# Patient Record
Sex: FEMALE | Race: ASIAN | Employment: OTHER | URBAN - METROPOLITAN AREA
[De-identification: names, ages, dates, MRNs, and addresses within clinical notes are randomized per-mention and may not be internally consistent; named-entity substitution may affect disease eponyms.]

---

## 2017-03-23 ENCOUNTER — OFFICE VISIT (OUTPATIENT)
Dept: INTERNAL MEDICINE CLINIC | Age: 62
End: 2017-03-23

## 2017-03-23 ENCOUNTER — CARE COORDINATION (OUTPATIENT)
Dept: INTERNAL MEDICINE CLINIC | Age: 62
End: 2017-03-23

## 2017-03-23 VITALS
HEIGHT: 60 IN | BODY MASS INDEX: 25.91 KG/M2 | DIASTOLIC BLOOD PRESSURE: 90 MMHG | SYSTOLIC BLOOD PRESSURE: 160 MMHG | WEIGHT: 132 LBS | OXYGEN SATURATION: 99 % | HEART RATE: 94 BPM | TEMPERATURE: 98.7 F

## 2017-03-23 DIAGNOSIS — E87.6 HYPOKALEMIA: Primary | ICD-10-CM

## 2017-03-23 DIAGNOSIS — E87.6 HYPOKALEMIA: ICD-10-CM

## 2017-03-23 DIAGNOSIS — I10 ESSENTIAL HYPERTENSION: ICD-10-CM

## 2017-03-23 LAB
ANION GAP SERPL CALCULATED.3IONS-SCNC: 17 MMOL/L (ref 3–16)
BUN BLDV-MCNC: 11 MG/DL (ref 7–20)
CALCIUM SERPL-MCNC: 8.8 MG/DL (ref 8.3–10.6)
CHLORIDE BLD-SCNC: 96 MMOL/L (ref 99–110)
CO2: 26 MMOL/L (ref 21–32)
CREAT SERPL-MCNC: 0.7 MG/DL (ref 0.6–1.2)
GFR AFRICAN AMERICAN: >60
GFR NON-AFRICAN AMERICAN: >60
GLUCOSE BLD-MCNC: 298 MG/DL (ref 70–99)
POTASSIUM SERPL-SCNC: 3.6 MMOL/L (ref 3.5–5.1)
SODIUM BLD-SCNC: 139 MMOL/L (ref 136–145)

## 2017-03-23 PROCEDURE — G8427 DOCREV CUR MEDS BY ELIG CLIN: HCPCS | Performed by: NURSE PRACTITIONER

## 2017-03-23 PROCEDURE — 3017F COLORECTAL CA SCREEN DOC REV: CPT | Performed by: NURSE PRACTITIONER

## 2017-03-23 PROCEDURE — 3014F SCREEN MAMMO DOC REV: CPT | Performed by: NURSE PRACTITIONER

## 2017-03-23 PROCEDURE — G8419 CALC BMI OUT NRM PARAM NOF/U: HCPCS | Performed by: NURSE PRACTITIONER

## 2017-03-23 PROCEDURE — 99214 OFFICE O/P EST MOD 30 MIN: CPT | Performed by: NURSE PRACTITIONER

## 2017-03-23 PROCEDURE — G8484 FLU IMMUNIZE NO ADMIN: HCPCS | Performed by: NURSE PRACTITIONER

## 2017-03-23 PROCEDURE — 1036F TOBACCO NON-USER: CPT | Performed by: NURSE PRACTITIONER

## 2017-04-06 ENCOUNTER — OFFICE VISIT (OUTPATIENT)
Dept: INTERNAL MEDICINE CLINIC | Age: 62
End: 2017-04-06

## 2017-04-06 VITALS
WEIGHT: 135 LBS | SYSTOLIC BLOOD PRESSURE: 140 MMHG | OXYGEN SATURATION: 98 % | HEART RATE: 62 BPM | BODY MASS INDEX: 26.5 KG/M2 | DIASTOLIC BLOOD PRESSURE: 80 MMHG | HEIGHT: 60 IN

## 2017-04-06 DIAGNOSIS — Z79.4 TYPE 2 DIABETES MELLITUS WITHOUT COMPLICATION, WITH LONG-TERM CURRENT USE OF INSULIN (HCC): Primary | Chronic | ICD-10-CM

## 2017-04-06 DIAGNOSIS — E11.9 TYPE 2 DIABETES MELLITUS WITHOUT COMPLICATION, WITH LONG-TERM CURRENT USE OF INSULIN (HCC): Primary | Chronic | ICD-10-CM

## 2017-04-06 DIAGNOSIS — I10 MALIGNANT HYPERTENSION: ICD-10-CM

## 2017-04-06 DIAGNOSIS — M54.42 CHRONIC LEFT-SIDED LOW BACK PAIN WITH LEFT-SIDED SCIATICA: ICD-10-CM

## 2017-04-06 DIAGNOSIS — F51.01 PRIMARY INSOMNIA: ICD-10-CM

## 2017-04-06 DIAGNOSIS — G89.29 CHRONIC LEFT-SIDED LOW BACK PAIN WITH LEFT-SIDED SCIATICA: ICD-10-CM

## 2017-04-06 DIAGNOSIS — E78.00 HYPERCHOLESTEREMIA: Chronic | ICD-10-CM

## 2017-04-06 LAB
A/G RATIO: 1.3 (ref 1.1–2.2)
ALBUMIN SERPL-MCNC: 4.3 G/DL (ref 3.4–5)
ALP BLD-CCNC: 59 U/L (ref 40–129)
ALT SERPL-CCNC: 16 U/L (ref 10–40)
ANION GAP SERPL CALCULATED.3IONS-SCNC: 14 MMOL/L (ref 3–16)
AST SERPL-CCNC: 16 U/L (ref 15–37)
BILIRUB SERPL-MCNC: 0.3 MG/DL (ref 0–1)
BUN BLDV-MCNC: 9 MG/DL (ref 7–20)
CALCIUM SERPL-MCNC: 9.3 MG/DL (ref 8.3–10.6)
CHLORIDE BLD-SCNC: 94 MMOL/L (ref 99–110)
CO2: 29 MMOL/L (ref 21–32)
CREAT SERPL-MCNC: 0.6 MG/DL (ref 0.6–1.2)
GFR AFRICAN AMERICAN: >60
GFR NON-AFRICAN AMERICAN: >60
GLOBULIN: 3.3 G/DL
GLUCOSE BLD-MCNC: 281 MG/DL (ref 70–99)
POTASSIUM SERPL-SCNC: 4 MMOL/L (ref 3.5–5.1)
SODIUM BLD-SCNC: 137 MMOL/L (ref 136–145)
TOTAL PROTEIN: 7.6 G/DL (ref 6.4–8.2)

## 2017-04-06 PROCEDURE — 99214 OFFICE O/P EST MOD 30 MIN: CPT | Performed by: INTERNAL MEDICINE

## 2017-04-06 ASSESSMENT — ENCOUNTER SYMPTOMS
BLURRED VISION: 0
STRIDOR: 0
BACK PAIN: 0
SHORTNESS OF BREATH: 0
WHEEZING: 1
COUGH: 1
HEMOPTYSIS: 0
GASTROINTESTINAL NEGATIVE: 1
SORE THROAT: 0
SPUTUM PRODUCTION: 1

## 2017-04-07 LAB
ESTIMATED AVERAGE GLUCOSE: 274.7 MG/DL
HBA1C MFR BLD: 11.2 %

## 2017-04-11 ENCOUNTER — TELEPHONE (OUTPATIENT)
Dept: INTERNAL MEDICINE CLINIC | Age: 62
End: 2017-04-11

## 2017-07-06 ENCOUNTER — OFFICE VISIT (OUTPATIENT)
Dept: INTERNAL MEDICINE CLINIC | Age: 62
End: 2017-07-06

## 2017-07-06 VITALS
OXYGEN SATURATION: 96 % | BODY MASS INDEX: 26.62 KG/M2 | DIASTOLIC BLOOD PRESSURE: 60 MMHG | WEIGHT: 135.6 LBS | HEIGHT: 60 IN | SYSTOLIC BLOOD PRESSURE: 98 MMHG | HEART RATE: 62 BPM

## 2017-07-06 DIAGNOSIS — M79.602 LEFT ARM PAIN: ICD-10-CM

## 2017-07-06 DIAGNOSIS — M54.42 CHRONIC LEFT-SIDED LOW BACK PAIN WITH LEFT-SIDED SCIATICA: Primary | ICD-10-CM

## 2017-07-06 DIAGNOSIS — E11.9 TYPE 2 DIABETES MELLITUS WITHOUT COMPLICATION, WITH LONG-TERM CURRENT USE OF INSULIN (HCC): Chronic | ICD-10-CM

## 2017-07-06 DIAGNOSIS — Z91.14 POOR COMPLIANCE WITH MEDICATION: ICD-10-CM

## 2017-07-06 DIAGNOSIS — E78.00 HYPERCHOLESTEREMIA: Chronic | ICD-10-CM

## 2017-07-06 DIAGNOSIS — I10 MALIGNANT HYPERTENSION: ICD-10-CM

## 2017-07-06 DIAGNOSIS — G89.29 CHRONIC LEFT-SIDED LOW BACK PAIN WITH LEFT-SIDED SCIATICA: Primary | ICD-10-CM

## 2017-07-06 DIAGNOSIS — Z79.4 TYPE 2 DIABETES MELLITUS WITHOUT COMPLICATION, WITH LONG-TERM CURRENT USE OF INSULIN (HCC): Chronic | ICD-10-CM

## 2017-07-06 PROCEDURE — 99214 OFFICE O/P EST MOD 30 MIN: CPT | Performed by: INTERNAL MEDICINE

## 2017-07-06 PROCEDURE — 93000 ELECTROCARDIOGRAM COMPLETE: CPT | Performed by: INTERNAL MEDICINE

## 2017-07-06 ASSESSMENT — ENCOUNTER SYMPTOMS
BLURRED VISION: 0
SORE THROAT: 0
HEMOPTYSIS: 0
COUGH: 0
SPUTUM PRODUCTION: 0
BACK PAIN: 1
GASTROINTESTINAL NEGATIVE: 1
WHEEZING: 0
STRIDOR: 0
SHORTNESS OF BREATH: 0

## 2017-07-08 ENCOUNTER — TELEPHONE (OUTPATIENT)
Dept: INTERNAL MEDICINE CLINIC | Age: 62
End: 2017-07-08

## 2017-07-10 ENCOUNTER — TELEPHONE (OUTPATIENT)
Dept: INTERNAL MEDICINE CLINIC | Age: 62
End: 2017-07-10

## 2017-07-10 DIAGNOSIS — Z79.4 TYPE 2 DIABETES MELLITUS WITHOUT COMPLICATION, WITH LONG-TERM CURRENT USE OF INSULIN (HCC): Chronic | ICD-10-CM

## 2017-07-10 DIAGNOSIS — E11.41 TYPE 2 DIABETES MELLITUS WITH DIABETIC MONONEUROPATHY, WITH LONG-TERM CURRENT USE OF INSULIN (HCC): ICD-10-CM

## 2017-07-10 DIAGNOSIS — E11.9 TYPE 2 DIABETES MELLITUS WITHOUT COMPLICATION, WITH LONG-TERM CURRENT USE OF INSULIN (HCC): Chronic | ICD-10-CM

## 2017-07-10 DIAGNOSIS — F51.01 PRIMARY INSOMNIA: ICD-10-CM

## 2017-07-10 DIAGNOSIS — Z79.4 TYPE 2 DIABETES MELLITUS WITH DIABETIC MONONEUROPATHY, WITH LONG-TERM CURRENT USE OF INSULIN (HCC): ICD-10-CM

## 2017-07-10 RX ORDER — TRAZODONE HYDROCHLORIDE 100 MG/1
TABLET ORAL
Qty: 90 TABLET | Refills: 3 | Status: SHIPPED | OUTPATIENT
Start: 2017-07-10 | End: 2017-08-02 | Stop reason: SDUPTHER

## 2017-07-12 ENCOUNTER — HOSPITAL ENCOUNTER (OUTPATIENT)
Dept: NON INVASIVE DIAGNOSTICS | Age: 62
Discharge: HOME | End: 2017-07-12

## 2017-07-12 DIAGNOSIS — M79.602 PAIN OF LEFT ARM: ICD-10-CM

## 2017-07-12 LAB
LV EF: 69 %
LVEF MODALITY: NORMAL

## 2017-07-13 ENCOUNTER — OFFICE VISIT (OUTPATIENT)
Dept: INTERNAL MEDICINE CLINIC | Age: 62
End: 2017-07-13

## 2017-07-13 VITALS
HEIGHT: 60 IN | HEART RATE: 70 BPM | WEIGHT: 140.6 LBS | SYSTOLIC BLOOD PRESSURE: 132 MMHG | TEMPERATURE: 98.8 F | DIASTOLIC BLOOD PRESSURE: 62 MMHG | BODY MASS INDEX: 27.61 KG/M2 | OXYGEN SATURATION: 97 %

## 2017-07-13 DIAGNOSIS — J21.9 ACUTE BRONCHIOLITIS DUE TO UNSPECIFIED ORGANISM: ICD-10-CM

## 2017-07-13 DIAGNOSIS — J20.9 ACUTE BRONCHITIS, UNSPECIFIED ORGANISM: ICD-10-CM

## 2017-07-13 PROCEDURE — 99213 OFFICE O/P EST LOW 20 MIN: CPT | Performed by: INTERNAL MEDICINE

## 2017-07-13 RX ORDER — GUAIFENESIN DEXTROMETHORPHAN HYDROBROMIDE ORAL SOLUTION 10; 100 MG/5ML; MG/5ML
10 SOLUTION ORAL EVERY 4 HOURS PRN
Qty: 120 ML | Refills: 0 | Status: SHIPPED | OUTPATIENT
Start: 2017-07-13 | End: 2017-10-10 | Stop reason: ALTCHOICE

## 2017-07-13 RX ORDER — DOXYCYCLINE HYCLATE 100 MG
100 TABLET ORAL 2 TIMES DAILY
Qty: 20 TABLET | Refills: 0 | Status: SHIPPED | OUTPATIENT
Start: 2017-07-13 | End: 2017-07-23

## 2017-07-13 RX ORDER — CEFUROXIME AXETIL 500 MG/1
500 TABLET ORAL 2 TIMES DAILY
Qty: 20 TABLET | Refills: 0 | Status: SHIPPED | OUTPATIENT
Start: 2017-07-13 | End: 2017-07-23

## 2017-07-13 ASSESSMENT — ENCOUNTER SYMPTOMS
BLURRED VISION: 0
STRIDOR: 0
BACK PAIN: 0
SHORTNESS OF BREATH: 0
HEMOPTYSIS: 0
GASTROINTESTINAL NEGATIVE: 1
SPUTUM PRODUCTION: 1
WHEEZING: 1
COUGH: 1
SORE THROAT: 1

## 2017-07-18 ENCOUNTER — TELEPHONE (OUTPATIENT)
Dept: INTERNAL MEDICINE CLINIC | Age: 62
End: 2017-07-18

## 2017-08-02 DIAGNOSIS — E11.41 TYPE 2 DIABETES MELLITUS WITH DIABETIC MONONEUROPATHY, WITH LONG-TERM CURRENT USE OF INSULIN (HCC): ICD-10-CM

## 2017-08-02 DIAGNOSIS — E11.9 TYPE 2 DIABETES MELLITUS WITHOUT COMPLICATION, WITH LONG-TERM CURRENT USE OF INSULIN (HCC): Chronic | ICD-10-CM

## 2017-08-02 DIAGNOSIS — E78.00 HYPERCHOLESTEREMIA: Chronic | ICD-10-CM

## 2017-08-02 DIAGNOSIS — Z79.4 TYPE 2 DIABETES MELLITUS WITH DIABETIC MONONEUROPATHY, WITH LONG-TERM CURRENT USE OF INSULIN (HCC): ICD-10-CM

## 2017-08-02 DIAGNOSIS — M48.02 CERVICAL SPINAL STENOSIS: ICD-10-CM

## 2017-08-02 DIAGNOSIS — I10 MALIGNANT HYPERTENSION: ICD-10-CM

## 2017-08-02 DIAGNOSIS — Z79.4 TYPE 2 DIABETES MELLITUS WITHOUT COMPLICATION, WITH LONG-TERM CURRENT USE OF INSULIN (HCC): Chronic | ICD-10-CM

## 2017-08-02 DIAGNOSIS — F51.01 PRIMARY INSOMNIA: ICD-10-CM

## 2017-08-02 DIAGNOSIS — R41.3 MEMORY LOSS: ICD-10-CM

## 2017-08-02 RX ORDER — TRAZODONE HYDROCHLORIDE 100 MG/1
TABLET ORAL
Qty: 90 TABLET | Refills: 3 | Status: SHIPPED | OUTPATIENT
Start: 2017-08-02 | End: 2017-08-02 | Stop reason: SDUPTHER

## 2017-08-02 RX ORDER — GABAPENTIN 300 MG/1
300 CAPSULE ORAL 3 TIMES DAILY
Qty: 270 CAPSULE | Refills: 3 | Status: SHIPPED | OUTPATIENT
Start: 2017-08-02 | End: 2017-10-30 | Stop reason: SDUPTHER

## 2017-08-02 RX ORDER — ATORVASTATIN CALCIUM 40 MG/1
40 TABLET, FILM COATED ORAL DAILY
Qty: 90 TABLET | Refills: 3 | Status: SHIPPED | OUTPATIENT
Start: 2017-08-02 | End: 2018-08-14 | Stop reason: SDUPTHER

## 2017-08-02 RX ORDER — LOSARTAN POTASSIUM AND HYDROCHLOROTHIAZIDE 25; 100 MG/1; MG/1
1 TABLET ORAL DAILY
Qty: 90 TABLET | Refills: 3 | Status: SHIPPED | OUTPATIENT
Start: 2017-08-02 | End: 2017-11-02 | Stop reason: SDUPTHER

## 2017-08-02 RX ORDER — METOPROLOL SUCCINATE 50 MG/1
50 TABLET, EXTENDED RELEASE ORAL DAILY
Qty: 90 TABLET | Refills: 3 | Status: SHIPPED | OUTPATIENT
Start: 2017-08-02 | End: 2017-08-02 | Stop reason: SDUPTHER

## 2017-08-02 RX ORDER — DONEPEZIL HYDROCHLORIDE 5 MG/1
5 TABLET, FILM COATED ORAL NIGHTLY
Qty: 90 TABLET | Refills: 3 | Status: SHIPPED | OUTPATIENT
Start: 2017-08-02 | End: 2017-08-02 | Stop reason: SDUPTHER

## 2017-08-02 RX ORDER — DONEPEZIL HYDROCHLORIDE 5 MG/1
5 TABLET, FILM COATED ORAL NIGHTLY
Qty: 90 TABLET | Refills: 3 | Status: SHIPPED | OUTPATIENT
Start: 2017-08-02 | End: 2017-11-02 | Stop reason: SDUPTHER

## 2017-08-02 RX ORDER — TRAZODONE HYDROCHLORIDE 100 MG/1
TABLET ORAL
Qty: 90 TABLET | Refills: 3 | Status: SHIPPED | OUTPATIENT
Start: 2017-08-02 | End: 2017-10-30 | Stop reason: SDUPTHER

## 2017-08-02 RX ORDER — METOPROLOL SUCCINATE 50 MG/1
50 TABLET, EXTENDED RELEASE ORAL DAILY
Qty: 90 TABLET | Refills: 3 | Status: SHIPPED | OUTPATIENT
Start: 2017-08-02 | End: 2017-10-23 | Stop reason: SDUPTHER

## 2017-08-02 RX ORDER — GABAPENTIN 300 MG/1
300 CAPSULE ORAL 3 TIMES DAILY
Qty: 270 CAPSULE | Refills: 3 | Status: SHIPPED | OUTPATIENT
Start: 2017-08-02 | End: 2017-08-02 | Stop reason: SDUPTHER

## 2017-08-02 RX ORDER — ATORVASTATIN CALCIUM 40 MG/1
40 TABLET, FILM COATED ORAL DAILY
Qty: 90 TABLET | Refills: 3 | Status: SHIPPED | OUTPATIENT
Start: 2017-08-02 | End: 2017-08-02 | Stop reason: SDUPTHER

## 2017-08-02 RX ORDER — LOSARTAN POTASSIUM AND HYDROCHLOROTHIAZIDE 25; 100 MG/1; MG/1
1 TABLET ORAL DAILY
Qty: 90 TABLET | Refills: 3 | Status: SHIPPED | OUTPATIENT
Start: 2017-08-02 | End: 2017-08-02 | Stop reason: SDUPTHER

## 2017-08-05 ENCOUNTER — TELEPHONE (OUTPATIENT)
Dept: INTERNAL MEDICINE CLINIC | Age: 62
End: 2017-08-05

## 2017-08-05 DIAGNOSIS — F51.01 PRIMARY INSOMNIA: ICD-10-CM

## 2017-08-05 DIAGNOSIS — M48.02 CERVICAL SPINAL STENOSIS: ICD-10-CM

## 2017-08-05 DIAGNOSIS — Z79.4 TYPE 2 DIABETES MELLITUS WITHOUT COMPLICATION, WITH LONG-TERM CURRENT USE OF INSULIN (HCC): Primary | Chronic | ICD-10-CM

## 2017-08-05 DIAGNOSIS — I10 ESSENTIAL HYPERTENSION: ICD-10-CM

## 2017-08-05 DIAGNOSIS — M54.42 CHRONIC LEFT-SIDED LOW BACK PAIN WITH LEFT-SIDED SCIATICA: ICD-10-CM

## 2017-08-05 DIAGNOSIS — E11.9 TYPE 2 DIABETES MELLITUS WITHOUT COMPLICATION, WITH LONG-TERM CURRENT USE OF INSULIN (HCC): Primary | Chronic | ICD-10-CM

## 2017-08-05 DIAGNOSIS — G89.29 CHRONIC LEFT-SIDED LOW BACK PAIN WITH LEFT-SIDED SCIATICA: ICD-10-CM

## 2017-08-05 DIAGNOSIS — G31.84 MCI (MILD COGNITIVE IMPAIRMENT): ICD-10-CM

## 2017-08-07 ENCOUNTER — TELEPHONE (OUTPATIENT)
Dept: INTERNAL MEDICINE CLINIC | Age: 62
End: 2017-08-07

## 2017-08-07 PROBLEM — G31.84 MCI (MILD COGNITIVE IMPAIRMENT): Status: ACTIVE | Noted: 2017-08-07

## 2017-08-08 ENCOUNTER — CARE COORDINATION (OUTPATIENT)
Dept: INTERNAL MEDICINE CLINIC | Age: 62
End: 2017-08-08

## 2017-08-08 DIAGNOSIS — G31.84 MCI (MILD COGNITIVE IMPAIRMENT): Primary | ICD-10-CM

## 2017-08-08 DIAGNOSIS — Z79.4 TYPE 2 DIABETES MELLITUS WITHOUT COMPLICATION, WITH LONG-TERM CURRENT USE OF INSULIN (HCC): Chronic | ICD-10-CM

## 2017-08-08 DIAGNOSIS — I10 ESSENTIAL HYPERTENSION: ICD-10-CM

## 2017-08-08 DIAGNOSIS — E11.9 TYPE 2 DIABETES MELLITUS WITHOUT COMPLICATION, WITH LONG-TERM CURRENT USE OF INSULIN (HCC): Chronic | ICD-10-CM

## 2017-08-16 ENCOUNTER — CARE COORDINATION (OUTPATIENT)
Dept: INTERNAL MEDICINE CLINIC | Age: 62
End: 2017-08-16

## 2017-08-17 ENCOUNTER — CARE COORDINATION (OUTPATIENT)
Dept: INTERNAL MEDICINE CLINIC | Age: 62
End: 2017-08-17

## 2017-08-21 ENCOUNTER — CARE COORDINATION (OUTPATIENT)
Dept: INTERNAL MEDICINE CLINIC | Age: 62
End: 2017-08-21

## 2017-08-22 ENCOUNTER — CARE COORDINATION (OUTPATIENT)
Dept: INTERNAL MEDICINE CLINIC | Age: 62
End: 2017-08-22

## 2017-08-24 ENCOUNTER — CARE COORDINATION (OUTPATIENT)
Dept: INTERNAL MEDICINE CLINIC | Age: 62
End: 2017-08-24

## 2017-08-29 ENCOUNTER — CARE COORDINATION (OUTPATIENT)
Dept: INTERNAL MEDICINE CLINIC | Age: 62
End: 2017-08-29

## 2017-09-06 ENCOUNTER — CARE COORDINATION (OUTPATIENT)
Dept: INTERNAL MEDICINE CLINIC | Age: 62
End: 2017-09-06

## 2017-09-18 ENCOUNTER — CARE COORDINATOR VISIT (OUTPATIENT)
Dept: INTERNAL MEDICINE CLINIC | Age: 62
End: 2017-09-18

## 2017-09-18 DIAGNOSIS — E11.41 TYPE 2 DIABETES MELLITUS WITH DIABETIC MONONEUROPATHY, UNSPECIFIED LONG TERM INSULIN USE STATUS: ICD-10-CM

## 2017-09-18 DIAGNOSIS — I10 MALIGNANT HYPERTENSION: ICD-10-CM

## 2017-09-18 RX ORDER — CLONIDINE HYDROCHLORIDE 0.2 MG/1
0.2 TABLET ORAL DAILY
Qty: 180 TABLET | Refills: 3 | Status: SHIPPED | OUTPATIENT
Start: 2017-09-18 | End: 2017-09-18 | Stop reason: SDUPTHER

## 2017-09-18 RX ORDER — CLONIDINE HYDROCHLORIDE 0.2 MG/1
0.2 TABLET ORAL DAILY
Qty: 180 TABLET | Refills: 3 | Status: SHIPPED | OUTPATIENT
Start: 2017-09-18 | End: 2018-09-27 | Stop reason: SDUPTHER

## 2017-10-02 ENCOUNTER — TELEPHONE (OUTPATIENT)
Dept: INTERNAL MEDICINE CLINIC | Age: 62
End: 2017-10-02

## 2017-10-02 PROBLEM — R07.2 PRECORDIAL PAIN: Status: ACTIVE | Noted: 2017-10-02

## 2017-10-02 PROBLEM — R07.9 CHEST PAIN: Status: ACTIVE | Noted: 2017-10-02

## 2017-10-02 NOTE — TELEPHONE ENCOUNTER
Tonia RN with [facility] is calling to speak with you regarding patient care please call her at [phone]

## 2017-10-04 ENCOUNTER — CARE COORDINATION (OUTPATIENT)
Dept: INTERNAL MEDICINE CLINIC | Age: 62
End: 2017-10-04

## 2017-10-04 ENCOUNTER — OFFICE VISIT (OUTPATIENT)
Dept: INTERNAL MEDICINE CLINIC | Age: 62
End: 2017-10-04

## 2017-10-04 VITALS
BODY MASS INDEX: 26.55 KG/M2 | OXYGEN SATURATION: 98 % | HEART RATE: 53 BPM | WEIGHT: 135.2 LBS | HEIGHT: 60 IN | SYSTOLIC BLOOD PRESSURE: 130 MMHG | DIASTOLIC BLOOD PRESSURE: 72 MMHG

## 2017-10-04 DIAGNOSIS — E78.00 HYPERCHOLESTEREMIA: Chronic | ICD-10-CM

## 2017-10-04 DIAGNOSIS — I10 ESSENTIAL HYPERTENSION: ICD-10-CM

## 2017-10-04 DIAGNOSIS — Z79.4 TYPE 2 DIABETES MELLITUS WITHOUT COMPLICATION, WITH LONG-TERM CURRENT USE OF INSULIN (HCC): Primary | Chronic | ICD-10-CM

## 2017-10-04 DIAGNOSIS — Z12.31 ENCOUNTER FOR SCREENING MAMMOGRAM FOR MALIGNANT NEOPLASM OF BREAST: ICD-10-CM

## 2017-10-04 DIAGNOSIS — R07.2 PRECORDIAL PAIN: ICD-10-CM

## 2017-10-04 DIAGNOSIS — E11.9 TYPE 2 DIABETES MELLITUS WITHOUT COMPLICATION, WITH LONG-TERM CURRENT USE OF INSULIN (HCC): Chronic | ICD-10-CM

## 2017-10-04 DIAGNOSIS — Z79.4 TYPE 2 DIABETES MELLITUS WITHOUT COMPLICATION, WITH LONG-TERM CURRENT USE OF INSULIN (HCC): Chronic | ICD-10-CM

## 2017-10-04 DIAGNOSIS — G31.84 MCI (MILD COGNITIVE IMPAIRMENT): ICD-10-CM

## 2017-10-04 DIAGNOSIS — E11.9 TYPE 2 DIABETES MELLITUS WITHOUT COMPLICATION, WITH LONG-TERM CURRENT USE OF INSULIN (HCC): Primary | Chronic | ICD-10-CM

## 2017-10-04 DIAGNOSIS — M54.42 CHRONIC LEFT-SIDED LOW BACK PAIN WITH LEFT-SIDED SCIATICA: ICD-10-CM

## 2017-10-04 DIAGNOSIS — G89.29 CHRONIC LEFT-SIDED LOW BACK PAIN WITH LEFT-SIDED SCIATICA: ICD-10-CM

## 2017-10-04 LAB
A/G RATIO: 1.2 (ref 1.1–2.2)
ALBUMIN SERPL-MCNC: 4.2 G/DL (ref 3.4–5)
ALP BLD-CCNC: 46 U/L (ref 40–129)
ALT SERPL-CCNC: 15 U/L (ref 10–40)
ANION GAP SERPL CALCULATED.3IONS-SCNC: 13 MMOL/L (ref 3–16)
AST SERPL-CCNC: 18 U/L (ref 15–37)
BASOPHILS ABSOLUTE: 0.1 K/UL (ref 0–0.2)
BASOPHILS RELATIVE PERCENT: 0.7 %
BILIRUB SERPL-MCNC: 0.5 MG/DL (ref 0–1)
BUN BLDV-MCNC: 15 MG/DL (ref 7–20)
CALCIUM SERPL-MCNC: 10 MG/DL (ref 8.3–10.6)
CHLORIDE BLD-SCNC: 97 MMOL/L (ref 99–110)
CHOLESTEROL, TOTAL: 165 MG/DL (ref 0–199)
CO2: 31 MMOL/L (ref 21–32)
CREAT SERPL-MCNC: 0.7 MG/DL (ref 0.6–1.2)
EOSINOPHILS ABSOLUTE: 0.2 K/UL (ref 0–0.6)
EOSINOPHILS RELATIVE PERCENT: 2.6 %
GFR AFRICAN AMERICAN: >60
GFR NON-AFRICAN AMERICAN: >60
GLOBULIN: 3.6 G/DL
GLUCOSE BLD-MCNC: 241 MG/DL (ref 70–99)
HCT VFR BLD CALC: 39.7 % (ref 36–48)
HDLC SERPL-MCNC: 58 MG/DL (ref 40–60)
HEMOGLOBIN: 13.4 G/DL (ref 12–16)
LDL CHOLESTEROL CALCULATED: 67 MG/DL
LYMPHOCYTES ABSOLUTE: 2.3 K/UL (ref 1–5.1)
LYMPHOCYTES RELATIVE PERCENT: 31 %
MCH RBC QN AUTO: 30.7 PG (ref 26–34)
MCHC RBC AUTO-ENTMCNC: 33.7 G/DL (ref 31–36)
MCV RBC AUTO: 91 FL (ref 80–100)
MONOCYTES ABSOLUTE: 0.6 K/UL (ref 0–1.3)
MONOCYTES RELATIVE PERCENT: 8.6 %
NEUTROPHILS ABSOLUTE: 4.3 K/UL (ref 1.7–7.7)
NEUTROPHILS RELATIVE PERCENT: 57.1 %
PDW BLD-RTO: 13.4 % (ref 12.4–15.4)
PLATELET # BLD: 248 K/UL (ref 135–450)
PMV BLD AUTO: 9.2 FL (ref 5–10.5)
POTASSIUM SERPL-SCNC: 4.2 MMOL/L (ref 3.5–5.1)
RBC # BLD: 4.36 M/UL (ref 4–5.2)
SODIUM BLD-SCNC: 141 MMOL/L (ref 136–145)
TOTAL PROTEIN: 7.8 G/DL (ref 6.4–8.2)
TRIGL SERPL-MCNC: 198 MG/DL (ref 0–150)
TSH SERPL DL<=0.05 MIU/L-ACNC: 1.69 UIU/ML (ref 0.27–4.2)
VLDLC SERPL CALC-MCNC: 40 MG/DL
WBC # BLD: 7.5 K/UL (ref 4–11)

## 2017-10-04 PROCEDURE — 99214 OFFICE O/P EST MOD 30 MIN: CPT | Performed by: INTERNAL MEDICINE

## 2017-10-04 ASSESSMENT — ENCOUNTER SYMPTOMS
COUGH: 0
SHORTNESS OF BREATH: 0
STRIDOR: 0
SORE THROAT: 0
HEMOPTYSIS: 0
BLURRED VISION: 0
GASTROINTESTINAL NEGATIVE: 1
BACK PAIN: 0
WHEEZING: 0

## 2017-10-04 NOTE — CARE COORDINATION
Ambulatory Care Coordination Note  10/4/2017  CM Risk Score: 2  Yamilet Mortality Risk Score:      ACC: Karthikeyan Gomez, RN    Summary Note: Met with pt and boyfriend Baldo. Reviewed medication list with both. Pt has been taking Lantus 40U QD instead of BID. Discussed with Dr Go Loera and confirmed that pt should be taking it BID. Reviewed Clonidine order as well. Diabetes Assessment    Medic Alert ID:  No   Meal Planning:  Avoidance of concentrated sweets   How often do you test your blood sugar?:  Daily   Do you have barriers with adherence to non-pharmacologic self-management interventions? (Nutrition/Exercise/Self-Monitoring):  Yes   Have you ever had to go to the ED for symptoms of low blood sugar?:  No      No patient-reported symptoms   Do you have hyperglycemia symptoms?:  No   Do you have hypoglycemia symptoms?:  No   Last Blood Sugar Value:  130   Blood Sugar Monitoring Regimen:  Morning Fasting   Blood Sugar Trends:  Fluctuating (Comment: FBS's are 130-170's. Before dinner 140-200's.)                   Care Coordination Interventions    Referral from Primary Care Provider:  No   Suggested Interventions and Community Resources   Diabetes Education: In Process (Comment: Pt with fluctuating BS's)   Other Services or Interventions:  VA benefits             Goals Addressed     None          Prior to Admission medications    Medication Sig Start Date End Date Taking?  Authorizing Provider   aspirin 325 MG tablet Take 1 tablet by mouth daily 10/3/17   Adelaida Elaine MD   amLODIPine (NORVASC) 5 MG tablet Take 1 tablet by mouth daily 10/3/17   Adelaida Elaine MD   cloNIDine (CATAPRES) 0.2 MG tablet Take 1 tablet by mouth daily And as needed Take an extra tablet for SBP >160 9/18/17   Adelaida Elaine MD   traZODone (DESYREL) 100 MG tablet TAKE ONE TABLET BY MOUTH DAILY AS NEEDED FOR SLEEP 8/2/17   Adelaida Elaine MD   empagliflozin (JARDIANCE) 25 MG tablet Take 25 mg by mouth daily 8/2/17 Warden Cesar MD   SITagliptin (JANUVIA) 100 MG tablet Take 1 tablet by mouth daily For diabetes 8/2/17   Warden Cesar MD   metoprolol succinate (TOPROL XL) 50 MG extended release tablet Take 1 tablet by mouth daily For heart 8/2/17   Warden Cesar MD   metFORMIN (GLUCOPHAGE) 1000 MG tablet Take 1 tablet by mouth 2 times daily (with meals) For diabetes 8/2/17   Warden Cesar MD   losartan-hydrochlorothiazide (HYZAAR) 100-25 MG per tablet Take 1 tablet by mouth daily 8/2/17   Warden Cesar MD   insulin glargine (LANTUS SOLOSTAR) 100 UNIT/ML injection pen Inject 40 Units into the skin 2 times daily 8/2/17   Warden Cesar MD   gabapentin (NEURONTIN) 300 MG capsule Take 1 capsule by mouth 3 times daily For nerve pain related to diabetes 8/2/17   Warden Cesar MD   donepezil (ARICEPT) 5 MG tablet Take 1 tablet by mouth nightly 8/2/17   Warden Cesar MD   atorvastatin (LIPITOR) 40 MG tablet Take 1 tablet by mouth daily For cholesterol and heart 8/2/17   Warden Cesar MD   dextromethorphan-guaiFENesin (ROBITUSSIN-DM)  MG/5ML syrup Take 10 mLs by mouth every 4 hours as needed for Cough 7/13/17   Warden Cesar MD   Acetaminophen 650 MG TABS Take 650 mg by mouth every 4 hours as needed for Pain 5/12/16   Warden Cesar MD   Insulin Pen Needle 31G X 5 MM MISC 1 each by Does not apply route daily 5/12/16   Warden Cesar MD   glucose blood VI test strips (FREESTYLE LITE) strip 1 each by In Vitro route 2 times daily (before meals) 5/12/16   Warden Cesar MD   FREESTYLE LANCETS MISC 1 each by Does not apply route 2 times daily (before meals) 5/12/16   Warden Cesar MD   Blood Glucose Monitoring Suppl (FREESTYLE LITE) SHANNEN 1 Device by Does not apply route 2 times daily (before meals) 5/12/16   Warden Cesar MD       Future Appointments  Date Time Provider Nael Millie   10/10/2017 9:30 AM Gadiel Carballo MD [facility]

## 2017-10-04 NOTE — PROGRESS NOTES
OUTPATIENT PROGRESS NOTE    Date of Service:  10/4/2017  Address: [address] [place] [address] INTERNAL MEDICINE  [address] [place] [address]  Dept: [phone]    Subjective:      Patient ID: [id number]  Edwar Dukes is a 57 y.o. female with:  Chief Complaint   Patient presents with    Hyperlipidemia     HPI: 56 y.o. female immigrant from the [facility] () w/ PMHx DM, HTN, HLD, and insomnia who p/w chest pain, brought to the ED by her boyfriend. Was admitted found in HTN emergency, dropped her BP with addition of amlodipine [facility]     Has been compliant with meds now with very vigilant home health. BP much better on amlodipine      Blood sugar testin time per day; 130 - 170 recent avg 168  Meter: Freestyle Lite meter  Hypoglycemia: several per week, mostly in afternoon after exercise  Symptoms: rarely shaking/ sweaty, more often non-specific sensation Threshold: 40's       Last dilated eye exam: Will give referral today to Dr. Marlon Cameron  Last dental exam: q6mos  Last podiatry exam: n/a  Associated symptoms: Now endorses polyuria, claudication, and neuropathy symptoms      Pt has sx's related to elevated blood pressure including HA. No CP, SOB, vision changes, orthopnea, and edema. Has been feeling dizzy lately.  Jordan Sterling for [facility]. Normal pap last year. Review of Systems   Constitutional: Negative for chills, diaphoresis, fever, malaise/fatigue and weight loss. HENT: Negative for congestion, ear discharge, ear pain, hearing loss, nosebleeds, sore throat and tinnitus. Eyes: Negative for blurred vision. Respiratory: Negative for cough, hemoptysis, shortness of breath, wheezing and stridor. Cardiovascular: Negative for chest pain. Gastrointestinal: Negative. Genitourinary: Negative. Musculoskeletal: Positive for myalgias. Negative for back pain. Skin: Negative. Neurological: Negative for dizziness, weakness and headaches. Endo/Heme/Allergies: Negative. Psychiatric/Behavioral: Positive for memory loss. All other systems reviewed and are negative. Objective: Wt Readings from Last 3 Encounters:   10/04/17 135 lb 3.2 oz (61.3 kg)   10/03/17 138 lb 10.7 oz (62.9 kg)   07/13/17 140 lb 9.6 oz (63.8 kg)     BP Readings from Last 3 Encounters:   10/04/17 130/72   10/03/17 105/64   07/13/17 132/62      Vitals:    10/04/17 1001   BP: 130/72   Site: Right Arm   Pulse: 53   SpO2: 98%   Weight: 135 lb 3.2 oz (61.3 kg)   Height: 5' (1.524 m)     Body mass index is 26.4 kg/(m^2). Physical Exam   Constitutional: She is oriented to person, place, and time and well-developed, well-nourished, and in no distress. Vital signs are normal. She appears not lethargic, to not be writhing in pain, not malnourished, not dehydrated and not jaundiced. She appears healthy. She appears not cachectic. Non-toxic appearance. She does not have a sickly appearance. No distress. HENT:   Head: Normocephalic and atraumatic. Right Ear: Hearing, tympanic membrane, external ear and ear canal normal.   Left Ear: Hearing, tympanic membrane, external ear and ear canal normal.   Nose: Nose normal.   Mouth/Throat: Oropharynx is clear and moist. No oropharyngeal exudate. Eyes: Conjunctivae and EOM are normal. Pupils are equal, round, and reactive to light. Right eye exhibits no discharge. Left eye exhibits no discharge. No scleral icterus. Neck: Normal range of motion. Neck supple. No JVD present. No tracheal deviation present. No thyromegaly present. Cardiovascular: Normal rate, regular rhythm, normal heart sounds and intact distal pulses. Exam reveals no gallop and no friction rub. No murmur heard. Pulmonary/Chest: Effort normal and breath sounds normal. No stridor. No respiratory distress. She has no wheezes. She has no rales. She exhibits no tenderness. Abdominal: Soft. Bowel sounds are normal. She exhibits no distension and no mass.  There is no tenderness. There is no rebound and no guarding. Musculoskeletal: She exhibits no edema. Left shoulder: She exhibits decreased range of motion, tenderness and bony tenderness. Lumbar back: She exhibits decreased range of motion. Left upper leg: She exhibits tenderness. Lymphadenopathy:     She has no cervical adenopathy. Neurological: She is oriented to person, place, and time. She has normal reflexes. She appears not lethargic. No cranial nerve deficit. She exhibits normal muscle tone. Gait normal. Coordination normal.   Skin: Skin is warm and dry. No rash noted. She is not diaphoretic. No erythema. No pallor. Psychiatric: Mood, memory, affect and judgment normal.   Nursing note and vitals reviewed. Assessment/Plan:      Encounter Diagnoses   Name Primary?  Type 2 diabetes mellitus without complication, with long-term current use of insulin (HCC) Yes    Precordial pain     MCI (mild cognitive impairment)     Chronic left-sided low back pain with left-sided sciatica     Essential hypertension     Hypercholesteremia     Encounter for screening mammogram for malignant neoplasm of breast      1. Type 2 diabetes mellitus without complication, with long-term current use of insulin (HCC)  Blood glucose control better with home tracking, she should get more extended home health to help with compliance as Heron Bush also has memory issues, this complicates the care of both of them. - CBC Auto Differential; Future  - Comprehensive Metabolic Panel; Future  - Hemoglobin A1C; Future  - TSH without Reflex; Future  - Lipid Panel; Future    2. Precordial pain  Resolved, needs to consider cardiology f/u in the future    3. MCI (mild cognitive impairment)  Stable progression on aricept, should add memantine    4. Chronic left-sided low back pain with left-sided sciatica  Stable, actually improved with better weight control    5. Essential hypertension  Now on amlodipine as well.  ASA for elevated ASCVD scoring    6. Hypercholesteremia  On high intensity statin, no myalgias, goal LDL < 70. Not at goal      Additional Orders:      Orders Placed This Encounter   Procedures    MARJORIE DIGITAL SCREEN W CAD BILATERAL     Standing Status:   Future     Standing Expiration Date:   12/5/2018     Order Specific Question:   Reason for exam:     Answer:   Screening mammogram    CBC Auto Differential     Standing Status:   Future     Standing Expiration Date:   10/4/2018    Comprehensive Metabolic Panel     Standing Status:   Future     Standing Expiration Date:   10/4/2018    Hemoglobin A1C     Standing Status:   Future     Standing Expiration Date:   10/4/2018    TSH without Reflex     Standing Status:   Future     Standing Expiration Date:   10/4/2018    Lipid Panel     Standing Status:   Future     Standing Expiration Date:   10/4/2018     Order Specific Question:   Is Patient Fasting?/# of Hours     Answer:   14     No orders of the defined types were placed in this encounter. DISPOSITION:      Return in about 3 months (around 1/4/2018).   1. Greater than 25 minutes spent with patient and significant other and >20 minutes on medication dosing, use and lifestyle modifications, home safety    Oscar Fowler MD

## 2017-10-04 NOTE — MR AVS SNAPSHOT
After Visit Summary             Bethel Luu   10/4/2017 10:00 AM   Office Visit    Description:  Female : 1955   Provider:  Henry Sparks MD   Department:  [facility] Internal Medicine              Your Follow-Up and Future Appointments         Below is a list of your follow-up and future appointments. This may not be a complete list as you may have made appointments directly with providers that we are not aware of or your providers may have made some for you. Please call your providers to confirm appointments. It is important to keep your appointments. Please bring your current insurance card, photo ID, co-pay, and all medication bottles to your appointment. If self-pay, payment is expected at the time of service. Your To-Do List     Future Appointments Provider Department Dept Phone    10/10/2017 9:30 AM Dane Sutton MD [facility] [phone]    Please arrive 15 minutes prior to scheduled appointment time to complete paper work, bring photo ID and insurance cards. Future Orders Complete By Expires    CBC Auto Differential [POA4799 Custom]  10/4/2017 10/4/2018    Comprehensive Metabolic Panel [PTE26 Custom]  10/4/2017 10/4/2018    Hemoglobin A1C [LAB90 Custom]  10/4/2017 10/4/2018    Lipid Panel [LAB18 Custom]  10/4/2017 10/4/2018    MARJORIE DIGITAL SCREEN W CAD BILATERAL [ Custom]  10/4/2017 2018    TSH without Reflex [NMX814 Custom]  10/4/2017 10/4/2018    Follow-Up    Return in about 3 months (around 2018).          Information from Your Visit        Department     Name Address Phone Fax    [facility] Internal Medicine MADELE[address]ina [address] [place] [address] [phone]      You Were Seen for:         Comments    Type 2 diabetes mellitus without complication, with long-term current use of insulin ([facility]   [[id number]]         Vital Signs     Blood Pressure Pulse Height Weight Oxygen Saturation Body Mass Index · Write your numbers in your log book, along with the date and time. What else should you know about the test?  Results for adults ages 25 and older (mm Hg):  · Normal (ideal): Systolic 341 or below. Diastolic 79 or below. · Prehypertension: Systolic 420 to 483. Diastolic 80 to 89. · Hypertension: Systolic 891 or above. Diastolic 90 or above. Follow-up care is a key part of your treatment and safety. Be sure to make and go to all appointments, and call your doctor if you are having problems. It's also a good idea to keep a list of the medicines you take. Where can you learn more? Go to [email or web address] and sign in to your CaseTrek account. Enter C427 in the Spanning Cloud Apps box to learn more about \"Home Blood Pressure Test: About This Test.\"     If you do not have an account, please click on the \"Sign Up Now\" link. Current as of: November 3, 2016  Content Version: 11.3  © 7951-5977 [employer]. Care instructions adapted under license by [facility] ([facility]). If you have questions about a medical condition or this instruction, always ask your healthcare professional. [place] [address] any warranty or liability for your use of this information.               Medications and Orders      Your Current Medications Are              aspirin 325 MG tablet Take 1 tablet by mouth daily    amLODIPine (NORVASC) 5 MG tablet Take 1 tablet by mouth daily    cloNIDine (CATAPRES) 0.2 MG tablet Take 1 tablet by mouth daily And as needed Take an extra tablet for SBP >160    traZODone (DESYREL) 100 MG tablet TAKE ONE TABLET BY MOUTH DAILY AS NEEDED FOR SLEEP    empagliflozin (JARDIANCE) 25 MG tablet Take 25 mg by mouth daily    SITagliptin (JANUVIA) 100 MG tablet Take 1 tablet by mouth daily For diabetes    metoprolol succinate (TOPROL XL) 50 MG extended release tablet Take 1 tablet by mouth daily For heart    metFORMIN (GLUCOPHAGE) 1000 MG tablet Take 1 tablet by mouth 2 times 1. In your Internet browser, go to [email or web address]  2. Click on the Sign Up Now link in the Sign In box. You will see the New Member Sign Up page. 3. Enter your Numerate Access Code exactly as it appears below. You will not need to use this code after youve completed the sign-up process. If you do not sign up before the expiration date, you must request a new code. Numerate Access Code: [email or web address]  Expires: 12/2/2017  9:45 AM    4. Enter your Social Security Number ([id number]) and Date of Birth (mm/dd/yyyy) as indicated and click Submit. You will be taken to the next sign-up page. 5. Create a Adisnt ID. This will be your Numerate login ID and cannot be changed, so think of one that is secure and easy to remember. 6. Create a Numerate password. You can change your password at any time. 7. Enter your Password Reset Question and Answer. This can be used at a later time if you forget your password. 8. Enter your e-mail address. You will receive e-mail notification when new information is available in [address] 9. Click Sign Up. You can now view your medical record. Additional Information  If you have questions, please contact the physician practice where you receive care. Remember, Numerate is NOT to be used for urgent needs. For medical emergencies, dial 911. For questions regarding your Numerate account call [phone]. If you have a clinical question, please call your doctor's office.

## 2017-10-04 NOTE — CARE COORDINATION
Call placed to pt's [address] [facility] Cora Wlaters to discuss pt's OV and medications.     Kashif ALAS

## 2017-10-04 NOTE — PATIENT INSTRUCTIONS
Home Blood Pressure Test: About This Test  What is it? A home blood pressure test allows you to keep track of your blood pressure at home. Blood pressure is a measure of the force of blood against the walls of your arteries. Blood pressure readings include two numbers, such as 130/80 (say \"130 over 80\"). The first number is the systolic pressure. The second number is the diastolic pressure. Why is this test done? You may do this test at home to:  · Find out if you have high blood pressure. · Track your blood pressure if you have high blood pressure. · Track how well medicine is working to reduce high blood pressure. · Check how lifestyle changes, such as weight loss and exercise, are affecting blood pressure. How can you prepare for the test?  · Do not use caffeine, tobacco, or medicines known to raise blood pressure (such as nasal decongestant sprays) for at least 30 minutes before taking your blood pressure. · Do not exercise for at least 30 minutes before taking your blood pressure. What happens before the test?  Take your blood pressure while you feel comfortable and relaxed. Sit quietly with both feet on the floor for at least 5 minutes before the test.  What happens during the test?  · Sit with your arm slightly bent and resting on a table so that your upper arm is at the same level as your heart. · Roll up your sleeve or take off your shirt to expose your upper arm. · Wrap the blood pressure cuff around your upper arm so that the lower edge of the cuff is about 1 inch above the bend of your elbow. Proceed with the following steps depending on if you are using an automatic or manual pressure monitor. Automatic blood pressure monitors  · Press the on/off button on the automatic monitor and wait until the ready-to-measure \"heart\" symbol appears next to zero in the display window. · Press the start button. The cuff will inflate and deflate by itself.   · Your blood pressure numbers will appear on the screen. · Write your numbers in your log book, along with the date and time. Manual blood pressure monitors  · Place the earpieces of a stethoscope in your ears, and place the bell of the stethoscope over the artery, just below the cuff. · Close the valve on the rubber inflating bulb. · Squeeze the bulb rapidly with your opposite hand to inflate the cuff until the dial or column of mercury reads about 30 mm Hg higher than your usual systolic pressure. If you do not know your usual pressure, inflate the cuff to 210 mm Hg or until the pulse at your wrist disappears. · Open the pressure valve just slightly by twisting or pressing the valve on the bulb. · As you watch the pressure slowly fall, note the level on the dial at which you first start to hear a pulsing or tapping sound through the stethoscope. This is your systolic blood pressure. · Continue letting the air out slowly. The sounds will become muffled and will finally disappear. Note the pressure when the sounds completely disappear. This is your diastolic blood pressure. Let out all the remaining air. · Write your numbers in your log book, along with the date and time. What else should you know about the test?  Results for adults ages 25 and older (mm Hg):  · Normal (ideal): Systolic 325 or below. Diastolic 79 or below. · Prehypertension: Systolic 332 to 701. Diastolic 80 to 89. · Hypertension: Systolic 319 or above. Diastolic 90 or above. Follow-up care is a key part of your treatment and safety. Be sure to make and go to all appointments, and call your doctor if you are having problems. It's also a good idea to keep a list of the medicines you take. Where can you learn more? Go to [email or web address] and sign in to your [email or web address] account.  Enter C427 in the eTax Credit Exchange box to learn more about \"Home Blood Pressure Test: About This Test.\"     If you do not have an account, please click on the \"Sign Up Now\" link.  Current as of: November 3, 2016  Content Version: 11.3  © 0147-5871 [employer], [employer]. Care instructions adapted under license by [employer]. If you have questions about a medical condition or this instruction, always ask your healthcare professional. [address] any warranty or liability for your use of this information.

## 2017-10-05 LAB
ESTIMATED AVERAGE GLUCOSE: 162.8 MG/DL
HBA1C MFR BLD: 7.3 %

## 2017-10-09 ENCOUNTER — HOSPITAL ENCOUNTER (OUTPATIENT)
Dept: WOMENS IMAGING | Age: 62
Discharge: HOME | End: 2017-10-09
Attending: INTERNAL MEDICINE | Admitting: INTERNAL MEDICINE

## 2017-10-09 DIAGNOSIS — Z12.31 ENCOUNTER FOR SCREENING MAMMOGRAM FOR MALIGNANT NEOPLASM OF BREAST: ICD-10-CM

## 2017-10-10 ENCOUNTER — OFFICE VISIT (OUTPATIENT)
Dept: CARDIOLOGY CLINIC | Age: 62
End: 2017-10-10

## 2017-10-10 VITALS
DIASTOLIC BLOOD PRESSURE: 62 MMHG | OXYGEN SATURATION: 97 % | SYSTOLIC BLOOD PRESSURE: 110 MMHG | BODY MASS INDEX: 26.7 KG/M2 | HEIGHT: 60 IN | HEART RATE: 68 BPM | WEIGHT: 136 LBS

## 2017-10-10 DIAGNOSIS — Z79.4 TYPE 2 DIABETES MELLITUS WITHOUT COMPLICATION, WITH LONG-TERM CURRENT USE OF INSULIN (HCC): Chronic | ICD-10-CM

## 2017-10-10 DIAGNOSIS — I10 ESSENTIAL HYPERTENSION: ICD-10-CM

## 2017-10-10 DIAGNOSIS — E11.9 TYPE 2 DIABETES MELLITUS WITHOUT COMPLICATION, WITH LONG-TERM CURRENT USE OF INSULIN (HCC): Chronic | ICD-10-CM

## 2017-10-10 DIAGNOSIS — R07.2 PRECORDIAL PAIN: Primary | ICD-10-CM

## 2017-10-10 PROCEDURE — 99214 OFFICE O/P EST MOD 30 MIN: CPT | Performed by: INTERNAL MEDICINE

## 2017-10-10 PROCEDURE — 1036F TOBACCO NON-USER: CPT | Performed by: INTERNAL MEDICINE

## 2017-10-10 PROCEDURE — G8482 FLU IMMUNIZE ORDER/ADMIN: HCPCS | Performed by: INTERNAL MEDICINE

## 2017-10-10 PROCEDURE — 3017F COLORECTAL CA SCREEN DOC REV: CPT | Performed by: INTERNAL MEDICINE

## 2017-10-10 PROCEDURE — 1111F DSCHRG MED/CURRENT MED MERGE: CPT | Performed by: INTERNAL MEDICINE

## 2017-10-10 PROCEDURE — 3014F SCREEN MAMMO DOC REV: CPT | Performed by: INTERNAL MEDICINE

## 2017-10-10 PROCEDURE — G8417 CALC BMI ABV UP PARAM F/U: HCPCS | Performed by: INTERNAL MEDICINE

## 2017-10-10 PROCEDURE — G8427 DOCREV CUR MEDS BY ELIG CLIN: HCPCS | Performed by: INTERNAL MEDICINE

## 2017-10-10 PROCEDURE — 3046F HEMOGLOBIN A1C LEVEL >9.0%: CPT | Performed by: INTERNAL MEDICINE

## 2017-10-10 NOTE — PROGRESS NOTES
[facility], [place]  Cardiology Consult Note      Bard Alexander  1955, 57 y.o.    CC: Chest Pain/Left arm pain                 Yoselin Cook MD:      HPI:   This is a 57 y.o. female with a PMH significant for HTN, Type 2 DM and Hypercholesteremia who presented to ED 10/2/17 for evaluation of chest pain. Pt was [id number]/2/17-10/3/17 for evaluation. She was seen by my partner Dr. Elias Fernandez. Pt had Lexiscan 7/2017 showing no reversible ischemia. Patient had unremarkable work-up and was discharged home. She was told that a LHC would be considered if she has recurrence of chest pain and that she would possibly benefit from an event monitor. Patient is here today for hospital f/u. She says she continues to have chest pain that radiates to her left shoulder pain. This pain is non exertional. She denies any nausea, vomiting, diaphoresis, palpitations or dizziness. In general, she has been doing well since hospital discharge. Continues to take medication as prescribed.        Past Medical History:   Diagnosis Date    Diabetes mellitus ([address]     NIDDM    Headache     Herniated disc, cervical     Hypercholesteremia     Hypertension     Memory loss     short term    Psychiatric problem       Past Surgical History:   Procedure Laterality Date    SHOULDER SURGERY      TUBAL LIGATION        Family History   Problem Relation Age of Onset    Diabetes Mother     High Blood Pressure Mother     Diabetes Father     High Blood Pressure Father       Social History   Substance Use Topics    Smoking status: Never Smoker    Smokeless tobacco: Not on file    Alcohol use No     No Known Allergies      Review of Systems -   Constitutional: Negative for weight gain/loss; malaise, fever  Respiratory: Negative for Asthma;  cough and hemoptysis  Cardiovascular: Negative for palpitations,dizziness   Gastrointestinal: Negative for abd.pain; constipation/diarrhea;    Genitourinary: Negative for stones; hematuria; frequency hesitancy  Integumentt: Negative for rash or pruritis  Hematologic/lymphatic: Negative for blood dyscrasia; leukemia/lymphoma  Musculoskeletal: Negative for Connective tissue disease  Neurological:  Negative for Seizure   Behavioral/Psych:Negative for Bipolar disorder, Schizophrenia; Dementia  Endocrine: negative for thyroid, parathyroid disease    Physical Examination:    /62   Pulse 68   Ht 5' (1.524 m)   Wt 136 lb (61.7 kg)   SpO2 97%   BMI 26.56 kg/m²    HEENT:  Face: Atraumatic, Conjunctiva: Pink; non icteric,  Mucous Memb:  Moist, No thyromegaly or Lymphadenopathy  Respiratory:  Resp Assessment: normal, Resp Auscultation: clear  Cardiovascular: Auscultation: nl S1 & S2, Palpation:  Nl PMI;  No heaves or thrills, JVP:  normal  Abdomen: Soft, non-tender, Normal bowel sounds,  No organomegaly  Extremities: No Cyanosis or Clubbing  Neurological: Oriented to time, place, and person, Non-anxious  Psychiatric: Normal mood and affect  Skin: Warm and dry,  No rash seen     Outpatient Prescriptions Marked as Taking for the 10/10/17 encounter (Office Visit) with Néstor Washington MD   Medication Sig Dispense Refill    aspirin 325 MG tablet Take 1 tablet by mouth daily 30 tablet 3    amLODIPine (NORVASC) 5 MG tablet Take 1 tablet by mouth daily 30 tablet 3    cloNIDine (CATAPRES) 0.2 MG tablet Take 1 tablet by mouth daily And as needed Take an extra tablet for SBP >160 180 tablet 3    traZODone (DESYREL) 100 MG tablet TAKE ONE TABLET BY MOUTH DAILY AS NEEDED FOR SLEEP 90 tablet 3    SITagliptin (JANUVIA) 100 MG tablet Take 1 tablet by mouth daily For diabetes 90 tablet 3    metoprolol succinate (TOPROL XL) 50 MG extended release tablet Take 1 tablet by mouth daily For heart 90 tablet 3    metFORMIN (GLUCOPHAGE) 1000 MG tablet Take 1 tablet by mouth 2 times daily (with meals) For diabetes 180 tablet 3    insulin glargine (LANTUS SOLOSTAR) 100 UNIT/ML injection pen Inject 40 Units into the skin 2 times daily 81 mL 3    atorvastatin (LIPITOR) 40 MG tablet Take 1 tablet by mouth daily For cholesterol and heart 90 tablet 3    Acetaminophen 650 MG TABS Take 650 mg by mouth every 4 hours as needed for Pain 120 tablet 1    Insulin Pen Needle 31G X 5 MM MISC 1 each by Does not apply route daily 100 each 1    glucose blood VI test strips (FREESTYLE LITE) strip 1 each by In Vitro route 2 times daily (before meals) 100 each 3    FREESTYLE LANCETS MISC 1 each by Does not apply route 2 times daily (before meals) 100 each 3    Blood Glucose Monitoring Suppl (FREESTYLE LITE) SHANNEN 1 Device by Does not apply route 2 times daily (before meals) 1 Device 0       Labs:   Lab Results   Component Value Date    HDL 58 10/04/2017    HDL 53 11/22/2011    LDLDIRECT 150 04/30/2013    LDLCALC 67 10/04/2017    TRIG 198 10/04/2017         EKG: reviewed EKG from 10/1/17    ECHO:5/31/2016  Left ventricle size is normal. Mild concentric left ventricular hypertrophy is present. Ejection fraction is visually estimated to be 55-60 %. There is reversal of E/A inflow velocities across the mitral valve suggesting impaired left ventricular relaxation. Normal right ventricular size and function. Stress Nuclear: 7/12/2017  Pharmacological Stress/MPI Results:   1. Technically a satisfactory study.   2. Normal pharmacological stress portion of the study.   3. No evidence of Ischemia by Myocardial Perfusion Imaging.   4. Gated Study shows normal LV size and Systolic function; EF is 69 %.     ASSESSMENT AND PLAN:      Chest pain   Atypical. Noncardiac. Most likely involving left shoulder pathology  Recent stress from 7/2017 test was negative, showing no reversible ischemia. Currently takes full dose ASA; she can switch to 81 mg ASA daily (pt  would like to check with PCP). No further cardiac work up needed at this time.      Essential Hypertension   Uncontrolled during hospital stay. Started on BB and ACEI.   BP is controlled

## 2017-10-10 NOTE — PATIENT INSTRUCTIONS
Patient Education        Chest Pain: Care Instructions  Your Care Instructions  There are many things that can cause chest pain. Some are not serious and will get better on their own in a few days. But some kinds of chest pain need more testing and treatment. Your doctor may have recommended a follow-up visit in the next 8 to 12 hours. If you are not getting better, you may need more tests or treatment. Even though your doctor has released you, you still need to watch for any problems. The doctor carefully checked you, but sometimes problems can develop later. If you have new symptoms or if your symptoms do not get better, get medical care right away. If you have worse or different chest pain or pressure that lasts more than 5 minutes or you passed out (lost consciousness), call 911 or seek other emergency help right away. A medical visit is only one step in your treatment. Even if you feel better, you still need to do what your doctor recommends, such as going to all suggested follow-up appointments and taking medicines exactly as directed. This will help you recover and help prevent future problems. How can you care for yourself at home? · Rest until you feel better. · Take your medicine exactly as prescribed. Call your doctor if you think you are having a problem with your medicine. · Do not drive after taking a prescription pain medicine. When should you call for help? Call 911 if:  · You passed out (lost consciousness). · You have severe difficulty breathing. · You have symptoms of a heart attack. These may include:  ¨ Chest pain or pressure, or a strange feeling in your chest.  ¨ Sweating. ¨ Shortness of breath. ¨ Nausea or vomiting. ¨ Pain, pressure, or a strange feeling in your back, neck, jaw, or upper belly or in one or both shoulders or arms. ¨ Lightheadedness or sudden weakness. ¨ A fast or irregular heartbeat.   After you call 911, the  may tell you to chew 1 adult-strength or 2 to 4 low-dose aspirin. Wait for an ambulance. Do not try to drive yourself. Call your doctor today if:  · You have any trouble breathing. · Your chest pain gets worse. · You are dizzy or lightheaded, or you feel like you may faint. · You are not getting better as expected. · You are having new or different chest pain. Where can you learn more? Go to [email or web address].[email or web address]-[email or web address] org and sign in to your [employer] account. Enter A120 in the "[employer]" box to learn more about \"Chest Pain: Care Instructions. \"     If you do not have an account, please click on the \"Sign Up Now\" link. Current as of: March 20, 2017  Content Version: 11.3  © 3763-1351 [employer]. Care instructions adapted under license by [facility] ([facility]). If you have questions about a medical condition or this instruction, always ask your healthcare professional. [place] [address] any warranty or liability for your use of this information.

## 2017-10-20 ENCOUNTER — CARE COORDINATION (OUTPATIENT)
Dept: INTERNAL MEDICINE CLINIC | Age: 62
End: 2017-10-20

## 2017-10-20 NOTE — CARE COORDINATION
Ambulatory Care Coordination Note  10/20/2017  CM Risk Score: 2  Yamilet Mortality Risk Score:      ACC: Amber Leon, RN    Summary Note: Called pt's [facility] nurse Aaron Pedraza to f/u. She states pt's BP has been below 140/90 with daily Clonidine. Pt and SO are moving to another apt complex November 1st.  SO Isra Bowers is stressed about this. Aaron Pedraza sent SW to the home today to offer assistance with the move. Diabetes Assessment    Medic Alert ID:  No  Meal Planning:  Avoidance of concentrated sweets   How often do you test your blood sugar?:  Daily   Do you have barriers with adherence to non-pharmacologic self-management interventions? (Nutrition/Exercise/Self-Monitoring):  Yes   Have you ever had to go to the ED for symptoms of low blood sugar?:  No       No patient-reported symptoms   Do you have hyperglycemia symptoms?:  No   Do you have hypoglycemia symptoms?:  No   Last Blood Sugar Value:  123   Blood Sugar Monitoring Regimen:  Morning Fasting   Blood Sugar Trends:  No Change (Comment: BS's are below 130.)                Care Coordination Interventions    Program Enrollment:  Rising Risk  Referral from Primary Care Provider:  Yes  Suggested Interventions and Community Resources  Diabetes Education: In Process (Comment: Pt with fluctuating BS's. VN working with pt ans s/o)  [address]: In Process (Comment: [facility] for Skilled nurse)  Social Work:  In Process (Comment: Brittany [facility])  Other Services or Interventions:  VA benefits         Goals Addressed             Most Recent     Self Monitoring   On track (10/20/2017)             Self-Monitored Blood Glucose - I will notify my provider of any trends of increasing or decreasing blood sugars over a 1 month period. I will notify my provider if I have any blood sugar readings less than 70 more than 2 times a month.         Barriers: diet noncompliance  Plan for overcoming my barriers: Continued CC support  Confidence: 5/10  Anticipated Goal Completion FREESTYLE LANCETS MISC 1 each by Does not apply route 2 times daily (before meals) 5/12/16   Lenore Vasquez MD   Blood Glucose Monitoring Suppl (FREESTYLE LITE) SHANNEN 1 Device by Does not apply route 2 times daily (before meals) 5/12/16   Lenore Vasquez MD       No future appointments.

## 2017-10-23 DIAGNOSIS — Z79.4 TYPE 2 DIABETES MELLITUS WITH DIABETIC MONONEUROPATHY, WITH LONG-TERM CURRENT USE OF INSULIN (HCC): ICD-10-CM

## 2017-10-23 DIAGNOSIS — I10 MALIGNANT HYPERTENSION: ICD-10-CM

## 2017-10-23 DIAGNOSIS — E11.41 TYPE 2 DIABETES MELLITUS WITH DIABETIC MONONEUROPATHY, WITH LONG-TERM CURRENT USE OF INSULIN (HCC): ICD-10-CM

## 2017-10-23 RX ORDER — METOPROLOL SUCCINATE 50 MG/1
50 TABLET, EXTENDED RELEASE ORAL DAILY
Qty: 90 TABLET | Refills: 3 | Status: SHIPPED | OUTPATIENT
Start: 2017-10-23 | End: 2017-10-30 | Stop reason: SDUPTHER

## 2017-10-23 NOTE — TELEPHONE ENCOUNTER
Austin Landrum with [facility] calling on behalf of pt who want her Metoprolol called to [address] at [phone].     metoprolol succinate (TOPROL XL) 50 MG extended release tablet Take 1 tablet by mouth daily For

## 2017-10-30 DIAGNOSIS — E11.41 TYPE 2 DIABETES MELLITUS WITH DIABETIC MONONEUROPATHY, WITH LONG-TERM CURRENT USE OF INSULIN (HCC): ICD-10-CM

## 2017-10-30 DIAGNOSIS — Z79.4 TYPE 2 DIABETES MELLITUS WITH DIABETIC MONONEUROPATHY, WITH LONG-TERM CURRENT USE OF INSULIN (HCC): ICD-10-CM

## 2017-10-30 DIAGNOSIS — F51.01 PRIMARY INSOMNIA: ICD-10-CM

## 2017-10-30 DIAGNOSIS — I10 MALIGNANT HYPERTENSION: ICD-10-CM

## 2017-10-30 RX ORDER — METOPROLOL SUCCINATE 50 MG/1
50 TABLET, EXTENDED RELEASE ORAL DAILY
Qty: 90 TABLET | Refills: 0 | Status: SHIPPED | OUTPATIENT
Start: 2017-10-30 | End: 2018-04-26 | Stop reason: SDUPTHER

## 2017-10-30 RX ORDER — GABAPENTIN 300 MG/1
300 CAPSULE ORAL 3 TIMES DAILY
Qty: 270 CAPSULE | Refills: 0 | Status: SHIPPED | OUTPATIENT
Start: 2017-10-30 | End: 2018-04-26 | Stop reason: SDUPTHER

## 2017-10-30 RX ORDER — TRAZODONE HYDROCHLORIDE 100 MG/1
TABLET ORAL
Qty: 90 TABLET | Refills: 0 | Status: SHIPPED | OUTPATIENT
Start: 2017-10-30 | End: 2018-04-26 | Stop reason: SDUPTHER

## 2017-10-30 NOTE — TELEPHONE ENCOUNTER
metoprolol succinate (TOPROL XL) 50 MG extended release tablet Take 1 tablet by mouth daily For heart   aspirin 325 MG tablet Take 1 tablet by mouth daily     amLODIPine (NORVASC) 5 MG tablet Take 1 tablet by mouth daily     traZODone (DESYREL) 100 MG tablet TAKE ONE TABLET BY MOUTH DAILY AS NEEDED FOR SLEEP   SITagliptin (JANUVIA) 100 MG tablet Take 1 tablet by mouth daily For diabetes     metFORMIN (GLUCOPHAGE) 1000 MG tablet Take 1 tablet by mouth 2 times daily (with meals) For diabetes   losartan-hydrochlorothiazide (HYZAAR) 100-25 MG per tablet Take 1 tablet by mouth daily     gabapentin (NEURONTIN) 300 MG capsule Take 1 capsule by mouth 3 times daily For nerve pain related to diabetes   donepezil (ARICEPT) 5 MG tablet Take 1 tablet by mouth nightly     atorvastatin (LIPITOR) 40 MG tablet Take 1 tablet by mouth daily For cholesterol and heart     Acetaminophen 650 MG TABS Take 650 mg by mouth every 4 hours as needed for Pain       [facility] [address], [place] - [phone] [place] [phone] - F [phone]

## 2017-11-02 ENCOUNTER — TELEPHONE (OUTPATIENT)
Dept: INTERNAL MEDICINE CLINIC | Age: 62
End: 2017-11-02

## 2017-11-02 DIAGNOSIS — E11.9 TYPE 2 DIABETES MELLITUS WITHOUT COMPLICATION, WITH LONG-TERM CURRENT USE OF INSULIN (HCC): Chronic | ICD-10-CM

## 2017-11-02 DIAGNOSIS — R41.3 MEMORY LOSS: ICD-10-CM

## 2017-11-02 DIAGNOSIS — Z79.4 TYPE 2 DIABETES MELLITUS WITH DIABETIC MONONEUROPATHY, WITH LONG-TERM CURRENT USE OF INSULIN (HCC): ICD-10-CM

## 2017-11-02 DIAGNOSIS — E11.41 TYPE 2 DIABETES MELLITUS WITH DIABETIC MONONEUROPATHY, WITH LONG-TERM CURRENT USE OF INSULIN (HCC): ICD-10-CM

## 2017-11-02 DIAGNOSIS — Z79.4 TYPE 2 DIABETES MELLITUS WITHOUT COMPLICATION, WITH LONG-TERM CURRENT USE OF INSULIN (HCC): Chronic | ICD-10-CM

## 2017-11-02 DIAGNOSIS — I10 MALIGNANT HYPERTENSION: ICD-10-CM

## 2017-11-02 RX ORDER — DONEPEZIL HYDROCHLORIDE 5 MG/1
5 TABLET, FILM COATED ORAL NIGHTLY
Qty: 90 TABLET | Refills: 3 | Status: SHIPPED | OUTPATIENT
Start: 2017-11-02 | End: 2018-09-27 | Stop reason: SDUPTHER

## 2017-11-02 RX ORDER — LOSARTAN POTASSIUM AND HYDROCHLOROTHIAZIDE 25; 100 MG/1; MG/1
1 TABLET ORAL DAILY
Qty: 90 TABLET | Refills: 3 | Status: SHIPPED | OUTPATIENT
Start: 2017-11-02 | End: 2018-09-27 | Stop reason: SDUPTHER

## 2017-11-02 NOTE — TELEPHONE ENCOUNTER
Merlinda Corrente asking for meds below    losartan-hydrochlorothiazide (HYZAAR) 100-25 MG per tablet    donepezil (ARICEPT) 5 MG tablet Take 1 tablet by mouth nightly       SITagliptin (JANUVIA) 100 MG tablet Take 1 tablet by mouth daily For diabetes       insulin glargine (LANTUS SOLOSTAR) 100 UNIT/ML injection pen 81 mL 3 8/2/2017     Sig - Route: Inject 40 Units into the skin 2 times daily - Subcutaneous      empagliflozin (JARDIANCE) 25 MG tablet (Discontinued) 90 tablet 3 8/2/2017 8/2/2017    Sig - Route:  Take 25 mg by mouth daily - Oral          [facility], [place] - On license of [facility] [address] [facility] [address]. - F [phone]

## 2018-01-08 ENCOUNTER — CARE COORDINATION (OUTPATIENT)
Dept: CARE COORDINATION | Age: 63
End: 2018-01-08

## 2018-01-10 ENCOUNTER — OFFICE VISIT (OUTPATIENT)
Dept: INTERNAL MEDICINE CLINIC | Age: 63
End: 2018-01-10

## 2018-01-10 VITALS
DIASTOLIC BLOOD PRESSURE: 80 MMHG | OXYGEN SATURATION: 98 % | BODY MASS INDEX: 27.09 KG/M2 | HEIGHT: 60 IN | WEIGHT: 138 LBS | RESPIRATION RATE: 16 BRPM | TEMPERATURE: 97.8 F | HEART RATE: 54 BPM | SYSTOLIC BLOOD PRESSURE: 120 MMHG

## 2018-01-10 DIAGNOSIS — E53.8 LOW VITAMIN B12 LEVEL: Primary | ICD-10-CM

## 2018-01-10 DIAGNOSIS — E78.00 HYPERCHOLESTEREMIA: ICD-10-CM

## 2018-01-10 DIAGNOSIS — Z79.4 TYPE 2 DIABETES MELLITUS WITHOUT COMPLICATION, WITH LONG-TERM CURRENT USE OF INSULIN (HCC): ICD-10-CM

## 2018-01-10 DIAGNOSIS — F51.01 PRIMARY INSOMNIA: ICD-10-CM

## 2018-01-10 DIAGNOSIS — E11.9 TYPE 2 DIABETES MELLITUS WITHOUT COMPLICATION, WITH LONG-TERM CURRENT USE OF INSULIN (HCC): ICD-10-CM

## 2018-01-10 DIAGNOSIS — G31.84 MCI (MILD COGNITIVE IMPAIRMENT): ICD-10-CM

## 2018-01-10 DIAGNOSIS — I10 ESSENTIAL HYPERTENSION: ICD-10-CM

## 2018-01-10 DIAGNOSIS — I10 ESSENTIAL HYPERTENSION: Primary | ICD-10-CM

## 2018-01-10 LAB
A/G RATIO: 1.3 (ref 1.1–2.2)
ALBUMIN SERPL-MCNC: 4.2 G/DL (ref 3.4–5)
ALP BLD-CCNC: 56 U/L (ref 40–129)
ALT SERPL-CCNC: 21 U/L (ref 10–40)
ANION GAP SERPL CALCULATED.3IONS-SCNC: 15 MMOL/L (ref 3–16)
AST SERPL-CCNC: 24 U/L (ref 15–37)
BASOPHILS ABSOLUTE: 0 K/UL (ref 0–0.2)
BASOPHILS RELATIVE PERCENT: 0.6 %
BILIRUB SERPL-MCNC: 0.4 MG/DL (ref 0–1)
BUN BLDV-MCNC: 9 MG/DL (ref 7–20)
CALCIUM SERPL-MCNC: 9.4 MG/DL (ref 8.3–10.6)
CHLORIDE BLD-SCNC: 100 MMOL/L (ref 99–110)
CO2: 26 MMOL/L (ref 21–32)
CREAT SERPL-MCNC: 0.8 MG/DL (ref 0.6–1.2)
CREATININE URINE: 90.3 MG/DL (ref 28–259)
EOSINOPHILS ABSOLUTE: 0.1 K/UL (ref 0–0.6)
EOSINOPHILS RELATIVE PERCENT: 2 %
FOLATE: 15.34 NG/ML (ref 4.78–24.2)
GFR AFRICAN AMERICAN: >60
GFR NON-AFRICAN AMERICAN: >60
GLOBULIN: 3.3 G/DL
GLUCOSE BLD-MCNC: 113 MG/DL (ref 70–99)
HCT VFR BLD CALC: 39.5 % (ref 36–48)
HEMOGLOBIN: 12.9 G/DL (ref 12–16)
LYMPHOCYTES ABSOLUTE: 2.4 K/UL (ref 1–5.1)
LYMPHOCYTES RELATIVE PERCENT: 31.8 %
MCH RBC QN AUTO: 29.9 PG (ref 26–34)
MCHC RBC AUTO-ENTMCNC: 32.7 G/DL (ref 31–36)
MCV RBC AUTO: 91.4 FL (ref 80–100)
MICROALBUMIN UR-MCNC: <1.2 MG/DL
MICROALBUMIN/CREAT UR-RTO: NORMAL MG/G (ref 0–30)
MONOCYTES ABSOLUTE: 0.5 K/UL (ref 0–1.3)
MONOCYTES RELATIVE PERCENT: 7 %
NEUTROPHILS ABSOLUTE: 4.5 K/UL (ref 1.7–7.7)
NEUTROPHILS RELATIVE PERCENT: 58.6 %
PDW BLD-RTO: 13.7 % (ref 12.4–15.4)
PLATELET # BLD: 258 K/UL (ref 135–450)
PMV BLD AUTO: 8.7 FL (ref 5–10.5)
POTASSIUM SERPL-SCNC: 4.1 MMOL/L (ref 3.5–5.1)
RBC # BLD: 4.32 M/UL (ref 4–5.2)
SODIUM BLD-SCNC: 141 MMOL/L (ref 136–145)
TOTAL PROTEIN: 7.5 G/DL (ref 6.4–8.2)
VITAMIN B-12: 310 PG/ML (ref 211–911)
WBC # BLD: 7.7 K/UL (ref 4–11)

## 2018-01-10 PROCEDURE — 99214 OFFICE O/P EST MOD 30 MIN: CPT | Performed by: INTERNAL MEDICINE

## 2018-01-10 ASSESSMENT — ENCOUNTER SYMPTOMS
STRIDOR: 0
GASTROINTESTINAL NEGATIVE: 1
HEMOPTYSIS: 0
SORE THROAT: 0
BLURRED VISION: 0
WHEEZING: 0
COUGH: 0
BACK PAIN: 0
SHORTNESS OF BREATH: 0

## 2018-01-10 ASSESSMENT — PATIENT HEALTH QUESTIONNAIRE - PHQ9
1. LITTLE INTEREST OR PLEASURE IN DOING THINGS: 0
SUM OF ALL RESPONSES TO PHQ QUESTIONS 1-9: 0
2. FEELING DOWN, DEPRESSED OR HOPELESS: 0
SUM OF ALL RESPONSES TO PHQ9 QUESTIONS 1 & 2: 0

## 2018-01-10 NOTE — PATIENT INSTRUCTIONS
Patient Education        Learning About the Mediterranean Diet  What is the [address]? The Mediterranean diet is a style of eating rather than a diet plan. It features foods eaten in [place], [place], [place] and [place], and other countries along the [facility]. It emphasizes eating foods like fish, fruits, vegetables, beans, high-fiber breads and whole grains, nuts, and olive oil. This style of eating includes limited red meat, cheese, and sweets. Why choose the Mediterranean diet? A Mediterranean-style diet may improve heart health. It contains more fat than other heart-healthy diets. But the fats are mainly from nuts, unsaturated oils (such as fish oils and olive oil), and certain nut or seed oils (such as canola, soybean, or flaxseed oil). These fats may help protect the heart and blood vessels. How can you get started on the Mediterranean diet? Here are some things you can do to switch to a more Mediterranean way of eating. What to eat  · Eat a variety of fruits and vegetables each day, such as grapes, blueberries, tomatoes, broccoli, peppers, figs, olives, spinach, eggplant, beans, lentils, and chickpeas. · Eat a variety of whole-grain foods each day, such as oats, brown rice, and whole wheat bread, pasta, and couscous. · Eat fish at least 2 times a week. Try tuna, salmon, mackerel, lake trout, herring, or sardines. · Eat moderate amounts of low-fat dairy products, such as milk, cheese, or yogurt. · Eat moderate amounts of poultry and eggs. · Choose healthy (unsaturated) fats, such as nuts, olive oil, and certain nut or seed oils like canola, soybean, and flaxseed. · Limit unhealthy (saturated) fats, such as butter, palm oil, and coconut oil. And limit fats found in animal products, such as meat and dairy products made with whole milk. Try to eat red meat only a few times a month in very small amounts. · Limit sweets and desserts to only a few times a week.  This includes

## 2018-01-10 NOTE — CARE COORDINATION
Received call from Almita Birch. She saw pt on Monday and states pt's BS's and BP's look good. She states pt's caregiver Xavier Garcia is somwhat stresssed out with the care of pt. He doesn't like reminding her to take medications--however, pt does take them. Nadeen Falcon states the couple moved to a new apt a few month's ago and are now settled. The move was very stressful for Baldo. Pt has a dtr with 12 children and a son. Neither help out much. Xavier Garcia does not have children. Informed Nadeen Falcon that pt has an OV with Dr Loren Gamez today to discuss her increasing confusion--told Nadeen Falcon I will update her with the outcome.     Kashif ALAS

## 2018-01-11 LAB
ESTIMATED AVERAGE GLUCOSE: 168.6 MG/DL
HBA1C MFR BLD: 7.5 %

## 2018-01-16 ENCOUNTER — TELEPHONE (OUTPATIENT)
Dept: INTERNAL MEDICINE CLINIC | Age: 63
End: 2018-01-16

## 2018-01-29 ENCOUNTER — CARE COORDINATION (OUTPATIENT)
Dept: CARE COORDINATION | Age: 63
End: 2018-01-29

## 2018-02-05 ENCOUNTER — CARE COORDINATION (OUTPATIENT)
Dept: CARE COORDINATION | Age: 63
End: 2018-02-05

## 2018-02-05 ENCOUNTER — TELEPHONE (OUTPATIENT)
Dept: INTERNAL MEDICINE CLINIC | Age: 63
End: 2018-02-05

## 2018-02-05 DIAGNOSIS — I10 MALIGNANT HYPERTENSION: Primary | ICD-10-CM

## 2018-02-05 RX ORDER — BENZONATATE 100 MG/1
100 CAPSULE ORAL 3 TIMES DAILY PRN
Qty: 20 CAPSULE | Refills: 0 | Status: SHIPPED | OUTPATIENT
Start: 2018-02-05 | End: 2018-02-12

## 2018-02-05 RX ORDER — AMLODIPINE BESYLATE 5 MG/1
5 TABLET ORAL DAILY
Qty: 90 TABLET | Refills: 3 | Status: SHIPPED | OUTPATIENT
Start: 2018-02-05 | End: 2018-04-26 | Stop reason: SDUPTHER

## 2018-02-05 NOTE — CARE COORDINATION
Received call from pt's [facility]. She states pt has a persistent cough with dark yellow sputum. VSS and no fever. Lungs are clear. Gonzalez Hawkins I will discuss with Dr Dandre Jackson and get back to her.        Kashif ALAS

## 2018-02-05 NOTE — TELEPHONE ENCOUNTER
Pt is requesting a refill for med listed below, pt uses [facility] on [address] at [phone].     amLODIPine (NORVASC) 5 MG tablet Take 1 tablet by mouth daily

## 2018-02-05 NOTE — CARE COORDINATION
Discussed pt's cough with Dr Dandre Jackson. He will order Maylon Dancer and if this doesn't help, pt should schedule an OV.     Kashif ALAS

## 2018-02-19 ENCOUNTER — TELEPHONE (OUTPATIENT)
Dept: INTERNAL MEDICINE CLINIC | Age: 63
End: 2018-02-19

## 2018-02-19 ENCOUNTER — CARE COORDINATION (OUTPATIENT)
Dept: CARE COORDINATION | Age: 63
End: 2018-02-19

## 2018-02-22 ENCOUNTER — CARE COORDINATION (OUTPATIENT)
Dept: CARE COORDINATION | Age: 63
End: 2018-02-22

## 2018-02-22 NOTE — CARE COORDINATION
MD Anuel   azithromycin (ZITHROMAX) 250 MG tablet 2 po day 1, then 1 po days 2-5 1/27/18   ANDRY Plunkett   ondansetron [facility]) 4 MG tablet Take 1 tablet by mouth every 8 hours as needed for Nausea 1/27/18   ANDRY Plunkett   Cyanocobalamin 1000 MCG SUBL Place 1,000 mcg under the tongue daily 1/15/18   Ida Gray MD   donepezil (ARICEPT) 5 MG tablet Take 1 tablet by mouth nightly 11/2/17   Ida Gray MD   losartan-hydrochlorothiazide [facility]) 100-25 MG per tablet Take 1 tablet by mouth daily 11/2/17   Ida Gray MD   SITagliptin (JANUVIA) 100 MG tablet Take 1 tablet by mouth daily For diabetes 11/2/17   Ida Gray MD   insulin glargine (LANTUS SOLOSTAR) 100 UNIT/ML injection pen Inject 40 Units into the skin 2 times daily 11/2/17   Ida Gray MD   empagliflozin (JARDIANCE) 25 MG tablet Take 25 mg by mouth daily 11/2/17   Ida Gray MD   metoprolol succinate (TOPROL XL) 50 MG extended release tablet Take 1 tablet by mouth daily For heart 10/30/17   Ling Westbrook MD   traZODone (DESYREL) 100 MG tablet TAKE ONE TABLET BY MOUTH DAILY AS NEEDED FOR SLEEP 10/30/17   Ling Westbrook MD   gabapentin (NEURONTIN) 300 MG capsule Take 1 capsule by mouth 3 times daily For nerve pain related to diabetes 10/30/17   Ling Westbrook MD   aspirin 325 MG tablet Take 1 tablet by mouth daily 10/3/17   Ida Gray MD   cloNIDine (CATAPRES) 0.2 MG tablet Take 1 tablet by mouth daily And as needed Take an extra tablet for SBP >160 9/18/17   Ida Gray MD   metFORMIN (GLUCOPHAGE) 1000 MG tablet Take 1 tablet by mouth 2 times daily (with meals) For diabetes 8/2/17   Ida Gray MD   atorvastatin (LIPITOR) 40 MG tablet Take 1 tablet by mouth daily For cholesterol and heart 8/2/17   Ida Gray MD   Acetaminophen 650 MG TABS Take 650 mg by mouth every 4 hours as needed for Pain 5/12/16   Ida Gray MD   Insulin Pen Needle

## 2018-03-09 ENCOUNTER — OFFICE VISIT (OUTPATIENT)
Dept: INTERNAL MEDICINE CLINIC | Age: 63
End: 2018-03-09

## 2018-03-09 VITALS
BODY MASS INDEX: 27.29 KG/M2 | DIASTOLIC BLOOD PRESSURE: 82 MMHG | HEART RATE: 64 BPM | RESPIRATION RATE: 16 BRPM | TEMPERATURE: 98.3 F | OXYGEN SATURATION: 98 % | HEIGHT: 60 IN | SYSTOLIC BLOOD PRESSURE: 138 MMHG | WEIGHT: 139 LBS

## 2018-03-09 DIAGNOSIS — I10 ESSENTIAL HYPERTENSION: ICD-10-CM

## 2018-03-09 DIAGNOSIS — G31.84 MCI (MILD COGNITIVE IMPAIRMENT): ICD-10-CM

## 2018-03-09 DIAGNOSIS — G89.29 CHRONIC LEFT-SIDED LOW BACK PAIN WITH LEFT-SIDED SCIATICA: Primary | ICD-10-CM

## 2018-03-09 DIAGNOSIS — Z79.4 TYPE 2 DIABETES MELLITUS WITHOUT COMPLICATION, WITH LONG-TERM CURRENT USE OF INSULIN (HCC): Chronic | ICD-10-CM

## 2018-03-09 DIAGNOSIS — Z23 NEED FOR VACCINATION FOR ZOSTER: ICD-10-CM

## 2018-03-09 DIAGNOSIS — F51.01 PRIMARY INSOMNIA: ICD-10-CM

## 2018-03-09 DIAGNOSIS — M54.42 CHRONIC LEFT-SIDED LOW BACK PAIN WITH LEFT-SIDED SCIATICA: ICD-10-CM

## 2018-03-09 DIAGNOSIS — G89.29 CHRONIC LEFT-SIDED LOW BACK PAIN WITH LEFT-SIDED SCIATICA: ICD-10-CM

## 2018-03-09 DIAGNOSIS — E11.9 TYPE 2 DIABETES MELLITUS WITHOUT COMPLICATION, WITH LONG-TERM CURRENT USE OF INSULIN (HCC): ICD-10-CM

## 2018-03-09 DIAGNOSIS — Z79.4 TYPE 2 DIABETES MELLITUS WITHOUT COMPLICATION, WITH LONG-TERM CURRENT USE OF INSULIN (HCC): ICD-10-CM

## 2018-03-09 DIAGNOSIS — M54.42 CHRONIC LEFT-SIDED LOW BACK PAIN WITH LEFT-SIDED SCIATICA: Primary | ICD-10-CM

## 2018-03-09 DIAGNOSIS — E78.00 HYPERCHOLESTEREMIA: Chronic | ICD-10-CM

## 2018-03-09 DIAGNOSIS — E78.00 HYPERCHOLESTEREMIA: ICD-10-CM

## 2018-03-09 DIAGNOSIS — E11.9 TYPE 2 DIABETES MELLITUS WITHOUT COMPLICATION, WITH LONG-TERM CURRENT USE OF INSULIN (HCC): Chronic | ICD-10-CM

## 2018-03-09 LAB
A/G RATIO: 1.2 (ref 1.1–2.2)
ALBUMIN SERPL-MCNC: 4.3 G/DL (ref 3.4–5)
ALP BLD-CCNC: 54 U/L (ref 40–129)
ALT SERPL-CCNC: 18 U/L (ref 10–40)
ANION GAP SERPL CALCULATED.3IONS-SCNC: 17 MMOL/L (ref 3–16)
AST SERPL-CCNC: 21 U/L (ref 15–37)
BASOPHILS ABSOLUTE: 0.1 K/UL (ref 0–0.2)
BASOPHILS RELATIVE PERCENT: 0.7 %
BILIRUB SERPL-MCNC: 0.4 MG/DL (ref 0–1)
BUN BLDV-MCNC: 10 MG/DL (ref 7–20)
CALCIUM SERPL-MCNC: 9.7 MG/DL (ref 8.3–10.6)
CHLORIDE BLD-SCNC: 97 MMOL/L (ref 99–110)
CO2: 27 MMOL/L (ref 21–32)
CREAT SERPL-MCNC: 0.6 MG/DL (ref 0.6–1.2)
EOSINOPHILS ABSOLUTE: 0.1 K/UL (ref 0–0.6)
EOSINOPHILS RELATIVE PERCENT: 1.7 %
FOLATE: 11.64 NG/ML (ref 4.78–24.2)
GFR AFRICAN AMERICAN: >60
GFR NON-AFRICAN AMERICAN: >60
GLOBULIN: 3.5 G/DL
GLUCOSE BLD-MCNC: 163 MG/DL (ref 70–99)
HCT VFR BLD CALC: 40.1 % (ref 36–48)
HEMOGLOBIN: 13.4 G/DL (ref 12–16)
LYMPHOCYTES ABSOLUTE: 1.8 K/UL (ref 1–5.1)
LYMPHOCYTES RELATIVE PERCENT: 22.1 %
MCH RBC QN AUTO: 29.6 PG (ref 26–34)
MCHC RBC AUTO-ENTMCNC: 33.3 G/DL (ref 31–36)
MCV RBC AUTO: 88.8 FL (ref 80–100)
MONOCYTES ABSOLUTE: 0.7 K/UL (ref 0–1.3)
MONOCYTES RELATIVE PERCENT: 8.3 %
NEUTROPHILS ABSOLUTE: 5.4 K/UL (ref 1.7–7.7)
NEUTROPHILS RELATIVE PERCENT: 67.2 %
PDW BLD-RTO: 13.5 % (ref 12.4–15.4)
PLATELET # BLD: 293 K/UL (ref 135–450)
PMV BLD AUTO: 9.2 FL (ref 5–10.5)
POTASSIUM SERPL-SCNC: 4 MMOL/L (ref 3.5–5.1)
RBC # BLD: 4.51 M/UL (ref 4–5.2)
SODIUM BLD-SCNC: 141 MMOL/L (ref 136–145)
TOTAL PROTEIN: 7.8 G/DL (ref 6.4–8.2)
VITAMIN B-12: 368 PG/ML (ref 211–911)
WBC # BLD: 8.1 K/UL (ref 4–11)

## 2018-03-09 PROCEDURE — 99214 OFFICE O/P EST MOD 30 MIN: CPT | Performed by: INTERNAL MEDICINE

## 2018-03-09 ASSESSMENT — ENCOUNTER SYMPTOMS
BACK PAIN: 0
BLURRED VISION: 0
SORE THROAT: 0
GASTROINTESTINAL NEGATIVE: 1
HEMOPTYSIS: 0
WHEEZING: 0
SHORTNESS OF BREATH: 0
COUGH: 0
STRIDOR: 0

## 2018-03-10 LAB
ESTIMATED AVERAGE GLUCOSE: 180 MG/DL
HBA1C MFR BLD: 7.9 %

## 2018-04-04 ENCOUNTER — CARE COORDINATION (OUTPATIENT)
Dept: CARE COORDINATION | Age: 63
End: 2018-04-04

## 2018-04-26 ENCOUNTER — OFFICE VISIT (OUTPATIENT)
Dept: INTERNAL MEDICINE CLINIC | Age: 63
End: 2018-04-26

## 2018-04-26 ENCOUNTER — CARE COORDINATION (OUTPATIENT)
Dept: CARE COORDINATION | Age: 63
End: 2018-04-26

## 2018-04-26 VITALS
OXYGEN SATURATION: 98 % | HEART RATE: 45 BPM | TEMPERATURE: 97.6 F | WEIGHT: 137 LBS | DIASTOLIC BLOOD PRESSURE: 86 MMHG | RESPIRATION RATE: 16 BRPM | HEIGHT: 60 IN | BODY MASS INDEX: 26.9 KG/M2 | SYSTOLIC BLOOD PRESSURE: 132 MMHG

## 2018-04-26 DIAGNOSIS — Z79.4 TYPE 2 DIABETES MELLITUS WITH DIABETIC MONONEUROPATHY, WITH LONG-TERM CURRENT USE OF INSULIN (HCC): Primary | ICD-10-CM

## 2018-04-26 DIAGNOSIS — F51.01 PRIMARY INSOMNIA: ICD-10-CM

## 2018-04-26 DIAGNOSIS — G31.84 MCI (MILD COGNITIVE IMPAIRMENT): ICD-10-CM

## 2018-04-26 DIAGNOSIS — M54.42 CHRONIC LEFT-SIDED LOW BACK PAIN WITH LEFT-SIDED SCIATICA: ICD-10-CM

## 2018-04-26 DIAGNOSIS — I10 MALIGNANT HYPERTENSION: ICD-10-CM

## 2018-04-26 DIAGNOSIS — E11.41 TYPE 2 DIABETES MELLITUS WITH DIABETIC MONONEUROPATHY, WITH LONG-TERM CURRENT USE OF INSULIN (HCC): Primary | ICD-10-CM

## 2018-04-26 DIAGNOSIS — G89.29 CHRONIC LEFT-SIDED LOW BACK PAIN WITH LEFT-SIDED SCIATICA: ICD-10-CM

## 2018-04-26 PROCEDURE — 99214 OFFICE O/P EST MOD 30 MIN: CPT | Performed by: INTERNAL MEDICINE

## 2018-04-26 RX ORDER — GABAPENTIN 300 MG/1
300 CAPSULE ORAL 4 TIMES DAILY
Qty: 360 CAPSULE | Refills: 3 | Status: SHIPPED | OUTPATIENT
Start: 2018-04-26 | End: 2018-09-27 | Stop reason: SDUPTHER

## 2018-04-26 RX ORDER — METOPROLOL SUCCINATE 50 MG/1
50 TABLET, EXTENDED RELEASE ORAL DAILY
Qty: 90 TABLET | Refills: 3 | Status: SHIPPED | OUTPATIENT
Start: 2018-04-26 | End: 2018-08-14 | Stop reason: SDUPTHER

## 2018-04-26 RX ORDER — TRAZODONE HYDROCHLORIDE 100 MG/1
TABLET ORAL
Qty: 90 TABLET | Refills: 3 | Status: SHIPPED | OUTPATIENT
Start: 2018-04-26 | End: 2018-08-14 | Stop reason: SDUPTHER

## 2018-04-26 RX ORDER — AMLODIPINE BESYLATE 5 MG/1
5 TABLET ORAL DAILY
Qty: 90 TABLET | Refills: 3 | Status: SHIPPED | OUTPATIENT
Start: 2018-04-26 | End: 2018-09-27 | Stop reason: SDUPTHER

## 2018-04-26 ASSESSMENT — ENCOUNTER SYMPTOMS
HEMOPTYSIS: 0
SORE THROAT: 0
COUGH: 0
BACK PAIN: 0
BLURRED VISION: 0
WHEEZING: 0
GASTROINTESTINAL NEGATIVE: 1
SHORTNESS OF BREATH: 0
STRIDOR: 0

## 2018-05-11 ENCOUNTER — CARE COORDINATION (OUTPATIENT)
Dept: INTERNAL MEDICINE CLINIC | Age: 63
End: 2018-05-11

## 2018-05-11 DIAGNOSIS — G31.84 MCI (MILD COGNITIVE IMPAIRMENT): Primary | ICD-10-CM

## 2018-06-06 ENCOUNTER — OFFICE VISIT (OUTPATIENT)
Dept: INTERNAL MEDICINE CLINIC | Age: 63
End: 2018-06-06

## 2018-06-06 VITALS
SYSTOLIC BLOOD PRESSURE: 84 MMHG | OXYGEN SATURATION: 99 % | HEIGHT: 60 IN | BODY MASS INDEX: 26.9 KG/M2 | DIASTOLIC BLOOD PRESSURE: 54 MMHG | HEART RATE: 59 BPM | WEIGHT: 137 LBS

## 2018-06-06 DIAGNOSIS — E78.00 HYPERCHOLESTEREMIA: ICD-10-CM

## 2018-06-06 DIAGNOSIS — I10 MALIGNANT HYPERTENSION: ICD-10-CM

## 2018-06-06 DIAGNOSIS — E11.41 TYPE 2 DIABETES MELLITUS WITH DIABETIC MONONEUROPATHY, WITH LONG-TERM CURRENT USE OF INSULIN (HCC): Primary | ICD-10-CM

## 2018-06-06 DIAGNOSIS — Z91.89 STREPTOCOCCUS PNEUMONIAE VACCINATION INDICATED: ICD-10-CM

## 2018-06-06 DIAGNOSIS — G31.84 MCI (MILD COGNITIVE IMPAIRMENT): ICD-10-CM

## 2018-06-06 DIAGNOSIS — F51.01 PRIMARY INSOMNIA: ICD-10-CM

## 2018-06-06 DIAGNOSIS — E11.41 TYPE 2 DIABETES MELLITUS WITH DIABETIC MONONEUROPATHY, WITH LONG-TERM CURRENT USE OF INSULIN (HCC): ICD-10-CM

## 2018-06-06 DIAGNOSIS — Z23 NEED FOR 23-POLYVALENT PNEUMOCOCCAL POLYSACCHARIDE VACCINE: ICD-10-CM

## 2018-06-06 DIAGNOSIS — Z79.4 TYPE 2 DIABETES MELLITUS WITH DIABETIC MONONEUROPATHY, WITH LONG-TERM CURRENT USE OF INSULIN (HCC): Primary | ICD-10-CM

## 2018-06-06 DIAGNOSIS — H44.812 EYE HEMORRHAGE, LEFT: ICD-10-CM

## 2018-06-06 DIAGNOSIS — Z79.4 TYPE 2 DIABETES MELLITUS WITH DIABETIC MONONEUROPATHY, WITH LONG-TERM CURRENT USE OF INSULIN (HCC): ICD-10-CM

## 2018-06-06 LAB
A/G RATIO: 1.3 (ref 1.1–2.2)
ALBUMIN SERPL-MCNC: 4.3 G/DL (ref 3.4–5)
ALP BLD-CCNC: 51 U/L (ref 40–129)
ALT SERPL-CCNC: 17 U/L (ref 10–40)
ANION GAP SERPL CALCULATED.3IONS-SCNC: 16 MMOL/L (ref 3–16)
AST SERPL-CCNC: 21 U/L (ref 15–37)
BASOPHILS ABSOLUTE: 0.1 K/UL (ref 0–0.2)
BASOPHILS RELATIVE PERCENT: 0.8 %
BILIRUB SERPL-MCNC: 0.5 MG/DL (ref 0–1)
BUN BLDV-MCNC: 9 MG/DL (ref 7–20)
CALCIUM SERPL-MCNC: 9.6 MG/DL (ref 8.3–10.6)
CHLORIDE BLD-SCNC: 97 MMOL/L (ref 99–110)
CO2: 28 MMOL/L (ref 21–32)
CREAT SERPL-MCNC: 0.6 MG/DL (ref 0.6–1.2)
EOSINOPHILS ABSOLUTE: 0.2 K/UL (ref 0–0.6)
EOSINOPHILS RELATIVE PERCENT: 1.9 %
GFR AFRICAN AMERICAN: >60
GFR NON-AFRICAN AMERICAN: >60
GLOBULIN: 3.3 G/DL
GLUCOSE BLD-MCNC: 124 MG/DL (ref 70–99)
HCT VFR BLD CALC: 39.6 % (ref 36–48)
HEMOGLOBIN: 13.2 G/DL (ref 12–16)
LYMPHOCYTES ABSOLUTE: 2.7 K/UL (ref 1–5.1)
LYMPHOCYTES RELATIVE PERCENT: 29 %
MCH RBC QN AUTO: 29.6 PG (ref 26–34)
MCHC RBC AUTO-ENTMCNC: 33.3 G/DL (ref 31–36)
MCV RBC AUTO: 88.9 FL (ref 80–100)
MONOCYTES ABSOLUTE: 0.6 K/UL (ref 0–1.3)
MONOCYTES RELATIVE PERCENT: 6.4 %
NEUTROPHILS ABSOLUTE: 5.6 K/UL (ref 1.7–7.7)
NEUTROPHILS RELATIVE PERCENT: 61.9 %
PDW BLD-RTO: 13.4 % (ref 12.4–15.4)
PLATELET # BLD: 240 K/UL (ref 135–450)
PMV BLD AUTO: 9.2 FL (ref 5–10.5)
POTASSIUM SERPL-SCNC: 4.2 MMOL/L (ref 3.5–5.1)
RBC # BLD: 4.45 M/UL (ref 4–5.2)
SODIUM BLD-SCNC: 141 MMOL/L (ref 136–145)
TOTAL PROTEIN: 7.6 G/DL (ref 6.4–8.2)
WBC # BLD: 9.1 K/UL (ref 4–11)

## 2018-06-06 PROCEDURE — G0009 ADMIN PNEUMOCOCCAL VACCINE: HCPCS | Performed by: INTERNAL MEDICINE

## 2018-06-06 PROCEDURE — 99214 OFFICE O/P EST MOD 30 MIN: CPT | Performed by: INTERNAL MEDICINE

## 2018-06-06 PROCEDURE — 90732 PPSV23 VACC 2 YRS+ SUBQ/IM: CPT | Performed by: INTERNAL MEDICINE

## 2018-06-06 ASSESSMENT — ENCOUNTER SYMPTOMS
SHORTNESS OF BREATH: 0
BLURRED VISION: 0
COUGH: 0
STRIDOR: 0
SORE THROAT: 0
HEMOPTYSIS: 0
GASTROINTESTINAL NEGATIVE: 1
BACK PAIN: 0
WHEEZING: 0

## 2018-06-07 LAB
ESTIMATED AVERAGE GLUCOSE: 174.3 MG/DL
HBA1C MFR BLD: 7.7 %

## 2018-07-06 ENCOUNTER — CARE COORDINATION (OUTPATIENT)
Dept: INTERNAL MEDICINE CLINIC | Age: 63
End: 2018-07-06

## 2018-07-06 NOTE — CARE COORDINATION
Call placed to pt's [phone] [facility] Sergey De La Cruz to f/u. Message left on VM to call me.      Kashif ALAS

## 2018-07-10 ENCOUNTER — CARE COORDINATION (OUTPATIENT)
Dept: INTERNAL MEDICINE CLINIC | Age: 63
End: 2018-07-10

## 2018-07-10 NOTE — CARE COORDINATION
Received VM message from pt's Anuradha Rodriguez--states pt and care giver Jasvir Kendall are doing good. Will call Colby Portillo as I need more information about the pt.     Kashif ALAS

## 2018-08-14 DIAGNOSIS — Z79.4 TYPE 2 DIABETES MELLITUS WITH DIABETIC MONONEUROPATHY, WITH LONG-TERM CURRENT USE OF INSULIN (HCC): ICD-10-CM

## 2018-08-14 DIAGNOSIS — I10 MALIGNANT HYPERTENSION: ICD-10-CM

## 2018-08-14 DIAGNOSIS — Z79.4 TYPE 2 DIABETES MELLITUS WITHOUT COMPLICATION, WITH LONG-TERM CURRENT USE OF INSULIN (HCC): Chronic | ICD-10-CM

## 2018-08-14 DIAGNOSIS — E11.9 TYPE 2 DIABETES MELLITUS WITHOUT COMPLICATION, WITH LONG-TERM CURRENT USE OF INSULIN (HCC): Chronic | ICD-10-CM

## 2018-08-14 DIAGNOSIS — E11.41 TYPE 2 DIABETES MELLITUS WITH DIABETIC MONONEUROPATHY, WITH LONG-TERM CURRENT USE OF INSULIN (HCC): ICD-10-CM

## 2018-08-14 DIAGNOSIS — E78.00 HYPERCHOLESTEREMIA: Chronic | ICD-10-CM

## 2018-08-14 DIAGNOSIS — F51.01 PRIMARY INSOMNIA: ICD-10-CM

## 2018-08-14 RX ORDER — METOPROLOL SUCCINATE 50 MG/1
50 TABLET, EXTENDED RELEASE ORAL DAILY
Qty: 90 TABLET | Refills: 3 | Status: SHIPPED | OUTPATIENT
Start: 2018-08-14 | End: 2018-09-27 | Stop reason: SDUPTHER

## 2018-08-14 RX ORDER — TRAZODONE HYDROCHLORIDE 100 MG/1
TABLET ORAL
Qty: 90 TABLET | Refills: 3 | Status: SHIPPED | OUTPATIENT
Start: 2018-08-14 | End: 2018-09-27 | Stop reason: SDUPTHER

## 2018-08-14 RX ORDER — ATORVASTATIN CALCIUM 40 MG/1
40 TABLET, FILM COATED ORAL DAILY
Qty: 90 TABLET | Refills: 3 | Status: SHIPPED | OUTPATIENT
Start: 2018-08-14 | End: 2018-09-27 | Stop reason: SDUPTHER

## 2018-09-05 ENCOUNTER — CARE COORDINATION (OUTPATIENT)
Dept: INTERNAL MEDICINE CLINIC | Age: 63
End: 2018-09-05

## 2018-09-06 NOTE — CARE COORDINATION
Called pt's [phone] [facility] Wojciech Leal for an update on pt's status. Wojciech Leal states FBS's are below 200. Pt has poor diet control--pt and  eat out most every day. Pt's BP has been in the 130's/80's. She rarely uses prn Clonidine. Wojciech Leal states she will be signing off case next week. Pt has an appt with Dr Jina Aase on 9/27/18--told Wojciech Leal I will meet with pt after BRUNO Rowland RN

## 2019-02-14 DIAGNOSIS — Z11.4 ENCOUNTER FOR SCREENING FOR HIV: ICD-10-CM

## 2019-02-14 DIAGNOSIS — E11.9 TYPE 2 DIABETES MELLITUS WITHOUT COMPLICATION, WITH LONG-TERM CURRENT USE OF INSULIN (HCC): ICD-10-CM

## 2019-02-14 DIAGNOSIS — E78.2 MIXED HYPERLIPIDEMIA: ICD-10-CM

## 2019-02-14 DIAGNOSIS — Z11.59 ENCOUNTER FOR HEPATITIS C SCREENING TEST FOR LOW RISK PATIENT: ICD-10-CM

## 2019-02-14 DIAGNOSIS — Z79.4 TYPE 2 DIABETES MELLITUS WITHOUT COMPLICATION, WITH LONG-TERM CURRENT USE OF INSULIN (HCC): ICD-10-CM

## 2019-02-14 LAB
CHOLESTEROL, TOTAL: 186 MG/DL (ref 0–199)
HDLC SERPL-MCNC: 72 MG/DL (ref 40–60)
HEPATITIS C ANTIBODY INTERPRETATION: NORMAL
LDL CHOLESTEROL CALCULATED: 87 MG/DL
TRIGL SERPL-MCNC: 134 MG/DL (ref 0–150)
VLDLC SERPL CALC-MCNC: 27 MG/DL

## 2019-02-15 LAB
ESTIMATED AVERAGE GLUCOSE: 234.6 MG/DL
HBA1C MFR BLD: 9.8 %
HIV AG/AB: NORMAL
HIV ANTIGEN: NORMAL
HIV-1 ANTIBODY: NORMAL
HIV-2 AB: NORMAL

## 2019-02-23 ENCOUNTER — APPOINTMENT (OUTPATIENT)
Dept: GENERAL RADIOLOGY | Age: 64
End: 2019-02-23
Payer: MEDICARE

## 2019-02-23 ENCOUNTER — APPOINTMENT (OUTPATIENT)
Dept: CT IMAGING | Age: 64
End: 2019-02-23
Payer: MEDICARE

## 2019-02-23 ENCOUNTER — HOSPITAL ENCOUNTER (EMERGENCY)
Age: 64
Discharge: HOME | End: 2019-02-23
Attending: EMERGENCY MEDICINE
Payer: MEDICARE

## 2019-02-23 VITALS
HEART RATE: 72 BPM | OXYGEN SATURATION: 98 % | DIASTOLIC BLOOD PRESSURE: 78 MMHG | TEMPERATURE: 98.3 F | SYSTOLIC BLOOD PRESSURE: 161 MMHG | RESPIRATION RATE: 16 BRPM
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
## 2019-02-23 DIAGNOSIS — S09.90XA CLOSED HEAD INJURY, INITIAL ENCOUNTER: ICD-10-CM

## 2019-02-23 PROCEDURE — 73030 X-RAY EXAM OF SHOULDER: CPT

## 2019-02-23 PROCEDURE — 70450 CT HEAD/BRAIN W/O DYE: CPT

## 2019-02-23 PROCEDURE — 71250 CT THORAX DX C-: CPT

## 2019-02-23 PROCEDURE — 72125 CT NECK SPINE W/O DYE: CPT

## 2019-02-23 PROCEDURE — 99284 EMERGENCY DEPT VISIT MOD MDM: CPT

## 2019-02-23 ASSESSMENT — PAIN DESCRIPTION - PAIN TYPE
TYPE: ACUTE PAIN
TYPE: ACUTE PAIN

## 2019-02-23 ASSESSMENT — PAIN SCALES - GENERAL
PAINLEVEL_OUTOF10: 8
PAINLEVEL_OUTOF10: 8

## 2019-02-23 ASSESSMENT — PAIN DESCRIPTION - LOCATION: LOCATION: HEAD

## 2019-02-25 ENCOUNTER — CARE COORDINATION (OUTPATIENT)
Dept: CARE COORDINATION | Age: 64
End: 2019-02-25

## 2019-02-27 ENCOUNTER — APPOINTMENT (OUTPATIENT)
Dept: GENERAL RADIOLOGY | Age: 64
End: 2019-02-27
Payer: MEDICARE

## 2019-02-27 ENCOUNTER — HOSPITAL ENCOUNTER (EMERGENCY)
Age: 64
Discharge: HOME | End: 2019-02-27
Attending: EMERGENCY MEDICINE
Payer: MEDICARE

## 2019-02-27 ENCOUNTER — CARE COORDINATION (OUTPATIENT)
Dept: CARE COORDINATION | Age: 64
End: 2019-02-27

## 2019-02-27 ENCOUNTER — APPOINTMENT (OUTPATIENT)
Dept: CT IMAGING | Age: 64
End: 2019-02-27
Payer: MEDICARE

## 2019-02-27 VITALS
DIASTOLIC BLOOD PRESSURE: 85 MMHG | HEART RATE: 72 BPM | RESPIRATION RATE: 16 BRPM | BODY MASS INDEX: 26.53 KG/M2 | OXYGEN SATURATION: 98 % | WEIGHT: 135.14 LBS | HEIGHT: 60 IN | TEMPERATURE: 98.9 F | SYSTOLIC BLOOD PRESSURE: 165 MMHG

## 2019-02-27 DIAGNOSIS — R11.2 NON-INTRACTABLE VOMITING WITH NAUSEA, UNSPECIFIED VOMITING TYPE: ICD-10-CM

## 2019-02-27 DIAGNOSIS — F07.81 POST CONCUSSION SYNDROME: Primary | ICD-10-CM

## 2019-02-27 LAB
A/G RATIO: 0.9 (ref 1.1–2.2)
ALBUMIN SERPL-MCNC: 4.2 G/DL (ref 3.4–5)
ALP BLD-CCNC: 63 U/L (ref 40–129)
ALT SERPL-CCNC: 21 U/L (ref 10–40)
ANION GAP SERPL CALCULATED.3IONS-SCNC: 16 MMOL/L (ref 3–16)
AST SERPL-CCNC: 24 U/L (ref 15–37)
BASOPHILS ABSOLUTE: 0.1 K/UL (ref 0–0.2)
BASOPHILS RELATIVE PERCENT: 0.9 %
BILIRUB SERPL-MCNC: 0.7 MG/DL (ref 0–1)
BILIRUBIN URINE: NEGATIVE
BLOOD, URINE: NEGATIVE
BUN BLDV-MCNC: 10 MG/DL (ref 7–20)
CALCIUM SERPL-MCNC: 9.8 MG/DL (ref 8.3–10.6)
CHLORIDE BLD-SCNC: 98 MMOL/L (ref 99–110)
CLARITY: CLEAR
CO2: 27 MMOL/L (ref 21–32)
COLOR: YELLOW
CREAT SERPL-MCNC: 0.6 MG/DL (ref 0.6–1.2)
EKG ATRIAL RATE: 68 BPM
EKG DIAGNOSIS: NORMAL
EKG P-R INTERVAL: 168 MS
EKG Q-T INTERVAL: 432 MS
EKG QRS DURATION: 72 MS
EKG QTC CALCULATION (BAZETT): 459 MS
EKG R AXIS: -21 DEGREES
EKG T AXIS: -17 DEGREES
EKG VENTRICULAR RATE: 68 BPM
EOSINOPHILS ABSOLUTE: 0.1 K/UL (ref 0–0.6)
EOSINOPHILS RELATIVE PERCENT: 0.7 %
EPITHELIAL CELLS, UA: 0 /HPF (ref 0–5)
GFR AFRICAN AMERICAN: >60
GFR NON-AFRICAN AMERICAN: >60
GLOBULIN: 4.6 G/DL
GLUCOSE BLD-MCNC: 208 MG/DL (ref 70–99)
GLUCOSE URINE: >=1000 MG/DL
HCT VFR BLD CALC: 45.4 % (ref 36–48)
HEMOGLOBIN: 15.2 G/DL (ref 12–16)
HYALINE CASTS: 0 /LPF (ref 0–8)
KETONES, URINE: NEGATIVE MG/DL
LEUKOCYTE ESTERASE, URINE: NEGATIVE
LIPASE: 57 U/L (ref 13–60)
LYMPHOCYTES ABSOLUTE: 1.2 K/UL (ref 1–5.1)
LYMPHOCYTES RELATIVE PERCENT: 12.2 %
MCH RBC QN AUTO: 29.6 PG (ref 26–34)
MCHC RBC AUTO-ENTMCNC: 33.4 G/DL (ref 31–36)
MCV RBC AUTO: 88.8 FL (ref 80–100)
MICROSCOPIC EXAMINATION: YES
MONOCYTES ABSOLUTE: 0.3 K/UL (ref 0–1.3)
MONOCYTES RELATIVE PERCENT: 3.2 %
NEUTROPHILS ABSOLUTE: 7.8 K/UL (ref 1.7–7.7)
NEUTROPHILS RELATIVE PERCENT: 83 %
NITRITE, URINE: NEGATIVE
PDW BLD-RTO: 13.6 % (ref 12.4–15.4)
PH UA: 7
PLATELET # BLD: 276 K/UL (ref 135–450)
PMV BLD AUTO: 8.6 FL (ref 5–10.5)
POTASSIUM REFLEX MAGNESIUM: 3.8 MMOL/L (ref 3.5–5.1)
PROTEIN UA: 30 MG/DL
RAPID INFLUENZA  B AGN: NEGATIVE
RAPID INFLUENZA A AGN: NEGATIVE
RBC # BLD: 5.11 M/UL (ref 4–5.2)
RBC UA: 0 /HPF (ref 0–4)
SODIUM BLD-SCNC: 141 MMOL/L (ref 136–145)
SPECIFIC GRAVITY UA: 1.02
TOTAL PROTEIN: 8.8 G/DL (ref 6.4–8.2)
TROPONIN: <0.01 NG/ML
URINE REFLEX TO CULTURE: ABNORMAL
URINE TYPE: ABNORMAL
UROBILINOGEN, URINE: 0.2 E.U./DL
WBC # BLD: 9.4 K/UL (ref 4–11)
WBC UA: 0 /HPF (ref 0–5)

## 2019-02-27 PROCEDURE — 83690 ASSAY OF LIPASE: CPT

## 2019-02-27 PROCEDURE — 99284 EMERGENCY DEPT VISIT MOD MDM: CPT

## 2019-02-27 PROCEDURE — 93005 ELECTROCARDIOGRAM TRACING: CPT | Performed by: EMERGENCY MEDICINE

## 2019-02-27 PROCEDURE — 84484 ASSAY OF TROPONIN QUANT: CPT

## 2019-02-27 PROCEDURE — 96374 THER/PROPH/DIAG INJ IV PUSH: CPT

## 2019-02-27 PROCEDURE — 81001 URINALYSIS AUTO W/SCOPE: CPT

## 2019-02-27 PROCEDURE — 93010 ELECTROCARDIOGRAM REPORT: CPT | Performed by: INTERNAL MEDICINE

## 2019-02-27 PROCEDURE — 71045 X-RAY EXAM CHEST 1 VIEW: CPT

## 2019-02-27 PROCEDURE — 2580000003 HC RX 258: Performed by: EMERGENCY MEDICINE

## 2019-02-27 PROCEDURE — 80053 COMPREHEN METABOLIC PANEL: CPT

## 2019-02-27 PROCEDURE — 70450 CT HEAD/BRAIN W/O DYE: CPT

## 2019-02-27 PROCEDURE — 6360000002 HC RX W HCPCS: Performed by: EMERGENCY MEDICINE

## 2019-02-27 PROCEDURE — 87804 INFLUENZA ASSAY W/OPTIC: CPT

## 2019-02-27 PROCEDURE — 85025 COMPLETE CBC W/AUTO DIFF WBC: CPT

## 2019-02-27 PROCEDURE — 70486 CT MAXILLOFACIAL W/O DYE: CPT

## 2019-02-27 RX ORDER — ONDANSETRON 4 MG/1
4 TABLET, ORALLY DISINTEGRATING ORAL EVERY 8 HOURS PRN
Qty: 20 TABLET | Refills: 0 | Status: SHIPPED | OUTPATIENT
Start: 2019-02-27 | End: 2020-10-27

## 2019-02-27 RX ORDER — 0.9 % SODIUM CHLORIDE 0.9 %
500 INTRAVENOUS SOLUTION INTRAVENOUS ONCE
Status: COMPLETED | OUTPATIENT
Start: 2019-02-27 | End: 2019-02-27

## 2019-02-27 RX ORDER — ONDANSETRON 2 MG/ML
4 INJECTION INTRAMUSCULAR; INTRAVENOUS ONCE
Status: COMPLETED | OUTPATIENT
Start: 2019-02-27 | End: 2019-02-27

## 2019-02-27 RX ADMIN — ONDANSETRON 4 MG: 2 INJECTION INTRAMUSCULAR; INTRAVENOUS at 12:32

## 2019-02-27 RX ADMIN — SODIUM CHLORIDE 500 ML: 9 INJECTION, SOLUTION INTRAVENOUS at 12:32

## 2019-02-27 ASSESSMENT — PAIN SCALES - GENERAL: PAINLEVEL_OUTOF10: 8

## 2019-02-27 ASSESSMENT — PAIN DESCRIPTION - LOCATION: LOCATION: HEAD;BACK;ABDOMEN

## 2019-02-27 ASSESSMENT — PAIN DESCRIPTION - PAIN TYPE: TYPE: ACUTE PAIN

## 2019-02-28 ENCOUNTER — CARE COORDINATION (OUTPATIENT)
Dept: CARE COORDINATION | Age: 64
End: 2019-02-28

## 2019-03-05 ENCOUNTER — CARE COORDINATION (OUTPATIENT)
Dept: CARE COORDINATION | Age: 64
End: 2019-03-05

## 2019-04-01 ENCOUNTER — CARE COORDINATION (OUTPATIENT)
Dept: CARE COORDINATION | Age: 64
End: 2019-04-01

## 2019-04-03 NOTE — CARE COORDINATION
Ambulatory Care Coordination Note  4/1/19  CM Risk Score: 6  Yamilet Mortality Risk Score: 3    ACC: Julia Laughlin, RN    Summary Note: Called pt to f/u. Spoke with caregiver Johnny Sy. He states pt continued to buy pop and drinks it. He has a difficult time controlling what she puts in her mouth. States she no longer has headaches from recent fall. Told Baldo to call me in a week with BS's. Diabetes Assessment    Medic Alert ID:  No  Meal Planning:  Avoidance of concentrated sweets   How often do you test your blood sugar?:  Daily   Do you have barriers with adherence to non-pharmacologic self-management interventions? (Nutrition/Exercise/Self-Monitoring):  Yes   Have you ever had to go to the ED for symptoms of low blood sugar?:  No       Increase or Decrease trend in Blood Sugars   Do you have hyperglycemia symptoms?:  No   Do you have hypoglycemia symptoms?:  No   Last Blood Sugar Value:  151   Blood Sugar Monitoring Regimen:  Morning Fasting   Blood Sugar Trends:  Steady Increase (Comment: Fbs's have been in the upper 100's. Had a couple in the 300's. Told caregiver Baldo to increase Lantus from 40U to 42U QD)                Care Coordination Interventions    Program Enrollment:  Rising Risk  Referral from Primary Care Provider:  Yes  Suggested Interventions and Community Resources  Diabetes Education: In Process (Comment: Pt with fluctuating BS's. VN working with pt ans s/o)  [address]: In Process (Comment: [facility] for Skilled nurse)  Social Work:  Completed (Comment: [facility] [address])  Other Services or Interventions:  VA benefits         Goals Addressed                 This Visit's Progress     Medication Management (pt-stated)   On track     I will take my medication as directed.     Barriers: impairment:  cognitive and overwhelmed by complexity of regimen  Plan for overcoming my barriers: Continued CC support  Confidence: 2/10  Anticipated Goal Completion Date: 11/31/19      [facility] Self Monitoring   No change     Self-Monitored Blood Glucose - I will notify my provider of any trends of increasing or decreasing blood sugars over a 1 month period. I will notify my provider if I have any blood sugar readings less than 70 more than 2 times a month. Barriers: diet noncompliance  Plan for overcoming my barriers: Continued CC support  Confidence: 5/10  Anticipated Goal Completion Date: 11/30/19              Prior to Admission medications    Medication Sig Start Date End Date Taking? Authorizing Provider   ondansetron (ZOFRAN ODT) 4 MG disintegrating tablet Take 1 tablet by mouth every 8 hours as needed for Nausea 2/27/19   Yasmeen Cutting, DO   traZODone (DESYREL) 100 MG tablet TAKE ONE TABLET BY MOUTH DAILY AS NEEDED FOR SLEEP 2/14/19   Beckie Gusman MD   SITagliptin (JANUVIA) 100 MG tablet Take 1 tablet by mouth daily For diabetes 12/19/18   Beckie Gusman MD   losartan-hydrochlorothiazide [facility]) 100-25 MG per tablet Take 1 tablet by mouth daily 12/19/18   Beckie Gusman MD   empagliflozin (JARDIANCE) 25 MG tablet Take 25 mg by mouth daily 12/19/18   Beckie Gusman MD   donepezil (ARICEPT) 5 MG tablet Take 1 tablet by mouth nightly 12/19/18   Beckie Gusman MD   metoprolol succinate (TOPROL XL) 50 MG extended release tablet Take 1 tablet by mouth daily For heart 12/19/18   Beckie Gusman MD   amLODIPine (NORVASC) 5 MG tablet Take 1 tablet by mouth daily 12/19/18   Beckie Gusman MD   atorvastatin (LIPITOR) 40 MG tablet Take 1 tablet by mouth daily For cholesterol and heart 12/19/18   Beckie Gusman MD   metFORMIN (GLUCOPHAGE) 1000 MG tablet Take 1 tablet by mouth daily (with breakfast) For diabetes 9/27/18   Beckie Gusman MD   insulin glargine (LANTUS SOLOSTAR) 100 UNIT/ML injection pen Inject 40 Units into the skin nightly  Patient taking differently: Inject 42 Units into the skin nightly  9/27/18   Beckie Gusman MD   gabapentin (NEURONTIN) 300 MG capsule Take 1 capsule by mouth 4 times daily for 90 days.

## 2019-04-10 ENCOUNTER — CARE COORDINATION (OUTPATIENT)
Dept: CARE COORDINATION | Age: 64
End: 2019-04-10

## 2019-04-11 NOTE — CARE COORDINATION
Ambulatory Care Coordination Note  4/10/19  CM Risk Score: 6  Yamilet Mortality Risk Score: 3    ACC: Gina Castro RN    Summary Note: Called pt to f/u. Spoke with caregiver Deepa Womack. He's difficult to communicate with over the phone because he is [facility]. Diabetes Assessment    Medic Alert ID:  No  Meal Planning:  Avoidance of concentrated sweets   How often do you test your blood sugar?:  Daily   Do you have barriers with adherence to non-pharmacologic self-management interventions? (Nutrition/Exercise/Self-Monitoring):  Yes   Have you ever had to go to the ED for symptoms of low blood sugar?:  No       Increase or Decrease trend in Blood Sugars   Do you have hyperglycemia symptoms?:  No   Do you have hypoglycemia symptoms?:  No   Last Blood Sugar Value:  200   Blood Sugar Monitoring Regimen:  Morning Fasting   Blood Sugar Trends:  Fluctuating (Comment: Caregiver Baldo states BS's are a little better since insulin increase last week. Will discuss with [address] [place] [address])                Care Coordination Interventions    Program Enrollment:  Rising Risk  Referral from Primary Care Provider:  Yes  Suggested Interventions and Community Resources  Diabetes Education: In Process (Comment: Pt with fluctuating BS's. VN working with pt ans s/o)  [address]: In Process (Comment: [facility] for Skilled nurse)  Social Work:  Completed (Comment: [facility])  Other Services or Interventions:  VA benefits         Goals Addressed     None          Prior to Admission medications    Medication Sig Start Date End Date Taking?  Authorizing Provider   ondansetron (ZOFRAN ODT) 4 MG disintegrating tablet Take 1 tablet by mouth every 8 hours as needed for Nausea 2/27/19   Goyo Vazquez DO   traZODone (DESYREL) 100 MG tablet TAKE ONE TABLET BY MOUTH DAILY AS NEEDED FOR SLEEP 2/14/19   Zelalem Garner MD   SITagliptin (JANUVIA) 100 MG tablet Take 1 tablet by mouth daily For diabetes 12/19/18   Zelalem Garner MD losartan-hydrochlorothiazide (HYZAAR) 100-25 MG per tablet Take 1 tablet by mouth daily 12/19/18   Key Adorno MD   empagliflozin (JARDIANCE) 25 MG tablet Take 25 mg by mouth daily 12/19/18   Key Adorno MD   donepezil (ARICEPT) 5 MG tablet Take 1 tablet by mouth nightly 12/19/18   Key Adorno MD   metoprolol succinate (TOPROL XL) 50 MG extended release tablet Take 1 tablet by mouth daily For heart 12/19/18   Key Adorno MD   amLODIPine (NORVASC) 5 MG tablet Take 1 tablet by mouth daily 12/19/18   Key Adorno MD   atorvastatin (LIPITOR) 40 MG tablet Take 1 tablet by mouth daily For cholesterol and heart 12/19/18   Key Adorno MD   metFORMIN (GLUCOPHAGE) 1000 MG tablet Take 1 tablet by mouth daily (with breakfast) For diabetes 9/27/18   Key Adorno MD   insulin glargine (LANTUS SOLOSTAR) 100 UNIT/ML injection pen Inject 40 Units into the skin nightly  Patient taking differently: Inject 42 Units into the skin nightly  9/27/18   Key Adorno MD   gabapentin (NEURONTIN) 300 MG capsule Take 1 capsule by mouth 4 times daily for 90 days. For nerve pain related to diabetes.  9/27/18 12/26/18  Key Adorno MD   Cyanocobalamin 1000 MCG SUBL Place 1,000 mcg under the tongue daily 9/27/18   Key Adorno MD   cloNIDine (CATAPRES) 0.2 MG tablet Take 1 tablet by mouth daily And as needed Take an extra tablet for SBP >160 9/27/18   Key Adorno MD   aspirin 325 MG tablet Take 1 tablet by mouth daily 9/27/18   Key Adorno MD   glucose blood VI test strips (FREESTYLE LITE) strip 1 each by In Vitro route 4 times daily (before meals and nightly) 4/26/18   Alisha Mckay MD   zoster recombinant adjuvanted vaccine [facility]) 50 MCG SUSR injection Once IM now and in 2-6 months 3/9/18   Alisha Mckay MD   Acetaminophen 650 MG TABS Take 650 mg by mouth every 4 hours as needed for Pain 5/12/16   Alisha Mckay MD   Insulin Pen Needle 31G X 5 MM MISC 1 each by Does not apply route daily 5/12/16   Cam gAee, MD   FREESTYLE LANCETS MISC 1 each by Does not apply route 2 times daily (before meals) 5/12/16   Burke Levy MD   Blood Glucose Monitoring Suppl (FREESTYLE LITE) SHANNEN 1 Device by Does not apply route 2 times daily (before meals) 5/12/16   Burke Levy MD       Future Appointments   Date Time Provider Nael Millie   5/17/2019 11:00 AM Sally Carballo MD [facility]

## 2019-04-11 NOTE — CARE COORDINATION
Called pt's [phone] [facility] Wesley Mendoza. She was unaware of insulin dose change--states Baldo didn't mention it to her. Wesley Mendoza states pt's BS's have been 160-200. She discussed with pt and caregiver not buying or drinking soda elevates their BS's--Baldo also has diabetes. Wesley Mendoza states BP's have been good. She will see pt next week and let me know about BS's.   Kashif ALAS

## 2019-04-15 ENCOUNTER — CARE COORDINATION (OUTPATIENT)
Dept: CARE COORDINATION | Age: 64
End: 2019-04-15

## 2019-04-16 NOTE — CARE COORDINATION
Received call from  [place] [id number] [facility]. She states pt is feeling dizzy and weak--also c/o \"upset stomach\". BS today 222. BP is normal.  She will check on pt tomorrow. Chico Keenan states prescriptions for Connie Romance, Losartan-Hydrochlorthiazide and Aricept need to be sent to [facility]. Will inform Dr Gracy Rowland RN

## 2019-04-16 NOTE — CARE COORDINATION
Met with Dr Alec Uriostegui MA as he is out of the office today--informed her about prescriptions that need to be sent to [facility]. Jenna Interiano states she sent them last week with all the others but will send again.       Kashif ALAS

## 2019-06-26 ENCOUNTER — CARE COORDINATION (OUTPATIENT)
Dept: CARE COORDINATION | Age: 64
End: 2019-06-26

## 2019-06-26 NOTE — CARE COORDINATION
Ambulatory Care Coordination Note  6/26/2019  CM Risk Score: 6  Yamilet Mortality Risk Score: 3    ACC: Maicol Wahl RN    Summary Note: Called pt to discuss BS's. Spoke with  Baldo. Pt was last seen by Dr Marcus Gomez for diabetes in 2/19. Told Baldo pt needs an OV for diabetes f/u. He will inform pt to make an appt. Diabetes Assessment    Medic Alert ID:  No  Meal Planning:  Avoidance of concentrated sweets   How often do you test your blood sugar?:  Daily   Do you have barriers with adherence to non-pharmacologic self-management interventions? (Nutrition/Exercise/Self-Monitoring):  Yes   Have you ever had to go to the ED for symptoms of low blood sugar?:  No       Increase or Decrease trend in Blood Sugars   Do you have hyperglycemia symptoms?:  No   Do you have hypoglycemia symptoms?:  No   Last Blood Sugar Value:  184   Blood Sugar Monitoring Regimen:  Morning Fasting   Blood Sugar Trends:  Fluctuating (Comment: FBS the past 10 days respectively: 184,181,193,183,207,159,127,138,137,126. No change to insulin dose.)                Care Coordination Interventions    Program Enrollment:  Rising Risk  Referral from Primary Care Provider:  Yes  Suggested Interventions and Community Resources  Diabetes Education: In Process (Comment: Pt with fluctuating BS's. VN working with pt ans s/o)  [address]: In Process (Comment: [facility] for Skilled nurse)  Social Work:  Completed (Comment: Brittany [place])  Other Services or Interventions:  VA benefits         Goals Addressed                 This Visit's Progress     Medication Management (pt-stated)   On track     I will take my medication as directed.     Barriers: impairment:  cognitive and overwhelmed by complexity of regimen  Plan for overcoming my barriers: Continued CC support  Confidence: 2/10  Anticipated Goal Completion Date: 11/31/19       Self Monitoring   Improving     Self-Monitored Blood Glucose - I will notify my provider of any trends of increasing or decreasing blood sugars over a 1 month period. I will notify my provider if I have any blood sugar readings less than 70 more than 2 times a month. Barriers: diet noncompliance  Plan for overcoming my barriers: Continued CC support  Confidence: 5/10  Anticipated Goal Completion Date: 11/30/19              Prior to Admission medications    Medication Sig Start Date End Date Taking? Authorizing Provider   donepezil (ARICEPT) 5 MG tablet Take 1 tablet by mouth nightly 5/13/19   ZACHARY Romano - CNP   losartan-hydrochlorothiazide [facility]) 100-25 MG per tablet Take 1 tablet by mouth daily 4/16/19   Key Adorno MD   SITagliptin (JANUVIA) 100 MG tablet Take 1 tablet by mouth daily For diabetes 4/16/19   Key Adorno MD   empagliflozin (JARDIANCE) 25 MG tablet Take 25 mg by mouth daily 4/16/19   Key Adorno MD   ondansetron (ZOFRAN ODT) 4 MG disintegrating tablet Take 1 tablet by mouth every 8 hours as needed for Nausea 2/27/19   Jung Mi DO   traZODone (DESYREL) 100 MG tablet TAKE ONE TABLET BY MOUTH DAILY AS NEEDED FOR SLEEP 2/14/19   Key Adorno MD   metoprolol succinate (TOPROL XL) 50 MG extended release tablet Take 1 tablet by mouth daily For heart 12/19/18   Key Adorno MD   amLODIPine (NORVASC) 5 MG tablet Take 1 tablet by mouth daily 12/19/18   Key Adorno MD   atorvastatin (LIPITOR) 40 MG tablet Take 1 tablet by mouth daily For cholesterol and heart 12/19/18   Key Adorno MD   metFORMIN (GLUCOPHAGE) 1000 MG tablet Take 1 tablet by mouth daily (with breakfast) For diabetes 9/27/18   Key Adorno MD   insulin glargine (LANTUS SOLOSTAR) 100 UNIT/ML injection pen Inject 40 Units into the skin nightly  Patient taking differently: Inject 42 Units into the skin nightly  9/27/18   Key Adorno MD   gabapentin (NEURONTIN) 300 MG capsule Take 1 capsule by mouth 4 times daily for 90 days. For nerve pain related to diabetes.  9/27/18 12/26/18  Key Adorno MD   Cyanocobalamin [address] 1,000 mcg under the tongue daily 9/27/18   Nely Pandya MD   cloNIDine (CATAPRES) 0.2 MG tablet Take 1 tablet by mouth daily And as needed Take an extra tablet for SBP >160 9/27/18   Nely Pandya MD   aspirin 325 MG tablet Take 1 tablet by mouth daily 9/27/18   Nely Pandya MD   glucose blood VI test strips (FREESTYLE LITE) strip 1 each by In Vitro route 4 times daily (before meals and nightly) 4/26/18   Chepe Jackson MD   zoster recombinant adjuvanted vaccine [facility]) 50 MCG SUSR injection Once IM now and in 2-6 months 3/9/18   Chepe Jackson MD   Acetaminophen 650 MG TABS Take 650 mg by mouth every 4 hours as needed for Pain 5/12/16   Chepe Jackson MD   Insulin Pen Needle 31G X 5 MM MISC 1 each by Does not apply route daily 5/12/16   Chepe Jackson MD   FREESTYLE LANCETS MISC 1 each by Does not apply route 2 times daily (before meals) 5/12/16   Chepe Jackson MD   Blood Glucose Monitoring Suppl (FREESTYLE LITE) SHANNEN 1 Device by Does not apply route 2 times daily (before meals) 5/12/16   Chepe Jackson MD       No future appointments.

## 2019-07-26 ENCOUNTER — CARE COORDINATION (OUTPATIENT)
Dept: CARE COORDINATION | Age: 64
End: 2019-07-26

## 2019-07-31 ENCOUNTER — CARE COORDINATION (OUTPATIENT)
Dept: CARE COORDINATION | Age: 64
End: 2019-07-31

## 2020-07-02 ENCOUNTER — OFFICE VISIT (OUTPATIENT)
Dept: PRIMARY CARE CLINIC | Age: 65
End: 2020-07-02
Payer: MEDICARE

## 2020-07-02 PROCEDURE — G8428 CUR MEDS NOT DOCUMENT: HCPCS | Performed by: INTERNAL MEDICINE

## 2020-07-02 PROCEDURE — G8417 CALC BMI ABV UP PARAM F/U: HCPCS | Performed by: INTERNAL MEDICINE

## 2020-07-02 PROCEDURE — 99211 OFF/OP EST MAY X REQ PHY/QHP: CPT | Performed by: INTERNAL MEDICINE

## 2020-07-02 NOTE — PROGRESS NOTES
Tessie Dears received a viral test for COVID-19. They were educated on isolation and quarantine as appropriate. For any symptoms, they were directed to seek care from their PCP, given contact information to establish with a doctor, directed to an urgent care or the emergency room.

## 2020-07-06 LAB
SARS-COV-2: DETECTED
SOURCE: ABNORMAL

## 2020-07-12 ENCOUNTER — HOSPITAL ENCOUNTER (INPATIENT)
Age: 65
LOS: 16 days | Discharge: NURSING, REHAB OR LONG-TERM CARE | DRG: 871 | End: 2020-07-28
Attending: INTERNAL MEDICINE | Admitting: INTERNAL MEDICINE
Payer: MEDICARE

## 2020-07-12 ENCOUNTER — APPOINTMENT (OUTPATIENT)
Dept: GENERAL RADIOLOGY | Age: 65
DRG: 871 | End: 2020-07-12
Payer: MEDICARE

## 2020-07-12 PROBLEM — U07.1 COVID-19 VIRUS INFECTION: Status: ACTIVE | Noted: 2020-07-12

## 2020-07-12 LAB
A/G RATIO: 0.7 (ref 1.1–2.2)
ALBUMIN SERPL-MCNC: 3.5 G/DL (ref 3.4–5)
ALP BLD-CCNC: 57 U/L (ref 40–129)
ALT SERPL-CCNC: 26 U/L (ref 10–40)
ANION GAP SERPL CALCULATED.3IONS-SCNC: 13 MMOL/L (ref 3–16)
AST SERPL-CCNC: 45 U/L (ref 15–37)
BASE EXCESS VENOUS: 3.2 MMOL/L
BASOPHILS ABSOLUTE: 0 K/UL (ref 0–0.2)
BASOPHILS RELATIVE PERCENT: 0.4 %
BILIRUB SERPL-MCNC: 0.6 MG/DL (ref 0–1)
BILIRUBIN URINE: NEGATIVE
BLOOD, URINE: NEGATIVE
BUN BLDV-MCNC: 11 MG/DL (ref 7–20)
C-REACTIVE PROTEIN: 74.5 MG/L (ref 0–5.1)
CALCIUM SERPL-MCNC: 8.4 MG/DL (ref 8.3–10.6)
CARBOXYHEMOGLOBIN: 1.2 %
CHLORIDE BLD-SCNC: 99 MMOL/L (ref 99–110)
CLARITY: ABNORMAL
CO2: 25 MMOL/L (ref 21–32)
COLOR: YELLOW
COMMENT UA: NORMAL
CREAT SERPL-MCNC: 0.8 MG/DL (ref 0.6–1.2)
D DIMER: 346 NG/ML DDU (ref 0–229)
EOSINOPHILS ABSOLUTE: 0 K/UL (ref 0–0.6)
EOSINOPHILS RELATIVE PERCENT: 0 %
EPITHELIAL CELLS, UA: NORMAL /HPF (ref 0–5)
FERRITIN: 511.5 NG/ML (ref 15–150)
FINE CASTS, UA: NORMAL /LPF (ref 0–2)
GFR AFRICAN AMERICAN: >60
GFR NON-AFRICAN AMERICAN: >60
GLOBULIN: 4.8 G/DL
GLUCOSE BLD-MCNC: 253 MG/DL (ref 70–99)
GLUCOSE URINE: >=1000 MG/DL
HCO3 VENOUS: 28 MMOL/L (ref 23–29)
HCT VFR BLD CALC: 43.3 % (ref 36–48)
HEMOGLOBIN: 14.4 G/DL (ref 12–16)
HYALINE CASTS: NORMAL /LPF (ref 0–2)
KETONES, URINE: NEGATIVE MG/DL
LACTATE DEHYDROGENASE: 534 U/L (ref 100–190)
LACTIC ACID: 3.7 MMOL/L (ref 0.4–2)
LEUKOCYTE ESTERASE, URINE: NEGATIVE
LYMPHOCYTES ABSOLUTE: 1.2 K/UL (ref 1–5.1)
LYMPHOCYTES RELATIVE PERCENT: 12.7 %
MCH RBC QN AUTO: 29.4 PG (ref 26–34)
MCHC RBC AUTO-ENTMCNC: 33.3 G/DL (ref 31–36)
MCV RBC AUTO: 88.1 FL (ref 80–100)
METHEMOGLOBIN VENOUS: 0.6 %
MICROSCOPIC EXAMINATION: YES
MONOCYTES ABSOLUTE: 0.3 K/UL (ref 0–1.3)
MONOCYTES RELATIVE PERCENT: 3.2 %
NEUTROPHILS ABSOLUTE: 8.2 K/UL (ref 1.7–7.7)
NEUTROPHILS RELATIVE PERCENT: 83.7 %
NITRITE, URINE: NEGATIVE
O2 CONTENT, VEN: 12 ML/DL
O2 SAT, VEN: 60 %
O2 THERAPY: NORMAL
PCO2, VEN: 44.1 MMHG (ref 40–50)
PDW BLD-RTO: 14.2 % (ref 12.4–15.4)
PH UA: 5.5 (ref 5–8)
PH VENOUS: 7.42 (ref 7.35–7.45)
PLATELET # BLD: 227 K/UL (ref 135–450)
PMV BLD AUTO: 8.9 FL (ref 5–10.5)
PO2, VEN: 30 MMHG
POTASSIUM SERPL-SCNC: 4 MMOL/L (ref 3.5–5.1)
PRO-BNP: 86 PG/ML (ref 0–124)
PROCALCITONIN: 0.15 NG/ML (ref 0–0.15)
PROTEIN UA: 30 MG/DL
RBC # BLD: 4.91 M/UL (ref 4–5.2)
RBC UA: NORMAL /HPF (ref 0–4)
SEDIMENTATION RATE, ERYTHROCYTE: 78 MM/HR (ref 0–30)
SODIUM BLD-SCNC: 137 MMOL/L (ref 136–145)
SPECIFIC GRAVITY UA: >1.03 (ref 1–1.03)
TCO2 CALC VENOUS: 30 MMOL/L
TOTAL PROTEIN: 8.3 G/DL (ref 6.4–8.2)
TROPONIN: <0.01 NG/ML
URINE REFLEX TO CULTURE: ABNORMAL
URINE TYPE: ABNORMAL
UROBILINOGEN, URINE: 1 E.U./DL
WBC # BLD: 9.8 K/UL (ref 4–11)
WBC UA: NORMAL /HPF (ref 0–5)

## 2020-07-12 PROCEDURE — 6370000000 HC RX 637 (ALT 250 FOR IP): Performed by: PHYSICIAN ASSISTANT

## 2020-07-12 PROCEDURE — 84484 ASSAY OF TROPONIN QUANT: CPT

## 2020-07-12 PROCEDURE — 86140 C-REACTIVE PROTEIN: CPT

## 2020-07-12 PROCEDURE — 99285 EMERGENCY DEPT VISIT HI MDM: CPT

## 2020-07-12 PROCEDURE — 83520 IMMUNOASSAY QUANT NOS NONAB: CPT

## 2020-07-12 PROCEDURE — 85025 COMPLETE CBC W/AUTO DIFF WBC: CPT

## 2020-07-12 PROCEDURE — 83880 ASSAY OF NATRIURETIC PEPTIDE: CPT

## 2020-07-12 PROCEDURE — 2580000003 HC RX 258: Performed by: INTERNAL MEDICINE

## 2020-07-12 PROCEDURE — 80053 COMPREHEN METABOLIC PANEL: CPT

## 2020-07-12 PROCEDURE — 96367 TX/PROPH/DG ADDL SEQ IV INF: CPT

## 2020-07-12 PROCEDURE — 96366 THER/PROPH/DIAG IV INF ADDON: CPT

## 2020-07-12 PROCEDURE — 84145 PROCALCITONIN (PCT): CPT

## 2020-07-12 PROCEDURE — 82728 ASSAY OF FERRITIN: CPT

## 2020-07-12 PROCEDURE — 96365 THER/PROPH/DIAG IV INF INIT: CPT

## 2020-07-12 PROCEDURE — 87040 BLOOD CULTURE FOR BACTERIA: CPT

## 2020-07-12 PROCEDURE — 83615 LACTATE (LD) (LDH) ENZYME: CPT

## 2020-07-12 PROCEDURE — 2060000000 HC ICU INTERMEDIATE R&B

## 2020-07-12 PROCEDURE — 85379 FIBRIN DEGRADATION QUANT: CPT

## 2020-07-12 PROCEDURE — 6370000000 HC RX 637 (ALT 250 FOR IP): Performed by: INTERNAL MEDICINE

## 2020-07-12 PROCEDURE — 71045 X-RAY EXAM CHEST 1 VIEW: CPT

## 2020-07-12 PROCEDURE — 87449 NOS EACH ORGANISM AG IA: CPT

## 2020-07-12 PROCEDURE — 83605 ASSAY OF LACTIC ACID: CPT

## 2020-07-12 PROCEDURE — 6360000002 HC RX W HCPCS: Performed by: PHYSICIAN ASSISTANT

## 2020-07-12 PROCEDURE — 85652 RBC SED RATE AUTOMATED: CPT

## 2020-07-12 PROCEDURE — 93005 ELECTROCARDIOGRAM TRACING: CPT | Performed by: PHYSICIAN ASSISTANT

## 2020-07-12 PROCEDURE — 6360000002 HC RX W HCPCS: Performed by: INTERNAL MEDICINE

## 2020-07-12 PROCEDURE — 2580000003 HC RX 258: Performed by: PHYSICIAN ASSISTANT

## 2020-07-12 PROCEDURE — 81001 URINALYSIS AUTO W/SCOPE: CPT

## 2020-07-12 PROCEDURE — 82803 BLOOD GASES ANY COMBINATION: CPT

## 2020-07-12 RX ORDER — SODIUM CHLORIDE 0.9 % (FLUSH) 0.9 %
10 SYRINGE (ML) INJECTION EVERY 12 HOURS SCHEDULED
Status: DISCONTINUED | OUTPATIENT
Start: 2020-07-12 | End: 2020-07-22

## 2020-07-12 RX ORDER — DEXAMETHASONE SODIUM PHOSPHATE 4 MG/ML
6 INJECTION, SOLUTION INTRA-ARTICULAR; INTRALESIONAL; INTRAMUSCULAR; INTRAVENOUS; SOFT TISSUE DAILY
Status: DISCONTINUED | OUTPATIENT
Start: 2020-07-12 | End: 2020-07-14 | Stop reason: CLARIF

## 2020-07-12 RX ORDER — METOPROLOL SUCCINATE 50 MG/1
50 TABLET, EXTENDED RELEASE ORAL DAILY
Status: DISCONTINUED | OUTPATIENT
Start: 2020-07-13 | End: 2020-07-20

## 2020-07-12 RX ORDER — POLYETHYLENE GLYCOL 3350 17 G/17G
17 POWDER, FOR SOLUTION ORAL DAILY PRN
Status: DISCONTINUED | OUTPATIENT
Start: 2020-07-12 | End: 2020-07-28 | Stop reason: HOSPADM

## 2020-07-12 RX ORDER — ACETAMINOPHEN 650 MG/1
650 SUPPOSITORY RECTAL EVERY 6 HOURS PRN
Status: DISCONTINUED | OUTPATIENT
Start: 2020-07-12 | End: 2020-07-28 | Stop reason: HOSPADM

## 2020-07-12 RX ORDER — DEXTROSE MONOHYDRATE 50 MG/ML
100 INJECTION, SOLUTION INTRAVENOUS PRN
Status: DISCONTINUED | OUTPATIENT
Start: 2020-07-12 | End: 2020-07-28 | Stop reason: HOSPADM

## 2020-07-12 RX ORDER — LANOLIN ALCOHOL/MO/W.PET/CERES
1000 CREAM (GRAM) TOPICAL DAILY
Status: DISCONTINUED | OUTPATIENT
Start: 2020-07-12 | End: 2020-07-28 | Stop reason: HOSPADM

## 2020-07-12 RX ORDER — ATORVASTATIN CALCIUM 40 MG/1
40 TABLET, FILM COATED ORAL DAILY
Status: DISCONTINUED | OUTPATIENT
Start: 2020-07-13 | End: 2020-07-28 | Stop reason: HOSPADM

## 2020-07-12 RX ORDER — ONDANSETRON 2 MG/ML
4 INJECTION INTRAMUSCULAR; INTRAVENOUS EVERY 6 HOURS PRN
Status: DISCONTINUED | OUTPATIENT
Start: 2020-07-12 | End: 2020-07-28 | Stop reason: HOSPADM

## 2020-07-12 RX ORDER — DEXTROSE MONOHYDRATE 25 G/50ML
12.5 INJECTION, SOLUTION INTRAVENOUS PRN
Status: DISCONTINUED | OUTPATIENT
Start: 2020-07-12 | End: 2020-07-28 | Stop reason: HOSPADM

## 2020-07-12 RX ORDER — GUAIFENESIN 600 MG/1
600 TABLET, EXTENDED RELEASE ORAL 2 TIMES DAILY
Status: DISCONTINUED | OUTPATIENT
Start: 2020-07-12 | End: 2020-07-20

## 2020-07-12 RX ORDER — BENZONATATE 100 MG/1
100 CAPSULE ORAL 3 TIMES DAILY PRN
Status: DISCONTINUED | OUTPATIENT
Start: 2020-07-12 | End: 2020-07-23

## 2020-07-12 RX ORDER — INSULIN GLARGINE 100 [IU]/ML
50 INJECTION, SOLUTION SUBCUTANEOUS 2 TIMES DAILY
Status: DISCONTINUED | OUTPATIENT
Start: 2020-07-12 | End: 2020-07-15

## 2020-07-12 RX ORDER — TRAZODONE HYDROCHLORIDE 100 MG/1
100 TABLET ORAL NIGHTLY PRN
Status: DISCONTINUED | OUTPATIENT
Start: 2020-07-12 | End: 2020-07-20

## 2020-07-12 RX ORDER — ASPIRIN 325 MG
325 TABLET ORAL DAILY
Status: DISCONTINUED | OUTPATIENT
Start: 2020-07-13 | End: 2020-07-26

## 2020-07-12 RX ORDER — ACETAMINOPHEN 325 MG/1
650 TABLET ORAL ONCE
Status: COMPLETED | OUTPATIENT
Start: 2020-07-12 | End: 2020-07-12

## 2020-07-12 RX ORDER — GABAPENTIN 300 MG/1
300 CAPSULE ORAL 4 TIMES DAILY
Status: DISCONTINUED | OUTPATIENT
Start: 2020-07-12 | End: 2020-07-28 | Stop reason: HOSPADM

## 2020-07-12 RX ORDER — LOSARTAN POTASSIUM AND HYDROCHLOROTHIAZIDE 12.5; 5 MG/1; MG/1
2 TABLET ORAL DAILY
Status: DISCONTINUED | OUTPATIENT
Start: 2020-07-13 | End: 2020-07-28 | Stop reason: HOSPADM

## 2020-07-12 RX ORDER — SODIUM CHLORIDE 0.9 % (FLUSH) 0.9 %
10 SYRINGE (ML) INJECTION PRN
Status: DISCONTINUED | OUTPATIENT
Start: 2020-07-12 | End: 2020-07-23

## 2020-07-12 RX ORDER — PROMETHAZINE HYDROCHLORIDE 25 MG/1
12.5 TABLET ORAL EVERY 6 HOURS PRN
Status: DISCONTINUED | OUTPATIENT
Start: 2020-07-12 | End: 2020-07-28 | Stop reason: HOSPADM

## 2020-07-12 RX ORDER — DONEPEZIL HYDROCHLORIDE 5 MG/1
5 TABLET, FILM COATED ORAL NIGHTLY
Status: DISCONTINUED | OUTPATIENT
Start: 2020-07-12 | End: 2020-07-28 | Stop reason: HOSPADM

## 2020-07-12 RX ORDER — ACETAMINOPHEN 325 MG/1
650 TABLET ORAL EVERY 6 HOURS PRN
Status: DISCONTINUED | OUTPATIENT
Start: 2020-07-12 | End: 2020-07-28 | Stop reason: HOSPADM

## 2020-07-12 RX ORDER — AMLODIPINE BESYLATE 5 MG/1
5 TABLET ORAL DAILY
Status: DISCONTINUED | OUTPATIENT
Start: 2020-07-13 | End: 2020-07-28 | Stop reason: HOSPADM

## 2020-07-12 RX ORDER — NICOTINE POLACRILEX 4 MG
15 LOZENGE BUCCAL PRN
Status: DISCONTINUED | OUTPATIENT
Start: 2020-07-12 | End: 2020-07-28 | Stop reason: HOSPADM

## 2020-07-12 RX ADMIN — ACETAMINOPHEN 650 MG: 325 TABLET ORAL at 22:56

## 2020-07-12 RX ADMIN — CEFTRIAXONE 1 G: 1 INJECTION, POWDER, FOR SOLUTION INTRAMUSCULAR; INTRAVENOUS at 15:30

## 2020-07-12 RX ADMIN — DONEPEZIL HYDROCHLORIDE 5 MG: 5 TABLET, FILM COATED ORAL at 22:56

## 2020-07-12 RX ADMIN — AZITHROMYCIN MONOHYDRATE 500 MG: 500 INJECTION, POWDER, LYOPHILIZED, FOR SOLUTION INTRAVENOUS at 17:04

## 2020-07-12 RX ADMIN — TRAZODONE HYDROCHLORIDE 100 MG: 100 TABLET ORAL at 22:56

## 2020-07-12 RX ADMIN — GABAPENTIN 300 MG: 300 CAPSULE ORAL at 22:57

## 2020-07-12 RX ADMIN — SODIUM CHLORIDE, PRESERVATIVE FREE 10 ML: 5 INJECTION INTRAVENOUS at 22:57

## 2020-07-12 RX ADMIN — INSULIN GLARGINE 50 UNITS: 100 INJECTION, SOLUTION SUBCUTANEOUS at 23:32

## 2020-07-12 RX ADMIN — ACETAMINOPHEN 650 MG: 325 TABLET ORAL at 15:15

## 2020-07-12 RX ADMIN — ENOXAPARIN SODIUM 40 MG: 40 INJECTION SUBCUTANEOUS at 22:54

## 2020-07-12 RX ADMIN — INSULIN LISPRO 3 UNITS: 100 INJECTION, SOLUTION INTRAVENOUS; SUBCUTANEOUS at 23:32

## 2020-07-12 RX ADMIN — DEXAMETHASONE SODIUM PHOSPHATE 6 MG: 4 INJECTION, SOLUTION INTRAMUSCULAR; INTRAVENOUS at 22:53

## 2020-07-12 RX ADMIN — GUAIFENESIN 600 MG: 600 TABLET ORAL at 22:55

## 2020-07-12 ASSESSMENT — PAIN DESCRIPTION - ONSET
ONSET: ON-GOING
ONSET: ON-GOING

## 2020-07-12 ASSESSMENT — ENCOUNTER SYMPTOMS
ABDOMINAL PAIN: 0
VOMITING: 0
DIARRHEA: 0
COLOR CHANGE: 0
SHORTNESS OF BREATH: 1
COUGH: 1

## 2020-07-12 ASSESSMENT — PAIN DESCRIPTION - FREQUENCY
FREQUENCY: CONTINUOUS
FREQUENCY: CONTINUOUS

## 2020-07-12 ASSESSMENT — PAIN - FUNCTIONAL ASSESSMENT
PAIN_FUNCTIONAL_ASSESSMENT: ACTIVITIES ARE NOT PREVENTED
PAIN_FUNCTIONAL_ASSESSMENT: ACTIVITIES ARE NOT PREVENTED

## 2020-07-12 ASSESSMENT — PAIN DESCRIPTION - PAIN TYPE
TYPE: ACUTE PAIN
TYPE: ACUTE PAIN

## 2020-07-12 ASSESSMENT — PAIN SCALES - GENERAL
PAINLEVEL_OUTOF10: 0
PAINLEVEL_OUTOF10: 2
PAINLEVEL_OUTOF10: 0
PAINLEVEL_OUTOF10: 1
PAINLEVEL_OUTOF10: 1

## 2020-07-12 ASSESSMENT — PAIN DESCRIPTION - DIRECTION
RADIATING_TOWARDS: NO
RADIATING_TOWARDS: NO

## 2020-07-12 ASSESSMENT — PAIN DESCRIPTION - LOCATION
LOCATION: GENERALIZED
LOCATION: GENERALIZED

## 2020-07-12 ASSESSMENT — PAIN DESCRIPTION - DESCRIPTORS
DESCRIPTORS: ACHING;CONSTANT
DESCRIPTORS: ACHING;CONSTANT

## 2020-07-12 ASSESSMENT — PAIN DESCRIPTION - PROGRESSION
CLINICAL_PROGRESSION: NOT CHANGED
CLINICAL_PROGRESSION: NOT CHANGED

## 2020-07-12 NOTE — ED PROVIDER NOTES
[address] [facility]      Pt Name: Elizabeth Darby  MRN: [id number]  [place] 1955  Date of evaluation: 7/12/2020  Provider: ANDRY Zhou    This patient was not seen and evaluated by the attending physician No att. providers found. CHIEF COMPLAINT       Chief Complaint   Patient presents with    Shortness of Breath     positive COVID test     Cough    Fever    Fatigue       CRITICAL CARE TIME   I performed a total Critical Care time of  35 minutes, excluding separately reportable procedures. There was a high probability of clinically significant/life threatening deterioration in the patient's condition which required my urgent intervention. Not limited to multiple reexaminations, discussions with attending physician and consultants. HISTORY OF PRESENT ILLNESS  (Location/Symptom, Timing/Onset, Context/Setting, Quality, Duration, Modifying Factors, Severity.)   Elizabeth Darby is a 75 y.o. female who presents to the emergency department via EMS from home where she lives with her boyfriend. She states she is been sick for about 2 weeks. 10 days ago she was seen by her primary care physician for cough and shortness of breath. She had tested positive at that time for coronavirus. She has not been taking anything at home for the symptoms including no antibiotics or Tylenol. She is felt like she is been running a fever and having body aches. No vomiting or diarrhea. No chest pain. She has past medical history of diabetes, hypertension, hyperlipidemia and mild cognitive impairment. She declines use of the  service and states that she speaks [place] and [place]. Her cough is productive with phlegm. She denies smoking history or asthma. Nursing Notes were reviewed and I agree.     REVIEW OF SYSTEMS    (2-9 systems for level 4, 10 or more for level 5)     Review of Systems   Constitutional: Positive for chills, fatigue and fever. Respiratory: Positive for cough and shortness of breath. Cardiovascular: Negative for chest pain. Gastrointestinal: Negative for abdominal pain, diarrhea and vomiting. Musculoskeletal: Positive for myalgias. Negative for neck pain and neck stiffness. Skin: Negative for color change, rash and wound. Neurological: Negative for weakness and numbness. Psychiatric/Behavioral: Negative for agitation, behavioral problems and confusion. Except as noted above the remainder of the review of systems was reviewed and negative. PAST MEDICAL HISTORY         Diagnosis Date    Diabetes mellitus ([facility] [address]     NIDDM    Headache(784.0)     Herniated disc, cervical     Hypercholesteremia     Hypertension     Memory loss     short term    Psychiatric problem     Type 2 diabetes mellitus without complication ([facility] [address]        SURGICAL HISTORY           Procedure Laterality Date    SHOULDER SURGERY      TUBAL LIGATION         CURRENT MEDICATIONS       Previous Medications    ACETAMINOPHEN 650 MG TABS    Take 650 mg by mouth every 4 hours as needed for Pain    AMLODIPINE (NORVASC) 5 MG TABLET    Take 1 tablet by mouth daily    ASPIRIN 325 MG TABLET    Take 1 tablet by mouth daily    ATORVASTATIN (LIPITOR) 40 MG TABLET    Take 1 tablet by mouth daily For cholesterol and heart    BLOOD GLUCOSE MONITORING SUPPL (FREESTYLE LITE) SHANNEN    1 Device by Does not apply route 2 times daily (before meals)    BLOOD GLUCOSE TEST STRIPS (FREESTYLE LITE) STRIP    1 each by In Vitro route 4 times daily (before meals and nightly)    CYANOCOBALAMIN 1000 MCG SUBL    Place 1,000 mcg under the tongue daily    DONEPEZIL (ARICEPT) 5 MG TABLET    Take 1 tablet by mouth nightly    EMPAGLIFLOZIN (JARDIANCE) 25 MG TABLET    Take 25 mg by mouth daily    FREESTYLE LANCETS MISC    1 each by Does not apply route 2 times daily (before meals)    GABAPENTIN (NEURONTIN) 300 MG CAPSULE    Take 1 capsule by mouth 4 times daily.  For nerve pain related to diabetes    INSULIN GLARGINE (LANTUS SOLOSTAR) 100 UNIT/ML INJECTION PEN    Inject 42 Units into the skin 2 times daily    INSULIN PEN NEEDLE 31G X 5 MM MISC    1 each by Does not apply route daily    LOSARTAN-HYDROCHLOROTHIAZIDE (HYZAAR) 100-25 MG PER TABLET    Take 1 tablet by mouth daily    METFORMIN (GLUCOPHAGE) 1000 MG TABLET    Take 1 tablet by mouth daily (with breakfast) For diabetes    METOPROLOL SUCCINATE (TOPROL XL) 50 MG EXTENDED RELEASE TABLET    Take 1 tablet by mouth daily For heart    ONDANSETRON (ZOFRAN ODT) 4 MG DISINTEGRATING TABLET    Take 1 tablet by mouth every 8 hours as needed for Nausea    SITAGLIPTIN (JANUVIA) 100 MG TABLET    Take 1 tablet by mouth daily For diabetes    TRAZODONE (DESYREL) 100 MG TABLET    TAKE ONE TABLET BY MOUTH DAILY AS NEEDED FOR SLEEP    VITAMIN D (ERGOCALCIFEROL) 1.25 MG (56804 UT) CAPS CAPSULE    Take 1 capsule by mouth once a week    ZOSTER RECOMBINANT ADJUVANTED VACCINE (SHINGRIX) 50 MCG SUSR INJECTION    Once IM now and in 2-6 months       ALLERGIES     Patient has no known allergies. FAMILY HISTORY           Problem Relation Age of Onset    Diabetes Mother     High Blood Pressure Mother     Diabetes Father     High Blood Pressure Father      Family Status   Relation Name Status    Mother      Father      Sister     Freya Coulter Brother      Sister  Alive    Sister  Alive    Sister  Alive    Sister  Alive    Sister  Alive    Brother      Brother      Brother          SOCIAL HISTORY      reports that she has never smoked. She has never used smokeless tobacco. She reports that she does not drink alcohol or use drugs. PHYSICAL EXAM    (up to 7 for level 4, 8 or more for level 5)     ED Triage Vitals   BP Temp Temp src Pulse Resp SpO2 Height Weight   -- -- -- -- -- -- -- --       Physical Exam  Vitals signs and nursing note reviewed.    Constitutional:       Appearance: Normal appearance. HENT:      Head: Normocephalic and atraumatic. Nose: Nose normal.      Mouth/Throat:      Mouth: Mucous membranes are moist.      Pharynx: Oropharynx is clear. Eyes:      Pupils: Pupils are equal, round, and reactive to light. Neck:      Musculoskeletal: Normal range of motion. Cardiovascular:      Rate and Rhythm: Normal rate. Pulses: Normal pulses. Pulmonary:      Effort: No respiratory distress. Breath sounds: Wheezing present. Abdominal:      Tenderness: There is no abdominal tenderness. There is no guarding. Musculoskeletal: Normal range of motion. General: No swelling. Skin:     General: Skin is warm. Findings: No rash. Neurological:      General: No focal deficit present. Mental Status: She is alert and oriented to person, place, and time. Cranial Nerves: No cranial nerve deficit. Psychiatric:         Mood and Affect: Mood normal.         Behavior: Behavior normal.         DIAGNOSTIC RESULTS     EKG: All EKG's are interpreted by ANDRY Cabrera in the absence of a cardiologist.    EKG interpreted by myself - please refer to attending physician's note for complete EKG interpretation:    T wave inversion in leads III aVF, V1 through V6 more pronounced when compared to February 2019. RADIOLOGY:   Non-plain film images such as CT, Ultrasound and MRI are read by the radiologist. Plain radiographic images are visualized and preliminarily interpreted by ANDRY Cabrera with the below findings:    Reviewed radiologist's interpretation. Interpretation per the Radiologist below, if available at the time of this note:    XR CHEST PORTABLE   Final Result   Left greater than right basilar airspace disease, concerning for pneumonia   given patient history.                LABS:  Labs Reviewed   CBC WITH AUTO DIFFERENTIAL - Abnormal; Notable for the following components:       Result Value    Neutrophils Absolute 8.2 (*)     All other components within normal limits    Narrative:     Performed at:  [facility]  [address] [address]   Phone [phone]   COMPREHENSIVE METABOLIC PANEL - Abnormal; Notable for the following components:    Glucose 253 (*)     Total Protein 8.3 (*)     Albumin/Globulin Ratio 0.7 (*)     AST 45 (*)     All other components within normal limits    Narrative:     Performed at:  [address] [facility] [address]   Phone [phone]   LACTIC ACID, PLASMA - Abnormal; Notable for the following components:    Lactic Acid 3.7 (*)     All other components within normal limits    Narrative:     Performed at:  [address] [address]   Phone [phone]   URINE RT REFLEX TO CULTURE - Abnormal; Notable for the following components:    Clarity, UA CLOUDY (*)     Glucose, Ur >=1000 (*)     Protein, UA 30 (*)     All other components within normal limits    Narrative:     Performed at:  [address] [facility] [address]   Phone [phone]   D-DIMER, QUANTITATIVE - Abnormal; Notable for the following components:    D-Dimer, Quant 346 (*)     All other components within normal limits    Narrative:     Performed at:  [address] [facility] [address]   Phone [phone]   CULTURE, BLOOD 1   CULTURE, BLOOD 2   BLOOD GAS, VENOUS    Narrative:     Performed at:  [address] [facility] [address]   Phone [phone]   TROPONIN    Narrative:     Performed at:  [facility]  [address] [place] [address] [facility] [address]   Phone [phone]   BRAIN NATRIURETIC PEPTIDE    Narrative:     Performed at:  [facility]  [address] [place] [address] [address]   Phone [phone]   MICROSCOPIC URINALYSIS       All other labs were within normal range or not returned as of this dictation. EMERGENCY DEPARTMENT COURSE and DIFFERENTIAL DIAGNOSIS/MDM:   Vitals:    Vitals:    07/12/20 1415 07/12/20 1425   BP:  131/75   Pulse:  79   Resp:  20   Temp:  101 °F (38.3 °C)   TempSrc:  Oral   SpO2: (!) 78% 98%   Weight:  134 lb 7.7 oz (61 kg)       Patient presented with oxygen saturations at 78% on room air. She was COVID +10 days ago and is continued to have body aches cough with increasing shortness of breath and cough today. She was placed on 6 L of O2 via nasal cannula and is now saturating 98%. Stable and resting comfortably. She is febrile at 101 °F.  She was given Tylenol. She has a multifocal pneumonia on chest x-ray was given azithromycin and Rocephin no recent admissions. May be viral.  She had a d-dimer drawn for prognostic purposes for COVID-19 only. She did have an elevated lactic acid level but because she is COVID-19 positive will not bolus with IV fluids at this time. I did tell the patient that she was COVID-19 +10 days ago. I did tell the patient that she needed to be admitted for her hypoxia and she expressed agreement and understanding I will consult the hospitalist.    CONSULTS:  IP CONSULT TO HOSPITALIST    PROCEDURES:  Procedures      FINAL IMPRESSION      1. Acute respiratory failure with hypoxia ([address]    2. COVID-19 virus detected    3. Multifocal pneumonia          DISPOSITION/PLAN   DISPOSITION        PATIENT REFERRED TO:  No follow-up provider specified.     DISCHARGE MEDICATIONS:  New Prescriptions    No medications on file       (Please note that portions of this note were completed with a voice recognition program.  Efforts were made to edit the dictations but occasionally words are mis-transcribed.)    Prudencio Berry, [address], [place]  07/12/20 1832

## 2020-07-12 NOTE — ED PROVIDER NOTES
The Ekg interpreted by me shows  normal sinus rhythm with a rate of 76  Axis is   Left axis deviation  QTc is  within an acceptable range  Intervals and Durations are unremarkable.       ST Segments: T wave inversion in leads III, aVF, V1 through V6  These changes look more pronounced when compared to an EKG from February 27, 2019           Faith Pederson MD  07/12/20 4050

## 2020-07-12 NOTE — ED NOTES
Bed: A-16  Expected date: 7/12/20  Expected time: 2:03 PM  Means of arrival: [facility]  Comments:  GEORGIA Acosta, [address] [facility]  07/12/20 0764

## 2020-07-12 NOTE — H&P
(DESYREL) 100 MG tablet TAKE ONE TABLET BY MOUTH DAILY AS NEEDED FOR SLEEP 5/11/20   Padma Espinoza MD   losartan-hydrochlorothiazide [facility]) 100-25 MG per tablet Take 1 tablet by mouth daily 2/21/20   Padma Espinoza MD   metFORMIN (GLUCOPHAGE) 1000 MG tablet Take 1 tablet by mouth daily (with breakfast) For diabetes 2/21/20   Padma Espinoza MD   vitamin D (ERGOCALCIFEROL) 1.25 MG (17011 UT) CAPS capsule Take 1 capsule by mouth once a week 2/21/20   Padma Espinoza MD   Cyanocobalamin 1000 MCG SUBL Place 1,000 mcg under the tongue daily 2/21/20   Padma Espinoza MD   insulin glargine (LANTUS SOLOSTAR) 100 UNIT/ML injection pen Inject 42 Units into the skin 2 times daily 11/1/19   Padma Espinoza MD   gabapentin (NEURONTIN) 300 MG capsule Take 1 capsule by mouth 4 times daily.  For nerve pain related to diabetes 11/1/19 12/3/20  Padma Espinoza MD   Insulin Pen Needle 31G X 5 MM MISC 1 each by Does not apply route daily 11/1/19   Padma Espinoza MD   blood glucose test strips (FREESTYLE LITE) strip 1 each by In Vitro route 4 times daily (before meals and nightly) 9/30/19   Anthony Smith MD   atorvastatin (LIPITOR) 40 MG tablet Take 1 tablet by mouth daily For cholesterol and heart 7/16/19   Padma Espinoza MD   empagliflozin (JARDIANCE) 25 MG tablet Take 25 mg by mouth daily 7/16/19   Padma Espinoza MD   metoprolol succinate (TOPROL XL) 50 MG extended release tablet Take 1 tablet by mouth daily For heart 7/16/19   Padma Espinoza MD   ondansetron (ZOFRAN ODT) 4 MG disintegrating tablet Take 1 tablet by mouth every 8 hours as needed for Nausea 2/27/19   David English DO   aspirin 325 MG tablet Take 1 tablet by mouth daily 9/27/18   Padma Espinoza MD   zoster recombinant adjuvanted vaccine [facility]) 50 MCG SUSR injection Once IM now and in 2-6 months 3/9/18   Danyel Zheng MD   Acetaminophen 650 MG TABS Take 650 mg by mouth every 4 hours as needed for Pain 5/12/16   MD LINDA RocaYLE LANCETS MISC 1 each by Does not apply route 2 times daily (before meals) 5/12/16   Mendoza Holder MD   Blood Glucose Monitoring Suppl (FREESTYLE LITE) SHANNEN 1 Device by Does not apply route 2 times daily (before meals) 5/12/16   Mendoza Holder MD       Allergies:  Patient has no known allergies. Social History:  The patient currently lives at home. TOBACCO:   reports that she has never smoked. She has never used smokeless tobacco.  ETOH:   reports no history of alcohol use. Family History:  Reviewed in detail and negative for DM, Early CAD, Cancer, CVA. Positive as follows:        Problem Relation Age of Onset    Diabetes Mother     High Blood Pressure Mother     Diabetes Father     High Blood Pressure Father        REVIEW OF SYSTEMS:   Positive for and as noted in the HPI. All other systems reviewed and negative. PHYSICAL EXAM:    /75   Pulse 79   Temp 101 °F (38.3 °C) (Oral)   Resp 20   Wt 134 lb 7.7 oz (61 kg)   SpO2 98%   BMI 26.26 kg/m²     General appearance: No apparent distress appears stated age and cooperative. HEENT Normal cephalic, atraumatic without obvious deformity. Pupils equal, round, and reactive to light. Extra ocular muscles intact. Conjunctivae/corneas clear. Neck: Supple, No jugular venous distention/bruits. Trachea midline without thyromegaly or adenopathy with full range of motion. Lungs: Increased work of breathing, accessory muscle use, rhonchi heard throughout. Heart: Regular rate and rhythm with Normal S1/S2 without murmurs, rubs or gallops, point of maximum impulse non-displaced  Abdomen: Soft, non-tender or non-distended without rigidity or guarding and positive bowel sounds all four quadrants. Extremities: No clubbing, cyanosis, or edema bilaterally. Full range of motion without deformity and normal gait intact. Skin: Skin color, texture, turgor normal.  No rashes or lesions.   Neurologic: Alert and oriented X 3, neurovascularly intact with sensory/motor intact upper extremities/lower extremities, bilaterally. Cranial nerves: II-XII intact, grossly non-focal.  Mental status: Alert, oriented, thought content appropriate. Capillary Refill: Acceptable  < 3 seconds  Peripheral Pulses: +3 Easily felt, not easily obliterated with pressure      CXR:  I have reviewed the CXR with the following interpretation: bilateral airspace disease  EKG:  I have reviewed the EKG with the following interpretation: NSR with some T wave inversion in III, aVF, and lateral leads    XR CHEST PORTABLE   Final Result   Left greater than right basilar airspace disease, concerning for pneumonia   given patient history. CBC   Recent Labs     07/12/20  1436   WBC 9.8   HGB 14.4   HCT 43.3         RENAL  Recent Labs     07/12/20  1436      K 4.0   CL 99   CO2 25   BUN 11   CREATININE 0.8     LFT'S  Recent Labs     07/12/20  1436   AST 45*   ALT 26   BILITOT 0.6   ALKPHOS 57     COAG  No results for input(s): INR in the last 72 hours.   CARDIAC ENZYMES  Recent Labs     07/12/20  1436   TROPONINI <0.01       U/A:    Lab Results   Component Value Date    COLORU YELLOW 07/12/2020    WBCUA 3-5 07/12/2020    RBCUA 0-2 07/12/2020    MUCUS Trace 10/12/2012    BACTERIA RARE 11/16/2016    CLARITYU CLOUDY 07/12/2020    SPECGRAV >1.030 07/12/2020    LEUKOCYTESUR Negative 07/12/2020    BLOODU Negative 07/12/2020    GLUCOSEU >=1000 07/12/2020    GLUCOSEU NEGATIVE 07/24/2011       ABG  No results found for: FCV3JEN, BEART, C4KVBFKK, PHART, THGBART, ZGN0QFC, PO2ART, KHJ9GFT        Active Hospital Problems    Diagnosis Date Noted    COVID-19 virus infection [U07.1] 07/12/2020         PHYSICIANS CERTIFICATION:    I certify that Nupur Marquez is expected to be hospitalized for more than 2 midnights based on the following assessment and plan:      ASSESSMENT/PLAN:    Shortness of breath likely 2/2 COVID-19 pneumonia  -check procalcitonin  -continue empiric Azithromycin/ceftriaxone  -Strep pneumo, Legionella urine antigens  -check inflammatory markers  -check IL-6  -IV decadron 6 mg daily  -Lovenox BID  -respiratory culture  -pulmonary toilet for cough: Mucinex, Tessalon, Acapella, IS    Acute respiratory failure with hypoxia - with accessory muscle use, air hunger  -management as above  -wean oxygen to maintain SpO2 > 89%    Type 2 DM - uncontrolled  -will increase Lantus tonight due to Decadron use  -add high dose SSI  -hypoglycemia protocol  -POCT glucose checks  -carb control diet    Essential hypertension  -continue home meds    Mild cognitive impairment  -continue home meds    Hyperlipidemia  -continue home meds    DVT Prophylaxis: Lovenox BID  Diet: Carb control diet  Code Status: Full Code  PT/OT Eval Status: defer    Dispo - admit PCU inpatient       Willie Buenrostro MD    Thank you Oskar Wilson MD for the opportunity to be involved in this patient's care. If you have any questions or concerns please feel free to contact me at [phone].

## 2020-07-13 LAB
A/G RATIO: 0.6 (ref 1.1–2.2)
ALBUMIN SERPL-MCNC: 3.2 G/DL (ref 3.4–5)
ALP BLD-CCNC: 54 U/L (ref 40–129)
ALT SERPL-CCNC: 27 U/L (ref 10–40)
ANION GAP SERPL CALCULATED.3IONS-SCNC: 15 MMOL/L (ref 3–16)
AST SERPL-CCNC: 45 U/L (ref 15–37)
BASOPHILS ABSOLUTE: 0.1 K/UL (ref 0–0.2)
BASOPHILS RELATIVE PERCENT: 0.7 %
BILIRUB SERPL-MCNC: 0.6 MG/DL (ref 0–1)
BUN BLDV-MCNC: 14 MG/DL (ref 7–20)
CALCIUM SERPL-MCNC: 8.7 MG/DL (ref 8.3–10.6)
CHLORIDE BLD-SCNC: 97 MMOL/L (ref 99–110)
CO2: 23 MMOL/L (ref 21–32)
CREAT SERPL-MCNC: 0.8 MG/DL (ref 0.6–1.2)
EKG ATRIAL RATE: 76 BPM
EKG DIAGNOSIS: NORMAL
EKG P AXIS: 29 DEGREES
EKG P-R INTERVAL: 162 MS
EKG Q-T INTERVAL: 394 MS
EKG QRS DURATION: 86 MS
EKG QTC CALCULATION (BAZETT): 443 MS
EKG R AXIS: -19 DEGREES
EKG T AXIS: -28 DEGREES
EKG VENTRICULAR RATE: 76 BPM
EOSINOPHILS ABSOLUTE: 0 K/UL (ref 0–0.6)
EOSINOPHILS RELATIVE PERCENT: 0 %
GFR AFRICAN AMERICAN: >60
GFR NON-AFRICAN AMERICAN: >60
GLOBULIN: 5 G/DL
GLUCOSE BLD-MCNC: 211 MG/DL (ref 70–99)
GLUCOSE BLD-MCNC: 240 MG/DL (ref 70–99)
GLUCOSE BLD-MCNC: 241 MG/DL (ref 70–99)
GLUCOSE BLD-MCNC: 248 MG/DL (ref 70–99)
GLUCOSE BLD-MCNC: 258 MG/DL (ref 70–99)
GLUCOSE BLD-MCNC: 264 MG/DL (ref 70–99)
HCT VFR BLD CALC: 43.8 % (ref 36–48)
HEMOGLOBIN: 14.4 G/DL (ref 12–16)
L. PNEUMOPHILA SEROGP 1 UR AG: NORMAL
LACTIC ACID: 1.8 MMOL/L (ref 0.4–2)
LYMPHOCYTES ABSOLUTE: 0.8 K/UL (ref 1–5.1)
LYMPHOCYTES RELATIVE PERCENT: 8.8 %
MCH RBC QN AUTO: 29.4 PG (ref 26–34)
MCHC RBC AUTO-ENTMCNC: 32.9 G/DL (ref 31–36)
MCV RBC AUTO: 89.4 FL (ref 80–100)
MONOCYTES ABSOLUTE: 0.3 K/UL (ref 0–1.3)
MONOCYTES RELATIVE PERCENT: 3.3 %
NEUTROPHILS ABSOLUTE: 8.3 K/UL (ref 1.7–7.7)
NEUTROPHILS RELATIVE PERCENT: 87.2 %
PDW BLD-RTO: 14.2 % (ref 12.4–15.4)
PERFORMED ON: ABNORMAL
PLATELET # BLD: 268 K/UL (ref 135–450)
PMV BLD AUTO: 8.8 FL (ref 5–10.5)
POTASSIUM REFLEX MAGNESIUM: 4.3 MMOL/L (ref 3.5–5.1)
RBC # BLD: 4.9 M/UL (ref 4–5.2)
SODIUM BLD-SCNC: 135 MMOL/L (ref 136–145)
STREP PNEUMONIAE ANTIGEN, URINE: NORMAL
TOTAL PROTEIN: 8.2 G/DL (ref 6.4–8.2)
WBC # BLD: 9.5 K/UL (ref 4–11)

## 2020-07-13 PROCEDURE — 80053 COMPREHEN METABOLIC PANEL: CPT

## 2020-07-13 PROCEDURE — 2580000003 HC RX 258: Performed by: INTERNAL MEDICINE

## 2020-07-13 PROCEDURE — 83605 ASSAY OF LACTIC ACID: CPT

## 2020-07-13 PROCEDURE — 6360000002 HC RX W HCPCS: Performed by: INTERNAL MEDICINE

## 2020-07-13 PROCEDURE — 2060000000 HC ICU INTERMEDIATE R&B

## 2020-07-13 PROCEDURE — 99223 1ST HOSP IP/OBS HIGH 75: CPT | Performed by: INTERNAL MEDICINE

## 2020-07-13 PROCEDURE — 93010 ELECTROCARDIOGRAM REPORT: CPT | Performed by: INTERNAL MEDICINE

## 2020-07-13 PROCEDURE — 85025 COMPLETE CBC W/AUTO DIFF WBC: CPT

## 2020-07-13 PROCEDURE — 36415 COLL VENOUS BLD VENIPUNCTURE: CPT

## 2020-07-13 PROCEDURE — 6370000000 HC RX 637 (ALT 250 FOR IP): Performed by: INTERNAL MEDICINE

## 2020-07-13 RX ORDER — 0.9 % SODIUM CHLORIDE 0.9 %
30 INTRAVENOUS SOLUTION INTRAVENOUS PRN
Status: DISCONTINUED | OUTPATIENT
Start: 2020-07-13 | End: 2020-07-20

## 2020-07-13 RX ADMIN — ENOXAPARIN SODIUM 40 MG: 40 INJECTION SUBCUTANEOUS at 22:03

## 2020-07-13 RX ADMIN — GABAPENTIN 300 MG: 300 CAPSULE ORAL at 22:03

## 2020-07-13 RX ADMIN — INSULIN LISPRO 9 UNITS: 100 INJECTION, SOLUTION INTRAVENOUS; SUBCUTANEOUS at 13:07

## 2020-07-13 RX ADMIN — SODIUM CHLORIDE 200 MG: 9 INJECTION, SOLUTION INTRAVENOUS at 22:39

## 2020-07-13 RX ADMIN — AMLODIPINE BESYLATE 5 MG: 5 TABLET ORAL at 08:30

## 2020-07-13 RX ADMIN — BENZONATATE 100 MG: 100 CAPSULE ORAL at 22:03

## 2020-07-13 RX ADMIN — DEXAMETHASONE SODIUM PHOSPHATE 6 MG: 4 INJECTION, SOLUTION INTRAMUSCULAR; INTRAVENOUS at 08:30

## 2020-07-13 RX ADMIN — BENZONATATE 100 MG: 100 CAPSULE ORAL at 05:50

## 2020-07-13 RX ADMIN — GABAPENTIN 300 MG: 300 CAPSULE ORAL at 13:07

## 2020-07-13 RX ADMIN — ATORVASTATIN CALCIUM 40 MG: 40 TABLET, FILM COATED ORAL at 08:30

## 2020-07-13 RX ADMIN — CYANOCOBALAMIN TAB 1000 MCG 1000 MCG: 1000 TAB at 08:30

## 2020-07-13 RX ADMIN — GABAPENTIN 300 MG: 300 CAPSULE ORAL at 08:30

## 2020-07-13 RX ADMIN — INSULIN GLARGINE 50 UNITS: 100 INJECTION, SOLUTION SUBCUTANEOUS at 22:16

## 2020-07-13 RX ADMIN — GABAPENTIN 300 MG: 300 CAPSULE ORAL at 17:38

## 2020-07-13 RX ADMIN — INSULIN LISPRO 6 UNITS: 100 INJECTION, SOLUTION INTRAVENOUS; SUBCUTANEOUS at 17:42

## 2020-07-13 RX ADMIN — ASPIRIN 325 MG ORAL TABLET 325 MG: 325 PILL ORAL at 08:30

## 2020-07-13 RX ADMIN — SODIUM CHLORIDE, PRESERVATIVE FREE 10 ML: 5 INJECTION INTRAVENOUS at 22:03

## 2020-07-13 RX ADMIN — DONEPEZIL HYDROCHLORIDE 5 MG: 5 TABLET, FILM COATED ORAL at 22:03

## 2020-07-13 RX ADMIN — GUAIFENESIN 600 MG: 600 TABLET ORAL at 22:03

## 2020-07-13 RX ADMIN — ENOXAPARIN SODIUM 40 MG: 40 INJECTION SUBCUTANEOUS at 08:29

## 2020-07-13 RX ADMIN — GUAIFENESIN 600 MG: 600 TABLET ORAL at 08:30

## 2020-07-13 RX ADMIN — CEFTRIAXONE 1 G: 1 INJECTION, POWDER, FOR SOLUTION INTRAMUSCULAR; INTRAVENOUS at 17:41

## 2020-07-13 RX ADMIN — LOSARTAN POTASSIUM AND HYDROCHLOROTHIAZIDE 2 TABLET: 12.5; 5 TABLET ORAL at 08:30

## 2020-07-13 RX ADMIN — INSULIN LISPRO 3 UNITS: 100 INJECTION, SOLUTION INTRAVENOUS; SUBCUTANEOUS at 22:16

## 2020-07-13 RX ADMIN — METOPROLOL SUCCINATE 50 MG: 50 TABLET, EXTENDED RELEASE ORAL at 08:30

## 2020-07-13 RX ADMIN — INSULIN GLARGINE 50 UNITS: 100 INJECTION, SOLUTION SUBCUTANEOUS at 08:35

## 2020-07-13 RX ADMIN — SODIUM CHLORIDE, PRESERVATIVE FREE 10 ML: 5 INJECTION INTRAVENOUS at 08:32

## 2020-07-13 RX ADMIN — AZITHROMYCIN MONOHYDRATE 500 MG: 500 INJECTION, POWDER, LYOPHILIZED, FOR SOLUTION INTRAVENOUS at 18:28

## 2020-07-13 RX ADMIN — INSULIN LISPRO 6 UNITS: 100 INJECTION, SOLUTION INTRAVENOUS; SUBCUTANEOUS at 08:35

## 2020-07-13 ASSESSMENT — PAIN SCALES - GENERAL
PAINLEVEL_OUTOF10: 0
PAINLEVEL_OUTOF10: 0

## 2020-07-13 NOTE — PLAN OF CARE
Problem: Airway Clearance - Ineffective  Goal: Achieve or maintain patent airway  Outcome: Ongoing     Problem: Gas Exchange - Impaired  Goal: Absence of hypoxia  Outcome: Ongoing     Problem: Gas Exchange - Impaired  Goal: Promote optimal lung function  Outcome: Ongoing     Problem: Breathing Pattern - Ineffective  Goal: Ability to achieve and maintain a regular respiratory rate  Outcome: Ongoing     Problem: Body Temperature -  Risk of, Imbalanced  Goal: Ability to maintain a body temperature within defined limits  Outcome: Ongoing     Problem: Body Temperature -  Risk of, Imbalanced  Goal: Will regain or maintain usual level of consciousness  Outcome: Ongoing     Problem:  Body Temperature -  Risk of, Imbalanced  Goal: Complications related to the disease process, condition or treatment will be avoided or minimized  Outcome: Ongoing     Problem: Isolation Precautions - Risk of Spread of Infection  Goal: Prevent transmission of infection  Outcome: Ongoing     Problem: Nutrition Deficits  Goal: Optimize nutrtional status  Outcome: Ongoing     Problem: Risk for Fluid Volume Deficit  Goal: Maintain normal heart rhythm  Outcome: Ongoing     Problem: Risk for Fluid Volume Deficit  Goal: Maintain absence of muscle cramping  Outcome: Ongoing     Problem: Risk for Fluid Volume Deficit  Goal: Maintain normal serum potassium, sodium, calcium, phosphorus, and pH  Outcome: Ongoing     Problem: Loneliness or Risk for Loneliness  Goal: Demonstrate positive use of time alone when socialization is not possible  Outcome: Ongoing     Problem: Fatigue  Goal: Verbalize increase energy and improved vitality  Outcome: Ongoing     Problem: Patient Education: Go to Patient Education Activity  Goal: Patient/Family Education  Outcome: Ongoing     Problem: Falls - Risk of:  Goal: Will remain free from falls  Description: Will remain free from falls  Outcome: Ongoing     Problem: Falls - Risk of:  Goal: Absence of physical injury  Description: Absence of physical injury  Outcome: Ongoing     Problem: Pain:  Goal: Pain level will decrease  Description: Pain level will decrease  Outcome: Ongoing     Problem: Pain:  Goal: Control of acute pain  Description: Control of acute pain  Outcome: Ongoing     Problem: Pain:  Goal: Control of chronic pain  Description: Control of chronic pain  Outcome: Ongoing

## 2020-07-13 NOTE — CARE COORDINATION
Sw attempted to contact pt via phone to compete initial assessment and ACP. Pt did not answer at this time. Sw will attempt to contact at a later time.      Electronically signed by BALAJI Ortiz, DARREL on 7/13/2020 at 12:14 PM

## 2020-07-13 NOTE — PROGRESS NOTES
Hospitalist Progress Note      PCP: Geri Jorgensen MD    Date of Admission: 7/12/2020        Subjective: coughing, weak, no fever or chills, no nausea or vomiting. Medications:  Reviewed    Infusion Medications    dextrose       Scheduled Medications    dexamethasone  6 mg Intravenous Daily    amLODIPine  5 mg Oral Daily    aspirin  325 mg Oral Daily    atorvastatin  40 mg Oral Daily    vitamin B-12  1,000 mcg Oral Daily    donepezil  5 mg Oral Nightly    gabapentin  300 mg Oral 4x Daily    insulin glargine  50 Units Subcutaneous BID    losartan-hydroCHLOROthiazide  2 tablet Oral Daily    metoprolol succinate  50 mg Oral Daily    sodium chloride flush  10 mL Intravenous 2 times per day    enoxaparin  40 mg Subcutaneous BID    azithromycin  500 mg Intravenous Q24H    And    cefTRIAXone (ROCEPHIN) IV  1 g Intravenous Q24H    insulin lispro  0-18 Units Subcutaneous TID WC    insulin lispro  0-9 Units Subcutaneous Nightly    guaiFENesin  600 mg Oral BID     PRN Meds: traZODone, sodium chloride flush, acetaminophen **OR** acetaminophen, polyethylene glycol, promethazine **OR** ondansetron, glucose, dextrose, glucagon (rDNA), dextrose, benzonatate      Intake/Output Summary (Last 24 hours) at 7/13/2020 1574  Last data filed at 7/13/2020 0829  Gross per 24 hour   Intake 130 ml   Output 400 ml   Net -270 ml       Physical Exam Performed:    /64   Pulse 69   Temp 99.4 °F (37.4 °C) (Oral)   Resp 22   Ht 5' (1.524 m)   Wt 132 lb 7.9 oz (60.1 kg)   SpO2 96%   BMI 25.88 kg/m²     General appearance: No apparent distress  Neck: Supple  Respiratory:  Scattered rhonchi   Cardiovascular: Regular rate and rhythm with normal S1/S2 without murmurs, rubs or gallops. Abdomen: Soft, non-tender, non-distended with normal bowel sounds. Musculoskeletal: No clubbing  Skin: Skin color, texture, turgor normal.  No rashes or lesions.   Neurologic:  No focal weakness  Psychiatric: Alert and oriented  Capillary Refill: Brisk,< 3 seconds   Peripheral Pulses: +2 palpable, equal bilaterally       Labs:   Recent Labs     07/12/20  1436 07/13/20  0545   WBC 9.8 9.5   HGB 14.4 14.4   HCT 43.3 43.8    268     Recent Labs     07/12/20  1436 07/13/20  0545    135*   K 4.0 4.3   CL 99 97*   CO2 25 23   BUN 11 14   CREATININE 0.8 0.8   CALCIUM 8.4 8.7     Recent Labs     07/12/20  1436 07/13/20  0545   AST 45* 45*   ALT 26 27   BILITOT 0.6 0.6   ALKPHOS 57 54     No results for input(s): INR in the last 72 hours. Recent Labs     07/12/20  1436   TROPONINI <0.01       Urinalysis:      Lab Results   Component Value Date    NITRU Negative 07/12/2020    WBCUA 3-5 07/12/2020    BACTERIA RARE 11/16/2016    RBCUA 0-2 07/12/2020    BLOODU Negative 07/12/2020    SPECGRAV >1.030 07/12/2020    GLUCOSEU >=1000 07/12/2020    GLUCOSEU NEGATIVE 07/24/2011       Radiology:  XR CHEST PORTABLE   Final Result   Left greater than right basilar airspace disease, concerning for pneumonia   given patient history. Assessment/Plan:    Active Hospital Problems    Diagnosis    COVID-19 virus infection [U07.1]       1. COVID-19 pneumonia, can not rule out bacterial infection, continue empiric Azithromycin/ceftriaxone, IV decadron 6 mg daily, Lovenox BID, respiratory culture, we will consult ID for recommendations on remdesivir. 2. Sepsis due to CODID 19 pneumonia, as above. 3. Acute respiratory failure with hypoxia, some increase in oxygen needs, minimal though, on dexamethasone. 4. Type 2 DM - uncontrolled, increased Lantus due to Decadron use, added high dose SSI. 5. Essential hypertension, continue home meds  6.  Mild cognitive impairment, continue home meds      Diet: DIET CARB CONTROL; Carb Control: 4 carb choices (60 gms)/meal  Code Status: Full Code        Edward Rodríguez MD

## 2020-07-13 NOTE — CONSULTS
pain related to diabetes 360 capsule 3    empagliflozin (JARDIANCE) 25 MG tablet Take 25 mg by mouth daily 90 tablet 3    ondansetron (ZOFRAN ODT) 4 MG disintegrating tablet Take 1 tablet by mouth every 8 hours as needed for Nausea 20 tablet 0       Allergies:  Patient has no known allergies. Immunizations :   Immunization History   Administered Date(s) Administered    Influenza Virus Vaccine 09/01/2017    Influenza, High Dose (Fluzone 65 yrs and older) 09/27/2018    Pneumococcal Conjugate 13-valent (Swati Fall) 07/26/2019    Pneumococcal Polysaccharide (Nwegbdgky29) 06/06/2018    Tdap (Boostrix, Adacel) 07/26/2019         Social History:   Social History     Tobacco Use    Smoking status: Never Smoker    Smokeless tobacco: Never Used   Substance Use Topics    Alcohol use: No    Drug use: No     Social History     Tobacco Use   Smoking Status Never Smoker   Smokeless Tobacco Never Used      Family History   Problem Relation Age of Onset    Diabetes Mother     High Blood Pressure Mother     Diabetes Father     High Blood Pressure Father          REVIEW OF SYSTEMS:    No fever / chills / sweats. No weight loss. No visual change, eye pain, eye discharge. No oral lesion, sore throat, dysphagia. Denies cough / sputum/Sob   Denies chest pain, palpitations/ dizziness  Denies nausea/ vomiting/abdominal pain/diarrhea. Denies dysuria or change in urinary function. Denies joint swelling or pain. No myalgia, arthralgia. No rashes, skin lesions   Denies focal weakness, sensory change or other neurologic symptoms  No lymph node swelling or tenderness. Cough, fevers sob   PHYSICAL EXAM:      Vitals:    /64   Pulse 69   Temp 99.4 °F (37.4 °C) (Oral)   Resp 22   Ht 5' (1.524 m)   Wt 132 lb 7.9 oz (60.1 kg)   SpO2 96%   BMI 25.88 kg/m²     PHYSICAL EXAM:     In-person bedside physical examination deferred.   Pursuant to the emergency declaration under the [address] Emergencies Act, 1135 waiver authority and the Coronavirus Preparedness and Response Supplemental Appropriations Act, this clinical encounter was conducted to provide necessary medical care. (Also consistent with new provisions and guidance offered by [facility] on March 18, 2020 in setting of COVID 19 outbreak and in order to preserve personal protective equipment in accordance with the flexibilities announced by CMS on March 30, 2020)   References: https://[facility]. [facility]/Portals/0/Resources/COVID-19/3_18%20Telemed%20Guidance%20Updated%20March%2018. pdf?xhq=[email or web address]                      https://[facility]. [facility]/Portals/0/Resources/COVID-19/3_18%20Telemed%20Guidance%20Updated%20March%2018. pdf?uru=[email or web address]                      http://[employer]/. pdf                              Pulmonary: deferred  Abdomen/GI: deferred  Neuro: deferred  Skin: deferred  Musculoskeletal:  deferred  Genitourinary: Deferred  Psych: deferred  Lymphatic/Immunologic: deferred         DATA:    Lab Results   Component Value Date    WBC 9.5 07/13/2020    HGB 14.4 07/13/2020    HCT 43.8 07/13/2020    MCV 89.4 07/13/2020     07/13/2020     Lab Results   Component Value Date    CREATININE 0.8 07/13/2020    BUN 14 07/13/2020     (L) 07/13/2020    K 4.3 07/13/2020    CL 97 (L) 07/13/2020    CO2 23 07/13/2020       Hepatic Function Panel:   Lab Results   Component Value Date    ALKPHOS 54 07/13/2020    ALT 27 07/13/2020    AST 45 07/13/2020    PROT 8.2 07/13/2020    PROT 7.3 01/08/2013    BILITOT 0.6 07/13/2020    BILIDIR 0.20 10/12/2012    IBILI 0.5 10/12/2012    LABALBU 3.2 07/13/2020     UA:  Lab Results   Component Value Date    COLORU YELLOW 07/12/2020    CLARITYU CLOUDY 07/12/2020    GLUCOSEU >=1000 07/12/2020    GLUCOSEU NEGATIVE 07/24/2011    BILIRUBINUR Negative 07/12/2020    BILIRUBINUR NEGATIVE 07/24/2011    KETUA Negative 07/12/2020 SPECGRAV >1.030 07/12/2020    BLOODU Negative 07/12/2020    PHUR 5.5 07/12/2020    PROTEINU 30 07/12/2020    UROBILINOGEN 1.0 07/12/2020    NITRU Negative 07/12/2020    LEUKOCYTESUR Negative 07/12/2020    LABMICR YES 07/12/2020    URINETYPE Other 07/12/2020      Urine Microscopic:   Lab Results   Component Value Date    LABCAST 20-40 Hyaline 11/29/2014    BACTERIA RARE 11/16/2016    COMU see below 07/12/2020    HYALCAST 0-2 07/12/2020    WBCUA 3-5 07/12/2020    RBCUA 0-2 07/12/2020    EPIU 2-5 07/12/2020         Scheduled Meds:   dexamethasone  6 mg Intravenous Daily    amLODIPine  5 mg Oral Daily    aspirin  325 mg Oral Daily    atorvastatin  40 mg Oral Daily    vitamin B-12  1,000 mcg Oral Daily    donepezil  5 mg Oral Nightly    gabapentin  300 mg Oral 4x Daily    insulin glargine  50 Units Subcutaneous BID    losartan-hydroCHLOROthiazide  2 tablet Oral Daily    metoprolol succinate  50 mg Oral Daily    sodium chloride flush  10 mL Intravenous 2 times per day    enoxaparin  40 mg Subcutaneous BID    azithromycin  500 mg Intravenous Q24H    And    cefTRIAXone (ROCEPHIN) IV  1 g Intravenous Q24H    insulin lispro  0-18 Units Subcutaneous TID WC    insulin lispro  0-9 Units Subcutaneous Nightly    guaiFENesin  600 mg Oral BID       Continuous Infusions:   dextrose         PRN Meds:  traZODone, sodium chloride flush, acetaminophen **OR** acetaminophen, polyethylene glycol, promethazine **OR** ondansetron, glucose, dextrose, glucagon (rDNA), dextrose, benzonatate  Ferritin 511  ESR 78     lACTIC ACID  3.7  DOWN TO  1.8     CRP  74.5     pROCAL  0.15     Hemoglobin A1C   Hemoglobin A1C   Collected:  02/05/20 1542    Result status:  Final    Resulting lab:  [facility] LAB    Reference range:  See comment %    Value:  6.9    Comment:  Comment:   Diagnosis of Diabetes: > or = 6.5%   Increased risk of diabetes (Prediabetes): 5.7-6.4%   Glycemic Control: Nonpregnant Adults: <7.0%                     Pregnant: <6.0%      *Additional information available - comment, narrative, result note        MICRO: cultures reviewed and updated by me     SARS-CoV-2   Covid-19 Ambulatory   Collected:  07/02/20 1242    Result status:  Final    Resulting lab:  [facility]    Reference range:  Not Detected    Value:  DetectedAbnormal       Comment: This test was developed and its performance   characteristics determined by [employer]. This test has not been FDA   cleared or approved. This test has been   authorized by FDA under an Emergency Use   Authorization (EUA). This test is only   authorized for the duration of time the   declaration that circumstances      Procedure  Component  Value  Units  Date/Time    Legionella antigen, urine [[id number]]   Collected: 07/12/20 2315    Order Status: Completed  Specimen: Urine voided  Updated: 07/13/20 1146     L. pneumophila Serogp 1 Ur Ag  --     Presumptive Negative   No Legionella pneumophila serogroup 1 antigens detected. A negative result does not exclude infection with   Legionella pneumophila serogroup 1 nor does it rule out   other microbial-caused respiratory infections or   disease caused by other serogroups of   Legionella pneumophila. Normal Range: Presumptive Negative    Narrative:      ORDER#: [id number]                          ORDERED BY: Enrique Glass   SOURCE: Urine Voided                       COLLECTED:  07/12/20 23:15   ANTIBIOTICS AT TAN. :                      RECEIVED :  07/12/20 23:40   Performed at:   [address] [place] [facility] [address]   Phone [phone]    Strep Pneumoniae Antigen [[id number]]   Collected: 07/12/20 2315    Order Status: Completed  Specimen: Urine voided  Updated: 07/13/20 1131     STREP PNEUMONIAE ANTIGEN, URINE  --     Presumptive Negative   Presumptive negative suggests no current or recent   pneumococcal infection.  Infection due to Strep pneumoniae   cannot be ruled out since the antigen present in the sample   may be below the detection limit of the test.   Normal Range:Presumptive Negative    Narrative:      ORDER#: [id number]                          ORDERED BY: Ray Mariscal   SOURCE: Urine Voided                       COLLECTED:  07/12/20 23:15   ANTIBIOTICS AT TAN. :                      RECEIVED :  07/12/20 23:40   Performed at:   [place] [address] [address] [facility] Mauricio Barbosa [place] [facility] [address]   Phone [phone]    Culture, Respiratory [[id number]]      Order Status: No result  Specimen: Sputum Expectorated     Culture, Blood 1 [[id number]]   Collected: 07/12/20 1436    Order Status: Sent  Specimen: Blood  Updated: 07/12/20 1500    Culture, Blood 2 [[id number]]   Collected: 07/12/20 1436    Order Status: Sent  Specimen: Blood  Updated: 07/12/20 1500        Urine Culture  No results for input(s): Redell Rota in the last 72 hours. Imaging:   XR CHEST PORTABLE   Final Result   Left greater than right basilar airspace disease, concerning for pneumonia   given patient history. All pertinent images and reports for the current Hospitalization were reviewed by me.     IMPRESSION:    Patient Active Problem List   Diagnosis    Cervical spinal stenosis    Acute encephalopathy    Malignant hypertension    Type 2 diabetes mellitus without complication, with long-term current use of insulin ([facility] [address]    Left-sided low back pain with left-sided sciatica    Hypercholesteremia    Primary insomnia    Hypotension    Dizziness    Essential hypertension    Elevated lactic acid level    Lactic acidosis    Atrial ectopy    Vasovagal syncope    Dysrhythmia    Chronic left-sided low back pain with left-sided sciatica    MCI (mild cognitive impairment)    Chest pain    COVID-19 virus infection         COVID-19 pneumonia  Lactic acidosis  Fevers  Dm+  Htn+   CXR with Bi lateral changes    Given her presentation this all likely from COVID-19 infection and will benefit from Remdesivir therapy given her DM, HTN watch for progression    Labs, Microbiology, Radiology and pertinent results from current hospitalization and care every where were reviewed by me as a part of the consultation. PLAN :  1. Start IV Remdesivir 200 mg loading followed by x 100 mg daily x 4 days  2. CMp x 5 days  3. Cont steroids  4. Ok to cont IV abx pending further cultures   5. Trend CRP, Ferritin, D dimer     Discussed with patient/Family and Nursing   Risk of Complications/Morbidity: High      · Illness(es)/ Infection present that pose threat to bodily function. · There is potential for severe exacerbation of infection/side effects of treatment. · Therapy requires intensive monitoring for antimicrobial agent toxicity. Thanks for allowing me to participate in your patient's care please call me with any questions or concerns.     Dr. Alejandro Arias MD  [address] [place] [address] Physician  Phone: [phone]   Fax : [phone]

## 2020-07-13 NOTE — PROGRESS NOTES
Pt agreeable to remdesivir medication. Will continue to monitor.     Electronically signed by Nikki Holland RN on 7/13/2020 at 7:05 PM

## 2020-07-13 NOTE — ED NOTES
[facility] Patient to be transported to Room 5254 via stretcher by ED tech. Patient transported with O2 tank, monitor, chart. IV site clean, dry, and intact. MEWS score and pain assessed as DENIES and documented. Updated patient on plan of care.      Luc Vazquez RN  07/12/20 2008

## 2020-07-13 NOTE — PROGRESS NOTES
Physician Progress Note      PATIENT:               Derek Miranda  CSN #:                  [id number]  :                       1955  ADMIT DATE:       2020 2:14 PM  [address] [place] DATE:  RESPONDING  PROVIDER #:        Sammi Prajapati MD          QUERY TEXT:    Pt admitted with COVID-19 and noted to have SIRS. If possible, please   document in progress notes and discharge summary if you are evaluating and/or   treating: The medical record reflects the following:  Risk Factors: + COVID Pneumonia  Clinical Indicators: Lactic Acid 3.7, temperature 101.0, HR > 92 and acute   respiratory failure  Treatment: IV Rocephin, IV Zithromax and IV Decadron    Thank you [email or web address]  Options provided:  -- Sepsis due to COVID-19 Pneumonia  -- COVID-19 without sepsis  -- Refer to Clinical Documentation Reviewer    PROVIDER RESPONSE TEXT:    This patient has sepsis due to COVID-19 Pneumonia.     Query created by: Caleb Ramires on 2020 1:26 PM      Electronically signed by:  Sammi Prajapati MD 2020 2:58 PM

## 2020-07-13 NOTE — PROGRESS NOTES
4 Eyes Skin Assessment     The patient is being assess for  Admission    I agree that 2 RN's have performed a thorough Head to Toe Skin Assessment on the patient. ALL assessment sites listed below have been assessed. Areas assessed by both nurses:   [x]   Head, Face, and Ears   [x]   Shoulders, Back, and Chest  [x]   Arms, Elbows, and Hands   [x]   Coccyx, Sacrum, and IschIum  [x]   Legs, Feet, and Heels        Does the Patient have Skin Breakdown?   No         James Prevention initiated:  NA   Wound Care Orders initiated:  NA      [facility] nurse consulted for Pressure Injury (Stage 3,4, Unstageable, DTI, NWPT, and Complex wounds), New and Established Ostomies:  NA      Nurse 1 eSignature: Electronically signed by Piyush Cartagena RN on 7/12/20 at 11:18 PM EDT    **SHARE this note so that the co-signing nurse is able to place an eSignature**    Nurse 2 eSignature: {Esignature:[id number]}

## 2020-07-14 LAB
A/G RATIO: 0.7 (ref 1.1–2.2)
ALBUMIN SERPL-MCNC: 2.9 G/DL (ref 3.4–5)
ALP BLD-CCNC: 49 U/L (ref 40–129)
ALT SERPL-CCNC: 32 U/L (ref 10–40)
ANION GAP SERPL CALCULATED.3IONS-SCNC: 16 MMOL/L (ref 3–16)
AST SERPL-CCNC: 49 U/L (ref 15–37)
BASOPHILS ABSOLUTE: 0 K/UL (ref 0–0.2)
BASOPHILS RELATIVE PERCENT: 0.2 %
BILIRUB SERPL-MCNC: 0.5 MG/DL (ref 0–1)
BUN BLDV-MCNC: 31 MG/DL (ref 7–20)
C-REACTIVE PROTEIN: 37.9 MG/L (ref 0–5.1)
CALCIUM SERPL-MCNC: 8.2 MG/DL (ref 8.3–10.6)
CHLORIDE BLD-SCNC: 101 MMOL/L (ref 99–110)
CO2: 22 MMOL/L (ref 21–32)
CREAT SERPL-MCNC: 1.1 MG/DL (ref 0.6–1.2)
EOSINOPHILS ABSOLUTE: 0 K/UL (ref 0–0.6)
EOSINOPHILS RELATIVE PERCENT: 0 %
FERRITIN: 409.5 NG/ML (ref 15–150)
GFR AFRICAN AMERICAN: 60
GFR NON-AFRICAN AMERICAN: 50
GLOBULIN: 4.4 G/DL
GLUCOSE BLD-MCNC: 132 MG/DL (ref 70–99)
GLUCOSE BLD-MCNC: 144 MG/DL (ref 70–99)
GLUCOSE BLD-MCNC: 194 MG/DL (ref 70–99)
GLUCOSE BLD-MCNC: 206 MG/DL (ref 70–99)
GLUCOSE BLD-MCNC: 279 MG/DL (ref 70–99)
GLUCOSE BLD-MCNC: 289 MG/DL (ref 70–99)
HCT VFR BLD CALC: 39.3 % (ref 36–48)
HEMOGLOBIN: 13 G/DL (ref 12–16)
INTERLEUKIN-6: 36.7 PG/ML
LYMPHOCYTES ABSOLUTE: 0.9 K/UL (ref 1–5.1)
LYMPHOCYTES RELATIVE PERCENT: 8 %
MCH RBC QN AUTO: 29.2 PG (ref 26–34)
MCHC RBC AUTO-ENTMCNC: 33.1 G/DL (ref 31–36)
MCV RBC AUTO: 88.3 FL (ref 80–100)
MONOCYTES ABSOLUTE: 0.8 K/UL (ref 0–1.3)
MONOCYTES RELATIVE PERCENT: 7.4 %
NEUTROPHILS ABSOLUTE: 9.5 K/UL (ref 1.7–7.7)
NEUTROPHILS RELATIVE PERCENT: 84.4 %
PDW BLD-RTO: 14.1 % (ref 12.4–15.4)
PERFORMED ON: ABNORMAL
PLATELET # BLD: 304 K/UL (ref 135–450)
PMV BLD AUTO: 8.4 FL (ref 5–10.5)
POTASSIUM REFLEX MAGNESIUM: 3.6 MMOL/L (ref 3.5–5.1)
RBC # BLD: 4.45 M/UL (ref 4–5.2)
SEDIMENTATION RATE, ERYTHROCYTE: 78 MM/HR (ref 0–30)
SODIUM BLD-SCNC: 139 MMOL/L (ref 136–145)
TOTAL PROTEIN: 7.3 G/DL (ref 6.4–8.2)
WBC # BLD: 11.3 K/UL (ref 4–11)

## 2020-07-14 PROCEDURE — 80053 COMPREHEN METABOLIC PANEL: CPT

## 2020-07-14 PROCEDURE — 36415 COLL VENOUS BLD VENIPUNCTURE: CPT

## 2020-07-14 PROCEDURE — 6370000000 HC RX 637 (ALT 250 FOR IP): Performed by: INTERNAL MEDICINE

## 2020-07-14 PROCEDURE — 6360000002 HC RX W HCPCS: Performed by: INTERNAL MEDICINE

## 2020-07-14 PROCEDURE — 99233 SBSQ HOSP IP/OBS HIGH 50: CPT | Performed by: INTERNAL MEDICINE

## 2020-07-14 PROCEDURE — 86140 C-REACTIVE PROTEIN: CPT

## 2020-07-14 PROCEDURE — 2060000000 HC ICU INTERMEDIATE R&B

## 2020-07-14 PROCEDURE — 85652 RBC SED RATE AUTOMATED: CPT

## 2020-07-14 PROCEDURE — 2580000003 HC RX 258: Performed by: INTERNAL MEDICINE

## 2020-07-14 PROCEDURE — 85025 COMPLETE CBC W/AUTO DIFF WBC: CPT

## 2020-07-14 PROCEDURE — 82728 ASSAY OF FERRITIN: CPT

## 2020-07-14 RX ORDER — DEXAMETHASONE SODIUM PHOSPHATE 4 MG/ML
6 INJECTION, SOLUTION INTRA-ARTICULAR; INTRALESIONAL; INTRAMUSCULAR; INTRAVENOUS; SOFT TISSUE DAILY
Status: DISCONTINUED | OUTPATIENT
Start: 2020-07-14 | End: 2020-07-21

## 2020-07-14 RX ADMIN — CEFTRIAXONE 1 G: 1 INJECTION, POWDER, FOR SOLUTION INTRAMUSCULAR; INTRAVENOUS at 18:08

## 2020-07-14 RX ADMIN — INSULIN LISPRO 5 UNITS: 100 INJECTION, SOLUTION INTRAVENOUS; SUBCUTANEOUS at 21:18

## 2020-07-14 RX ADMIN — ENOXAPARIN SODIUM 40 MG: 40 INJECTION SUBCUTANEOUS at 08:51

## 2020-07-14 RX ADMIN — INSULIN GLARGINE 50 UNITS: 100 INJECTION, SOLUTION SUBCUTANEOUS at 09:00

## 2020-07-14 RX ADMIN — Medication 6 MG: at 08:52

## 2020-07-14 RX ADMIN — GABAPENTIN 300 MG: 300 CAPSULE ORAL at 20:14

## 2020-07-14 RX ADMIN — DONEPEZIL HYDROCHLORIDE 5 MG: 5 TABLET, FILM COATED ORAL at 20:15

## 2020-07-14 RX ADMIN — BENZONATATE 100 MG: 100 CAPSULE ORAL at 18:06

## 2020-07-14 RX ADMIN — SODIUM CHLORIDE, PRESERVATIVE FREE 10 ML: 5 INJECTION INTRAVENOUS at 08:53

## 2020-07-14 RX ADMIN — INSULIN LISPRO 3 UNITS: 100 INJECTION, SOLUTION INTRAVENOUS; SUBCUTANEOUS at 12:30

## 2020-07-14 RX ADMIN — CYANOCOBALAMIN TAB 1000 MCG 1000 MCG: 1000 TAB at 08:52

## 2020-07-14 RX ADMIN — ENOXAPARIN SODIUM 40 MG: 40 INJECTION SUBCUTANEOUS at 20:14

## 2020-07-14 RX ADMIN — METOPROLOL SUCCINATE 50 MG: 50 TABLET, EXTENDED RELEASE ORAL at 08:51

## 2020-07-14 RX ADMIN — SODIUM CHLORIDE 100 MG: 9 INJECTION, SOLUTION INTRAVENOUS at 20:15

## 2020-07-14 RX ADMIN — GUAIFENESIN 600 MG: 600 TABLET ORAL at 08:51

## 2020-07-14 RX ADMIN — ATORVASTATIN CALCIUM 40 MG: 40 TABLET, FILM COATED ORAL at 08:52

## 2020-07-14 RX ADMIN — ASPIRIN 325 MG ORAL TABLET 325 MG: 325 PILL ORAL at 08:51

## 2020-07-14 RX ADMIN — AMLODIPINE BESYLATE 5 MG: 5 TABLET ORAL at 08:52

## 2020-07-14 RX ADMIN — TRAZODONE HYDROCHLORIDE 100 MG: 100 TABLET ORAL at 21:18

## 2020-07-14 RX ADMIN — INSULIN GLARGINE 50 UNITS: 100 INJECTION, SOLUTION SUBCUTANEOUS at 22:54

## 2020-07-14 RX ADMIN — GUAIFENESIN 600 MG: 600 TABLET ORAL at 20:15

## 2020-07-14 RX ADMIN — GABAPENTIN 300 MG: 300 CAPSULE ORAL at 08:52

## 2020-07-14 RX ADMIN — LOSARTAN POTASSIUM AND HYDROCHLOROTHIAZIDE 2 TABLET: 12.5; 5 TABLET ORAL at 08:52

## 2020-07-14 RX ADMIN — GABAPENTIN 300 MG: 300 CAPSULE ORAL at 18:08

## 2020-07-14 RX ADMIN — INSULIN LISPRO 9 UNITS: 100 INJECTION, SOLUTION INTRAVENOUS; SUBCUTANEOUS at 18:28

## 2020-07-14 RX ADMIN — AZITHROMYCIN MONOHYDRATE 500 MG: 500 INJECTION, POWDER, LYOPHILIZED, FOR SOLUTION INTRAVENOUS at 18:59

## 2020-07-14 RX ADMIN — SODIUM CHLORIDE, PRESERVATIVE FREE 10 ML: 5 INJECTION INTRAVENOUS at 20:14

## 2020-07-14 ASSESSMENT — PAIN SCALES - GENERAL
PAINLEVEL_OUTOF10: 0

## 2020-07-14 NOTE — PROGRESS NOTES
Patient provided Remdesivir fact sheet. All patient questions answered and patient understands what medication is for, side effects, and symptoms to report. Medication is now infusing after patient gave permission to administer this medication infusion.

## 2020-07-14 NOTE — PLAN OF CARE
Problem: Airway Clearance - Ineffective  Goal: Achieve or maintain patent airway  7/14/2020 0008 by Padma Arcos RN  Outcome: Ongoing     Problem: Gas Exchange - Impaired  Goal: Absence of hypoxia  7/14/2020 0008 by Padma Arcos RN  Outcome: Ongoing     Problem: Gas Exchange - Impaired  Goal: Promote optimal lung function  7/14/2020 0008 by Padma Arcos RN  Outcome: Ongoing     Problem: Breathing Pattern - Ineffective  Goal: Ability to achieve and maintain a regular respiratory rate  7/14/2020 0008 by Padma Arcos RN  Outcome: Ongoing     Problem:  Body Temperature -  Risk of, Imbalanced  Goal: Ability to maintain a body temperature within defined limits  7/14/2020 0008 by Padma Arcos RN  Outcome: Ongoing     Problem: Isolation Precautions - Risk of Spread of Infection  Goal: Prevent transmission of infection  7/14/2020 0008 by Padma Arcos RN  Outcome: Ongoing     Problem: Fatigue  Goal: Verbalize increase energy and improved vitality  7/14/2020 0008 by Padma Arcos RN  Outcome: Ongoing     Problem: Falls - Risk of:  Goal: Will remain free from falls  Description: Will remain free from falls  7/14/2020 0008 by Padma Arcos RN  Outcome: Ongoing     Problem: Falls - Risk of:  Goal: Absence of physical injury  Description: Absence of physical injury  7/14/2020 0008 by Padma Arcos RN  Outcome: Ongoing     Problem: Pain:  Goal: Pain level will decrease  Description: Pain level will decrease  7/14/2020 0008 by Padma Arcos RN  Outcome: Ongoing     Problem: Pain:  Goal: Control of acute pain  Description: Control of acute pain  7/14/2020 0008 by Padma Arcos RN  Outcome: Ongoing     Problem: Pain:  Goal: Control of chronic pain  Description: Control of chronic pain  7/14/2020 0008 by Padma Arcos RN  Outcome: Ongoing

## 2020-07-14 NOTE — PROGRESS NOTES
rashes or lesions. Neurologic:  No focal weakness   Psychiatric: Alert and oriented  Capillary Refill: Brisk,< 3 seconds   Peripheral Pulses: +2 palpable, equal bilaterally       Labs:   Recent Labs     07/12/20  1436 07/13/20  0545 07/14/20  1058   WBC 9.8 9.5 11.3*   HGB 14.4 14.4 13.0   HCT 43.3 43.8 39.3    268 304     Recent Labs     07/12/20  1436 07/13/20  0545 07/14/20  1058    135* 139   K 4.0 4.3 3.6   CL 99 97* 101   CO2 25 23 22   BUN 11 14 31*   CREATININE 0.8 0.8 1.1   CALCIUM 8.4 8.7 8.2*     Recent Labs     07/12/20  1436 07/13/20  0545 07/14/20  1058   AST 45* 45* 49*   ALT 26 27 32   BILITOT 0.6 0.6 0.5   ALKPHOS 57 54 49     No results for input(s): INR in the last 72 hours. Recent Labs     07/12/20  1436   TROPONINI <0.01       Urinalysis:      Lab Results   Component Value Date    NITRU Negative 07/12/2020    WBCUA 3-5 07/12/2020    BACTERIA RARE 11/16/2016    RBCUA 0-2 07/12/2020    BLOODU Negative 07/12/2020    SPECGRAV >1.030 07/12/2020    GLUCOSEU >=1000 07/12/2020    GLUCOSEU NEGATIVE 07/24/2011       Radiology:  XR CHEST PORTABLE   Final Result   Left greater than right basilar airspace disease, concerning for pneumonia   given patient history. Assessment/Plan:    Active Hospital Problems    Diagnosis    COVID-19 virus infection [U07.1]     1. COVID-19 pneumonia, can not rule out bacterial infection, continue empiric Azithromycin/ceftriaxone, IV decadron 6 mg daily, Lovenox BID, respiratory culture, consulted ID and remdesivir started, CRP improved some. 2. Sepsis due to CODID 19 pneumonia, ongoing management. 3. Acute respiratory failure with hypoxia, some increase in oxygen needs, minimal though, on dexamethasone. 4. Type 2 DM - uncontrolled, increased Lantus due to Decadron use, added high dose SSI. 5. Essential hypertension, continue home meds  6.  Mild cognitive impairment, continue home meds    Diet: DIET CARB CONTROL; Carb Control: 4 carb choices (60 gms)/meal  Code Status: Full Code        Vielka Ling MD

## 2020-07-14 NOTE — PROGRESS NOTES
Medication Reconciliation    List of medications patient is currently taking is complete. Source of information: 1. Dispense history                                      2. Epic records      Allergies  Patient has no known allergies.

## 2020-07-14 NOTE — PROGRESS NOTES
the Coronavirus Preparedness and Response Supplemental Appropriations Act, this clinical encounter was conducted to provide necessary medical care.   (Also consistent with new provisions and guidance offered by [facility] on March 18, 2020 in setting of COVID 19 outbreak and in order to preserve personal protective equipment in accordance with the flexibilities announced by CMS on March 30, 2020)   References: https://[facility]. [facility]/Portals/0/Resources/COVID-19/3_18%20Telemed%20Guidance%20Updated%20March%2018. pdf?lpw=[email or web address]                      https://[facility]. [facility]/Portals/0/Resources/COVID-19/3_18%20Telemed%20Guidance%20Updated%20March%2018. pdf?sxz=[email or web address]                      http://[email or web address] pdf                                Pulmonary: deferred  Abdomen/GI: deferred  Neuro: deferred  Skin: deferred  Musculoskeletal:  deferred  Genitourinary: Deferred  Psych: deferred  Lymphatic/Immunologic: deferred       Data Review:    Lab Results   Component Value Date    WBC 9.5 07/13/2020    HGB 14.4 07/13/2020    HCT 43.8 07/13/2020    MCV 89.4 07/13/2020     07/13/2020     Lab Results   Component Value Date    CREATININE 0.8 07/13/2020    BUN 14 07/13/2020     (L) 07/13/2020    K 4.3 07/13/2020    CL 97 (L) 07/13/2020    CO2 23 07/13/2020       Hepatic Function Panel:   Lab Results   Component Value Date    ALKPHOS 54 07/13/2020    ALT 27 07/13/2020    AST 45 07/13/2020    PROT 8.2 07/13/2020    PROT 7.3 01/08/2013    BILITOT 0.6 07/13/2020    BILIDIR 0.20 10/12/2012    IBILI 0.5 10/12/2012    LABALBU 3.2 07/13/2020       UA:  Lab Results   Component Value Date    COLORU YELLOW 07/12/2020    CLARITYU CLOUDY 07/12/2020    GLUCOSEU >=1000 07/12/2020    GLUCOSEU NEGATIVE 07/24/2011    BILIRUBINUR Negative 07/12/2020    BILIRUBINUR NEGATIVE 07/24/2011    KETUA Negative 07/12/2020    SPECGRAV >1.030 07/12/2020 BLOODU Negative 07/12/2020    PHUR 5.5 07/12/2020    PROTEINU 30 07/12/2020    UROBILINOGEN 1.0 07/12/2020    NITRU Negative 07/12/2020    LEUKOCYTESUR Negative 07/12/2020    LABMICR YES 07/12/2020    URINETYPE Other 07/12/2020      Urine Microscopic:   Lab Results   Component Value Date    LABCAST 20-40 Hyaline 11/29/2014    BACTERIA RARE 11/16/2016    COMU see below 07/12/2020    HYALCAST 0-2 07/12/2020    WBCUA 3-5 07/12/2020    RBCUA 0-2 07/12/2020    EPIU 2-5 07/12/2020       MICRO: cultures reviewed and updated by me         IMAGING:    XR CHEST PORTABLE   Final Result   Left greater than right basilar airspace disease, concerning for pneumonia   given patient history.                All the pertinent images and reports for the current Hospitalization were reviewed by me     Scheduled Meds:   dexamethasone  6 mg Intravenous Daily    remdesivir IVPB  100 mg Intravenous Q24H    amLODIPine  5 mg Oral Daily    aspirin  325 mg Oral Daily    atorvastatin  40 mg Oral Daily    vitamin B-12  1,000 mcg Oral Daily    donepezil  5 mg Oral Nightly    gabapentin  300 mg Oral 4x Daily    insulin glargine  50 Units Subcutaneous BID    losartan-hydroCHLOROthiazide  2 tablet Oral Daily    metoprolol succinate  50 mg Oral Daily    sodium chloride flush  10 mL Intravenous 2 times per day    enoxaparin  40 mg Subcutaneous BID    azithromycin  500 mg Intravenous Q24H    And    cefTRIAXone (ROCEPHIN) IV  1 g Intravenous Q24H    insulin lispro  0-18 Units Subcutaneous TID WC    insulin lispro  0-9 Units Subcutaneous Nightly    guaiFENesin  600 mg Oral BID       Continuous Infusions:   dextrose         PRN Meds:  sodium chloride, traZODone, sodium chloride flush, acetaminophen **OR** acetaminophen, polyethylene glycol, promethazine **OR** ondansetron, glucose, dextrose, glucagon (rDNA), dextrose, benzonatate      Assessment:     Patient Active Problem List   Diagnosis    Cervical spinal stenosis    Acute

## 2020-07-14 NOTE — CARE COORDINATION
INITIAL CASE MANAGEMENT ASSESSMENT    Reviewed chart, met with patient to assess possible discharge needs. Explained Case Management role/services. SW interviewed patient via telephone as she is currently placed in droplet/plus precautions. Living Situation: Patient reports that she resides in an apartment home with her boyfriend and no pets. She stated that there were two stairs leading up to her front door and she has no trouble getting in and out of the unit at this time. ADLs: Prior to medical admission patient reports that she was independent. She stated that she does not anticipate any needs at discharge. DME: Prior to medical admission patient reports that she used no durable medical equipment. She stated that she does not anticipate any needs at discharge. PT/OT Recs: Since medical admission patient reports that she has been ambulating with no assistance. She stated that she does not anticipate any needs at discharge. Active Services: Prior to medical admission patient reports that she used no active services. She stated that she does not anticipate any needs at discharge. Transportation: Prior to medical admission patient reports that she was an active . She stated that her boyfriend will likely transport her home at discharge. Medications: Patient receives medications from [facility], [employer] and [employer]. At this time she reports no issues with access or affordability. PCP: Zeeshan Pinto MD is patient's primary care physician. She stated that she can't remember her last appointment with this provider. PLAN/COMMENTS:   1) Continue to monitor oxygen needs until discharge. Patient is currently on room air. SW provided contact information for patient or family to call with any questions. SW will follow and assist as needed. Respectfully submitted,    Coretta CARPIO, [facility]   [phone]    Electronically signed by Daniel Valdez REJI on 7/14/2020 at 9:35 AM

## 2020-07-14 NOTE — CARE COORDINATION
Advance Care Planning     Advance Care Planning Activator (Inpatient)  Conversation Note      Date of ACP Conversation: 7/12/2020    [employer] with: patient via telephone today as she is currently in droplet plus precautions. ACP Activator: Klaus Vinson    *When Decision Maker makes decisions on behalf of the incapacitated patient: Decision Maker is asked to consider and make decisions based on patient values, known preferences, or best interests. Health Care Decision Maker:     Current Designated Health Care Decision Maker:   (If there is a valid [place] named in the [address] [place] [address] Makers\" box in the ACP activity, but it is not visible above, be sure to open that field and then select the health care decision maker relationship (ie \"primary\") in the blank space to the right of the name.) Validate  this information as still accurate & up-to-date; edit [place] field as needed.)    Note: Assess and validate information in current ACP documents, as indicated. If no Decision Maker listed above or available through scanned documents, then:    If no Authorized Decision Maker has previously been identified, then patient chooses [place]:  \"Who would you like to name as your primary health care decision-maker? \"               Name: Joel Timmons        Relationship: Daughter         Phone number: [phone]  Lisa Margret this person be reached easily? \" Yes     \"Who would you like to name as your back-up decision maker? \"   Name: Yanet Metcalfard       Relationship: Daughter          Phone number: Lola Delacruz this person be reached easily? \" Yes      Note: If the relationship of these Decision-Makers to the patient does NOT follow your state's Next of Kin hierarchy, recommend that patient complete ACP document that meets state-specific requirements to allow them to act on the patient's behalf when appropriate.     Care Preferences    Ventilation: \"If you were in your present state of health and suddenly became very ill and were unable to breathe on your own, what would your preference be about the use of a ventilator (breathing machine) if it were available to you? \"      Would the patient desire the use of ventilator (breathing machine)?: Yes. \"If your health worsens and it becomes clear that your chance of recovery is unlikely, what would your preference be about the use of a ventilator (breathing machine) if it were available to you? \"     Would the patient desire the use of ventilator (breathing machine)?: No.      Resuscitation  \"CPR works best to restart the heart when there is a sudden event, like a heart attack, in someone who is otherwise healthy. Unfortunately, CPR does not typically restart the heart for people who have serious health conditions or who are very sick. \"    \"In the event your heart stopped as a result of an underlying serious health condition, would you want attempts to be made to restart your heart (answer \"yes\" for attempt to resuscitate) or would you prefer a natural death (answer \"no\" for do not attempt to resuscitate)? \" Yes. NOTE: If the patient has a valid advance directive AND now provides care preference(s) that are inconsistent with that prior directive, advise the patient to consider either: creating a new advance directive that complies with state-specific requirements; or, if that is not possible, orally revoking that prior directive in accordance with state-specific requirements, which must be documented in the EHR. [x] Yes   [] No   Educated Patient / Jamas Favre regarding differences between Advance Directives and portable DNR orders.     Length of ACP Conversation in minutes:      Conversation Outcomes:  [x] ACP discussion completed  [] Existing advance directive reviewed with patient; no changes to patient's previously recorded wishes  [] New Advance Directive completed  [] Portable Do Not Rescitate prepared for Provider review and signature  [] POLST/POST/MOLST/MOST prepared for Provider review and signature      Follow-up plan:    [] Schedule follow-up conversation to continue planning  [] Referred individual to Provider for additional questions/concerns   [] Advised patient/agent/surrogate to review completed ACP document and update if needed with changes in condition, patient preferences or care setting    [x] This note routed to one or more involved healthcare providers Mohit Salmeron MD     Respectfully submitted,    Coretta CARPIO, SILVIA-S  [facility]   [phone]    Electronically signed by REJI Ascencio on 7/14/2020 at 9:41 AM

## 2020-07-15 LAB
A/G RATIO: 0.7 (ref 1.1–2.2)
ALBUMIN SERPL-MCNC: 2.9 G/DL (ref 3.4–5)
ALP BLD-CCNC: 47 U/L (ref 40–129)
ALT SERPL-CCNC: 33 U/L (ref 10–40)
ANION GAP SERPL CALCULATED.3IONS-SCNC: 14 MMOL/L (ref 3–16)
AST SERPL-CCNC: 44 U/L (ref 15–37)
BASOPHILS ABSOLUTE: 0 K/UL (ref 0–0.2)
BASOPHILS RELATIVE PERCENT: 0.1 %
BILIRUB SERPL-MCNC: 0.4 MG/DL (ref 0–1)
BUN BLDV-MCNC: 35 MG/DL (ref 7–20)
CALCIUM SERPL-MCNC: 8.6 MG/DL (ref 8.3–10.6)
CHLORIDE BLD-SCNC: 103 MMOL/L (ref 99–110)
CO2: 22 MMOL/L (ref 21–32)
CREAT SERPL-MCNC: 0.9 MG/DL (ref 0.6–1.2)
D DIMER: 212 NG/ML DDU (ref 0–229)
EOSINOPHILS ABSOLUTE: 0 K/UL (ref 0–0.6)
EOSINOPHILS RELATIVE PERCENT: 0 %
FERRITIN: 338.8 NG/ML (ref 15–150)
GFR AFRICAN AMERICAN: >60
GFR NON-AFRICAN AMERICAN: >60
GLOBULIN: 4.3 G/DL
GLUCOSE BLD-MCNC: 148 MG/DL (ref 70–99)
GLUCOSE BLD-MCNC: 214 MG/DL (ref 70–99)
GLUCOSE BLD-MCNC: 240 MG/DL (ref 70–99)
GLUCOSE BLD-MCNC: 70 MG/DL (ref 70–99)
GLUCOSE BLD-MCNC: 71 MG/DL (ref 70–99)
HCT VFR BLD CALC: 39.1 % (ref 36–48)
HEMOGLOBIN: 13 G/DL (ref 12–16)
LYMPHOCYTES ABSOLUTE: 0.9 K/UL (ref 1–5.1)
LYMPHOCYTES RELATIVE PERCENT: 7.9 %
MCH RBC QN AUTO: 29.3 PG (ref 26–34)
MCHC RBC AUTO-ENTMCNC: 33.3 G/DL (ref 31–36)
MCV RBC AUTO: 87.9 FL (ref 80–100)
MONOCYTES ABSOLUTE: 0.8 K/UL (ref 0–1.3)
MONOCYTES RELATIVE PERCENT: 7.1 %
NEUTROPHILS ABSOLUTE: 10 K/UL (ref 1.7–7.7)
NEUTROPHILS RELATIVE PERCENT: 84.9 %
PDW BLD-RTO: 14.4 % (ref 12.4–15.4)
PERFORMED ON: ABNORMAL
PERFORMED ON: NORMAL
PLATELET # BLD: 358 K/UL (ref 135–450)
PMV BLD AUTO: 8.5 FL (ref 5–10.5)
POTASSIUM REFLEX MAGNESIUM: 3.8 MMOL/L (ref 3.5–5.1)
RBC # BLD: 4.45 M/UL (ref 4–5.2)
SODIUM BLD-SCNC: 139 MMOL/L (ref 136–145)
TOTAL PROTEIN: 7.2 G/DL (ref 6.4–8.2)
WBC # BLD: 11.8 K/UL (ref 4–11)

## 2020-07-15 PROCEDURE — 6370000000 HC RX 637 (ALT 250 FOR IP): Performed by: INTERNAL MEDICINE

## 2020-07-15 PROCEDURE — 94761 N-INVAS EAR/PLS OXIMETRY MLT: CPT

## 2020-07-15 PROCEDURE — 80053 COMPREHEN METABOLIC PANEL: CPT

## 2020-07-15 PROCEDURE — 2580000003 HC RX 258: Performed by: INTERNAL MEDICINE

## 2020-07-15 PROCEDURE — 99233 SBSQ HOSP IP/OBS HIGH 50: CPT | Performed by: INTERNAL MEDICINE

## 2020-07-15 PROCEDURE — 6360000002 HC RX W HCPCS: Performed by: INTERNAL MEDICINE

## 2020-07-15 PROCEDURE — 82728 ASSAY OF FERRITIN: CPT

## 2020-07-15 PROCEDURE — 85379 FIBRIN DEGRADATION QUANT: CPT

## 2020-07-15 PROCEDURE — 2700000000 HC OXYGEN THERAPY PER DAY

## 2020-07-15 PROCEDURE — 2060000000 HC ICU INTERMEDIATE R&B

## 2020-07-15 PROCEDURE — 85025 COMPLETE CBC W/AUTO DIFF WBC: CPT

## 2020-07-15 RX ORDER — INSULIN GLARGINE 100 [IU]/ML
25 INJECTION, SOLUTION SUBCUTANEOUS 2 TIMES DAILY
Status: DISCONTINUED | OUTPATIENT
Start: 2020-07-15 | End: 2020-07-20

## 2020-07-15 RX ORDER — AZITHROMYCIN 500 MG/1
500 TABLET, FILM COATED ORAL EVERY EVENING
Status: DISCONTINUED | OUTPATIENT
Start: 2020-07-15 | End: 2020-07-20

## 2020-07-15 RX ADMIN — ASPIRIN 325 MG ORAL TABLET 325 MG: 325 PILL ORAL at 08:50

## 2020-07-15 RX ADMIN — SODIUM CHLORIDE, PRESERVATIVE FREE 10 ML: 5 INJECTION INTRAVENOUS at 08:51

## 2020-07-15 RX ADMIN — INSULIN GLARGINE 25 UNITS: 100 INJECTION, SOLUTION SUBCUTANEOUS at 21:35

## 2020-07-15 RX ADMIN — Medication 6 MG: at 08:50

## 2020-07-15 RX ADMIN — SODIUM CHLORIDE 100 MG: 9 INJECTION, SOLUTION INTRAVENOUS at 21:34

## 2020-07-15 RX ADMIN — GUAIFENESIN 600 MG: 600 TABLET ORAL at 21:33

## 2020-07-15 RX ADMIN — METOPROLOL SUCCINATE 50 MG: 50 TABLET, EXTENDED RELEASE ORAL at 08:50

## 2020-07-15 RX ADMIN — BENZONATATE 100 MG: 100 CAPSULE ORAL at 16:40

## 2020-07-15 RX ADMIN — INSULIN LISPRO 6 UNITS: 100 INJECTION, SOLUTION INTRAVENOUS; SUBCUTANEOUS at 17:20

## 2020-07-15 RX ADMIN — ENOXAPARIN SODIUM 40 MG: 40 INJECTION SUBCUTANEOUS at 21:33

## 2020-07-15 RX ADMIN — SODIUM CHLORIDE, PRESERVATIVE FREE 10 ML: 5 INJECTION INTRAVENOUS at 21:34

## 2020-07-15 RX ADMIN — ATORVASTATIN CALCIUM 40 MG: 40 TABLET, FILM COATED ORAL at 08:50

## 2020-07-15 RX ADMIN — AZITHROMYCIN 500 MG: 500 TABLET, FILM COATED ORAL at 17:28

## 2020-07-15 RX ADMIN — DONEPEZIL HYDROCHLORIDE 5 MG: 5 TABLET, FILM COATED ORAL at 21:33

## 2020-07-15 RX ADMIN — CEFTRIAXONE 1 G: 1 INJECTION, POWDER, FOR SOLUTION INTRAMUSCULAR; INTRAVENOUS at 16:40

## 2020-07-15 RX ADMIN — AMLODIPINE BESYLATE 5 MG: 5 TABLET ORAL at 08:50

## 2020-07-15 RX ADMIN — BENZONATATE 100 MG: 100 CAPSULE ORAL at 23:58

## 2020-07-15 RX ADMIN — BENZONATATE 100 MG: 100 CAPSULE ORAL at 01:46

## 2020-07-15 RX ADMIN — CYANOCOBALAMIN TAB 1000 MCG 1000 MCG: 1000 TAB at 08:50

## 2020-07-15 RX ADMIN — GUAIFENESIN 600 MG: 600 TABLET ORAL at 08:50

## 2020-07-15 RX ADMIN — ACETAMINOPHEN 650 MG: 325 TABLET ORAL at 16:40

## 2020-07-15 RX ADMIN — INSULIN LISPRO 3 UNITS: 100 INJECTION, SOLUTION INTRAVENOUS; SUBCUTANEOUS at 21:35

## 2020-07-15 RX ADMIN — ENOXAPARIN SODIUM 40 MG: 40 INJECTION SUBCUTANEOUS at 08:51

## 2020-07-15 RX ADMIN — GABAPENTIN 300 MG: 300 CAPSULE ORAL at 16:40

## 2020-07-15 RX ADMIN — GABAPENTIN 300 MG: 300 CAPSULE ORAL at 08:50

## 2020-07-15 RX ADMIN — LOSARTAN POTASSIUM AND HYDROCHLOROTHIAZIDE 2 TABLET: 12.5; 5 TABLET ORAL at 08:50

## 2020-07-15 RX ADMIN — TRAZODONE HYDROCHLORIDE 100 MG: 100 TABLET ORAL at 23:58

## 2020-07-15 RX ADMIN — GABAPENTIN 300 MG: 300 CAPSULE ORAL at 21:33

## 2020-07-15 ASSESSMENT — PAIN SCALES - GENERAL
PAINLEVEL_OUTOF10: 3
PAINLEVEL_OUTOF10: 0
PAINLEVEL_OUTOF10: 2
PAINLEVEL_OUTOF10: 0

## 2020-07-15 ASSESSMENT — PAIN DESCRIPTION - ONSET: ONSET: ON-GOING

## 2020-07-15 ASSESSMENT — PAIN DESCRIPTION - DESCRIPTORS: DESCRIPTORS: ACHING;SHARP

## 2020-07-15 ASSESSMENT — PAIN DESCRIPTION - PAIN TYPE: TYPE: ACUTE PAIN

## 2020-07-15 ASSESSMENT — PAIN SCALES - WONG BAKER: WONGBAKER_NUMERICALRESPONSE: 0

## 2020-07-15 ASSESSMENT — PAIN - FUNCTIONAL ASSESSMENT: PAIN_FUNCTIONAL_ASSESSMENT: PREVENTS OR INTERFERES SOME ACTIVE ACTIVITIES AND ADLS

## 2020-07-15 ASSESSMENT — PAIN DESCRIPTION - LOCATION: LOCATION: KNEE

## 2020-07-15 ASSESSMENT — PAIN DESCRIPTION - FREQUENCY: FREQUENCY: INTERMITTENT

## 2020-07-15 ASSESSMENT — PAIN DESCRIPTION - ORIENTATION: ORIENTATION: LEFT

## 2020-07-15 NOTE — CARE COORDINATION
[facility]  Hypoglycemia Event and Prevention Plan      NAME: Raymundo [facility] RECORD NUMBER:  [id number]  AGE: 75 y.o.    GENDER: female  : 1955  EPISODE DATE:  7/15/2020     Data     Recent Labs     20  0811 20  1147 20  1653 07/14/20  1952 07/15/20  0724   POCGLU 241* 132* 144* 279* 289* 71       Medications  Scheduled Medications:   dexamethasone  6 mg Intravenous Daily    remdesivir IVPB  100 mg Intravenous Q24H    amLODIPine  5 mg Oral Daily    aspirin  325 mg Oral Daily    atorvastatin  40 mg Oral Daily    vitamin B-12  1,000 mcg Oral Daily    donepezil  5 mg Oral Nightly    gabapentin  300 mg Oral 4x Daily    insulin glargine  50 Units Subcutaneous BID    losartan-hydroCHLOROthiazide  2 tablet Oral Daily    metoprolol succinate  50 mg Oral Daily    sodium chloride flush  10 mL Intravenous 2 times per day    enoxaparin  40 mg Subcutaneous BID    azithromycin  500 mg Intravenous Q24H    And    cefTRIAXone (ROCEPHIN) IV  1 g Intravenous Q24H    insulin lispro  0-18 Units Subcutaneous TID WC    insulin lispro  0-9 Units Subcutaneous Nightly    guaiFENesin  600 mg Oral BID       Diet  Current diet/supplement order: DIET CARB CONTROL; Carb Control: 4 carb choices (60 gms)/meal     Recorded PO: PO Meals Eaten (%): 76 - 100% last meal in flowsheets      Action        Physician Notified of event: Yes   Cresencio Hamlin MD        Responce     Achieved POCT Blood Glucose greater than 70 mg/dl: Yes      Medication plan updated: Yes        Electronically signed by Agustin Amato RN on 7/15/2020 at 8:40 AM

## 2020-07-15 NOTE — CARE COORDINATION
Sw call to pt room for IMM completion. IMM read to pt over the phone. No further needs identified.      Electronically signed by BALAJI Araujo LSW on 7/15/2020 at 4:05 PM

## 2020-07-15 NOTE — PROGRESS NOTES
Infectious Disease Follow up Notes  Admit Date: 7/12/2020  Hospital Day: 4    Antibiotics :   IV Remdesivir STOP 7/17  Dexamethasone     CHIEF COMPLAINT:       COVID-19 pneumonia  Resp failure     Subjective interval History :  75 y.o. woman with HTN, DM admitted with cough, sob, fevers and not feeling well recent diagnosed with COVID-19 infection on 7/2 now with worsening symptoms admitted through ED and Lactic acid elevation with mild elevation in CRP, LDH and mild elevation in Ferritin we are consulted for recommendations.  She is using 5 lts nasal cannula and had fever 101 ON Admit.     Fever trend down and using 5 lts nasal cannula cough + creat stable and less sob and WBC stable     Past Medical History:    Past Medical History:   Diagnosis Date    Diabetes mellitus ([facility] [address]     NIDDM    Headache(784.0)     Herniated disc, cervical     Hypercholesteremia     Hypertension     Memory loss     short term    Psychiatric problem     Type 2 diabetes mellitus without complication ([facility] [address]        Past Surgical History:    Past Surgical History:   Procedure Laterality Date    SHOULDER SURGERY      TUBAL LIGATION         Current Medications:    Outpatient Medications Marked as Taking for the 7/12/20 encounter [facility] HOSPITAL Encounter)   Medication Sig Dispense Refill    SITagliptin (JANUVIA) 100 MG tablet Take 1 tablet by mouth daily For diabetes 90 tablet 3    amLODIPine (NORVASC) 5 MG tablet Take 1 tablet by mouth daily 90 tablet 3    donepezil (ARICEPT) 5 MG tablet Take 1 tablet by mouth nightly 90 tablet 3    traZODone (DESYREL) 100 MG tablet TAKE ONE TABLET BY MOUTH DAILY AS NEEDED FOR SLEEP 90 tablet 3    losartan-hydrochlorothiazide (HYZAAR) 100-25 MG per tablet Take 1 tablet by mouth daily 90 tablet 3    metFORMIN (GLUCOPHAGE) 1000 MG tablet Take 1 tablet by mouth daily (with breakfast) For diabetes 30 tablet 5    vitamin D (ERGOCALCIFEROL) 1.25 MG (25517 UT) CAPS capsule Take 1 capsule by mouth once a week 12 capsule 3    Cyanocobalamin 1000 MCG SUBL Place 1,000 mcg under the tongue daily 90 tablet 3    insulin glargine (LANTUS SOLOSTAR) 100 UNIT/ML injection pen Inject 42 Units into the skin 2 times daily 90 mL 3    gabapentin (NEURONTIN) 300 MG capsule Take 1 capsule by mouth 4 times daily. For nerve pain related to diabetes 360 capsule 3    atorvastatin (LIPITOR) 40 MG tablet Take 1 tablet by mouth daily For cholesterol and heart 90 tablet 3    empagliflozin (JARDIANCE) 25 MG tablet Take 25 mg by mouth daily 90 tablet 3    metoprolol succinate (TOPROL XL) 50 MG extended release tablet Take 1 tablet by mouth daily For heart 90 tablet 3    ondansetron (ZOFRAN ODT) 4 MG disintegrating tablet Take 1 tablet by mouth every 8 hours as needed for Nausea 20 tablet 0    aspirin 325 MG tablet Take 1 tablet by mouth daily 30 tablet 3       Allergies:  Patient has no known allergies. Immunizations :   Immunization History   Administered Date(s) Administered    Influenza Virus Vaccine 09/01/2017    Influenza, High Dose (Fluzone 65 yrs and older) 09/27/2018    Pneumococcal Conjugate 13-valent (Cecilia Otto) 07/26/2019    Pneumococcal Polysaccharide ([id number]) 06/06/2018    Tdap (Boostrix, Adacel) 07/26/2019       Social History:     Social History     Tobacco Use    Smoking status: Never Smoker    Smokeless tobacco: Never Used   Substance Use Topics    Alcohol use: No    Drug use: No     Social History     Tobacco Use   Smoking Status Never Smoker   Smokeless Tobacco Never Used      Family History   Problem Relation Age of Onset    Diabetes Mother     High Blood Pressure Mother     Diabetes Father     High Blood Pressure Father          REVIEW OF SYSTEMS:    No fever / chills / sweats. No weight loss. No visual change, eye pain, eye discharge. No oral lesion, sore throat, dysphagia.   Denies cough / sputum/Sob   Denies chest pain, palpitations/ dizziness  Denies nausea/ vomiting/abdominal pain/diarrhea. Denies dysuria or change in urinary function. Denies joint swelling or pain. No myalgia, arthralgia. No rashes, skin lesions   Denies focal weakness, sensory change or other neurologic symptoms  No lymph node swelling or tenderness. Cough sob, resp distress     PHYSICAL EXAM:      Vitals:    /60   Pulse 69   Temp 98 °F (36.7 °C) (Oral)   Resp 20   Ht 5' (1.524 m)   Wt 134 lb 7.7 oz (61 kg)   SpO2 100%   BMI 26.26 kg/m²     In-person bedside physical examination deferred. Pursuant to the emergency declaration under the [address] [place] [address] waiver authority and the [employer] Act, this clinical encounter was conducted to provide necessary medical care.   (Also consistent with new provisions and guidance offered by [facility] on March 18, 2020 in setting of COVID 19 outbreak and in order to preserve personal protective equipment in accordance with the flexibilities announced by CMS on March 30, 2020)   References: https://[facility]. [facility]/Portals/0/Resources/COVID-19/3_18%20Telemed%20Guidance%20Updated%20March%2018. pdf?iof=[email or web address]                      https://[facility]. [facility]/Portals/0/Resources/COVID-19/3_18%20Telemed%20Guidance%20Updated%20March%2018. pdf?dfq=[email or web address]                      [email or web address] pdf                                Pulmonary: deferred  Abdomen/GI: deferred  Neuro: deferred  Skin: deferred  Musculoskeletal:  deferred  Genitourinary: Deferred  Psych: deferred  Lymphatic/Immunologic: deferred       Data Review:    Lab Results   Component Value Date    WBC 11.8 (H) 07/15/2020    HGB 13.0 07/15/2020    HCT 39.1 07/15/2020    MCV 87.9 07/15/2020     07/15/2020     Lab Results   Component Value Date    CREATININE 0.9 07/15/2020    BUN 35 (H) 07/15/2020     07/15/2020    K 3.8 07/15/2020     07/15/2020    CO2 22 07/15/2020       Hepatic Function Panel:   Lab Results   Component Value Date    ALKPHOS 47 07/15/2020    ALT 33 07/15/2020    AST 44 07/15/2020    PROT 7.2 07/15/2020    PROT 7.3 01/08/2013    BILITOT 0.4 07/15/2020    BILIDIR 0.20 10/12/2012    IBILI 0.5 10/12/2012    LABALBU 2.9 07/15/2020       UA:  Lab Results   Component Value Date    COLORU YELLOW 07/12/2020    CLARITYU CLOUDY 07/12/2020    GLUCOSEU >=1000 07/12/2020    GLUCOSEU NEGATIVE 07/24/2011    BILIRUBINUR Negative 07/12/2020    BILIRUBINUR NEGATIVE 07/24/2011    KETUA Negative 07/12/2020    SPECGRAV >1.030 07/12/2020    BLOODU Negative 07/12/2020    PHUR 5.5 07/12/2020    PROTEINU 30 07/12/2020    UROBILINOGEN 1.0 07/12/2020    NITRU Negative 07/12/2020    LEUKOCYTESUR Negative 07/12/2020    LABMICR YES 07/12/2020    URINETYPE Other 07/12/2020      Urine Microscopic:   Lab Results   Component Value Date    LABCAST 20-40 Hyaline 11/29/2014    BACTERIA RARE 11/16/2016    COMU see below 07/12/2020    HYALCAST 0-2 07/12/2020    WBCUA 3-5 07/12/2020    RBCUA 0-2 07/12/2020    EPIU 2-5 07/12/2020     Ferritin  338     CRP 37.9     D dimer  212       MICRO: cultures reviewed and updated by me          Culture, Blood 1 [[id number]]   Collected: 07/12/20 1436    Order Status: Completed  Specimen: Blood  Updated: 07/13/20 1616     Blood Culture, Routine  No Growth to date.  Any change in status will be called. Narrative:      ORDER#: [id number]                          ORDERED BY: JESSE METCALF   SOURCE: Blood                              COLLECTED:  07/12/20 14:36   ANTIBIOTICS AT TAN. :                      RECEIVED :  07/12/20 14:59   If child <=2 yrs old please draw pediatric bottle. ~Blood Culture #1   Performed at:   [facility]   [address] [place] [address]   Phone [phone]    Culture, Blood 2 [[id number]]   Collected: 07/12/20 1436    Order Status: Completed  Specimen: Blood  Updated: 07/13/20 1616     Culture, Blood 2  No Growth to date.  Any change in status will be called. Narrative:      ORDER#: [id number]                          ORDERED BY: JESSE METCALF   SOURCE: Blood                              COLLECTED:  07/12/20 14:36   ANTIBIOTICS AT TAN. :                      RECEIVED :  07/12/20 15:00   If child <=2 yrs old please draw pediatric bottle. ~Blood Culture #2   Performed at:   [facility]   [address] [place] [employer] [address]   Phone [phone]    Legionella antigen, urine [[id number]]   Collected: 07/12/20 2315    Order Status: Completed  Specimen: Urine voided  Updated: 07/13/20 1146     L. pneumophila Serogp 1 Ur Ag  --     Presumptive Negative   No Legionella pneumophila serogroup 1 antigens detected. A negative result does not exclude infection with   Legionella pneumophila serogroup 1 nor does it rule out   other microbial-caused respiratory infections or   disease caused by other serogroups of   Legionella pneumophila. Normal Range: Presumptive Negative    Narrative:      ORDER#: [id number]                          ORDERED BY: Magali Ruiz   SOURCE: Urine Voided                       COLLECTED:  07/12/20 23:15   ANTIBIOTICS AT TAN. :                      RECEIVED :  07/12/20 23:40   Performed at:   [facility]   [address] [employer] [address]   Phone [phone]    Strep Pneumoniae Antigen [[id number]]   Collected: 07/12/20 2315    Order Status: Completed  Specimen: Urine voided  Updated: 07/13/20 1131     STREP PNEUMONIAE ANTIGEN, URINE  --     Presumptive Negative   Presumptive negative suggests no current or recent   pneumococcal infection.  Infection due to Strep pneumoniae   cannot be ruled out since the antigen present in the sample   may be below the detection limit of the test. Normal Range:Presumptive Negative    Narrative:      ORDER#: [id number]                          ORDERED BY: Cecy Joel   SOURCE: Urine Voided                       COLLECTED:  07/12/20 23:15   ANTIBIOTICS AT TAN. :                      RECEIVED :  07/12/20 23:40   Performed at:   [place] [address]   Phone [phone]    Culture, Respiratory [[id number]]      Order Status: No result  Specimen: Sputum Expectorated             IMAGING:    XR CHEST PORTABLE   Final Result   Left greater than right basilar airspace disease, concerning for pneumonia   given patient history.                All the pertinent images and reports for the current Hospitalization were reviewed by me     Scheduled Meds:   insulin glargine  25 Units Subcutaneous BID    azithromycin  500 mg Oral QPM    dexamethasone  6 mg Intravenous Daily    remdesivir IVPB  100 mg Intravenous Q24H    amLODIPine  5 mg Oral Daily    aspirin  325 mg Oral Daily    atorvastatin  40 mg Oral Daily    vitamin B-12  1,000 mcg Oral Daily    donepezil  5 mg Oral Nightly    gabapentin  300 mg Oral 4x Daily    losartan-hydroCHLOROthiazide  2 tablet Oral Daily    metoprolol succinate  50 mg Oral Daily    sodium chloride flush  10 mL Intravenous 2 times per day    enoxaparin  40 mg Subcutaneous BID    cefTRIAXone (ROCEPHIN) IV  1 g Intravenous Q24H    insulin lispro  0-18 Units Subcutaneous TID WC    insulin lispro  0-9 Units Subcutaneous Nightly    guaiFENesin  600 mg Oral BID       Continuous Infusions:   dextrose         PRN Meds:  sodium chloride, traZODone, sodium chloride flush, acetaminophen **OR** acetaminophen, polyethylene glycol, promethazine **OR** ondansetron, glucose, dextrose, glucagon (rDNA), dextrose, benzonatate      Assessment:     Patient Active Problem List   Diagnosis    Cervical spinal stenosis    Acute encephalopathy    Malignant hypertension    Type 2 diabetes mellitus without complication, with long-term current use of insulin (HCC)    Left-sided low back pain with left-sided sciatica    Hypercholesteremia    Primary insomnia    Hypotension    Dizziness    Essential hypertension    Elevated lactic acid level    Lactic acidosis    Atrial ectopy    Vasovagal syncope    Dysrhythmia    Chronic left-sided low back pain with left-sided sciatica    MCI (mild cognitive impairment)    Chest pain    COVID-19 virus infection     COVID-19 pneumonia  Lactic acidosis  Fevers  Dm+  Htn+   CXR with Bi lateral changes     Given her presentation this all likely from COVID-19 infection and will benefit from Remdesivir therapy given her DM, HTN watch for progression      Tolerating IV Remdesivir ok and will watch lFTS AND Creat closely    Hopefully able to wean her O2 thus far Ferritin and D dimer down trend     Labs, Microbiology, Radiology and all the pertinent results from current hospitalization and  care every where were reviewed  by me as a part of the evaluation   Plan:   1. Start IV Remdesivir 200 mg loading followed by x 100 mg daily x 4 days stop date  7/17  2. CMp x 5 days  3. Cont steroids to finish the course    4. Procal normal and Urine antigens -ve   5. Trend CRP, Ferritin at 409 , D dimer now slow improvement   6. Il -6 is elevated if any worsening will consider Tocilizumab for now will observe    7. D/C Ceftriaxone no bacterial process changes are from COVID-19 infection      Discussed with patient/Family and Nursing   Risk of Complications/Morbidity: High      · Illness(es)/ Infection present that pose threat to bodily function. · There is potential for severe exacerbation of infection/side effects of treatment. · Therapy requires intensive monitoring for antimicrobial agent toxicity.      Discussed with patient/Family and Nursing staff     Thanks for allowing me to participate in your patient's care and please call me with any questions or concerns.     Pau Munoz MD  Infectious Disease  [facility]) Physician  Phone: [phone]   Fax : [phone]

## 2020-07-15 NOTE — PROGRESS NOTES
rashes or lesions. Neurologic:  No focal weakness   Psychiatric: Alert   Capillary Refill: Brisk,< 3 seconds   Peripheral Pulses: +2 palpable, equal bilaterally       Labs:   Recent Labs     07/13/20  0545 07/14/20  1058 07/15/20  0541   WBC 9.5 11.3* 11.8*   HGB 14.4 13.0 13.0   HCT 43.8 39.3 39.1    304 358     Recent Labs     07/13/20  0545 07/14/20  1058 07/15/20  0541   * 139 139   K 4.3 3.6 3.8   CL 97* 101 103   CO2 23 22 22   BUN 14 31* 35*   CREATININE 0.8 1.1 0.9   CALCIUM 8.7 8.2* 8.6     Recent Labs     07/13/20  0545 07/14/20  1058 07/15/20  0541   AST 45* 49* 44*   ALT 27 32 33   BILITOT 0.6 0.5 0.4   ALKPHOS 54 49 47     No results for input(s): INR in the last 72 hours. Recent Labs     07/12/20  1436   TROPONINI <0.01       Urinalysis:      Lab Results   Component Value Date    NITRU Negative 07/12/2020    WBCUA 3-5 07/12/2020    BACTERIA RARE 11/16/2016    RBCUA 0-2 07/12/2020    BLOODU Negative 07/12/2020    SPECGRAV >1.030 07/12/2020    GLUCOSEU >=1000 07/12/2020    GLUCOSEU NEGATIVE 07/24/2011       Radiology:  XR CHEST PORTABLE   Final Result   Left greater than right basilar airspace disease, concerning for pneumonia   given patient history. Assessment/Plan:    Active Hospital Problems    Diagnosis    COVID-19 virus infection [U07.1]     1.  COVID-19 pneumonia, can not rule out bacterial infection, continue empiric Azithromycin/ceftriaxone, IV decadron 6 mg daily, Lovenox BID, respiratory culture, consulted ID and remdesivir started, clinically improving. 2. Sepsis due to CODID 19 pneumonia, ongoing management. 3. Acute respiratory failure with hypoxia, decrease in oxygen needs, on dexamethasone. 4. Type 2 DM, fluctuating, Lantus and high dose SSI.   5. Essential hypertension, continue home meds  6. Mild cognitive impairment, continue home meds    Diet: DIET CARB CONTROL; Carb Control: 4 carb choices (60 gms)/meal  Code Status: Full Code      Cresencio OWEN Lei Gu MD

## 2020-07-15 NOTE — PLAN OF CARE
Problem: Airway Clearance - Ineffective  Goal: Achieve or maintain patent airway  Outcome: Ongoing     Problem: Gas Exchange - Impaired  Goal: Absence of hypoxia  Outcome: Ongoing     Problem: Gas Exchange - Impaired  Goal: Promote optimal lung function  Outcome: Ongoing     Problem: Breathing Pattern - Ineffective  Goal: Ability to achieve and maintain a regular respiratory rate  Outcome: Ongoing     Problem: Body Temperature -  Risk of, Imbalanced  Goal: Ability to maintain a body temperature within defined limits  Outcome: Ongoing     Problem: Body Temperature -  Risk of, Imbalanced  Goal: Will regain or maintain usual level of consciousness  Outcome: Ongoing     Problem:  Body Temperature -  Risk of, Imbalanced  Goal: Complications related to the disease process, condition or treatment will be avoided or minimized  Outcome: Ongoing     Problem: Isolation Precautions - Risk of Spread of Infection  Goal: Prevent transmission of infection  Outcome: Ongoing     Problem: Risk for Fluid Volume Deficit  Goal: Maintain normal heart rhythm  Outcome: Ongoing     Problem: Risk for Fluid Volume Deficit  Goal: Maintain absence of muscle cramping  Outcome: Ongoing     Problem: Risk for Fluid Volume Deficit  Goal: Maintain normal serum potassium, sodium, calcium, phosphorus, and pH  Outcome: Ongoing     Problem: Loneliness or Risk for Loneliness  Goal: Demonstrate positive use of time alone when socialization is not possible  Outcome: Ongoing     Problem: Fatigue  Goal: Verbalize increase energy and improved vitality  Outcome: Ongoing     Problem: Patient Education: Go to Patient Education Activity  Goal: Patient/Family Education  Outcome: Ongoing     Problem: Pain:  Goal: Pain level will decrease  Description: Pain level will decrease  Outcome: Ongoing     Problem: Pain:  Goal: Control of acute pain  Description: Control of acute pain  Outcome: Ongoing     Problem: Pain:  Goal: Control of chronic pain  Description: Control of chronic pain  Outcome: Ongoing

## 2020-07-16 ENCOUNTER — APPOINTMENT (OUTPATIENT)
Dept: GENERAL RADIOLOGY | Age: 65
DRG: 871 | End: 2020-07-16
Payer: MEDICARE

## 2020-07-16 LAB
A/G RATIO: 0.8 (ref 1.1–2.2)
ALBUMIN SERPL-MCNC: 3.2 G/DL (ref 3.4–5)
ALP BLD-CCNC: 53 U/L (ref 40–129)
ALT SERPL-CCNC: 31 U/L (ref 10–40)
ANION GAP SERPL CALCULATED.3IONS-SCNC: 15 MMOL/L (ref 3–16)
AST SERPL-CCNC: 36 U/L (ref 15–37)
BASOPHILS ABSOLUTE: 0 K/UL (ref 0–0.2)
BASOPHILS RELATIVE PERCENT: 0.1 %
BILIRUB SERPL-MCNC: 0.4 MG/DL (ref 0–1)
BLOOD CULTURE, ROUTINE: NORMAL
BUN BLDV-MCNC: 30 MG/DL (ref 7–20)
CALCIUM SERPL-MCNC: 9.1 MG/DL (ref 8.3–10.6)
CHLORIDE BLD-SCNC: 103 MMOL/L (ref 99–110)
CO2: 23 MMOL/L (ref 21–32)
CREAT SERPL-MCNC: 0.7 MG/DL (ref 0.6–1.2)
CULTURE, BLOOD 2: NORMAL
EOSINOPHILS ABSOLUTE: 0 K/UL (ref 0–0.6)
EOSINOPHILS RELATIVE PERCENT: 0.1 %
GFR AFRICAN AMERICAN: >60
GFR NON-AFRICAN AMERICAN: >60
GLOBULIN: 4 G/DL
GLUCOSE BLD-MCNC: 105 MG/DL (ref 70–99)
GLUCOSE BLD-MCNC: 219 MG/DL (ref 70–99)
GLUCOSE BLD-MCNC: 291 MG/DL (ref 70–99)
GLUCOSE BLD-MCNC: 98 MG/DL (ref 70–99)
HCT VFR BLD CALC: 42.7 % (ref 36–48)
HEMOGLOBIN: 13.9 G/DL (ref 12–16)
LYMPHOCYTES ABSOLUTE: 1.3 K/UL (ref 1–5.1)
LYMPHOCYTES RELATIVE PERCENT: 9.1 %
MCH RBC QN AUTO: 28.9 PG (ref 26–34)
MCHC RBC AUTO-ENTMCNC: 32.6 G/DL (ref 31–36)
MCV RBC AUTO: 88.8 FL (ref 80–100)
MONOCYTES ABSOLUTE: 0.7 K/UL (ref 0–1.3)
MONOCYTES RELATIVE PERCENT: 5.3 %
NEUTROPHILS ABSOLUTE: 11.8 K/UL (ref 1.7–7.7)
NEUTROPHILS RELATIVE PERCENT: 85.4 %
PDW BLD-RTO: 14 % (ref 12.4–15.4)
PERFORMED ON: ABNORMAL
PERFORMED ON: ABNORMAL
PERFORMED ON: NORMAL
PLATELET # BLD: 403 K/UL (ref 135–450)
PMV BLD AUTO: 8.2 FL (ref 5–10.5)
POTASSIUM REFLEX MAGNESIUM: 3.6 MMOL/L (ref 3.5–5.1)
PROCALCITONIN: 0.11 NG/ML (ref 0–0.15)
RBC # BLD: 4.81 M/UL (ref 4–5.2)
SODIUM BLD-SCNC: 141 MMOL/L (ref 136–145)
TOTAL PROTEIN: 7.2 G/DL (ref 6.4–8.2)
WBC # BLD: 13.8 K/UL (ref 4–11)

## 2020-07-16 PROCEDURE — 6370000000 HC RX 637 (ALT 250 FOR IP): Performed by: INTERNAL MEDICINE

## 2020-07-16 PROCEDURE — 2580000003 HC RX 258: Performed by: INTERNAL MEDICINE

## 2020-07-16 PROCEDURE — 97530 THERAPEUTIC ACTIVITIES: CPT

## 2020-07-16 PROCEDURE — 84145 PROCALCITONIN (PCT): CPT

## 2020-07-16 PROCEDURE — 71045 X-RAY EXAM CHEST 1 VIEW: CPT

## 2020-07-16 PROCEDURE — 6360000002 HC RX W HCPCS: Performed by: INTERNAL MEDICINE

## 2020-07-16 PROCEDURE — 2700000000 HC OXYGEN THERAPY PER DAY

## 2020-07-16 PROCEDURE — 97162 PT EVAL MOD COMPLEX 30 MIN: CPT

## 2020-07-16 PROCEDURE — 97166 OT EVAL MOD COMPLEX 45 MIN: CPT

## 2020-07-16 PROCEDURE — 85025 COMPLETE CBC W/AUTO DIFF WBC: CPT

## 2020-07-16 PROCEDURE — 97116 GAIT TRAINING THERAPY: CPT

## 2020-07-16 PROCEDURE — 99233 SBSQ HOSP IP/OBS HIGH 50: CPT | Performed by: INTERNAL MEDICINE

## 2020-07-16 PROCEDURE — 36415 COLL VENOUS BLD VENIPUNCTURE: CPT

## 2020-07-16 PROCEDURE — 94761 N-INVAS EAR/PLS OXIMETRY MLT: CPT

## 2020-07-16 PROCEDURE — 2060000000 HC ICU INTERMEDIATE R&B

## 2020-07-16 PROCEDURE — 80053 COMPREHEN METABOLIC PANEL: CPT

## 2020-07-16 RX ADMIN — METOPROLOL SUCCINATE 50 MG: 50 TABLET, EXTENDED RELEASE ORAL at 09:20

## 2020-07-16 RX ADMIN — Medication 6 MG: at 09:20

## 2020-07-16 RX ADMIN — ASPIRIN 325 MG ORAL TABLET 325 MG: 325 PILL ORAL at 09:19

## 2020-07-16 RX ADMIN — ENOXAPARIN SODIUM 40 MG: 40 INJECTION SUBCUTANEOUS at 21:07

## 2020-07-16 RX ADMIN — AZITHROMYCIN 500 MG: 500 TABLET, FILM COATED ORAL at 16:46

## 2020-07-16 RX ADMIN — GUAIFENESIN 600 MG: 600 TABLET ORAL at 21:07

## 2020-07-16 RX ADMIN — TOCILIZUMAB 400 MG: 20 INJECTION, SOLUTION, CONCENTRATE INTRAVENOUS at 18:13

## 2020-07-16 RX ADMIN — INSULIN LISPRO 6 UNITS: 100 INJECTION, SOLUTION INTRAVENOUS; SUBCUTANEOUS at 18:13

## 2020-07-16 RX ADMIN — GABAPENTIN 300 MG: 300 CAPSULE ORAL at 16:46

## 2020-07-16 RX ADMIN — INSULIN GLARGINE 25 UNITS: 100 INJECTION, SOLUTION SUBCUTANEOUS at 21:07

## 2020-07-16 RX ADMIN — INSULIN LISPRO 5 UNITS: 100 INJECTION, SOLUTION INTRAVENOUS; SUBCUTANEOUS at 21:08

## 2020-07-16 RX ADMIN — GUAIFENESIN 600 MG: 600 TABLET ORAL at 09:20

## 2020-07-16 RX ADMIN — AMLODIPINE BESYLATE 5 MG: 5 TABLET ORAL at 09:20

## 2020-07-16 RX ADMIN — INSULIN GLARGINE 25 UNITS: 100 INJECTION, SOLUTION SUBCUTANEOUS at 09:23

## 2020-07-16 RX ADMIN — SODIUM CHLORIDE, PRESERVATIVE FREE 10 ML: 5 INJECTION INTRAVENOUS at 10:40

## 2020-07-16 RX ADMIN — ENOXAPARIN SODIUM 40 MG: 40 INJECTION SUBCUTANEOUS at 09:20

## 2020-07-16 RX ADMIN — LOSARTAN POTASSIUM AND HYDROCHLOROTHIAZIDE 2 TABLET: 12.5; 5 TABLET ORAL at 09:19

## 2020-07-16 RX ADMIN — ATORVASTATIN CALCIUM 40 MG: 40 TABLET, FILM COATED ORAL at 09:20

## 2020-07-16 RX ADMIN — ACETAMINOPHEN 650 MG: 325 TABLET ORAL at 09:23

## 2020-07-16 RX ADMIN — CEFEPIME HYDROCHLORIDE 2 G: 2 INJECTION, POWDER, FOR SOLUTION INTRAVENOUS at 16:46

## 2020-07-16 RX ADMIN — DONEPEZIL HYDROCHLORIDE 5 MG: 5 TABLET, FILM COATED ORAL at 21:07

## 2020-07-16 RX ADMIN — SODIUM CHLORIDE 100 MG: 9 INJECTION, SOLUTION INTRAVENOUS at 21:07

## 2020-07-16 RX ADMIN — GABAPENTIN 300 MG: 300 CAPSULE ORAL at 21:07

## 2020-07-16 RX ADMIN — GABAPENTIN 300 MG: 300 CAPSULE ORAL at 11:55

## 2020-07-16 RX ADMIN — CYANOCOBALAMIN TAB 1000 MCG 1000 MCG: 1000 TAB at 09:19

## 2020-07-16 RX ADMIN — GABAPENTIN 300 MG: 300 CAPSULE ORAL at 09:19

## 2020-07-16 ASSESSMENT — PAIN SCALES - GENERAL
PAINLEVEL_OUTOF10: 0

## 2020-07-16 NOTE — PROGRESS NOTES
Hospitalist Progress Note      PCP: Hermina Riedel, MD    Date of Admission: 7/12/2020        Subjective: feels better, still coughing, no fever or chills. Medications:  Reviewed    Infusion Medications    dextrose       Scheduled Medications    insulin glargine  25 Units Subcutaneous BID    azithromycin  500 mg Oral QPM    dexamethasone  6 mg Intravenous Daily    remdesivir IVPB  100 mg Intravenous Q24H    amLODIPine  5 mg Oral Daily    aspirin  325 mg Oral Daily    atorvastatin  40 mg Oral Daily    vitamin B-12  1,000 mcg Oral Daily    donepezil  5 mg Oral Nightly    gabapentin  300 mg Oral 4x Daily    losartan-hydroCHLOROthiazide  2 tablet Oral Daily    metoprolol succinate  50 mg Oral Daily    sodium chloride flush  10 mL Intravenous 2 times per day    enoxaparin  40 mg Subcutaneous BID    insulin lispro  0-18 Units Subcutaneous TID WC    insulin lispro  0-9 Units Subcutaneous Nightly    guaiFENesin  600 mg Oral BID     PRN Meds: sodium chloride, traZODone, sodium chloride flush, acetaminophen **OR** acetaminophen, polyethylene glycol, promethazine **OR** ondansetron, glucose, dextrose, glucagon (rDNA), dextrose, benzonatate      Intake/Output Summary (Last 24 hours) at 7/16/2020 0732  Last data filed at 7/16/2020 0559  Gross per 24 hour   Intake 480 ml   Output 1100 ml   Net -620 ml       Physical Exam Performed:    /74   Pulse 78   Temp 98.8 °F (37.1 °C) (Oral)   Resp 20   Ht 5' (1.524 m)   Wt 142 lb 3.2 oz (64.5 kg)   SpO2 94%   BMI 27.77 kg/m²     General appearance: No apparent distress  Neck: Supple  Respiratory:  Normal respiratory effort. Clear to auscultation, bilaterally without Rales/Wheezes/Rhonchi. Cardiovascular: Regular rate and rhythm with normal S1/S2 without murmurs, rubs or gallops. Abdomen: Soft, non-tender, non-distended  Musculoskeletal: No clubbing, cyanosis  Skin: Skin color, texture, turgor normal.  No rashes or lesions.   Neurologic:  No focal weakness   Psychiatric: Alert and oriented  Capillary Refill: Brisk,< 3 seconds   Peripheral Pulses: +2 palpable, equal bilaterally       Labs:   Recent Labs     07/14/20  1058 07/15/20  0541 07/16/20  0648   WBC 11.3* 11.8* 13.8*   HGB 13.0 13.0 13.9   HCT 39.3 39.1 42.7    358 403     Recent Labs     07/14/20  1058 07/15/20  0541    139   K 3.6 3.8    103   CO2 22 22   BUN 31* 35*   CREATININE 1.1 0.9   CALCIUM 8.2* 8.6     Recent Labs     07/14/20  1058 07/15/20  0541   AST 49* 44*   ALT 32 33   BILITOT 0.5 0.4   ALKPHOS 49 47     No results for input(s): INR in the last 72 hours. No results for input(s): Arnoldo Thacker in the last 72 hours. Urinalysis:      Lab Results   Component Value Date    NITRU Negative 07/12/2020    WBCUA 3-5 07/12/2020    BACTERIA RARE 11/16/2016    RBCUA 0-2 07/12/2020    BLOODU Negative 07/12/2020    SPECGRAV >1.030 07/12/2020    GLUCOSEU >=1000 07/12/2020    GLUCOSEU NEGATIVE 07/24/2011       Radiology:  XR CHEST PORTABLE   Final Result   Left greater than right basilar airspace disease, concerning for pneumonia   given patient history. Assessment/Plan:    Active Hospital Problems    Diagnosis    COVID-19 virus infection [U07.1]     1.  COVID-19 pneumonia, was on empiric Azithromycin/ceftriaxone, ceftriaxone discontinued for now,  decadron 6 mg daily, Lovenox BID, respiratory culture, consulted ID and remdesivir started, clinically continue to improve. 2. Sepsis due to CODID 19 pneumonia, ongoing management.   3. Acute respiratory failure with hypoxia, oxygen needs continue to decrease, on dexamethasone. 4. Type 2 DM, fluctuating, Lantus and high dose SSI.   5. Essential hypertension, continue home meds  6. Mild cognitive impairment, continue home meds    Diet: DIET CARB CONTROL; Carb Control: 4 carb choices (60 gms)/meal  Code Status: Full Code        Ida Amezquita MD

## 2020-07-16 NOTE — PROGRESS NOTES
breakfast) For diabetes 30 tablet 5    vitamin D (ERGOCALCIFEROL) 1.25 MG (51494 UT) CAPS capsule Take 1 capsule by mouth once a week 12 capsule 3    Cyanocobalamin 1000 MCG SUBL Place 1,000 mcg under the tongue daily 90 tablet 3    insulin glargine (LANTUS SOLOSTAR) 100 UNIT/ML injection pen Inject 42 Units into the skin 2 times daily 90 mL 3    gabapentin (NEURONTIN) 300 MG capsule Take 1 capsule by mouth 4 times daily. For nerve pain related to diabetes 360 capsule 3    atorvastatin (LIPITOR) 40 MG tablet Take 1 tablet by mouth daily For cholesterol and heart 90 tablet 3    empagliflozin (JARDIANCE) 25 MG tablet Take 25 mg by mouth daily 90 tablet 3    metoprolol succinate (TOPROL XL) 50 MG extended release tablet Take 1 tablet by mouth daily For heart 90 tablet 3    ondansetron (ZOFRAN ODT) 4 MG disintegrating tablet Take 1 tablet by mouth every 8 hours as needed for Nausea 20 tablet 0    aspirin 325 MG tablet Take 1 tablet by mouth daily 30 tablet 3       Allergies:  Patient has no known allergies. Immunizations :   Immunization History   Administered Date(s) Administered    Influenza Virus Vaccine 09/01/2017    Influenza, High Dose (Fluzone 65 yrs and older) 09/27/2018    Pneumococcal Conjugate 13-valent (Memo Ion) 07/26/2019    Pneumococcal Polysaccharide ([id number]) 06/06/2018    Tdap (Boostrix, Adacel) 07/26/2019       Social History:     Social History     Tobacco Use    Smoking status: Never Smoker    Smokeless tobacco: Never Used   Substance Use Topics    Alcohol use: No    Drug use: No     Social History     Tobacco Use   Smoking Status Never Smoker   Smokeless Tobacco Never Used      Family History   Problem Relation Age of Onset    Diabetes Mother     High Blood Pressure Mother     Diabetes Father     High Blood Pressure Father          REVIEW OF SYSTEMS:    No fever / chills / sweats. No weight loss. No visual change, eye pain, eye discharge.     No oral lesion, sore 07/16/2020     Lab Results   Component Value Date    CREATININE 0.7 07/16/2020    BUN 30 (H) 07/16/2020     07/16/2020    K 3.6 07/16/2020     07/16/2020    CO2 23 07/16/2020       Hepatic Function Panel:   Lab Results   Component Value Date    ALKPHOS 53 07/16/2020    ALT 31 07/16/2020    AST 36 07/16/2020    PROT 7.2 07/16/2020    PROT 7.3 01/08/2013    BILITOT 0.4 07/16/2020    BILIDIR 0.20 10/12/2012    IBILI 0.5 10/12/2012    LABALBU 3.2 07/16/2020       UA:  Lab Results   Component Value Date    COLORU YELLOW 07/12/2020    CLARITYU CLOUDY 07/12/2020    GLUCOSEU >=1000 07/12/2020    GLUCOSEU NEGATIVE 07/24/2011    BILIRUBINUR Negative 07/12/2020    BILIRUBINUR NEGATIVE 07/24/2011    KETUA Negative 07/12/2020    SPECGRAV >1.030 07/12/2020    BLOODU Negative 07/12/2020    PHUR 5.5 07/12/2020    PROTEINU 30 07/12/2020    UROBILINOGEN 1.0 07/12/2020    NITRU Negative 07/12/2020    LEUKOCYTESUR Negative 07/12/2020    LABMICR YES 07/12/2020    URINETYPE Other 07/12/2020      Urine Microscopic:   Lab Results   Component Value Date    LABCAST 20-40 Hyaline 11/29/2014    BACTERIA RARE 11/16/2016    COMU see below 07/12/2020    HYALCAST 0-2 07/12/2020    WBCUA 3-5 07/12/2020    RBCUA 0-2 07/12/2020    EPIU 2-5 07/12/2020     Ferritin  338     CRP 37.9     D dimer  212       MICRO: cultures reviewed and updated by me          Culture, Blood 1 [[id number]]   Collected: 07/12/20 1436    Order Status: Completed  Specimen: Blood  Updated: 07/13/20 1616     Blood Culture, Routine  No Growth to date.  Any change in status will be called. Narrative:      ORDER#: [id number]                          ORDERED BY: JESSE METCALF   SOURCE: Blood                              COLLECTED:  07/12/20 14:36   ANTIBIOTICS AT TAN. :                      RECEIVED :  07/12/20 14:59   If child <=2 yrs old please draw pediatric bottle. ~Blood Culture #1   Performed at:   [facility]   [address], [place] [address]   Phone [phone]    Culture, Blood 2 [[id number]]   Collected: 07/12/20 1436    Order Status: Completed  Specimen: Blood  Updated: 07/13/20 1616     Culture, Blood 2  No Growth to date.  Any change in status will be called. Narrative:      ORDER#: [id number]                          ORDERED BY: JESSE METCALF   SOURCE: Blood                              COLLECTED:  07/12/20 14:36   ANTIBIOTICS AT TAN. :                      RECEIVED :  07/12/20 15:00   If child <=2 yrs old please draw pediatric bottle. ~Blood Culture #2   Performed at:   [facility]   [address] [facility] [place] [address]   Phone [phone]    Legionella antigen, urine [[id number]]   Collected: 07/12/20 2315    Order Status: Completed  Specimen: Urine voided  Updated: 07/13/20 1146     L. pneumophila Serogp 1 Ur Ag  --     Presumptive Negative   No Legionella pneumophila serogroup 1 antigens detected. A negative result does not exclude infection with   Legionella pneumophila serogroup 1 nor does it rule out   other microbial-caused respiratory infections or   disease caused by other serogroups of   Legionella pneumophila. Normal Range: Presumptive Negative    Narrative:      ORDER#: [id number]                          ORDERED BY: Phil Farooq   SOURCE: Urine Voided                       COLLECTED:  07/12/20 23:15   ANTIBIOTICS AT TAN. :                      RECEIVED :  07/12/20 23:40   Performed at:   [facility]   [address] [place] [address]   Phone [phone]    Strep Pneumoniae Antigen [[id number]]   Collected: 07/12/20 2315    Order Status: Completed  Specimen: Urine voided  Updated: 07/13/20 1131     STREP PNEUMONIAE ANTIGEN, URINE  --     Presumptive Negative   Presumptive negative suggests no current or recent   pneumococcal infection.  Infection due to Strep pneumoniae   cannot be ruled out since the antigen present in the sample   may be below the detection limit of the test.   Normal Range:Presumptive Negative    Narrative:      ORDER#: [id number]                          ORDERED BY: Laquetta Prader   SOURCE: Urine Voided                       COLLECTED:  07/12/20 23:15   ANTIBIOTICS AT TAN. :                      RECEIVED :  07/12/20 23:40   Performed at:   [place] [address] [address]   Phone [phone]    Culture, Respiratory [[id number]]      Order Status: No result  Specimen: Sputum Expectorated             IMAGING:    XR CHEST PORTABLE   Final Result   Left greater than right basilar airspace disease, concerning for pneumonia   given patient history.                All the pertinent images and reports for the current Hospitalization were reviewed by me     Scheduled Meds:   insulin glargine  25 Units Subcutaneous BID    azithromycin  500 mg Oral QPM    dexamethasone  6 mg Intravenous Daily    remdesivir IVPB  100 mg Intravenous Q24H    amLODIPine  5 mg Oral Daily    aspirin  325 mg Oral Daily    atorvastatin  40 mg Oral Daily    vitamin B-12  1,000 mcg Oral Daily    donepezil  5 mg Oral Nightly    gabapentin  300 mg Oral 4x Daily    losartan-hydroCHLOROthiazide  2 tablet Oral Daily    metoprolol succinate  50 mg Oral Daily    sodium chloride flush  10 mL Intravenous 2 times per day    enoxaparin  40 mg Subcutaneous BID    insulin lispro  0-18 Units Subcutaneous TID WC    insulin lispro  0-9 Units Subcutaneous Nightly    guaiFENesin  600 mg Oral BID       Continuous Infusions:   dextrose         PRN Meds:  sodium chloride, traZODone, sodium chloride flush, acetaminophen **OR** acetaminophen, polyethylene glycol, promethazine **OR** ondansetron, glucose, dextrose, glucagon (rDNA), dextrose, benzonatate      Assessment:     Patient Active Problem List   Diagnosis    Cervical spinal stenosis    Acute encephalopathy    Malignant hypertension    Type 2 diabetes mellitus without complication, with long-term current use of insulin (HCC)    Left-sided low back pain with left-sided sciatica    Hypercholesteremia    Primary insomnia    Hypotension    Dizziness    Essential hypertension    Elevated lactic acid level    Lactic acidosis    Atrial ectopy    Vasovagal syncope    Dysrhythmia    Chronic left-sided low back pain with left-sided sciatica    MCI (mild cognitive impairment)    Chest pain    COVID-19 virus infection     COVID-19 pneumonia  Lactic acidosis  Fevers  Dm+  Htn+   CXR with Bi lateral changes     Given her presentation this all likely from COVID-19 infection and will benefit from Remdesivir therapy given her DM, HTN watch for progression      Tolerating IV Remdesivir ok and will watch lFTS AND Creat closely    Hopefully able to wean her O2 thus far Ferritin and D dimer down trend     Unfortunately she declined with rise in O2 REquirement and Fevers will add IV Tocilizumab     Labs, Microbiology, Radiology and all the pertinent results from current hospitalization and  care every where were reviewed  by me as a part of the evaluation   Plan:   1. Start IV Remdesivir 200 mg loading followed by x 100 mg daily x 4 days stop date  7/17  2. CMp x 5 days  3. Cont steroids to finish the course    4. Procal normal and Urine antigens -ve   5. Trend CRP, Ferritin at 409 , D dimer now slow improvement   6. Il -6 is elevated will consider Tocilizumab today given worsening resp status   now will observe    7. Add IV Cefepime until bacterial process r/o given sudden decline in clinical status        Discussed with patient/Family and Nursing   Risk of Complications/Morbidity: High      · Illness(es)/ Infection present that pose threat to bodily function. · There is potential for severe exacerbation of infection/side effects of treatment. · Therapy requires intensive monitoring for antimicrobial agent toxicity.      Discussed with patient/Family and Nursing staff     Thanks for allowing me to participate in your patient's care and please call me with any questions or concerns.     Linus Mujica MD  Infectious Disease  [address] Physician  Phone: [phone]   Fax : [phone]

## 2020-07-16 NOTE — PROGRESS NOTES
Patient ambulated good with PCA to bathroom Once she got there she got very tired and felt like she was going to pass out. We used steady to get patient back to bed and O2 sats. Dropped to low 80's O2 up from 1L to 3L for aprox.  15 min then put back down to 1L Currently sating 95% and patient states she feels much better

## 2020-07-16 NOTE — PLAN OF CARE
Problem: Airway Clearance - Ineffective  Goal: Achieve or maintain patent airway  7/16/2020 1054 by Ginger Jiménez RN  Outcome: Ongoing     Problem: Gas Exchange - Impaired  Goal: Absence of hypoxia  7/16/2020 1054 by Ginger Jiménez RN  Outcome: Ongoing     Problem: Gas Exchange - Impaired  Goal: Promote optimal lung function  7/16/2020 1054 by Ginger Jiémnez RN  Outcome: Ongoing     Problem: Breathing Pattern - Ineffective  Goal: Ability to achieve and maintain a regular respiratory rate  7/16/2020 1054 by Ginger Jiménez RN  Outcome: Ongoing     Problem: Body Temperature -  Risk of, Imbalanced  Goal: Ability to maintain a body temperature within defined limits  7/16/2020 1054 by Ginger Jiménez RN  Outcome: Ongoing     Problem: Body Temperature -  Risk of, Imbalanced  Goal: Will regain or maintain usual level of consciousness  7/16/2020 1054 by Ginger Jiménez RN  Outcome: Ongoing     Problem:  Body Temperature -  Risk of, Imbalanced  Goal: Complications related to the disease process, condition or treatment will be avoided or minimized  7/16/2020 1054 by Ginger Jiménez RN  Outcome: Ongoing     Problem: Isolation Precautions - Risk of Spread of Infection  Goal: Prevent transmission of infection  7/16/2020 1054 by Ginger Jiménez RN  Outcome: Ongoing     Problem: Nutrition Deficits  Goal: Optimize nutrtional status  7/16/2020 1054 by Ginger Jiménez RN  Outcome: Ongoing     Problem: Risk for Fluid Volume Deficit  Goal: Maintain normal heart rhythm  7/16/2020 1054 by Ginger Jiménez RN  Outcome: Ongoing     Problem: Risk for Fluid Volume Deficit  Goal: Maintain absence of muscle cramping  Outcome: Ongoing     Problem: Risk for Fluid Volume Deficit  Goal: Maintain normal serum potassium, sodium, calcium, phosphorus, and pH  7/16/2020 1054 by Ginger Jiménez RN  Outcome: Ongoing     Problem: Loneliness or Risk for Loneliness  Goal: Demonstrate positive use of time alone when socialization is not possible  7/16/2020 1054 by Ginger Jiménez RN  Outcome: Ongoing     Problem: Fatigue  Goal: Verbalize increase energy and improved vitality  7/16/2020 1054 by Ginger Jiménez RN  Outcome: Ongoing     Problem: Patient Education: Go to Patient Education Activity  Goal: Patient/Family Education  7/16/2020 1054 by Ginger Jiménez RN  Outcome: Ongoing     Problem: Falls - Risk of:  Goal: Will remain free from falls  Description: Will remain free from falls  7/16/2020 1054 by Ginger Jiménez RN  Outcome: Ongoing     Problem: Falls - Risk of:  Goal: Absence of physical injury  Description: Absence of physical injury  7/16/2020 1054 by Ginger Jiménez RN  Outcome: Ongoing     Problem: Pain:  Goal: Pain level will decrease  Description: Pain level will decrease  7/16/2020 1054 by Ginger Jiménez RN  Outcome: Ongoing     Problem: Pain:  Goal: Control of acute pain  Description: Control of acute pain  7/16/2020 1054 by Ginger Jiménez RN  Outcome: Ongoing     Problem: Pain:  Goal: Control of chronic pain  Description: Control of chronic pain  7/16/2020 1054 by Ginger Jiménez RN  Outcome: Ongoing

## 2020-07-16 NOTE — PROGRESS NOTES
Anticipate pt to require mod/max A for ADL needs at this time. At this time d/t medical and cognitive status pt is unsafe to d/c home. Will continue to assess for appropriate discharge disposition pending progress medically and physically. Prognosis: Fair  Decision Making: Medium Complexity  History: PMH: DM, psychiatric problem, memory loss  Exam: ADLs, transfers, func mob, bed mob  Assistance / Modification: CGA for mobility, mod A for ADLs  OT Education: OT Role;Transfer Training;Plan of Care;ADL Adaptive Strategies;Precautions  Barriers to Learning: Cognition  REQUIRES OT FOLLOW UP: Yes  Activity Tolerance  Activity Tolerance: Patient limited by fatigue;Treatment limited secondary to decreased cognition  Activity Tolerance: Pt is on 6.5L of O2, O2 sats at rest were 85%. After 3 feet of ambulation desat to 76%. With several minutes of rest and encouraged deep breathing pt recovered to 83-84%. RN notified of lack of O2 improvement. Safety Devices  Safety Devices in place: Yes(RN Velma Brittle) notified)  Type of devices: Left in bed;Bed alarm in place;Gait belt;Call light within reach;Nurse notified           Patient Diagnosis(es): The primary encounter diagnosis was Acute respiratory failure with hypoxia ([address] Diagnoses of COVID-19 virus detected and Multifocal pneumonia were also pertinent to this visit. has a past medical history of Diabetes mellitus ([address] Headache(784.0), Herniated disc, cervical, Hypercholesteremia, Hypertension, Memory loss, Psychiatric problem, and Type 2 diabetes mellitus without complication ([address] has a past surgical history that includes Tubal ligation and shoulder surgery. Restrictions  Restrictions/Precautions  Restrictions/Precautions: Fall Risk  Position Activity Restriction  Other position/activity restrictions: Droplet Plus Precautions + COVID. O2 6.5 L via NC.     Subjective   General  Chart Reviewed: Yes  Patient assessed for rehabilitation services?: Yes  Additional Pertinent Hx: Per Sarah Campo MD: Pt is \"89 y.o. female with hx HTN, HLD, and type 2 DM who presents to [facility] with cough and shortness of breath. Patient recently diagnosed positive for COVID-19. States that she has been having progressively worsening productive cough and shortness of breath over the past few days. Also having body aches. Denies fever, chest pain, abdominal pain, nausea, vomiting, constipation, diarrhea, and dysuria. She was found to have an oxygen saturation of 78% on room air. She was initially placed on 6L and saturating well at 98%. She was febrile to 101F in the ED. CXR showed possible multifocal pneumonia so she was started on Azithromycin and ceftriaxone. Labs were otherwise unremarkable. \"  Family / Caregiver Present: No  Referring Practitioner: Adriana Gottron, MD  Diagnosis: COVID19  Subjective  Subjective: Pt met bedside, agreeable for therapy evaluation and OOB activity. Upon entrance, pt appears to be leaning sideways out of the chair asleep. Awoke to name.   Patient Currently in Pain: (No complaints of pain at this time)  Vital Signs  Patient Currently in Pain: (No complaints of pain at this time)  Oxygen Therapy  SpO2: 92 %  Pulse Oximeter Device Mode: Continuous  Pulse Oximeter Device Location: Other(comment)(nose)  O2 Device: High flow nasal cannula  O2 Flow Rate (L/min): 7 L/min     Social/Functional History  Social/Functional History  Lives With: Significant other  Type of Home: Apartment  Home Layout: One level  Home Access: Stairs to enter with rails  Bathroom Shower/Tub: Tub/Shower unit  Bathroom Toilet: Standard  Bathroom Accessibility: Accessible  Home Equipment: (No DME.)  ADL Assistance: Independent  Ambulation Assistance: Independent(Without assist device pta.)  Transfer Assistance: Independent  Active : Yes  Occupation: Retired       Objective   Vision: Within Functional Limits  Hearing: Within functional limits Orientation  Overall Orientation Status: Impaired  Orientation Level: Disoriented to place;Oriented to person;Disoriented to time;Disoriented to situation     Balance  Sitting Balance: Stand by assistance  Standing Balance: Contact guard assistance  Standing Balance  Time: ~30 seconds  Activity: Func mob, transfers    Functional Mobility  Functional - Mobility Device: Rolling Walker  Activity: Other  Assist Level: Contact guard assistance  Functional Mobility Comments: Pt completed functional mobility with RW from bed to chair with CGA, max cues for safety with RW management. Difficulty following cues to remain inside RW. Pt O2 sats dropped to 76% with 3 feet of ambulation. ADL  Additional Comments: PTA pt reports independence in self-care tasks. Pt with decreased cognition at this time and cannot provide specifics. Anticipate pt to require mod/max A for bathing/dressing/toileting needs. Tone RUE  RUE Tone: Normotonic  Tone LUE  LUE Tone: Normotonic  Coordination  Movements Are Fluid And Coordinated: Yes     Bed mobility  Supine to Sit: Unable to assess(Up in chair at beginning of session)  Sit to Supine: Minimal assistance(Flat hospital bed, min A for LEs d/t pt coughin heavily)     Transfers  Sit to stand: Contact guard assistance  Stand to sit: Contact guard assistance  Transfer Comments: CGA for sit <> stand from recliner to RW and sat to EOB     Cognition  Overall Cognitive Status: Exceptions  Arousal/Alertness: Delayed responses to stimuli  Following Commands: Follows one step commands with increased time; Follows one step commands with repetition  Memory: Decreased recall of recent events;Decreased short term memory  Safety Judgement: Decreased awareness of need for assistance;Decreased awareness of need for safety  Insights: Decreased awareness of deficits  Initiation: Requires cues for some  Sequencing: Requires cues for some  Cognition Comment: Pt asked several times throughout session, \"where am self-care and transfers with O2 sats WFL. Patient Goals   Patient goals : Pt did not report goal at this time, difficulty understanding question. Therapy Time   Individual Concurrent Group Co-treatment   Time In 1325         Time Out 1405         Minutes 40         Timed Code Treatment Minutes: 25 Minutes(15 min eval)     If pt is discharged prior to next OT session, this note will serve as the discharge summary.     SOHEILA Glover/[facility]#[id number]

## 2020-07-16 NOTE — PLAN OF CARE
Problem: Airway Clearance - Ineffective  Goal: Achieve or maintain patent airway  7/16/2020 0030 by Taras Strauss RN  Outcome: Ongoing  7/15/2020 2026 by Stephany Simpson RN  Outcome: Ongoing   Alert and oriented with some forgetfulness but easily redirected. Dyspnic with exertion Sats. WNL on 1L O2 per n/c Lungs diminished. Cough and deep breathing exercises encouraged. Has congested cough Medicated x1 for cough. No c/o pain f/c patent draining clear yellow urine Cath. Care PHP Turns and repositions self with encouragement.  Free from fall/injury

## 2020-07-16 NOTE — PROGRESS NOTES
Physical Therapy    Facility/Department: [address] PROGRESSIVE CARE  Initial Assessment    NAME: Stefanie Solorio  : 1955  MRN: [id number]    Date of Service: 2020    Discharge Recommendations:  Continue to assess pending progress   PT Equipment Recommendations  Other: Will monitor for potential equipt needs. Assessment   Body structures, Functions, Activity limitations: Decreased functional mobility ; Decreased endurance  Assessment: 74 y/o female admit 2020 with Acute Respiratory, Pneumonia, COVID +. PMH as noted including C-Spine Surg, Shld Surg, DM, Memory Loss. PTA pt living with sign other in apt setting with steps to access. Pt reports independent with daily care and functional mobility pta. Pt veena oob to chair although very limited endurance (currently requiring O2 6L) and becoming anxious easily. Anticipate adequate progress for d/c home; however, will need to monitor pt's progress. Prognosis: Fair;Good  Decision Making: Medium Complexity  History: 74 y/o female admit 2020 with Acute Respiratory, Pneumonia, COVID +. PMH as noted including C-Spine Surg, Shld Surg, DM, Memory Loss. Exam: See above. Clinical Presentation: See above. Patient Education: Role of PT, POC, Need to call for assist, Importance OOB Activities. Barriers to Learning: Anxiety. REQUIRES PT FOLLOW UP: Yes  Activity Tolerance  Activity Tolerance: Patient limited by endurance  Activity Tolerance: Pt limited endurance, becomes very anxious easily although able to complete OOB to chair with gentle encouragement. Patient Diagnosis(es): The primary encounter diagnosis was Acute respiratory failure with hypoxia ([facility] Diagnoses of COVID-19 virus detected and Multifocal pneumonia were also pertinent to this visit.      has a past medical history of Diabetes mellitus ([address] Headache(784.0), Herniated disc, cervical, Hypercholesteremia, Hypertension, Memory loss, Psychiatric problem, and Type 2 diabetes mellitus without complication ([facility] has a past surgical history that includes Tubal ligation and shoulder surgery. Restrictions  Restrictions/Precautions  Restrictions/Precautions: Fall Risk  Position Activity Restriction  Other position/activity restrictions: Droplet Plus Precautions + COVID. O2 6 L via NC. Vision/Hearing  Vision: Within Functional Limits  Hearing: Within functional limits     Subjective  General  Chart Reviewed: Yes  Patient assessed for rehabilitation services?: Yes  Additional Pertinent Hx: 74 y/o female admit 7/12/2020 with Acute Respiratory, Pneumonia, COVID +. PMH as noted including C-Spine Surg, Shld Surg, DM, Memory Loss. Family / Caregiver Present: No  Referring Practitioner: Dr. Long Ch  Diagnosis: Acute Respiratory, Pneumonia, COVID +. Follows Commands: Within Functional Limits  Subjective  Subjective: Pt agreeable to PT Eval/Rx.   Pain Screening  Patient Currently in Pain: Denies          Orientation  Orientation  Overall Orientation Status: Within Functional Limits(Pt does become anxious easily.)  Social/Functional History  Social/Functional History  Lives With: Significant other  Type of Home: Apartment  Home Layout: One level  Home Access: Stairs to enter with rails  Bathroom Shower/Tub: Tub/Shower unit  Bathroom Toilet: Standard  Bathroom Accessibility: Accessible  Home Equipment: (No DME.)  ADL Assistance: Independent  Ambulation Assistance: Independent(Without assist device pta.)  Transfer Assistance: Independent  Active : Yes  Occupation: Retired  Cognition        Objective          AROM RLE (degrees)  RLE AROM: WFL  AROM LLE (degrees)  LLE AROM : WFL  AROM RUE (degrees)  RUE AROM : WFL  AROM LUE (degrees)  LUE AROM : WFL  Strength RLE  Strength RLE: WFL  Strength LLE  Strength LLE: WFL  Strength RUE  Strength RUE: WFL  Strength LUE  Strength LUE: WFL        Bed mobility  Supine to Sit: Stand by assistance  Sit to Supine: Stand by assistance  Comment: Initial transfer to eob, pt anxious and stating \"I am going to pass out\" and return to sidelying. Remain stable/alert, able to proceed to eob with gentle encouragement. Transfers  Sit to Stand: Contact guard assistance(With Walker.)  Stand to sit: Contact guard assistance(With Walker.)  Ambulation  Ambulation?: Yes  Ambulation 1  Surface: level tile  Device: Rolling Walker  Quality of Gait: Pt amb ~10' bed to chair with Padmini Bunting assist. Short/shuffling steps. Very limited endurance. Pt becoming more distressed/anxious  as proceed bed to chair although remain stable and recovering as positioned in chair. Plan   Plan  Times per week: 3-5x week while in acute care setting. Current Treatment Recommendations: Functional Mobility Training, Transfer Training, Gait Training, Safety Education & Training, Patient/Caregiver Education & Training  Safety Devices  Type of devices: Call light within reach, Chair alarm in place, Left in chair, Nurse notified    G-Code       OutComes Score                                                  AM-PAC Score  AM-PAC Inpatient Mobility Raw Score : 18 (07/16/20 1243)  AM-PAC Inpatient T-Scale Score : 43.63 (07/16/20 1243)  Mobility Inpatient CMS 0-100% Score: 46.58 (07/16/20 1243)  Mobility Inpatient CMS G-Code Modifier : CK (07/16/20 1243)          Goals  Short term goals  Time Frame for Short term goals: Upon d/c acute care setting. Short term goal 1: Bed Mob Supervision. Short term goal 2: Transfers with/without assist device SBA. Short term goal 3: Amb with/without assist device within hospital room setting SBA. Patient Goals   Patient goals : None stated today.        Therapy Time   Individual Concurrent Group Co-treatment   Time In 1030         Time Out [address]         Minutes [address] [facility] Ely Keri

## 2020-07-17 ENCOUNTER — APPOINTMENT (OUTPATIENT)
Dept: GENERAL RADIOLOGY | Age: 65
DRG: 871 | End: 2020-07-17
Payer: MEDICARE

## 2020-07-17 LAB
A/G RATIO: 0.6 (ref 1.1–2.2)
ABO/RH: NORMAL
ALBUMIN SERPL-MCNC: 2.6 G/DL (ref 3.4–5)
ALP BLD-CCNC: 50 U/L (ref 40–129)
ALT SERPL-CCNC: 27 U/L (ref 10–40)
ANION GAP SERPL CALCULATED.3IONS-SCNC: 12 MMOL/L (ref 3–16)
ANTIBODY SCREEN: NORMAL
AST SERPL-CCNC: 30 U/L (ref 15–37)
BASOPHILS ABSOLUTE: 0 K/UL (ref 0–0.2)
BASOPHILS RELATIVE PERCENT: 0.2 %
BILIRUB SERPL-MCNC: 0.4 MG/DL (ref 0–1)
BUN BLDV-MCNC: 23 MG/DL (ref 7–20)
CALCIUM SERPL-MCNC: 8.8 MG/DL (ref 8.3–10.6)
CHLORIDE BLD-SCNC: 104 MMOL/L (ref 99–110)
CO2: 23 MMOL/L (ref 21–32)
CREAT SERPL-MCNC: 0.6 MG/DL (ref 0.6–1.2)
EOSINOPHILS ABSOLUTE: 0 K/UL (ref 0–0.6)
EOSINOPHILS RELATIVE PERCENT: 0.4 %
GFR AFRICAN AMERICAN: >60
GFR NON-AFRICAN AMERICAN: >60
GLOBULIN: 4.1 G/DL
GLUCOSE BLD-MCNC: 102 MG/DL (ref 70–99)
GLUCOSE BLD-MCNC: 149 MG/DL (ref 70–99)
GLUCOSE BLD-MCNC: 168 MG/DL (ref 70–99)
GLUCOSE BLD-MCNC: 171 MG/DL (ref 70–99)
GLUCOSE BLD-MCNC: 233 MG/DL (ref 70–99)
GLUCOSE BLD-MCNC: 235 MG/DL (ref 70–99)
HCT VFR BLD CALC: 38.7 % (ref 36–48)
HEMOGLOBIN: 12.9 G/DL (ref 12–16)
LYMPHOCYTES ABSOLUTE: 0.7 K/UL (ref 1–5.1)
LYMPHOCYTES RELATIVE PERCENT: 6.9 %
MCH RBC QN AUTO: 29.3 PG (ref 26–34)
MCHC RBC AUTO-ENTMCNC: 33.4 G/DL (ref 31–36)
MCV RBC AUTO: 87.6 FL (ref 80–100)
MONOCYTES ABSOLUTE: 0.3 K/UL (ref 0–1.3)
MONOCYTES RELATIVE PERCENT: 3.1 %
NEUTROPHILS ABSOLUTE: 8.7 K/UL (ref 1.7–7.7)
NEUTROPHILS RELATIVE PERCENT: 89.4 %
PDW BLD-RTO: 13.8 % (ref 12.4–15.4)
PERFORMED ON: ABNORMAL
PLATELET # BLD: 341 K/UL (ref 135–450)
PMV BLD AUTO: 8.3 FL (ref 5–10.5)
POTASSIUM REFLEX MAGNESIUM: 3.6 MMOL/L (ref 3.5–5.1)
PROCALCITONIN: 0.14 NG/ML (ref 0–0.15)
RBC # BLD: 4.42 M/UL (ref 4–5.2)
SODIUM BLD-SCNC: 139 MMOL/L (ref 136–145)
TOTAL PROTEIN: 6.7 G/DL (ref 6.4–8.2)
WBC # BLD: 9.8 K/UL (ref 4–11)

## 2020-07-17 PROCEDURE — 2580000003 HC RX 258: Performed by: INTERNAL MEDICINE

## 2020-07-17 PROCEDURE — 2000000000 HC ICU R&B

## 2020-07-17 PROCEDURE — 6370000000 HC RX 637 (ALT 250 FOR IP): Performed by: NURSE PRACTITIONER

## 2020-07-17 PROCEDURE — 6360000002 HC RX W HCPCS: Performed by: INTERNAL MEDICINE

## 2020-07-17 PROCEDURE — 86900 BLOOD TYPING SEROLOGIC ABO: CPT

## 2020-07-17 PROCEDURE — 6370000000 HC RX 637 (ALT 250 FOR IP): Performed by: INTERNAL MEDICINE

## 2020-07-17 PROCEDURE — 94761 N-INVAS EAR/PLS OXIMETRY MLT: CPT

## 2020-07-17 PROCEDURE — 85025 COMPLETE CBC W/AUTO DIFF WBC: CPT

## 2020-07-17 PROCEDURE — 2580000003 HC RX 258: Performed by: NURSE PRACTITIONER

## 2020-07-17 PROCEDURE — 86850 RBC ANTIBODY SCREEN: CPT

## 2020-07-17 PROCEDURE — 99291 CRITICAL CARE FIRST HOUR: CPT | Performed by: INTERNAL MEDICINE

## 2020-07-17 PROCEDURE — 99233 SBSQ HOSP IP/OBS HIGH 50: CPT | Performed by: INTERNAL MEDICINE

## 2020-07-17 PROCEDURE — 36415 COLL VENOUS BLD VENIPUNCTURE: CPT

## 2020-07-17 PROCEDURE — 84145 PROCALCITONIN (PCT): CPT

## 2020-07-17 PROCEDURE — 71045 X-RAY EXAM CHEST 1 VIEW: CPT

## 2020-07-17 PROCEDURE — 80053 COMPREHEN METABOLIC PANEL: CPT

## 2020-07-17 PROCEDURE — 86901 BLOOD TYPING SEROLOGIC RH(D): CPT

## 2020-07-17 PROCEDURE — 2700000000 HC OXYGEN THERAPY PER DAY

## 2020-07-17 RX ORDER — LIDOCAINE HYDROCHLORIDE 10 MG/ML
5 INJECTION, SOLUTION EPIDURAL; INFILTRATION; INTRACAUDAL; PERINEURAL ONCE
Status: DISCONTINUED | OUTPATIENT
Start: 2020-07-17 | End: 2020-07-20

## 2020-07-17 RX ORDER — SODIUM CHLORIDE 0.9 % (FLUSH) 0.9 %
10 SYRINGE (ML) INJECTION PRN
Status: DISCONTINUED | OUTPATIENT
Start: 2020-07-17 | End: 2020-07-20

## 2020-07-17 RX ORDER — QUETIAPINE FUMARATE 25 MG/1
25 TABLET, FILM COATED ORAL ONCE
Status: COMPLETED | OUTPATIENT
Start: 2020-07-17 | End: 2020-07-17

## 2020-07-17 RX ORDER — SODIUM CHLORIDE 0.9 % (FLUSH) 0.9 %
10 SYRINGE (ML) INJECTION EVERY 12 HOURS SCHEDULED
Status: DISCONTINUED | OUTPATIENT
Start: 2020-07-17 | End: 2020-07-20

## 2020-07-17 RX ADMIN — ASPIRIN 325 MG ORAL TABLET 325 MG: 325 PILL ORAL at 08:33

## 2020-07-17 RX ADMIN — ATORVASTATIN CALCIUM 40 MG: 40 TABLET, FILM COATED ORAL at 08:33

## 2020-07-17 RX ADMIN — QUETIAPINE FUMARATE 25 MG: 25 TABLET ORAL at 21:56

## 2020-07-17 RX ADMIN — SODIUM CHLORIDE 100 MG: 9 INJECTION, SOLUTION INTRAVENOUS at 21:15

## 2020-07-17 RX ADMIN — AMLODIPINE BESYLATE 5 MG: 5 TABLET ORAL at 08:33

## 2020-07-17 RX ADMIN — SODIUM CHLORIDE, PRESERVATIVE FREE 10 ML: 5 INJECTION INTRAVENOUS at 08:38

## 2020-07-17 RX ADMIN — INSULIN LISPRO 6 UNITS: 100 INJECTION, SOLUTION INTRAVENOUS; SUBCUTANEOUS at 17:19

## 2020-07-17 RX ADMIN — CEFEPIME HYDROCHLORIDE 2 G: 2 INJECTION, POWDER, FOR SOLUTION INTRAVENOUS at 05:00

## 2020-07-17 RX ADMIN — TRAZODONE HYDROCHLORIDE 100 MG: 100 TABLET ORAL at 21:20

## 2020-07-17 RX ADMIN — GABAPENTIN 300 MG: 300 CAPSULE ORAL at 12:15

## 2020-07-17 RX ADMIN — Medication 6 MG: at 08:33

## 2020-07-17 RX ADMIN — DONEPEZIL HYDROCHLORIDE 5 MG: 5 TABLET, FILM COATED ORAL at 21:56

## 2020-07-17 RX ADMIN — CEFEPIME HYDROCHLORIDE 2 G: 2 INJECTION, POWDER, FOR SOLUTION INTRAVENOUS at 17:40

## 2020-07-17 RX ADMIN — INSULIN GLARGINE 25 UNITS: 100 INJECTION, SOLUTION SUBCUTANEOUS at 08:46

## 2020-07-17 RX ADMIN — ENOXAPARIN SODIUM 40 MG: 40 INJECTION SUBCUTANEOUS at 08:34

## 2020-07-17 RX ADMIN — INSULIN LISPRO 3 UNITS: 100 INJECTION, SOLUTION INTRAVENOUS; SUBCUTANEOUS at 21:30

## 2020-07-17 RX ADMIN — GUAIFENESIN 600 MG: 600 TABLET ORAL at 21:56

## 2020-07-17 RX ADMIN — CYANOCOBALAMIN TAB 1000 MCG 1000 MCG: 1000 TAB at 08:33

## 2020-07-17 RX ADMIN — INSULIN GLARGINE 25 UNITS: 100 INJECTION, SOLUTION SUBCUTANEOUS at 21:30

## 2020-07-17 RX ADMIN — Medication 10 ML: at 20:22

## 2020-07-17 RX ADMIN — GABAPENTIN 300 MG: 300 CAPSULE ORAL at 20:23

## 2020-07-17 RX ADMIN — LOSARTAN POTASSIUM AND HYDROCHLOROTHIAZIDE 2 TABLET: 12.5; 5 TABLET ORAL at 08:33

## 2020-07-17 RX ADMIN — SODIUM CHLORIDE, PRESERVATIVE FREE 10 ML: 5 INJECTION INTRAVENOUS at 20:23

## 2020-07-17 RX ADMIN — METOPROLOL SUCCINATE 50 MG: 50 TABLET, EXTENDED RELEASE ORAL at 08:34

## 2020-07-17 RX ADMIN — GUAIFENESIN 600 MG: 600 TABLET ORAL at 08:33

## 2020-07-17 RX ADMIN — GABAPENTIN 300 MG: 300 CAPSULE ORAL at 08:33

## 2020-07-17 RX ADMIN — INSULIN LISPRO 3 UNITS: 100 INJECTION, SOLUTION INTRAVENOUS; SUBCUTANEOUS at 12:15

## 2020-07-17 RX ADMIN — ENOXAPARIN SODIUM 40 MG: 40 INJECTION SUBCUTANEOUS at 20:22

## 2020-07-17 ASSESSMENT — PAIN SCALES - GENERAL
PAINLEVEL_OUTOF10: 0

## 2020-07-17 NOTE — CONSULTS
REASON FOR CONSULTATION/CC: Hypoxia      Consult at request of Blair Wright MD     PCP: Miguel Gold MD    Chief Complaint   Patient presents with    Shortness of Breath     positive COVID test     Cough    Fever    Fatigue       HISTORY OF PRESENT ILLNESS: Steward Prader is a 75y.o. year old female with a history of DMII transferred to the ICU this morning due to worsening hypoxic respiratory failure. Patient has been admitted since 7/12, initially presented febrile, found to have COVID. She was tolerating 6 L nasal cannula for most of the week but overnight increased oxygen requirement now requiring 15 L nonrebreather. Infectious disease has been following is been started on Actemra and remdesivir. She is also on cefepime and Decadron 6 mg. During my visit difficult to obtain review of systems or HPI due to patient wearing full facemask. She appears comfortable in no respiratory distress. HPI obtained mostly from bedside nursing report and EMR. Past Medical History:   Diagnosis Date    Diabetes mellitus (HCC)     NIDDM    Headache(784.0)     Herniated disc, cervical     Hypercholesteremia     Hypertension     Memory loss     short term    Psychiatric problem     Type 2 diabetes mellitus without complication ([facility]          Past Surgical History:   Procedure Laterality Date    SHOULDER SURGERY      TUBAL LIGATION         Family Hx  family history includes Diabetes in her father and mother; High Blood Pressure in her father and mother. Social Hx   reports that she has never smoked.  She has never used smokeless tobacco.    Scheduled Meds:   cefepime  2 g Intravenous Q12H    insulin glargine  25 Units Subcutaneous BID    azithromycin  500 mg Oral QPM    dexamethasone  6 mg Intravenous Daily    remdesivir IVPB  100 mg Intravenous Q24H    amLODIPine  5 mg Oral Daily    aspirin  325 mg Oral Daily    atorvastatin  40 mg Oral Daily    vitamin B-12  1,000 mcg Oral Daily    donepezil  5 mg Oral Nightly    gabapentin  300 mg Oral 4x Daily    losartan-hydroCHLOROthiazide  2 tablet Oral Daily    metoprolol succinate  50 mg Oral Daily    sodium chloride flush  10 mL Intravenous 2 times per day    enoxaparin  40 mg Subcutaneous BID    insulin lispro  0-18 Units Subcutaneous TID     insulin lispro  0-9 Units Subcutaneous Nightly    guaiFENesin  600 mg Oral BID       Continuous Infusions:   dextrose         PRN Meds:  sodium chloride, traZODone, sodium chloride flush, acetaminophen **OR** acetaminophen, polyethylene glycol, promethazine **OR** ondansetron, glucose, dextrose, glucagon (rDNA), dextrose, benzonatate    ALLERGIES:  Patient has No Known Allergies. REVIEW OF SYSTEMS:  Unable to obtain due to facemask oxygen    Objective:   PHYSICAL EXAM:  Blood pressure 122/74, pulse 64, temperature 99.1 °F (37.3 °C), temperature source Oral, resp. rate 18, height 5' (1.524 m), weight 140 lb 6.9 oz (63.7 kg), SpO2 91 %, not currently breastfeeding.'  Gen:  No acute distress. Eyes: PERRL. Anicteric sclera. No conjunctival injection. ENT: No discharge. Posterior oropharynx clear. External appearance of ears and nose normal.  Neck: Trachea midline. No mass   Resp:  No crackles. No wheezes. No rhonchi. No dullness on percussion. CV: Regular rate. Regular rhythm. No murmur or rub. No edema. GI: Soft, Non-tender. Non-distended. +BS  Skin: Warm, dry, w/o erythema. Lymph: No cervical or supraclavicular LAD. M/S: No cyanosis. No clubbing. Neuro:  no focal neurologic deficit. Moves all extremities  Psych: Awake and alert, Oriented x 3. Judgement and insight appropriate. Mood stable.       Data Reviewed:   LABS:  CBC:   Recent Labs     07/15/20  0541 07/16/20  0648 07/17/20  0518   WBC 11.8* 13.8* 9.8   HGB 13.0 13.9 12.9   HCT 39.1 42.7 38.7   MCV 87.9 88.8 87.6    403 341     BMP:   Recent Labs     07/15/20  0541 07/16/20  0648 07/17/20  0518    141 139   K 3.8 3.6 3.6    103 104   CO2 22 23 23   BUN 35* 30* 23*   CREATININE 0.9 0.7 0.6     LIVER PROFILE:   Recent Labs     07/15/20  0541 07/16/20  0648 07/17/20  0518   AST 44* 36 30   ALT 33 31 27   BILITOT 0.4 0.4 0.4   ALKPHOS 47 53 50     PT/INR: No results for input(s): PROTIME, INR in the last 72 hours. APTT: No results for input(s): APTT in the last 72 hours. UA:No results for input(s): NITRITE, COLORU, PHUR, LABCAST, WBCUA, RBCUA, MUCUS, TRICHOMONAS, YEAST, BACTERIA, CLARITYU, SPECGRAV, LEUKOCYTESUR, UROBILINOGEN, BILIRUBINUR, BLOODU, GLUCOSEU, AMORPHOUS in the last 72 hours. Invalid input(s): KETONESU  No results for input(s): PHART, TEJ1KOD, PO2ART in the last 72 hours. Vent Information  Skin Assessment: Clean, dry, & intact  FiO2 : 100 %  SpO2: 91 %  SpO2/FiO2 ratio: 92    Radiology Review:  Pertinent images / reports were reviewed as a part of this visit. CT Chest w/ contrast: No results found for this or any previous visit. CT Chest w/o contrast:   Results for orders placed during the hospital encounter of 02/23/19   CT CHEST WO CONTRAST    Narrative EXAMINATION:  CT OF THE CHEST WITHOUT CONTRAST 2/23/2019 1:10 pm    TECHNIQUE:  CT of the chest was performed without the administration of intravenous  contrast. Multiplanar reformatted images are provided for review. Dose  modulation, iterative reconstruction, and/or weight based adjustment of the  mA/kV was utilized to reduce the radiation dose to as low as reasonably  achievable. COMPARISON:  None    HISTORY:  ORDERING SYSTEM PROVIDED HISTORY: fall, back pain  TECHNOLOGIST PROVIDED HISTORY:  Ordering Physician Provided Reason for Exam: Fall (tripped over grandson  while trying to pick him up, incident happened at [facility], pt c/o neck and  head pain, denies LOc)  Acuity: Acute  Type of Exam: Initial    Acute back pain and chest pain due to fall. Initial encounter. FINDINGS:  Mediastinum: Heart is mildly enlarged.   No pericardial effusion. There are  coronary artery calcifications. Within the limitations of a noncontrast  exam, there is no evidence of hilar or mediastinal adenopathy. Visible  portion of the thyroid is unremarkable. Ascending thoracic aorta at the  level of the right pulmonary artery measures 3.7 cm in diameter. Lungs/pleura: Central tracheobronchial airways are normal.  No bronchiectasis  or bronchial wall thickening. No focal consolidation, pleural effusion, or  pneumothorax. Upper Abdomen: No acute abnormality in the upper abdomen. Multiple  collateral vessels are seen in the splenic hilum and left upper quadrant of  the abdomen. Soft Tissues/Bones: No aggressive lytic or blastic bony lesion. No displaced  rib fracture. Impression 1. No acute intrathoracic abnormality. Please note that in the absence of  intravenous contrast, sensitivity of the exam for mediastinal vascular injury  is low. 2. Coronary artery disease. CTPA: No results found for this or any previous visit. CXR PA/LAT:   Results for orders placed during the hospital encounter of 01/27/18   XR CHEST STANDARD (2 VW)    Narrative EXAMINATION:  TWO VIEWS OF THE CHEST    1/27/2018 7:59 pm    COMPARISON:  10/01/2017    HISTORY:  ORDERING PHYSICIAN PROVIDED HISTORY: cough, fever, tachy  TECHNOLOGIST PROVIDED HISTORY:  Technologist Provided Reason for Exam: cough, fever, tachy  Acuity: Acute  Type of Encounter: Initial    FINDINGS:  No focal pulmonary opacities. No pleural effusion or pneumothorax. Heart  size within normal limits. Prior ACDF. Impression No evidence of pneumonia.          CXR portable:   Results for orders placed during the hospital encounter of 07/12/20   XR CHEST PORTABLE    Narrative EXAMINATION:  ONE XRAY VIEW OF THE CHEST    7/16/2020 2:34 pm    COMPARISON:  July 12, 2020    HISTORY:  ORDERING SYSTEM PROVIDED HISTORY: sob  TECHNOLOGIST PROVIDED HISTORY:  Reason for exam:->sob  Reason for Exam: sob    FINDINGS:  Cardiac silhouette is enlarged. No pneumothorax. Worsening multifocal  airspace opacities. Impression Worsening multifocal airspace opacities. Access  Arterial       PICC           CVC               Assessment:     Acute hypoxemic respiratory failure with SPO2 less than 90% on room air  Sepsis, due to  Multifocal pneumonia, due to  COVID-19    Plan:        -Wean supplemental oxygen to goal saturation of >90%  -Dosing Actemra and remdesivir, ID following  -Decadron 6 mg daily  -Recheck pro-Martín, other inflammatory markers appear to be downtrending  -Chest x-ray yesterday much worsened, will repeat today    Lovenox twice daily    Due to the immediate potential for life-threatening deterioration due to hypoxic respiratory failure, I spent 38 minutes providing critical care. This time is excluding time spent performing separately billable procedures. This note was transcribed using [id number]. Please disregard any translational errors.     Thank you for the consult    [address]  Pulmonary, Sleep and Critical Care Medicine

## 2020-07-17 NOTE — PROGRESS NOTES
Switched pt over to heated high flow cannula. Pt keep taking cannula off and Spo2 decreased  To 70's. Pt has to be restrained to keep cannula in nose. Spo2 are 92 to 96% at this time. Pt does have short term memory loss.

## 2020-07-17 NOTE — PROGRESS NOTES
Pt pulling off vapotherm. RN and RT kept reinstructing pt on the dangers of doing so. Pt placed in restraints. Dr. Chelsie Bolden aware.

## 2020-07-17 NOTE — PROGRESS NOTES
Hospitalist Progress Note      PCP: Rosi Ramirez MD    Date of Admission: 7/12/2020      Subjective: coughing, no fever or chills. Medications:  Reviewed    Infusion Medications    dextrose       Scheduled Medications    cefepime  2 g Intravenous Q12H    insulin glargine  25 Units Subcutaneous BID    azithromycin  500 mg Oral QPM    dexamethasone  6 mg Intravenous Daily    remdesivir IVPB  100 mg Intravenous Q24H    amLODIPine  5 mg Oral Daily    aspirin  325 mg Oral Daily    atorvastatin  40 mg Oral Daily    vitamin B-12  1,000 mcg Oral Daily    donepezil  5 mg Oral Nightly    gabapentin  300 mg Oral 4x Daily    losartan-hydroCHLOROthiazide  2 tablet Oral Daily    metoprolol succinate  50 mg Oral Daily    sodium chloride flush  10 mL Intravenous 2 times per day    enoxaparin  40 mg Subcutaneous BID    insulin lispro  0-18 Units Subcutaneous TID WC    insulin lispro  0-9 Units Subcutaneous Nightly    guaiFENesin  600 mg Oral BID     PRN Meds: sodium chloride, traZODone, sodium chloride flush, acetaminophen **OR** acetaminophen, polyethylene glycol, promethazine **OR** ondansetron, glucose, dextrose, glucagon (rDNA), dextrose, benzonatate      Intake/Output Summary (Last 24 hours) at 7/17/2020 1153  Last data filed at 7/17/2020 0359  Gross per 24 hour   Intake --   Output 1200 ml   Net -1200 ml       Physical Exam Performed:    /84   Pulse 60   Temp 98.1 °F (36.7 °C) (Oral)   Resp 18   Ht 5' (1.524 m)   Wt 140 lb 6.9 oz (63.7 kg)   SpO2 92%   BMI 27.43 kg/m²     General appearance: No apparent distress  Neck: Supple  Respiratory:  Coarse breath sounds   Cardiovascular: Regular rate and rhythm with normal S1/S2 without murmurs, rubs or gallops. Abdomen: Soft, non-tender, non-distended with normal bowel sounds. Musculoskeletal: No clubbing, cyanosis   Skin: Skin color, texture, turgor normal.  No rashes or lesions.   Neurologic:  No focal weakness   Psychiatric: Alert and oriented  Capillary Refill: Brisk,< 3 seconds   Peripheral Pulses: +2 palpable, equal bilaterally       Labs:   Recent Labs     07/15/20  0541 07/16/20  0648 07/17/20  0518   WBC 11.8* 13.8* 9.8   HGB 13.0 13.9 12.9   HCT 39.1 42.7 38.7    403 341     Recent Labs     07/15/20  0541 07/16/20  0648 07/17/20  0518    141 139   K 3.8 3.6 3.6    103 104   CO2 22 23 23   BUN 35* 30* 23*   CREATININE 0.9 0.7 0.6   CALCIUM 8.6 9.1 8.8     Recent Labs     07/15/20  0541 07/16/20  0648 07/17/20  0518   AST 44* 36 30   ALT 33 31 27   BILITOT 0.4 0.4 0.4   ALKPHOS 47 53 50     No results for input(s): INR in the last 72 hours. No results for input(s): Radha Mayelin in the last 72 hours. Urinalysis:      Lab Results   Component Value Date    NITRU Negative 07/12/2020    WBCUA 3-5 07/12/2020    BACTERIA RARE 11/16/2016    RBCUA 0-2 07/12/2020    BLOODU Negative 07/12/2020    SPECGRAV >1.030 07/12/2020    GLUCOSEU >=1000 07/12/2020    GLUCOSEU NEGATIVE 07/24/2011       Radiology:  XR CHEST PORTABLE   Final Result   Worsening multifocal airspace opacities. XR CHEST PORTABLE   Final Result   Left greater than right basilar airspace disease, concerning for pneumonia   given patient history. Assessment/Plan:    Active Hospital Problems    Diagnosis    COVID-19 virus infection [U07.1]     1.  COVID-19 pneumonia, was on empiric Azithromycin/ceftriaxone, ceftriaxone discontinued for now,  decadron 6 mg daily, Lovenox BID, respiratory culture, consulted ID and remdesivir started, clinically was improving, then yesterday developed fever and increased need of oxygen, chest xray worsening. Discussed with ID, recommended starting cefepime and received one dose of Actemra. On 15 l of oxygen currently, I will transfer to ICU for close monitoring, I will consult pulmonary. 2. Sepsis due to CODID 19 pneumonia, ongoing management as above.    3. Acute respiratory failure with hypoxia, oxygen needs increased. Chest xray noted. 4. Type 2 DM, fluctuating, Lantus and high dose SSI.   5. Essential hypertension, continue home meds  6. Mild cognitive impairment, continue home meds      Diet: DIET CARB CONTROL; Carb Control: 4 carb choices (60 gms)/meal  Code Status: Full Code        Jayjay Briseno MD

## 2020-07-17 NOTE — PROGRESS NOTES
Infectious Disease Follow up Notes  Admit Date: 7/12/2020  Hospital Day: 6    Antibiotics :   IV Remdesivir STOP 7/17  Dexamethasone   S/p Tocilizumab on  7/16     CHIEF COMPLAINT:       COVID-19 pneumonia  Resp failure     Subjective interval History :  75 y.o. woman with HTN, DM admitted with cough, sob, fevers and not feeling well recent diagnosed with COVID-19 infection on 7/2 now with worsening symptoms admitted through ED and Lactic acid elevation with mild elevation in CRP, LDH and mild elevation in Ferritin we are consulted for recommendations.  She is using 5 lts nasal cannula and had fever 101 ON Admit.     Fever + yesterday and now declined clinically more sob and hypoxic and tx to ICU for close monitoring and d/w RN and ICU team - s/p Tocilizumab yesterday pt having difficulty  Keeping the Oxygen high flow cannula   Past Medical History:    Past Medical History:   Diagnosis Date    Diabetes mellitus ([facility] [address]     NIDDM    Headache(784.0)     Herniated disc, cervical     Hypercholesteremia     Hypertension     Memory loss     short term    Psychiatric problem     Type 2 diabetes mellitus without complication ([facility] [address]        Past Surgical History:    Past Surgical History:   Procedure Laterality Date    SHOULDER SURGERY      TUBAL LIGATION         Current Medications:    Outpatient Medications Marked as Taking for the 7/12/20 encounter [facility] Encounter)   Medication Sig Dispense Refill    SITagliptin (JANUVIA) 100 MG tablet Take 1 tablet by mouth daily For diabetes 90 tablet 3    amLODIPine (NORVASC) 5 MG tablet Take 1 tablet by mouth daily 90 tablet 3    donepezil (ARICEPT) 5 MG tablet Take 1 tablet by mouth nightly 90 tablet 3    traZODone (DESYREL) 100 MG tablet TAKE ONE TABLET BY MOUTH DAILY AS NEEDED FOR SLEEP 90 tablet 3    losartan-hydrochlorothiazide (HYZAAR) 100-25 MG per tablet Take 1 tablet by mouth daily 90 tablet 3    metFORMIN (GLUCOPHAGE) 1000 MG tablet Take 1 tablet by mouth daily (with breakfast) For diabetes 30 tablet 5    vitamin D (ERGOCALCIFEROL) 1.25 MG (76292 UT) CAPS capsule Take 1 capsule by mouth once a week 12 capsule 3    Cyanocobalamin 1000 MCG SUBL Place 1,000 mcg under the tongue daily 90 tablet 3    insulin glargine (LANTUS SOLOSTAR) 100 UNIT/ML injection pen Inject 42 Units into the skin 2 times daily 90 mL 3    gabapentin (NEURONTIN) 300 MG capsule Take 1 capsule by mouth 4 times daily. For nerve pain related to diabetes 360 capsule 3    atorvastatin (LIPITOR) 40 MG tablet Take 1 tablet by mouth daily For cholesterol and heart 90 tablet 3    empagliflozin (JARDIANCE) 25 MG tablet Take 25 mg by mouth daily 90 tablet 3    metoprolol succinate (TOPROL XL) 50 MG extended release tablet Take 1 tablet by mouth daily For heart 90 tablet 3    ondansetron (ZOFRAN ODT) 4 MG disintegrating tablet Take 1 tablet by mouth every 8 hours as needed for Nausea 20 tablet 0    aspirin 325 MG tablet Take 1 tablet by mouth daily 30 tablet 3       Allergies:  Patient has no known allergies. Immunizations :   Immunization History   Administered Date(s) Administered    Influenza Virus Vaccine 09/01/2017    Influenza, High Dose (Fluzone 65 yrs and older) 09/27/2018    Pneumococcal Conjugate 13-valent (Swapna Augustusz) 07/26/2019    Pneumococcal Polysaccharide ([id number]) 06/06/2018    Tdap (Boostrix, Adacel) 07/26/2019       Social History:     Social History     Tobacco Use    Smoking status: Never Smoker    Smokeless tobacco: Never Used   Substance Use Topics    Alcohol use: No    Drug use: No     Social History     Tobacco Use   Smoking Status Never Smoker   Smokeless Tobacco Never Used      Family History   Problem Relation Age of Onset    Diabetes Mother     High Blood Pressure Mother     Diabetes Father     High Blood Pressure Father          REVIEW OF SYSTEMS:    No fever / chills / sweats. No weight loss. No visual change, eye pain, eye discharge. No oral lesion, sore throat, dysphagia. Denies cough / sputum/Sob   Denies chest pain, palpitations/ dizziness  Denies nausea/ vomiting/abdominal pain/diarrhea. Denies dysuria or change in urinary function. Denies joint swelling or pain. No myalgia, arthralgia. No rashes, skin lesions   Denies focal weakness, sensory change or other neurologic symptoms  No lymph node swelling or tenderness. Cough sob, resp distress     PHYSICAL EXAM:      Vitals:    /74   Pulse 64   Temp 99.1 °F (37.3 °C) (Oral)   Resp 18   Ht 5' (1.524 m)   Wt 140 lb 6.9 oz (63.7 kg)   SpO2 91%   BMI 27.43 kg/m²     In-person bedside physical examination deferred. Pursuant to the emergency declaration under the [address] [place] [address] waiver authority and the [employer] Act, this clinical encounter was conducted to provide necessary medical care.   (Also consistent with new provisions and guidance offered by [facility] on March 18, 2020 in setting of COVID 19 outbreak and in order to preserve personal protective equipment in accordance with the flexibilities announced by CMS on March 30, 2020)   References: [email or web address]://[email or web address]/Portals/0/Resources/COVID-19/3_18%20Telemed%20Guidance%20Updated%20March%2018. [email or web address]                      [email or web address]://[email or web address]/Portals/0/Resources/COVID-19/3_18%20Telemed%20Guidance%20Updated%20March%2018. pdf?[email or web address]                      [email or web address] pdf                                Pulmonary: deferred  Abdomen/GI: deferred  Neuro: deferred  Skin: deferred  Musculoskeletal:  deferred  Genitourinary: Deferred  Psych: deferred  Lymphatic/Immunologic: deferred       Data Review:    Lab Results   Component Value Date    WBC 9.8 07/17/2020    HGB 12.9 07/17/2020    HCT 38.7 07/17/2020    MCV 87.6 07/17/2020     07/17/2020     Lab Results   Component Value Date    CREATININE 0.6 07/17/2020    BUN 23 (H) 07/17/2020     07/17/2020    K 3.6 07/17/2020     07/17/2020    CO2 23 07/17/2020       Hepatic Function Panel:   Lab Results   Component Value Date    ALKPHOS 50 07/17/2020    ALT 27 07/17/2020    AST 30 07/17/2020    PROT 6.7 07/17/2020    PROT 7.3 01/08/2013    BILITOT 0.4 07/17/2020    BILIDIR 0.20 10/12/2012    IBILI 0.5 10/12/2012    LABALBU 2.6 07/17/2020       UA:  Lab Results   Component Value Date    COLORU YELLOW 07/12/2020    CLARITYU CLOUDY 07/12/2020    GLUCOSEU >=1000 07/12/2020    GLUCOSEU NEGATIVE 07/24/2011    BILIRUBINUR Negative 07/12/2020    BILIRUBINUR NEGATIVE 07/24/2011    KETUA Negative 07/12/2020    SPECGRAV >1.030 07/12/2020    BLOODU Negative 07/12/2020    PHUR 5.5 07/12/2020    PROTEINU 30 07/12/2020    UROBILINOGEN 1.0 07/12/2020    NITRU Negative 07/12/2020    LEUKOCYTESUR Negative 07/12/2020    LABMICR YES 07/12/2020    URINETYPE Other 07/12/2020      Urine Microscopic:   Lab Results   Component Value Date    LABCAST 20-40 Hyaline 11/29/2014    BACTERIA RARE 11/16/2016    COMU see below 07/12/2020    HYALCAST 0-2 07/12/2020    WBCUA 3-5 07/12/2020    RBCUA 0-2 07/12/2020    EPIU 2-5 07/12/2020     Ferritin  338     CRP 37.9     D dimer  212       MICRO: cultures reviewed and updated by me          Culture, Blood 1 [[id number]]   Collected: 07/12/20 1436    Order Status: Completed  Specimen: Blood  Updated: 07/13/20 1616     Blood Culture, Routine  No Growth to date.  Any change in status will be called. Narrative:      ORDER#: [id number]                          ORDERED BY: JESSE METCALF   SOURCE: Blood                              COLLECTED:  07/12/20 14:36   ANTIBIOTICS AT [facility]. :                      RECEIVED :  07/12/20 14:59   If child <=2 yrs old please draw pediatric bottle. ~Blood Culture #1   Performed to Strep pneumoniae   cannot be ruled out since the antigen present in the sample   may be below the detection limit of the test.   Normal Range:Presumptive Negative    Narrative:      ORDER#: [id number]                          ORDERED BY: Francia Mcguire   SOURCE: Urine Voided                       COLLECTED:  07/12/20 23:15   ANTIBIOTICS AT TAN. :                      RECEIVED :  07/12/20 23:40   Performed at:   [place] [address] [facility] [address] [facility] [address]   Phone [phone]    Culture, Respiratory [[id number]]      Order Status: No result  Specimen: Sputum Expectorated             IMAGING:    XR CHEST PORTABLE   Final Result   Worsening multifocal airspace opacities. XR CHEST PORTABLE   Final Result   Left greater than right basilar airspace disease, concerning for pneumonia   given patient history.                All the pertinent images and reports for the current Hospitalization were reviewed by me     Scheduled Meds:   cefepime  2 g Intravenous Q12H    insulin glargine  25 Units Subcutaneous BID    azithromycin  500 mg Oral QPM    dexamethasone  6 mg Intravenous Daily    remdesivir IVPB  100 mg Intravenous Q24H    amLODIPine  5 mg Oral Daily    aspirin  325 mg Oral Daily    atorvastatin  40 mg Oral Daily    vitamin B-12  1,000 mcg Oral Daily    donepezil  5 mg Oral Nightly    gabapentin  300 mg Oral 4x Daily    losartan-hydroCHLOROthiazide  2 tablet Oral Daily    metoprolol succinate  50 mg Oral Daily    sodium chloride flush  10 mL Intravenous 2 times per day    enoxaparin  40 mg Subcutaneous BID    insulin lispro  0-18 Units Subcutaneous TID WC    insulin lispro  0-9 Units Subcutaneous Nightly    guaiFENesin  600 mg Oral BID       Continuous Infusions:   dextrose         PRN Meds:  sodium chloride, traZODone, sodium chloride flush, acetaminophen **OR** acetaminophen, polyethylene glycol, promethazine **OR** ondansetron, glucose, dextrose, glucagon (rDNA), dextrose, benzonatate      Assessment:     Patient Active Problem List   Diagnosis    Cervical spinal stenosis    Acute encephalopathy    Malignant hypertension    Type 2 diabetes mellitus without complication, with long-term current use of insulin ([facility] [address]    Left-sided low back pain with left-sided sciatica    Hypercholesteremia    Primary insomnia    Hypotension    Dizziness    Essential hypertension    Elevated lactic acid level    Lactic acidosis    Atrial ectopy    Vasovagal syncope    Dysrhythmia    Chronic left-sided low back pain with left-sided sciatica    MCI (mild cognitive impairment)    Chest pain    COVID-19 virus infection     COVID-19 pneumonia  Lactic acidosis  Fevers  Dm+  Htn+   CXR with Bi lateral changes     Given her presentation this all likely from COVID-19 infection and will benefit from Remdesivir therapy given her DM, HTN watch for progression      Tolerating IV Remdesivir ok and will watch lFTS AND Creat closely    Unfortunately she declined with rise in O2 REquirement and Fevers s/p  IV Tocilizumab on  7/16 now more sob with hypoxia and tx to ICU for close observation using  Heated high flow at 60 l/min and CXR with dramatic worsening     Labs, Microbiology, Radiology and all the pertinent results from current hospitalization and  care every where were reviewed  by me as a part of the evaluation   Plan:   1. Started  IV Remdesivir 200 mg loading followed by x 100 mg daily x 4 days stop date  7/17  2. CMp x 5 days  3. Cont steroids to finish the course    4. Procal normal and Urine antigens -ve   5. Trend CRP, Ferritin at 409 ,  6. Il -6 is elevated s/p Tocilizumab  On 7/16 given worsening resp status   now will observe    7. Added IV Cefepime until bacterial process r/o given sudden decline in clinical status but seems to be less likely if no fevers can d/c tomorrow  8.  She has short term memory impairment with Dementia listed on the HPI having problems to redirect to keep nasal cannula       Discussed with patient/Family and Nursing   Risk of Complications/Morbidity: High      · Illness(es)/ Infection present that pose threat to bodily function. · There is potential for severe exacerbation of infection/side effects of treatment. · Therapy requires intensive monitoring for antimicrobial agent toxicity. Discussed with patient/Family and Nursing staff     Thanks for allowing me to participate in your patient's care and please call me with any questions or concerns.     Floridalma Weaver MD  Infectious Disease  [facility]) Physician  Phone: [phone]   Fax : [phone]

## 2020-07-17 NOTE — PROGRESS NOTES
Daughter Osito Díaz notified of transfer to ICU. She states she understands and will notify other family members.

## 2020-07-17 NOTE — PLAN OF CARE
Problem: Airway Clearance - Ineffective  Goal: Achieve or maintain patent airway  7/16/2020 2331 by Jenny Sorenson RN  Outcome: Ongoing     Problem: Gas Exchange - Impaired  Goal: Absence of hypoxia  7/16/2020 2331 by Jenny Sorenson RN  Outcome: Ongoing     Problem: Gas Exchange - Impaired  Goal: Promote optimal lung function  7/16/2020 2331 by Jenny Sorenson RN  Outcome: Ongoing     Problem: Breathing Pattern - Ineffective  Goal: Ability to achieve and maintain a regular respiratory rate  7/16/2020 2331 by Jenny Sorenson RN  Outcome: Ongoing     Problem:  Body Temperature -  Risk of, Imbalanced  Goal: Ability to maintain a body temperature within defined limits  7/16/2020 2331 by Jenny Sorenson RN  Outcome: Ongoing     Problem: Nutrition Deficits  Goal: Optimize nutrtional status  7/16/2020 2331 by Jenny Sorenson RN  Outcome: Ongoing     Problem: Risk for Fluid Volume Deficit  Goal: Maintain normal heart rhythm  7/16/2020 2331 by Jenny Sorenson RN  Outcome: Ongoing

## 2020-07-17 NOTE — PROGRESS NOTES
Pt transferred to ICU for increased o2 requirements. Pt placed on  Monitor. Pt oriented to room and call light. Bed locked and in lowest position.  Md aware of pt arrival.

## 2020-07-17 NOTE — PROGRESS NOTES
Occupational Therapy  Unable to see pt at this time due to, per RN, pt has been increased to 15L O2 due to increased O2 demand. Requesting to hold therapy at this time. Will follow up with pt as time allows and medically appropriate.     Ximena Dubon, OTR/L

## 2020-07-17 NOTE — PLAN OF CARE
Patient/Family Education  Outcome: Ongoing     Problem: Falls - Risk of:  Goal: Will remain free from falls  Description: Will remain free from falls  Outcome: Ongoing     Problem: Falls - Risk of:  Goal: Absence of physical injury  Description: Absence of physical injury  Outcome: Ongoing     Problem: Pain:  Goal: Pain level will decrease  Description: Pain level will decrease  Outcome: Ongoing     Problem: Pain:  Goal: Control of acute pain  Description: Control of acute pain  Outcome: Ongoing     Problem: Pain:  Goal: Control of chronic pain  Description: Control of chronic pain  Outcome: Ongoing

## 2020-07-17 NOTE — PROGRESS NOTES
Comprehensive Nutrition Assessment    Type and Reason for Visit:  Initial    Nutrition Recommendations/Plan:   Carb Control diet   Glucerna BID  Will monitor nutritional adequacy, nutrition-related labs, weights, BMs, and clinical progress     Nutrition Assessment:  LOS. Pt admitted with Covid-19, currently on 15L HF O2. Hx includes DM, HTN. On Carb Control diet, intake varied but decreasing lately. Will order ONS. Pt denied any nutrition-related issues PTA per nsg screen. Malnutrition Assessment:  Malnutrition Status: At risk for malnutrition (Comment)    Context:  Acute Illness     Findings of the 6 clinical characteristics of malnutrition:  Energy Intake:  1 - 75% or less of estimated energy requirements for 7 or more days  Weight Loss:  No significant weight loss     Body Fat Loss:  Unable to assess     Muscle Mass Loss:  Unable to assess    Fluid Accumulation:  No significant fluid accumulation     Strength:  Not Performed    Estimated Daily Nutrient Needs:  Energy (kcal):  8623-3745 kcal (20-25 kcal/kg ABW); Weight Used for Energy Requirements:  Current     Protein (g):  64-77 gm (1-1.2 gm/kg ABW); Weight Used for Protein Requirements:  Current        Fluid (ml/day):  1 ml/kcal; Weight Used for Fluid Requirements:  Current      Nutrition Related Findings:  no edema      Wounds:  None       Current Nutrition Therapies:    DIET CARB CONTROL; Carb Control: 4 carb choices (60 gms)/meal    Anthropometric Measures:  · Height: 5' (152.4 cm)  · Current Body Weight: 140 lb (63.5 kg)   · Admission Body Weight: 134 lb (60.8 kg)    · Usual Body Weight: 140 lb (63.5 kg)     · Ideal Body Weight: 100 lbs; 140 lbs   · BMI: 27.3  · Adjusted Body Weight:  ; No Adjustment   · BMI Categories: Overweight (BMI 25.0-29. 9)       Nutrition Diagnosis:   · Inadequate oral intake related to impaired respiratory funtion as evidenced by intake 26-50%      Nutrition Interventions:   Food and/or Nutrient Delivery:  Continue Current Diet, Start Oral Nutrition Supplement  Nutrition Education/Counseling:  No recommendation at this time   Coordination of Nutrition Care:  No recommendation at this time    Goals:   Tolerate diet and consume greater than 50% of meals and supplements this admission       Nutrition Monitoring and Evaluation:   Behavioral-Environmental Outcomes:  (NA)   Food/Nutrient Intake Outcomes:  Diet Advancement/Tolerance, Food and Nutrient Intake, Supplement Intake  Physical Signs/Symptoms Outcomes:  Constipation, Diarrhea, Nausea or Vomiting, Hemodynamic Status, Nutrition Focused Physical Findings, Skin, Weight     Discharge Planning:    Continue current diet     Electronically signed by Sonali Quintana RD, LD on 7/17/20 at 12:42 PM EDT    Contact: [phone]

## 2020-07-17 NOTE — PLAN OF CARE
Problem: Nutrition  Goal: Optimal nutrition therapy  Outcome: Ongoing     Nutrition Problem #1: Inadequate oral intake  Intervention: Food and/or Nutrient Delivery: Continue Current Diet, Start Oral Nutrition Supplement  Nutritional Goals:  Tolerate diet and consume greater than 50% of meals and supplements this admission

## 2020-07-18 LAB
A/G RATIO: 0.7 (ref 1.1–2.2)
ALBUMIN SERPL-MCNC: 2.5 G/DL (ref 3.4–5)
ALP BLD-CCNC: 48 U/L (ref 40–129)
ALT SERPL-CCNC: 25 U/L (ref 10–40)
ANION GAP SERPL CALCULATED.3IONS-SCNC: 12 MMOL/L (ref 3–16)
AST SERPL-CCNC: 34 U/L (ref 15–37)
BASOPHILS ABSOLUTE: 0 K/UL (ref 0–0.2)
BASOPHILS RELATIVE PERCENT: 0.1 %
BILIRUB SERPL-MCNC: 0.3 MG/DL (ref 0–1)
BUN BLDV-MCNC: 23 MG/DL (ref 7–20)
C-REACTIVE PROTEIN: 24 MG/L (ref 0–5.1)
CALCIUM SERPL-MCNC: 8.5 MG/DL (ref 8.3–10.6)
CHLORIDE BLD-SCNC: 104 MMOL/L (ref 99–110)
CO2: 23 MMOL/L (ref 21–32)
CREAT SERPL-MCNC: 0.5 MG/DL (ref 0.6–1.2)
D DIMER: 284 NG/ML DDU (ref 0–229)
EOSINOPHILS ABSOLUTE: 0 K/UL (ref 0–0.6)
EOSINOPHILS RELATIVE PERCENT: 0.4 %
FERRITIN: 216.5 NG/ML (ref 15–150)
GFR AFRICAN AMERICAN: >60
GFR NON-AFRICAN AMERICAN: >60
GLOBULIN: 3.8 G/DL
GLUCOSE BLD-MCNC: 186 MG/DL (ref 70–99)
GLUCOSE BLD-MCNC: 292 MG/DL (ref 70–99)
GLUCOSE BLD-MCNC: 75 MG/DL (ref 70–99)
GLUCOSE BLD-MCNC: 84 MG/DL (ref 70–99)
GLUCOSE BLD-MCNC: 94 MG/DL (ref 70–99)
HCT VFR BLD CALC: 40.9 % (ref 36–48)
HEMOGLOBIN: 13.4 G/DL (ref 12–16)
LYMPHOCYTES ABSOLUTE: 0.8 K/UL (ref 1–5.1)
LYMPHOCYTES RELATIVE PERCENT: 7.6 %
MAGNESIUM: 1.8 MG/DL (ref 1.8–2.4)
MCH RBC QN AUTO: 29 PG (ref 26–34)
MCHC RBC AUTO-ENTMCNC: 32.7 G/DL (ref 31–36)
MCV RBC AUTO: 88.5 FL (ref 80–100)
MONOCYTES ABSOLUTE: 0.4 K/UL (ref 0–1.3)
MONOCYTES RELATIVE PERCENT: 4.2 %
NEUTROPHILS ABSOLUTE: 9 K/UL (ref 1.7–7.7)
NEUTROPHILS RELATIVE PERCENT: 87.7 %
PDW BLD-RTO: 14.2 % (ref 12.4–15.4)
PERFORMED ON: ABNORMAL
PERFORMED ON: ABNORMAL
PERFORMED ON: NORMAL
PERFORMED ON: NORMAL
PHOSPHORUS: 3.2 MG/DL (ref 2.5–4.9)
PLATELET # BLD: 381 K/UL (ref 135–450)
PMV BLD AUTO: 8.1 FL (ref 5–10.5)
POTASSIUM REFLEX MAGNESIUM: 3.9 MMOL/L (ref 3.5–5.1)
RBC # BLD: 4.62 M/UL (ref 4–5.2)
SODIUM BLD-SCNC: 139 MMOL/L (ref 136–145)
TOTAL PROTEIN: 6.3 G/DL (ref 6.4–8.2)
WBC # BLD: 10.3 K/UL (ref 4–11)

## 2020-07-18 PROCEDURE — 86140 C-REACTIVE PROTEIN: CPT

## 2020-07-18 PROCEDURE — 6360000002 HC RX W HCPCS: Performed by: INTERNAL MEDICINE

## 2020-07-18 PROCEDURE — 84100 ASSAY OF PHOSPHORUS: CPT

## 2020-07-18 PROCEDURE — APPNB15 APP NON BILLABLE TIME 0-15 MINS: Performed by: NURSE PRACTITIONER

## 2020-07-18 PROCEDURE — 6360000002 HC RX W HCPCS: Performed by: NURSE PRACTITIONER

## 2020-07-18 PROCEDURE — 94761 N-INVAS EAR/PLS OXIMETRY MLT: CPT

## 2020-07-18 PROCEDURE — 2000000000 HC ICU R&B

## 2020-07-18 PROCEDURE — 2700000000 HC OXYGEN THERAPY PER DAY

## 2020-07-18 PROCEDURE — 85379 FIBRIN DEGRADATION QUANT: CPT

## 2020-07-18 PROCEDURE — 82728 ASSAY OF FERRITIN: CPT

## 2020-07-18 PROCEDURE — 80053 COMPREHEN METABOLIC PANEL: CPT

## 2020-07-18 PROCEDURE — 6370000000 HC RX 637 (ALT 250 FOR IP): Performed by: INTERNAL MEDICINE

## 2020-07-18 PROCEDURE — 85025 COMPLETE CBC W/AUTO DIFF WBC: CPT

## 2020-07-18 PROCEDURE — 2580000003 HC RX 258: Performed by: NURSE PRACTITIONER

## 2020-07-18 PROCEDURE — 99291 CRITICAL CARE FIRST HOUR: CPT | Performed by: INTERNAL MEDICINE

## 2020-07-18 PROCEDURE — 83735 ASSAY OF MAGNESIUM: CPT

## 2020-07-18 PROCEDURE — 2580000003 HC RX 258: Performed by: INTERNAL MEDICINE

## 2020-07-18 RX ORDER — MAGNESIUM SULFATE IN WATER 40 MG/ML
2 INJECTION, SOLUTION INTRAVENOUS ONCE
Status: COMPLETED | OUTPATIENT
Start: 2020-07-18 | End: 2020-07-18

## 2020-07-18 RX ADMIN — INSULIN LISPRO 3 UNITS: 100 INJECTION, SOLUTION INTRAVENOUS; SUBCUTANEOUS at 16:05

## 2020-07-18 RX ADMIN — BENZONATATE 100 MG: 100 CAPSULE ORAL at 15:11

## 2020-07-18 RX ADMIN — DONEPEZIL HYDROCHLORIDE 5 MG: 5 TABLET, FILM COATED ORAL at 19:47

## 2020-07-18 RX ADMIN — CEFEPIME HYDROCHLORIDE 2 G: 2 INJECTION, POWDER, FOR SOLUTION INTRAVENOUS at 16:13

## 2020-07-18 RX ADMIN — TRAZODONE HYDROCHLORIDE 100 MG: 100 TABLET ORAL at 21:22

## 2020-07-18 RX ADMIN — ASPIRIN 325 MG ORAL TABLET 325 MG: 325 PILL ORAL at 09:08

## 2020-07-18 RX ADMIN — ATORVASTATIN CALCIUM 40 MG: 40 TABLET, FILM COATED ORAL at 09:08

## 2020-07-18 RX ADMIN — ENOXAPARIN SODIUM 40 MG: 40 INJECTION SUBCUTANEOUS at 09:08

## 2020-07-18 RX ADMIN — Medication 6 MG: at 09:08

## 2020-07-18 RX ADMIN — INSULIN LISPRO 5 UNITS: 100 INJECTION, SOLUTION INTRAVENOUS; SUBCUTANEOUS at 20:03

## 2020-07-18 RX ADMIN — CYANOCOBALAMIN TAB 1000 MCG 1000 MCG: 1000 TAB at 09:08

## 2020-07-18 RX ADMIN — GUAIFENESIN 600 MG: 600 TABLET ORAL at 19:47

## 2020-07-18 RX ADMIN — INSULIN GLARGINE 25 UNITS: 100 INJECTION, SOLUTION SUBCUTANEOUS at 20:05

## 2020-07-18 RX ADMIN — GABAPENTIN 300 MG: 300 CAPSULE ORAL at 09:10

## 2020-07-18 RX ADMIN — GABAPENTIN 300 MG: 300 CAPSULE ORAL at 12:42

## 2020-07-18 RX ADMIN — LOSARTAN POTASSIUM AND HYDROCHLOROTHIAZIDE 2 TABLET: 12.5; 5 TABLET ORAL at 09:08

## 2020-07-18 RX ADMIN — BENZONATATE 100 MG: 100 CAPSULE ORAL at 06:45

## 2020-07-18 RX ADMIN — Medication 10 ML: at 19:46

## 2020-07-18 RX ADMIN — Medication 10 ML: at 09:11

## 2020-07-18 RX ADMIN — AMLODIPINE BESYLATE 5 MG: 5 TABLET ORAL at 09:08

## 2020-07-18 RX ADMIN — INSULIN GLARGINE 25 UNITS: 100 INJECTION, SOLUTION SUBCUTANEOUS at 09:08

## 2020-07-18 RX ADMIN — CEFEPIME HYDROCHLORIDE 2 G: 2 INJECTION, POWDER, FOR SOLUTION INTRAVENOUS at 05:15

## 2020-07-18 RX ADMIN — GABAPENTIN 300 MG: 300 CAPSULE ORAL at 19:47

## 2020-07-18 RX ADMIN — GUAIFENESIN 600 MG: 600 TABLET ORAL at 09:08

## 2020-07-18 RX ADMIN — GABAPENTIN 300 MG: 300 CAPSULE ORAL at 16:02

## 2020-07-18 RX ADMIN — SODIUM CHLORIDE, PRESERVATIVE FREE 10 ML: 5 INJECTION INTRAVENOUS at 19:48

## 2020-07-18 RX ADMIN — METOPROLOL SUCCINATE 50 MG: 50 TABLET, EXTENDED RELEASE ORAL at 09:08

## 2020-07-18 RX ADMIN — AZITHROMYCIN 500 MG: 500 TABLET, FILM COATED ORAL at 17:32

## 2020-07-18 RX ADMIN — SODIUM CHLORIDE, PRESERVATIVE FREE 10 ML: 5 INJECTION INTRAVENOUS at 09:14

## 2020-07-18 RX ADMIN — ENOXAPARIN SODIUM 40 MG: 40 INJECTION SUBCUTANEOUS at 19:47

## 2020-07-18 RX ADMIN — MAGNESIUM SULFATE HEPTAHYDRATE 2 G: 40 INJECTION, SOLUTION INTRAVENOUS at 09:16

## 2020-07-18 ASSESSMENT — PAIN SCALES - GENERAL
PAINLEVEL_OUTOF10: 0

## 2020-07-18 NOTE — PROGRESS NOTES
1950: resumed care. Patient very confused. Pulling her O2 tube off and her O2 saturation drops to 60's.     2037: Talked to patient daughter (Arti Riddle). Updated with patient status and plan of care. Patient refused PICC placement    2125: patient confused. Keep taking off and O2 saturation dropping to 60's. RN attempted to redirect patient but patient very confused and unable to follow commands. 2135: Paged hospitalist on call. Talked to Rashawn Zendejas NP. New orders received for vest restraint. 2148: Called patient daughter and informed new orders for vest restraint and Seroquel.      2852: RN called patient's daughter from patient's room phone. Patient and patient's talked to each other briefly over the phone. Patient remain extremely confused, kicking her legs out in bed, turning her head back and forth in an attempt to get O2 tube out off her nose. 2253: Sleeping with eyes closed. HR 58, RR 22, O2 sats 94% with current airflow settings. 0400: Resting in bed. Restraints loosened and reapplied. Water offered. O2 saturation drops to mid 80's and RT Scottie at bed side adjusting settings. 4491: called patient's daughter Jayme Flynn) and update with patient's status. 0645: patient O2 satuartion drops to 70's. RN encouraged patient to take deep breathing and O2 saturation gradually improved to lower 90's. 0720: Shift hand off report given to Elvera Peabody, RN.  Patient O2 satuartion 96%, RR 19, and HR 73,

## 2020-07-18 NOTE — PROGRESS NOTES
0720 Shift handoff completed. IVFs infusing per pump without difficulty. Freitas patent to gravity bedside drainage. 0820 Assessment completed and documented. Patient able to state name, year and aware in hospital. Continues with bilateral wrist restraints and posey vest for safety. 1248 Patient able to feed self lunch, bilateral soft wrist restraints secured to bed frame. No changes noted since previous assessment. 1354 Patient able to manage to remove nasal cannula from nares and have placed in mouth or under chin. Bilateral soft wrist restraints remain secure. Spoke with daughter, Carleen Hutchinson, updated on patient's status and plan of care/treatment. 1525 No changes noted since previous assessment. Medicated with Tessalon PO per PRN order at 1511 for persistent cough. Continues with bilateral soft wrist restraints and posey vest.   1830 No changes noted since previous assessment. Patient continues to remove nasal cannula from nares.    Electronically signed by Vickey Durham RN on 7/18/2020 at 6:44 PM

## 2020-07-18 NOTE — PROGRESS NOTES
Pulmonary Progress Note    Date of Admission: 7/12/2020   LOS: 6 days     CC:  Chief Complaint   Patient presents with    Shortness of Breath     positive COVID test     Cough    Fever    Fatigue       HPI/Subjective  On vapotherm, resp status stable  CXR improved, inflammatory markers downtrend    ROS:   No nausea  No Vomiting  No chest pain      Intake/Output Summary (Last 24 hours) at 7/18/2020 1108  Last data filed at 7/18/2020 0555  Gross per 24 hour   Intake 1090 ml   Output 1145 ml   Net -55 ml         PHYSICAL EXAM:   Blood pressure 135/73, pulse 73, temperature 98.6 °F (37 °C), temperature source Oral, resp. rate 29, height 5' (1.524 m), weight 141 lb 5 oz (64.1 kg), SpO2 93 %, not currently breastfeeding.'  Gen:  No acute distress. Eyes: PERRL. Anicteric sclera. No conjunctival injection. ENT: No discharge. Posterior oropharynx clear. External appearance of ears and nose normal.  Neck: Trachea midline. No mass   Resp:  No crackles. No wheezes. No rhonchi. No dullness on percussion. CV: Regular rate. Regular rhythm. No murmur or rub. No edema. GI: Soft, Non-tender. Non-distended. +BS  Skin: Warm, dry, w/o erythema. Lymph: No cervical or supraclavicular LAD. M/S: No cyanosis. No clubbing. Neuro:  no focal neurologic deficit. Moves all extremities  Psych: Awake and alert  Mood stable.       Medications:    Scheduled Meds:   magnesium sulfate  2 g Intravenous Once    lidocaine 1 % injection  5 mL Intradermal Once    sodium chloride flush  10 mL Intravenous 2 times per day    cefepime  2 g Intravenous Q12H    insulin glargine  25 Units Subcutaneous BID    azithromycin  500 mg Oral QPM    dexamethasone  6 mg Intravenous Daily    amLODIPine  5 mg Oral Daily    aspirin  325 mg Oral Daily    atorvastatin  40 mg Oral Daily    vitamin B-12  1,000 mcg Oral Daily    donepezil  5 mg Oral Nightly    gabapentin  300 mg Oral 4x Daily    losartan-hydroCHLOROthiazide  2 tablet Oral Daily    metoprolol succinate  50 mg Oral Daily    sodium chloride flush  10 mL Intravenous 2 times per day    enoxaparin  40 mg Subcutaneous BID    insulin lispro  0-18 Units Subcutaneous TID WC    insulin lispro  0-9 Units Subcutaneous Nightly    guaiFENesin  600 mg Oral BID       Continuous Infusions:   dextrose         PRN Meds:  sodium chloride flush, sodium chloride, traZODone, sodium chloride flush, acetaminophen **OR** acetaminophen, polyethylene glycol, promethazine **OR** ondansetron, glucose, dextrose, glucagon (rDNA), dextrose, benzonatate    Labs reviewed:  CBC:   Recent Labs     07/16/20 0648 07/17/20 0518 07/18/20  0547   WBC 13.8* 9.8 10.3   HGB 13.9 12.9 13.4   HCT 42.7 38.7 40.9   MCV 88.8 87.6 88.5    341 381     BMP:   Recent Labs     07/16/20 0648 07/17/20 0518 07/18/20  0547    139 139   K 3.6 3.6 3.9    104 104   CO2 23 23 23   PHOS  --   --  3.2   BUN 30* 23* 23*   CREATININE 0.7 0.6 0.5*     LIVER PROFILE:   Recent Labs     07/16/20 0648 07/17/20 0518 07/18/20  0547   AST 36 30 34   ALT 31 27 25   BILITOT 0.4 0.4 0.3   ALKPHOS 53 50 48     PT/INR: No results for input(s): PROTIME, INR in the last 72 hours. APTT: No results for input(s): APTT in the last 72 hours. UA:No results for input(s): NITRITE, COLORU, PHUR, LABCAST, WBCUA, RBCUA, MUCUS, TRICHOMONAS, YEAST, BACTERIA, CLARITYU, SPECGRAV, LEUKOCYTESUR, UROBILINOGEN, BILIRUBINUR, BLOODU, GLUCOSEU, AMORPHOUS in the last 72 hours. Invalid input(s): Ulus Bear  No results for input(s): PH, PCO2, PO2 in the last 72 hours. Films:  Radiology Review:  Pertinent images / reports were reviewed as a part of this visit. CT Chest w/ contrast: No results found for this or any previous visit.     CT Chest w/o contrast:   Results for orders placed during the hospital encounter of 02/23/19   CT CHEST WO CONTRAST    Narrative EXAMINATION:  CT OF THE CHEST WITHOUT CONTRAST 2/23/2019 1:10 pm    TECHNIQUE:  CT of the chest was pm    COMPARISON:  10/01/2017    HISTORY:  ORDERING PHYSICIAN PROVIDED HISTORY: cough, fever, tachy  TECHNOLOGIST PROVIDED HISTORY:  Technologist Provided Reason for Exam: cough, fever, tachy  Acuity: Acute  Type of Encounter: Initial    FINDINGS:  No focal pulmonary opacities. No pleural effusion or pneumothorax. Heart  size within normal limits. Prior ACDF. Impression No evidence of pneumonia. CXR portable:   Results for orders placed during the hospital encounter of 07/12/20   XR CHEST PORTABLE    Narrative EXAMINATION:  ONE XRAY VIEW OF THE CHEST    7/16/2020 2:34 pm    COMPARISON:  July 12, 2020    HISTORY:  ORDERING SYSTEM PROVIDED HISTORY: sob  TECHNOLOGIST PROVIDED HISTORY:  Reason for exam:->sob  Reason for Exam: sob    FINDINGS:  Cardiac silhouette is enlarged. No pneumothorax. Worsening multifocal  airspace opacities. Impression Worsening multifocal airspace opacities. Access  Arterial       PICC           CVC                 Assessment:     Acute hypoxemic respiratory failure with SPO2 less than 90% on room air  Sepsis, due to  Multifocal pneumonia, due to  COVID-19  H/o dementia with forgetfulness    Plan:      -Wean supplemental oxygen to goal saturation of >90%  -rec'd actemra, remdesivir, ID following  -decadron 6mg dialy  -repeat procal 0.14  -CXR yesterday with interval improvement    Due to the immediate potential for life-threatening deterioration due to hypoxic respiratory failure , I spent 33 minutes providing critical care. This time is excluding time spent performing separately billable procedures.       Thank you for this consult,    Madhu Chun [facility]

## 2020-07-18 NOTE — PLAN OF CARE
Problem: Airway Clearance - Ineffective  Goal: Achieve or maintain patent airway  Outcome: Ongoing     Problem: Gas Exchange - Impaired  Goal: Absence of hypoxia  Outcome: Ongoing  Goal: Promote optimal lung function  Outcome: Ongoing     Problem: Breathing Pattern - Ineffective  Goal: Ability to achieve and maintain a regular respiratory rate  Outcome: Ongoing     Problem:  Body Temperature -  Risk of, Imbalanced  Goal: Ability to maintain a body temperature within defined limits  Outcome: Ongoing  Goal: Will regain or maintain usual level of consciousness  Outcome: Ongoing  Goal: Complications related to the disease process, condition or treatment will be avoided or minimized  Outcome: Ongoing     Problem: Isolation Precautions - Risk of Spread of Infection  Goal: Prevent transmission of infection  Outcome: Ongoing     Problem: Nutrition Deficits  Goal: Optimize nutrtional status  Outcome: Ongoing     Problem: Risk for Fluid Volume Deficit  Goal: Maintain normal heart rhythm  Outcome: Ongoing  Goal: Maintain absence of muscle cramping  Outcome: Ongoing  Goal: Maintain normal serum potassium, sodium, calcium, phosphorus, and pH  Outcome: Ongoing     Problem: Loneliness or Risk for Loneliness  Goal: Demonstrate positive use of time alone when socialization is not possible  Outcome: Ongoing     Problem: Fatigue  Goal: Verbalize increase energy and improved vitality  Outcome: Ongoing     Problem: Patient Education: Go to Patient Education Activity  Goal: Patient/Family Education  Outcome: Ongoing     Problem: Falls - Risk of:  Goal: Will remain free from falls  Description: Will remain free from falls  Outcome: Ongoing  Goal: Absence of physical injury  Description: Absence of physical injury  Outcome: Ongoing     Problem: Pain:  Goal: Pain level will decrease  Description: Pain level will decrease  Outcome: Ongoing  Goal: Control of acute pain  Description: Control of acute pain  Outcome: Ongoing  Goal: Control of chronic pain  Description: Control of chronic pain  Outcome: Ongoing     Problem: Nutrition  Goal: Optimal nutrition therapy  Outcome: Ongoing     Problem: Skin Integrity:  Goal: Will show no infection signs and symptoms  Description: Will show no infection signs and symptoms  Outcome: Ongoing  Goal: Absence of new skin breakdown  Description: Absence of new skin breakdown  Outcome: Ongoing     Problem: Restraint Use - Nonviolent/Non-Self-Destructive Behavior:  Goal: Absence of restraint indications  Description: Absence of restraint indications  Outcome: Ongoing  Goal: Absence of restraint-related injury  Description: Absence of restraint-related injury  Outcome: Ongoing

## 2020-07-18 NOTE — PLAN OF CARE
Problem: Gas Exchange - Impaired  Goal: Absence of hypoxia  7/18/2020 1454 by Adonay Cramer RN  Outcome: Ongoing   Patient with shortness of breath or difficulties with breathing. Patient continues with independent airway. Breathing pattern remains even and symmetrical. SpO2 within acceptable range for this patient when she leaves nasal cannula in place. Assessment findings remain free of cyanosis; cap refill remains brisk. ABG's to be ordered per protocol/physician order, if applicable. Supplemental O2 applied per protocol/physican order, as applicable. 7/18/2020 0736 by Tasha Zaragoza RN  Outcome: Ongoing     Problem: Risk for Fluid Volume Deficit  Goal: Maintain normal heart rhythm  7/18/2020 1454 by Adonay Cramer RN  Outcome: Ongoing  Continuing to monitor labs and vital signs. No present signs of fluid overload/depletion. Continuing to monitor I&O's per protocol. Patient denies nausea/vomiting/diarrhea. IV/INT assessments continue. Monitoring for signs/symptoms of unusual bleeding. 7/18/2020 0736 by Tasha Zaragoza RN  Outcome: Ongoing     Problem: Falls - Risk of:  Goal: Will remain free from falls  Description: Will remain free from falls  7/18/2020 1454 by Adonay Cramer RN  Outcome: Ongoing  Falling star program remains in place. Call light and personal belongings within reach. Frequent visual monitoring continues. Toileting program inplace. Patient assisted in turning/repositioning at least once every 2 hours, and on a prn basis. 7/18/2020 0736 by Tasha Zaragoza RN  Outcome: Ongoing     Problem: Pain:  Goal: Pain level will decrease  Description: Pain level will decrease  7/18/2020 1454 by Adonay Cramer RN  Outcome: Ongoing  Continuing to monitor pain and discomfort. Monitoring pain level on scale of 0-10. Non- pharmacological measures encouraged to reduce discomfort/pain. PRN pain meds administeration continues when/if applicable as ordered by physician.      7/18/2020 0627 by Raven Berry RN  Outcome: Ongoing     Problem: Nutrition  Goal: Optimal nutrition therapy  7/18/2020 1454 by Kirsten Evans RN  Outcome: Ongoing  No present signs of nutritional deficits. Patient denies oral sore or swallowing difficulties. Patient continues with adequate PO intake. Will continue to monitor. 7/18/2020 0736 by Raven Berry RN  Outcome: Ongoing     Problem: Skin Integrity:  Goal: Will show no infection signs and symptoms  Description: Will show no infection signs and symptoms  7/18/2020 1454 by Kirsten Evans RN  Outcome: Ongoing  Monitoring patient skin integrity for skin breakdown, turning and repositioning q2h per protocol. Patient demonstrates turning and repositioning self. 7/18/2020 0736 by Raven Berry RN  Outcome: Ongoing     Problem: Restraint Use - Nonviolent/Non-Self-Destructive Behavior:  Goal: Absence of restraint indications  Description: Absence of restraint indications  7/18/2020 1454 by Kirsten Evans RN  Outcome: Ongoing  Patient continues with bilateral soft wrist restraints to prevent pulling out nasal cannula or removing monitoring equipment. Barron vest remains in place to prevent injury/fall.   7/18/2020 0736 by Raven Berry RN  Outcome: Ongoing   Electronically signed by Kirsten Evans RN on 7/18/2020 at 2:58 PM

## 2020-07-18 NOTE — PROGRESS NOTES
Hospitalist ICU Progress Note    CC: <principal problem not specified>    Hospital course:  64yoAAF with PMHx sig for HTn, DMII, hyperlipidemia presents with cough, SOB. Dx with COVID19 pneumonia. Found to have oxygen sate of 78% on RA.  initially went to floor, then with further dequan decompensation was transferred to ICU for vapotherm. Has received dexamethasone, lovenox BID, remdesvir and actemra. ID and critical care following    Admit date: 7/12/2020  Days in hospital:  6    24 Hour Events: pt pleasantly demented - still requiring large amount of oxygen    Subjective: in restraints for safety    ROS:  Review of systems not obtained due to patient factors. confusion. Objective:    VITALS:  /69   Pulse 60   Temp 98.4 °F (36.9 °C) (Oral)   Resp 15   Ht 5' (1.524 m)   Wt 140 lb 10.5 oz (63.8 kg)   SpO2 95%   BMI 27.47 kg/m²     Gen: ill appearing  HEENT: NC/AT, moist mucous membranes, no oropharyngeal erythema or exudate    Neck: supple, trachea midline, no anterior cervical or SC LAD  Heart:  Normal s1/s2, RRR, no murmurs, gallops, or rubs. no leg edema  Lungs:  diminished bilaterally, no wheeze, no rales, no rhonchi, no crackles, no use of accessory muscles  Abd: bowel sounds present, soft, nontender, nondistended, no masses  Extrem:  No clubbing, cyanosis,  no edema, peripheral pulses 2+, brisk capillary refill  Skin: no rashes or lesions, normal color/perfusion  Psych:  Not oriented to date or Not oriented to place  Unable to fully assess due to confusion. Neuro: grossly intact, moves all four extremities. Radiology (personally reviewed by me):  CXR 7/17:    Impression:          Multifocal airspace disease.  Areas in the right upper and left lower lung   are consolidated. Suella Backers however, there are areas of improved aeration.               Assessment:    Active Problems:    COVID-19 virus infection  Resolved Problems:    * No resolved hospital problems. *      Plan:  1. COVID19 pneumonia - off of azithromycin/ceftriaxone, on dexamethasone 6mg daily, lovenox BID and remdesvir, then got worse so cefepime and actemra started - currently on vapotherm  2. Acute resp failure with hypoxia - currently on vapotherm  3. Acute metabolic encephalopathy in setting of dementia - confused and requiring vest and wrist restraints - likely steroids not helping  4. Sepsis POA due to COVID19 pneumonia  5.   DMII - on lantus and high dose SSI - exacerbated by dexamethasone        Prognosis:  Guarded    Code status:  Full code    DVT prophylaxis: Lovenox  GI prophylaxis: H2 blocker  Antibiotic prophylaxis indicated:   yes - lactobacillus    Diet:  DIET CARB CONTROL; Carb Control: 4 carb choices (60 gms)/meal  Dietary Nutrition Supplements: Diabetic Oral Supplement    Disposition:  SNF    Medications:  Scheduled Meds:   magnesium sulfate  2 g Intravenous Once    lidocaine 1 % injection  5 mL Intradermal Once    sodium chloride flush  10 mL Intravenous 2 times per day    cefepime  2 g Intravenous Q12H    insulin glargine  25 Units Subcutaneous BID    azithromycin  500 mg Oral QPM    dexamethasone  6 mg Intravenous Daily    amLODIPine  5 mg Oral Daily    aspirin  325 mg Oral Daily    atorvastatin  40 mg Oral Daily    vitamin B-12  1,000 mcg Oral Daily    donepezil  5 mg Oral Nightly    gabapentin  300 mg Oral 4x Daily    losartan-hydroCHLOROthiazide  2 tablet Oral Daily    metoprolol succinate  50 mg Oral Daily    sodium chloride flush  10 mL Intravenous 2 times per day    enoxaparin  40 mg Subcutaneous BID    insulin lispro  0-18 Units Subcutaneous TID WC    insulin lispro  0-9 Units Subcutaneous Nightly    guaiFENesin  600 mg Oral BID       PRN Meds:  sodium chloride flush, sodium chloride, traZODone, sodium chloride flush, acetaminophen **OR** acetaminophen, polyethylene glycol, promethazine **OR** ondansetron, glucose, dextrose, glucagon

## 2020-07-18 NOTE — PROGRESS NOTES
4 Eyes Skin Assessment     The patient is being assess for  Shift Handoff    I agree that 2 RN's have performed a thorough Head to Toe Skin Assessment on the patient. ALL assessment sites listed below have been assessed. Areas assessed by both nurses:Jamal/Jamal  [x]   Head, Face, and Ears   [x]   Shoulders, Back, and Chest  [x]   Arms, Elbows, and Hands   [x]   Coccyx, Sacrum, and IschIum  [x]   Legs, Feet, and Heels        Does the Patient have Skin Breakdown?   No         James Prevention initiated:  Yes   Wound Care Orders initiated:  No      C nurse consulted for Pressure Injury (Stage 3,4, Unstageable, DTI, NWPT, and Complex wounds), New and Established Ostomies:  No      Nurse 1 eSignature: Electronically signed by Rosalba Quiñones RN on 7/18/20 at 7:38 AM EDT    **SHARE this note so that the co-signing nurse is able to place an eSignature**    Nurse 2 eSignature: Electronically signed by Jhon Yu RN on 7/18/20 at 7:48 AM EDT

## 2020-07-19 LAB
A/G RATIO: 0.8 (ref 1.1–2.2)
ALBUMIN SERPL-MCNC: 2.8 G/DL (ref 3.4–5)
ALP BLD-CCNC: 58 U/L (ref 40–129)
ALT SERPL-CCNC: 28 U/L (ref 10–40)
ANION GAP SERPL CALCULATED.3IONS-SCNC: 11 MMOL/L (ref 3–16)
AST SERPL-CCNC: 45 U/L (ref 15–37)
BASOPHILS ABSOLUTE: 0 K/UL (ref 0–0.2)
BASOPHILS RELATIVE PERCENT: 0.2 %
BILIRUB SERPL-MCNC: 0.4 MG/DL (ref 0–1)
BUN BLDV-MCNC: 32 MG/DL (ref 7–20)
C-REACTIVE PROTEIN: 11.2 MG/L (ref 0–5.1)
CALCIUM SERPL-MCNC: 9.1 MG/DL (ref 8.3–10.6)
CHLORIDE BLD-SCNC: 101 MMOL/L (ref 99–110)
CO2: 22 MMOL/L (ref 21–32)
CREAT SERPL-MCNC: 0.6 MG/DL (ref 0.6–1.2)
D DIMER: 662 NG/ML DDU (ref 0–229)
EOSINOPHILS ABSOLUTE: 0.1 K/UL (ref 0–0.6)
EOSINOPHILS RELATIVE PERCENT: 1.7 %
FERRITIN: 255 NG/ML (ref 15–150)
GFR AFRICAN AMERICAN: >60
GFR NON-AFRICAN AMERICAN: >60
GLOBULIN: 3.6 G/DL
GLUCOSE BLD-MCNC: 206 MG/DL (ref 70–99)
GLUCOSE BLD-MCNC: 210 MG/DL (ref 70–99)
GLUCOSE BLD-MCNC: 210 MG/DL (ref 70–99)
GLUCOSE BLD-MCNC: 258 MG/DL (ref 70–99)
GLUCOSE BLD-MCNC: 88 MG/DL (ref 70–99)
HCT VFR BLD CALC: 40.9 % (ref 36–48)
HEMOGLOBIN: 13.5 G/DL (ref 12–16)
LYMPHOCYTES ABSOLUTE: 0.7 K/UL (ref 1–5.1)
LYMPHOCYTES RELATIVE PERCENT: 9.2 %
MAGNESIUM: 2.3 MG/DL (ref 1.8–2.4)
MCH RBC QN AUTO: 29.2 PG (ref 26–34)
MCHC RBC AUTO-ENTMCNC: 33 G/DL (ref 31–36)
MCV RBC AUTO: 88.6 FL (ref 80–100)
MONOCYTES ABSOLUTE: 0.3 K/UL (ref 0–1.3)
MONOCYTES RELATIVE PERCENT: 3.6 %
NEUTROPHILS ABSOLUTE: 6.3 K/UL (ref 1.7–7.7)
NEUTROPHILS RELATIVE PERCENT: 85.3 %
PDW BLD-RTO: 14.2 % (ref 12.4–15.4)
PERFORMED ON: ABNORMAL
PERFORMED ON: NORMAL
PHOSPHORUS: 4 MG/DL (ref 2.5–4.9)
PLATELET # BLD: 394 K/UL (ref 135–450)
PMV BLD AUTO: 7.9 FL (ref 5–10.5)
POTASSIUM REFLEX MAGNESIUM: 3.8 MMOL/L (ref 3.5–5.1)
RBC # BLD: 4.61 M/UL (ref 4–5.2)
SODIUM BLD-SCNC: 134 MMOL/L (ref 136–145)
TOTAL PROTEIN: 6.4 G/DL (ref 6.4–8.2)
WBC # BLD: 7.4 K/UL (ref 4–11)

## 2020-07-19 PROCEDURE — 2580000003 HC RX 258: Performed by: NURSE PRACTITIONER

## 2020-07-19 PROCEDURE — 82728 ASSAY OF FERRITIN: CPT

## 2020-07-19 PROCEDURE — 6370000000 HC RX 637 (ALT 250 FOR IP): Performed by: INTERNAL MEDICINE

## 2020-07-19 PROCEDURE — 2580000003 HC RX 258: Performed by: INTERNAL MEDICINE

## 2020-07-19 PROCEDURE — 36415 COLL VENOUS BLD VENIPUNCTURE: CPT

## 2020-07-19 PROCEDURE — 99291 CRITICAL CARE FIRST HOUR: CPT | Performed by: INTERNAL MEDICINE

## 2020-07-19 PROCEDURE — 6360000002 HC RX W HCPCS: Performed by: INTERNAL MEDICINE

## 2020-07-19 PROCEDURE — 80053 COMPREHEN METABOLIC PANEL: CPT

## 2020-07-19 PROCEDURE — 84100 ASSAY OF PHOSPHORUS: CPT

## 2020-07-19 PROCEDURE — 83735 ASSAY OF MAGNESIUM: CPT

## 2020-07-19 PROCEDURE — 85025 COMPLETE CBC W/AUTO DIFF WBC: CPT

## 2020-07-19 PROCEDURE — 94761 N-INVAS EAR/PLS OXIMETRY MLT: CPT

## 2020-07-19 PROCEDURE — 86140 C-REACTIVE PROTEIN: CPT

## 2020-07-19 PROCEDURE — 2000000000 HC ICU R&B

## 2020-07-19 PROCEDURE — 2700000000 HC OXYGEN THERAPY PER DAY

## 2020-07-19 PROCEDURE — APPNB15 APP NON BILLABLE TIME 0-15 MINS: Performed by: NURSE PRACTITIONER

## 2020-07-19 PROCEDURE — 85379 FIBRIN DEGRADATION QUANT: CPT

## 2020-07-19 RX ORDER — FAMOTIDINE 20 MG/1
20 TABLET, FILM COATED ORAL 2 TIMES DAILY
Status: DISCONTINUED | OUTPATIENT
Start: 2020-07-19 | End: 2020-07-20

## 2020-07-19 RX ORDER — LACTOBACILLUS RHAMNOSUS GG 10B CELL
1 CAPSULE ORAL 2 TIMES DAILY WITH MEALS
Status: DISCONTINUED | OUTPATIENT
Start: 2020-07-19 | End: 2020-07-28 | Stop reason: HOSPADM

## 2020-07-19 RX ADMIN — TRAZODONE HYDROCHLORIDE 100 MG: 100 TABLET ORAL at 22:02

## 2020-07-19 RX ADMIN — CYANOCOBALAMIN TAB 1000 MCG 1000 MCG: 1000 TAB at 08:21

## 2020-07-19 RX ADMIN — ATORVASTATIN CALCIUM 40 MG: 40 TABLET, FILM COATED ORAL at 08:21

## 2020-07-19 RX ADMIN — LOSARTAN POTASSIUM AND HYDROCHLOROTHIAZIDE 2 TABLET: 12.5; 5 TABLET ORAL at 08:21

## 2020-07-19 RX ADMIN — METOPROLOL SUCCINATE 50 MG: 50 TABLET, EXTENDED RELEASE ORAL at 08:21

## 2020-07-19 RX ADMIN — Medication 1 CAPSULE: at 08:22

## 2020-07-19 RX ADMIN — Medication 1 CAPSULE: at 16:14

## 2020-07-19 RX ADMIN — AZITHROMYCIN 500 MG: 500 TABLET, FILM COATED ORAL at 17:15

## 2020-07-19 RX ADMIN — SODIUM CHLORIDE, PRESERVATIVE FREE 10 ML: 5 INJECTION INTRAVENOUS at 08:31

## 2020-07-19 RX ADMIN — BENZONATATE 100 MG: 100 CAPSULE ORAL at 13:17

## 2020-07-19 RX ADMIN — Medication 10 ML: at 20:06

## 2020-07-19 RX ADMIN — INSULIN LISPRO 6 UNITS: 100 INJECTION, SOLUTION INTRAVENOUS; SUBCUTANEOUS at 16:15

## 2020-07-19 RX ADMIN — GABAPENTIN 300 MG: 300 CAPSULE ORAL at 08:21

## 2020-07-19 RX ADMIN — GABAPENTIN 300 MG: 300 CAPSULE ORAL at 12:29

## 2020-07-19 RX ADMIN — CEFEPIME HYDROCHLORIDE 2 G: 2 INJECTION, POWDER, FOR SOLUTION INTRAVENOUS at 16:14

## 2020-07-19 RX ADMIN — CEFEPIME HYDROCHLORIDE 2 G: 2 INJECTION, POWDER, FOR SOLUTION INTRAVENOUS at 05:05

## 2020-07-19 RX ADMIN — SODIUM CHLORIDE, PRESERVATIVE FREE 10 ML: 5 INJECTION INTRAVENOUS at 20:06

## 2020-07-19 RX ADMIN — GABAPENTIN 300 MG: 300 CAPSULE ORAL at 16:14

## 2020-07-19 RX ADMIN — ENOXAPARIN SODIUM 40 MG: 40 INJECTION SUBCUTANEOUS at 20:05

## 2020-07-19 RX ADMIN — DONEPEZIL HYDROCHLORIDE 5 MG: 5 TABLET, FILM COATED ORAL at 20:05

## 2020-07-19 RX ADMIN — GABAPENTIN 300 MG: 300 CAPSULE ORAL at 20:05

## 2020-07-19 RX ADMIN — Medication 6 MG: at 08:55

## 2020-07-19 RX ADMIN — BENZONATATE 100 MG: 100 CAPSULE ORAL at 05:45

## 2020-07-19 RX ADMIN — FAMOTIDINE 20 MG: 20 TABLET, FILM COATED ORAL at 20:05

## 2020-07-19 RX ADMIN — INSULIN GLARGINE 25 UNITS: 100 INJECTION, SOLUTION SUBCUTANEOUS at 20:12

## 2020-07-19 RX ADMIN — ENOXAPARIN SODIUM 40 MG: 40 INJECTION SUBCUTANEOUS at 08:29

## 2020-07-19 RX ADMIN — GUAIFENESIN 600 MG: 600 TABLET ORAL at 20:05

## 2020-07-19 RX ADMIN — INSULIN GLARGINE 25 UNITS: 100 INJECTION, SOLUTION SUBCUTANEOUS at 08:29

## 2020-07-19 RX ADMIN — GUAIFENESIN 600 MG: 600 TABLET ORAL at 08:22

## 2020-07-19 RX ADMIN — INSULIN LISPRO 6 UNITS: 100 INJECTION, SOLUTION INTRAVENOUS; SUBCUTANEOUS at 11:21

## 2020-07-19 RX ADMIN — INSULIN LISPRO 5 UNITS: 100 INJECTION, SOLUTION INTRAVENOUS; SUBCUTANEOUS at 20:10

## 2020-07-19 RX ADMIN — ASPIRIN 325 MG ORAL TABLET 325 MG: 325 PILL ORAL at 08:21

## 2020-07-19 RX ADMIN — AMLODIPINE BESYLATE 5 MG: 5 TABLET ORAL at 08:21

## 2020-07-19 RX ADMIN — FAMOTIDINE 20 MG: 20 TABLET, FILM COATED ORAL at 08:21

## 2020-07-19 ASSESSMENT — PAIN SCALES - GENERAL
PAINLEVEL_OUTOF10: 0

## 2020-07-19 NOTE — PROGRESS NOTES
0745 Shift handoff report completed. Patient resting quietly in bed. Bilateral soft wrist restraints and posey vest in place, secured to bed frame. Freitas patent to gravity bedside drainage. IVFs infusing per pump with difficulty. 0830 Shift assessment completed. Right soft wrist released for patient to feed self, patient immediately attempted to remove O2 nasal cannula. Freitas care and bed bath with chlorhexidine wipes completed. 1433 Received call from daughter, Crystal Briones, updated on patient's current status and plan of care/treatment  1125 No changes noted in assessment. 1350 Patient displaying SpO2 84-85% with Airvo nasal cannula in nares, attempted repositioning of nasal cannula without effectiveness. RT notified and nasal cannula changed with SpO2 sustained afterwards of >91%. Patient resting quietly in bed at present time. 1605 No changes noted since previous assessment. Patient continues to remove O2 NC from nares and desaturates. 1828 Patient resting quietly in bed at present time. Tolerating AirVo to present, SpO2 93% at present time. Continues with bilateral soft wrist restraints and posey vest. Freitas patent to gravity bedside drainage.   Electronically signed by Magalie Muse RN on 7/19/2020 at 6:28 PM\

## 2020-07-19 NOTE — PROGRESS NOTES
Hospitalist Progress Note      PCP: Dorenda Duane, MD    Date of Admission: 7/12/2020      Subjective: having cough, no fever or chills. Medications:  Reviewed    Infusion Medications    dextrose       Scheduled Medications    famotidine  20 mg Oral BID    lactobacillus  1 capsule Oral BID WC    lidocaine 1 % injection  5 mL Intradermal Once    sodium chloride flush  10 mL Intravenous 2 times per day    cefepime  2 g Intravenous Q12H    insulin glargine  25 Units Subcutaneous BID    azithromycin  500 mg Oral QPM    dexamethasone  6 mg Intravenous Daily    amLODIPine  5 mg Oral Daily    aspirin  325 mg Oral Daily    atorvastatin  40 mg Oral Daily    vitamin B-12  1,000 mcg Oral Daily    donepezil  5 mg Oral Nightly    gabapentin  300 mg Oral 4x Daily    losartan-hydroCHLOROthiazide  2 tablet Oral Daily    metoprolol succinate  50 mg Oral Daily    sodium chloride flush  10 mL Intravenous 2 times per day    enoxaparin  40 mg Subcutaneous BID    insulin lispro  0-18 Units Subcutaneous TID WC    insulin lispro  0-9 Units Subcutaneous Nightly    guaiFENesin  600 mg Oral BID     PRN Meds: sodium chloride flush, sodium chloride, traZODone, sodium chloride flush, acetaminophen **OR** acetaminophen, polyethylene glycol, promethazine **OR** ondansetron, glucose, dextrose, glucagon (rDNA), dextrose, benzonatate      Intake/Output Summary (Last 24 hours) at 7/19/2020 1008  Last data filed at 7/19/2020 0849  Gross per 24 hour   Intake 1755 ml   Output 1650 ml   Net 105 ml       Physical Exam Performed:    /64   Pulse 66   Temp 98.6 °F (37 °C) (Oral)   Resp 17   Ht 5' (1.524 m)   Wt 140 lb 10.5 oz (63.8 kg)   SpO2 91%   BMI 27.47 kg/m²     General appearance: No apparent distress  Neck: Supple  Respiratory:  Normal respiratory effort. Clear to auscultation, bilaterally without Rales/Wheezes/Rhonchi.   Cardiovascular: Regular rate and rhythm with normal S1/S2 without murmurs, rubs or gallops. Abdomen: Soft, non-tender, non-distended with normal bowel sounds. Musculoskeletal: No clubbing, cyanosis  Skin: Skin color, texture, turgor normal.  No rashes or lesions. Neurologic:  No focal weakness   Psychiatric: Alert and oriented  Capillary Refill: Brisk,< 3 seconds   Peripheral Pulses: +2 palpable, equal bilaterally       Labs:   Recent Labs     07/17/20 0518 07/18/20  0547 07/19/20  0440   WBC 9.8 10.3 7.4   HGB 12.9 13.4 13.5   HCT 38.7 40.9 40.9    381 394     Recent Labs     07/17/20 0518 07/18/20  0547 07/19/20  0440    139 134*   K 3.6 3.9 3.8    104 101   CO2 23 23 22   BUN 23* 23* 32*   CREATININE 0.6 0.5* 0.6   CALCIUM 8.8 8.5 9.1   PHOS  --  3.2 4.0     Recent Labs     07/17/20 0518 07/18/20  0547 07/19/20  0440   AST 30 34 45*   ALT 27 25 28   BILITOT 0.4 0.3 0.4   ALKPHOS 50 48 58     No results for input(s): INR in the last 72 hours. No results for input(s): Gal Hodgkins in the last 72 hours. Urinalysis:      Lab Results   Component Value Date    NITRU Negative 07/12/2020    WBCUA 3-5 07/12/2020    BACTERIA RARE 11/16/2016    RBCUA 0-2 07/12/2020    BLOODU Negative 07/12/2020    SPECGRAV >1.030 07/12/2020    GLUCOSEU >=1000 07/12/2020    GLUCOSEU NEGATIVE 07/24/2011       Radiology:  XR CHEST 1 VW   Final Result   Multifocal airspace disease. Areas in the right upper and left lower lung   are consolidated. However however, there are areas of improved aeration. XR CHEST PORTABLE   Final Result   Worsening multifocal airspace opacities. XR CHEST PORTABLE   Final Result   Left greater than right basilar airspace disease, concerning for pneumonia   given patient history.                  Assessment/Plan:    Active Hospital Problems    Diagnosis    Acute respiratory failure with hypoxia ([facility] [address] [J96.01]    COVID-19 virus detected [U07.1]    COVID-19 virus infection [U07.1]     Patient admitted with COVID19 pneumonia initially improved on steroids and remdesivir, then developed fever and increased need of oxygen, transferred to ICU, given one dose of actemra. 1.  COVID-19 pneumonia, initially was on empiric Azithromycin/ceftriaxone,  decadron 6 mg daily, Lovenox BID, respiratory culture, consulted ID and remdesivir started, clinically was improving, then  developed fever and increased need of oxygen, chest xray was worsening. Started on cefepime and received one dose of Actemra. oxygen need was 15 l so patient transferred to ICU, and vapotherm started. 2. Sepsis due to CODID 19 pneumonia, ongoing management as above. 3. Acute respiratory failure with hypoxia, vapothetm. 4. Type 2 DM, fluctuating, Lantus and high dose SSI.   5. Essential hypertension, continue home meds  6. Mild cognitive impairment, continue home meds    Diet: DIET CARB CONTROL; Carb Control: 4 carb choices (60 gms)/meal  Dietary Nutrition Supplements: Diabetic Oral Supplement  Code Status: Full Code      Lisa Russell MD

## 2020-07-19 NOTE — PROGRESS NOTES
Pulmonary Progress Note    Date of Admission: 7/12/2020   LOS: 7 days     CC:  Chief Complaint   Patient presents with    Shortness of Breath     positive COVID test     Cough    Fever    Fatigue       HPI/Subjective  On vapotherm, is confused and keeps removing oxygen. When wearing her spo2 is good. ROS:   No nausea  No Vomiting  No chest pain      Intake/Output Summary (Last 24 hours) at 7/19/2020 1028  Last data filed at 7/19/2020 0849  Gross per 24 hour   Intake 1755 ml   Output 1650 ml   Net 105 ml         PHYSICAL EXAM:   Blood pressure 132/64, pulse 66, temperature 98.6 °F (37 °C), temperature source Oral, resp. rate 17, height 5' (1.524 m), weight 140 lb 10.5 oz (63.8 kg), SpO2 91 %, not currently breastfeeding.'  Gen:  No acute distress. Eyes: PERRL. Anicteric sclera. No conjunctival injection. ENT: No discharge. Posterior oropharynx clear. External appearance of ears and nose normal.  Neck: Trachea midline. No mass   Resp:  No crackles. No wheezes. No rhonchi. No dullness on percussion. CV: Regular rate. Regular rhythm. No murmur or rub. No edema. GI: Soft, Non-tender. Non-distended. +BS  Skin: Warm, dry, w/o erythema. Lymph: No cervical or supraclavicular LAD. M/S: No cyanosis. No clubbing. Neuro:  no focal neurologic deficit. Moves all extremities  Psych: Awake and alert but confused and disoriented. Mood stable.       Medications:    Scheduled Meds:   famotidine  20 mg Oral BID    lactobacillus  1 capsule Oral BID     lidocaine 1 % injection  5 mL Intradermal Once    sodium chloride flush  10 mL Intravenous 2 times per day    cefepime  2 g Intravenous Q12H    insulin glargine  25 Units Subcutaneous BID    azithromycin  500 mg Oral QPM    dexamethasone  6 mg Intravenous Daily    amLODIPine  5 mg Oral Daily    aspirin  325 mg Oral Daily    atorvastatin  40 mg Oral Daily    vitamin B-12  1,000 mcg Oral Daily    donepezil  5 mg Oral Nightly    gabapentin  300 mg Oral 4x Daily    losartan-hydroCHLOROthiazide  2 tablet Oral Daily    metoprolol succinate  50 mg Oral Daily    sodium chloride flush  10 mL Intravenous 2 times per day    enoxaparin  40 mg Subcutaneous BID    insulin lispro  0-18 Units Subcutaneous TID     insulin lispro  0-9 Units Subcutaneous Nightly    guaiFENesin  600 mg Oral BID       Continuous Infusions:   dextrose         PRN Meds:  sodium chloride flush, sodium chloride, traZODone, sodium chloride flush, acetaminophen **OR** acetaminophen, polyethylene glycol, promethazine **OR** ondansetron, glucose, dextrose, glucagon (rDNA), dextrose, benzonatate    Labs reviewed:  CBC:   Recent Labs     07/17/20 0518 07/18/20  0547 07/19/20  0440   WBC 9.8 10.3 7.4   HGB 12.9 13.4 13.5   HCT 38.7 40.9 40.9   MCV 87.6 88.5 88.6    381 394     BMP:   Recent Labs     07/17/20 0518 07/18/20  0547 07/19/20  0440    139 134*   K 3.6 3.9 3.8    104 101   CO2 23 23 22   PHOS  --  3.2 4.0   BUN 23* 23* 32*   CREATININE 0.6 0.5* 0.6     LIVER PROFILE:   Recent Labs     07/17/20 0518 07/18/20  0547 07/19/20  0440   AST 30 34 45*   ALT 27 25 28   BILITOT 0.4 0.3 0.4   ALKPHOS 50 48 58     PT/INR: No results for input(s): PROTIME, INR in the last 72 hours. APTT: No results for input(s): APTT in the last 72 hours. UA:No results for input(s): NITRITE, COLORU, PHUR, LABCAST, WBCUA, RBCUA, MUCUS, TRICHOMONAS, YEAST, BACTERIA, CLARITYU, SPECGRAV, LEUKOCYTESUR, UROBILINOGEN, BILIRUBINUR, BLOODU, GLUCOSEU, AMORPHOUS in the last 72 hours. Invalid input(s): Karis Mis  No results for input(s): PH, PCO2, PO2 in the last 72 hours. Films:  Radiology Review:  Pertinent images / reports were reviewed as a part of this visit. CT Chest w/ contrast: No results found for this or any previous visit.     CT Chest w/o contrast:   Results for orders placed during the hospital encounter of 02/23/19   CT CHEST WO CONTRAST    Narrative EXAMINATION:  CT OF THE CHEST WITHOUT CONTRAST 2/23/2019 1:10 pm    TECHNIQUE:  CT of the chest was performed without the administration of intravenous  contrast. Multiplanar reformatted images are provided for review. Dose  modulation, iterative reconstruction, and/or weight based adjustment of the  mA/kV was utilized to reduce the radiation dose to as low as reasonably  achievable. COMPARISON:  None    HISTORY:  ORDERING SYSTEM PROVIDED HISTORY: fall, back pain  TECHNOLOGIST PROVIDED HISTORY:  Ordering Physician Provided Reason for Exam: Fall (tripped over grandson  while trying to pick him up, incident happened at [facility], pt c/o neck and  head pain, denies LOc)  Acuity: Acute  Type of Exam: Initial    Acute back pain and chest pain due to fall. Initial encounter. FINDINGS:  Mediastinum: Heart is mildly enlarged. No pericardial effusion. There are  coronary artery calcifications. Within the limitations of a noncontrast  exam, there is no evidence of hilar or mediastinal adenopathy. Visible  portion of the thyroid is unremarkable. Ascending thoracic aorta at the  level of the right pulmonary artery measures 3.7 cm in diameter. Lungs/pleura: Central tracheobronchial airways are normal.  No bronchiectasis  or bronchial wall thickening. No focal consolidation, pleural effusion, or  pneumothorax. Upper Abdomen: No acute abnormality in the upper abdomen. Multiple  collateral vessels are seen in the splenic hilum and left upper quadrant of  the abdomen. Soft Tissues/Bones: No aggressive lytic or blastic bony lesion. No displaced  rib fracture. Impression 1. No acute intrathoracic abnormality. Please note that in the absence of  intravenous contrast, sensitivity of the exam for mediastinal vascular injury  is low. 2. Coronary artery disease. CTPA: No results found for this or any previous visit.     CXR PA/LAT:   Results for orders placed during the hospital encounter of 01/27/18   XR CHEST STANDARD (2 VW) Narrative EXAMINATION:  TWO VIEWS OF THE CHEST    1/27/2018 7:59 pm    COMPARISON:  10/01/2017    HISTORY:  ORDERING PHYSICIAN PROVIDED HISTORY: cough, fever, tachy  TECHNOLOGIST PROVIDED HISTORY:  Technologist Provided Reason for Exam: cough, fever, tachy  Acuity: Acute  Type of Encounter: Initial    FINDINGS:  No focal pulmonary opacities. No pleural effusion or pneumothorax. Heart  size within normal limits. Prior ACDF. Impression No evidence of pneumonia. CXR portable:   Results for orders placed during the hospital encounter of 07/12/20   XR CHEST PORTABLE    Narrative EXAMINATION:  ONE XRAY VIEW OF THE CHEST    7/16/2020 2:34 pm    COMPARISON:  July 12, 2020    HISTORY:  ORDERING SYSTEM PROVIDED HISTORY: sob  TECHNOLOGIST PROVIDED HISTORY:  Reason for exam:->sob  Reason for Exam: sob    FINDINGS:  Cardiac silhouette is enlarged. No pneumothorax. Worsening multifocal  airspace opacities. Impression Worsening multifocal airspace opacities. Access  Arterial       PICC           CVC                 Assessment:     Acute hypoxemic respiratory failure with SPO2 less than 90% on room air  Sepsis, due to  Multifocal pneumonia, due to  [id number]  Acute Metabolic Encephalopathy  H/o dementia with forgetfulness    Plan:      -Wean supplemental oxygen to goal saturation of >90%  -rec'd actemra, remdesivir, ID following  -decadron 6mg QD  -inflammatory markers and CXR improving, but pt has some delirium on top ob baseline dementia that is making it difficult for her to comply with O2 therapy. When she will leave it in her sats are in the 90's  -repeat procal 0.14    Due to the immediate potential for life-threatening deterioration due to hypoxic respiratory failure and acute mental status change, I spent 32 minutes providing critical care. This time is excluding time spent performing separately billable procedures.       Thank you for this consult,    [address] Pulmonary, Critical Care, and Sleep Medicine

## 2020-07-19 NOTE — PLAN OF CARE
Ongoing     Problem: Skin Integrity:  Goal: Will show no infection signs and symptoms  Description: Will show no infection signs and symptoms  Outcome: Ongoing  Goal: Absence of new skin breakdown  Description: Absence of new skin breakdown  Outcome: Ongoing     Problem: Restraint Use - Nonviolent/Non-Self-Destructive Behavior:  Goal: Absence of restraint indications  Description: Absence of restraint indications  Outcome: Ongoing  Goal: Absence of restraint-related injury  Description: Absence of restraint-related injury  Outcome: Ongoing

## 2020-07-20 ENCOUNTER — APPOINTMENT (OUTPATIENT)
Dept: GENERAL RADIOLOGY | Age: 65
DRG: 871 | End: 2020-07-20
Payer: MEDICARE

## 2020-07-20 LAB
A/G RATIO: 0.8 (ref 1.1–2.2)
ALBUMIN SERPL-MCNC: 2.7 G/DL (ref 3.4–5)
ALP BLD-CCNC: 62 U/L (ref 40–129)
ALT SERPL-CCNC: 29 U/L (ref 10–40)
ANION GAP SERPL CALCULATED.3IONS-SCNC: 10 MMOL/L (ref 3–16)
AST SERPL-CCNC: 39 U/L (ref 15–37)
BASOPHILS ABSOLUTE: 0 K/UL (ref 0–0.2)
BASOPHILS RELATIVE PERCENT: 0.1 %
BILIRUB SERPL-MCNC: 0.4 MG/DL (ref 0–1)
BUN BLDV-MCNC: 28 MG/DL (ref 7–20)
C-REACTIVE PROTEIN: 5.6 MG/L (ref 0–5.1)
CALCIUM SERPL-MCNC: 8.8 MG/DL (ref 8.3–10.6)
CHLORIDE BLD-SCNC: 103 MMOL/L (ref 99–110)
CO2: 23 MMOL/L (ref 21–32)
CREAT SERPL-MCNC: 0.6 MG/DL (ref 0.6–1.2)
D DIMER: 775 NG/ML DDU (ref 0–229)
EOSINOPHILS ABSOLUTE: 0.1 K/UL (ref 0–0.6)
EOSINOPHILS RELATIVE PERCENT: 1.4 %
FERRITIN: 214.8 NG/ML (ref 15–150)
GFR AFRICAN AMERICAN: >60
GFR NON-AFRICAN AMERICAN: >60
GLOBULIN: 3.5 G/DL
GLUCOSE BLD-MCNC: 116 MG/DL (ref 70–99)
GLUCOSE BLD-MCNC: 126 MG/DL (ref 70–99)
GLUCOSE BLD-MCNC: 127 MG/DL (ref 70–99)
GLUCOSE BLD-MCNC: 156 MG/DL (ref 70–99)
GLUCOSE BLD-MCNC: 204 MG/DL (ref 70–99)
HCT VFR BLD CALC: 40.9 % (ref 36–48)
HEMOGLOBIN: 13.6 G/DL (ref 12–16)
LYMPHOCYTES ABSOLUTE: 0.7 K/UL (ref 1–5.1)
LYMPHOCYTES RELATIVE PERCENT: 6.4 %
MAGNESIUM: 2.1 MG/DL (ref 1.8–2.4)
MCH RBC QN AUTO: 29.2 PG (ref 26–34)
MCHC RBC AUTO-ENTMCNC: 33.1 G/DL (ref 31–36)
MCV RBC AUTO: 88.2 FL (ref 80–100)
MONOCYTES ABSOLUTE: 0.3 K/UL (ref 0–1.3)
MONOCYTES RELATIVE PERCENT: 2.9 %
NEUTROPHILS ABSOLUTE: 9.8 K/UL (ref 1.7–7.7)
NEUTROPHILS RELATIVE PERCENT: 89.2 %
PDW BLD-RTO: 14.2 % (ref 12.4–15.4)
PERFORMED ON: ABNORMAL
PHOSPHORUS: 3.5 MG/DL (ref 2.5–4.9)
PLATELET # BLD: 394 K/UL (ref 135–450)
PMV BLD AUTO: 8 FL (ref 5–10.5)
POTASSIUM REFLEX MAGNESIUM: 4 MMOL/L (ref 3.5–5.1)
PROCALCITONIN: 0.12 NG/ML (ref 0–0.15)
RBC # BLD: 4.63 M/UL (ref 4–5.2)
SODIUM BLD-SCNC: 136 MMOL/L (ref 136–145)
TOTAL PROTEIN: 6.2 G/DL (ref 6.4–8.2)
WBC # BLD: 11 K/UL (ref 4–11)

## 2020-07-20 PROCEDURE — 94761 N-INVAS EAR/PLS OXIMETRY MLT: CPT

## 2020-07-20 PROCEDURE — C1751 CATH, INF, PER/CENT/MIDLINE: HCPCS

## 2020-07-20 PROCEDURE — 84100 ASSAY OF PHOSPHORUS: CPT

## 2020-07-20 PROCEDURE — 71045 X-RAY EXAM CHEST 1 VIEW: CPT

## 2020-07-20 PROCEDURE — 2580000003 HC RX 258: Performed by: INTERNAL MEDICINE

## 2020-07-20 PROCEDURE — 99291 CRITICAL CARE FIRST HOUR: CPT | Performed by: INTERNAL MEDICINE

## 2020-07-20 PROCEDURE — 6370000000 HC RX 637 (ALT 250 FOR IP): Performed by: INTERNAL MEDICINE

## 2020-07-20 PROCEDURE — 36415 COLL VENOUS BLD VENIPUNCTURE: CPT

## 2020-07-20 PROCEDURE — 85025 COMPLETE CBC W/AUTO DIFF WBC: CPT

## 2020-07-20 PROCEDURE — 2700000000 HC OXYGEN THERAPY PER DAY

## 2020-07-20 PROCEDURE — 94660 CPAP INITIATION&MGMT: CPT

## 2020-07-20 PROCEDURE — 6360000002 HC RX W HCPCS: Performed by: NURSE PRACTITIONER

## 2020-07-20 PROCEDURE — 86140 C-REACTIVE PROTEIN: CPT

## 2020-07-20 PROCEDURE — 94002 VENT MGMT INPAT INIT DAY: CPT

## 2020-07-20 PROCEDURE — 80053 COMPREHEN METABOLIC PANEL: CPT

## 2020-07-20 PROCEDURE — 99292 CRITICAL CARE ADDL 30 MIN: CPT | Performed by: INTERNAL MEDICINE

## 2020-07-20 PROCEDURE — 83735 ASSAY OF MAGNESIUM: CPT

## 2020-07-20 PROCEDURE — 36569 INSJ PICC 5 YR+ W/O IMAGING: CPT

## 2020-07-20 PROCEDURE — APPNB15 APP NON BILLABLE TIME 0-15 MINS: Performed by: NURSE PRACTITIONER

## 2020-07-20 PROCEDURE — 2500000003 HC RX 250 WO HCPCS: Performed by: NURSE PRACTITIONER

## 2020-07-20 PROCEDURE — 2580000003 HC RX 258: Performed by: NURSE PRACTITIONER

## 2020-07-20 PROCEDURE — 6360000002 HC RX W HCPCS: Performed by: INTERNAL MEDICINE

## 2020-07-20 PROCEDURE — 85379 FIBRIN DEGRADATION QUANT: CPT

## 2020-07-20 PROCEDURE — 31500 INSERT EMERGENCY AIRWAY: CPT | Performed by: INTERNAL MEDICINE

## 2020-07-20 PROCEDURE — 84145 PROCALCITONIN (PCT): CPT

## 2020-07-20 PROCEDURE — 2000000000 HC ICU R&B

## 2020-07-20 PROCEDURE — 6370000000 HC RX 637 (ALT 250 FOR IP): Performed by: NURSE PRACTITIONER

## 2020-07-20 PROCEDURE — 0BH17EZ INSERTION OF ENDOTRACHEAL AIRWAY INTO TRACHEA, VIA NATURAL OR ARTIFICIAL OPENING: ICD-10-PCS | Performed by: INTERNAL MEDICINE

## 2020-07-20 PROCEDURE — 76937 US GUIDE VASCULAR ACCESS: CPT

## 2020-07-20 PROCEDURE — 02HV33Z INSERTION OF INFUSION DEVICE INTO SUPERIOR VENA CAVA, PERCUTANEOUS APPROACH: ICD-10-PCS | Performed by: INTERNAL MEDICINE

## 2020-07-20 PROCEDURE — 5A1945Z RESPIRATORY VENTILATION, 24-96 CONSECUTIVE HOURS: ICD-10-PCS | Performed by: INTERNAL MEDICINE

## 2020-07-20 PROCEDURE — 6360000002 HC RX W HCPCS

## 2020-07-20 PROCEDURE — 82728 ASSAY OF FERRITIN: CPT

## 2020-07-20 RX ORDER — SODIUM CHLORIDE 0.9 % (FLUSH) 0.9 %
10 SYRINGE (ML) INJECTION PRN
Status: DISCONTINUED | OUTPATIENT
Start: 2020-07-20 | End: 2020-07-28 | Stop reason: HOSPADM

## 2020-07-20 RX ORDER — MIDAZOLAM HYDROCHLORIDE 1 MG/ML
2 INJECTION INTRAMUSCULAR; INTRAVENOUS ONCE
Status: DISCONTINUED | OUTPATIENT
Start: 2020-07-20 | End: 2020-07-22

## 2020-07-20 RX ORDER — CHLORHEXIDINE GLUCONATE 0.12 MG/ML
15 RINSE ORAL 2 TIMES DAILY
Status: DISCONTINUED | OUTPATIENT
Start: 2020-07-20 | End: 2020-07-24

## 2020-07-20 RX ORDER — MIDAZOLAM HYDROCHLORIDE 1 MG/ML
INJECTION INTRAMUSCULAR; INTRAVENOUS
Status: COMPLETED
Start: 2020-07-20 | End: 2020-07-20

## 2020-07-20 RX ORDER — PROPOFOL 10 MG/ML
10 INJECTION, EMULSION INTRAVENOUS CONTINUOUS
Status: DISCONTINUED | OUTPATIENT
Start: 2020-07-20 | End: 2020-07-24

## 2020-07-20 RX ORDER — LIDOCAINE HYDROCHLORIDE 10 MG/ML
5 INJECTION, SOLUTION EPIDURAL; INFILTRATION; INTRACAUDAL; PERINEURAL ONCE
Status: COMPLETED | OUTPATIENT
Start: 2020-07-20 | End: 2020-07-20

## 2020-07-20 RX ORDER — FENTANYL CITRATE 50 UG/ML
25 INJECTION, SOLUTION INTRAMUSCULAR; INTRAVENOUS
Status: DISCONTINUED | OUTPATIENT
Start: 2020-07-20 | End: 2020-07-24

## 2020-07-20 RX ORDER — PROPOFOL 10 MG/ML
INJECTION, EMULSION INTRAVENOUS
Status: COMPLETED
Start: 2020-07-20 | End: 2020-07-20

## 2020-07-20 RX ORDER — SODIUM CHLORIDE 0.9 % (FLUSH) 0.9 %
10 SYRINGE (ML) INJECTION EVERY 12 HOURS SCHEDULED
Status: DISCONTINUED | OUTPATIENT
Start: 2020-07-20 | End: 2020-07-28 | Stop reason: HOSPADM

## 2020-07-20 RX ADMIN — AMLODIPINE BESYLATE 5 MG: 5 TABLET ORAL at 07:31

## 2020-07-20 RX ADMIN — PROPOFOL 20 MCG/KG/MIN: 10 INJECTION, EMULSION INTRAVENOUS at 14:43

## 2020-07-20 RX ADMIN — INSULIN LISPRO 2 UNITS: 100 INJECTION, SOLUTION INTRAVENOUS; SUBCUTANEOUS at 20:40

## 2020-07-20 RX ADMIN — CEFEPIME HYDROCHLORIDE 2 G: 2 INJECTION, POWDER, FOR SOLUTION INTRAVENOUS at 17:29

## 2020-07-20 RX ADMIN — INSULIN GLARGINE 25 UNITS: 100 INJECTION, SOLUTION SUBCUTANEOUS at 08:22

## 2020-07-20 RX ADMIN — GABAPENTIN 300 MG: 300 CAPSULE ORAL at 13:04

## 2020-07-20 RX ADMIN — ENOXAPARIN SODIUM 40 MG: 40 INJECTION SUBCUTANEOUS at 07:34

## 2020-07-20 RX ADMIN — Medication 25 MCG/HR: at 17:30

## 2020-07-20 RX ADMIN — SODIUM CHLORIDE, PRESERVATIVE FREE 10 ML: 5 INJECTION INTRAVENOUS at 20:24

## 2020-07-20 RX ADMIN — GABAPENTIN 300 MG: 300 CAPSULE ORAL at 17:54

## 2020-07-20 RX ADMIN — Medication 1 CAPSULE: at 07:32

## 2020-07-20 RX ADMIN — GABAPENTIN 300 MG: 300 CAPSULE ORAL at 07:32

## 2020-07-20 RX ADMIN — SODIUM CHLORIDE, PRESERVATIVE FREE 10 ML: 5 INJECTION INTRAVENOUS at 20:23

## 2020-07-20 RX ADMIN — FAMOTIDINE 20 MG: 20 TABLET, FILM COATED ORAL at 07:34

## 2020-07-20 RX ADMIN — GABAPENTIN 300 MG: 300 CAPSULE ORAL at 20:22

## 2020-07-20 RX ADMIN — LIDOCAINE HYDROCHLORIDE 5 ML: 10 INJECTION, SOLUTION EPIDURAL; INFILTRATION; INTRACAUDAL; PERINEURAL at 15:30

## 2020-07-20 RX ADMIN — CHLORHEXIDINE GLUCONATE 15 ML: 1.2 RINSE ORAL at 20:24

## 2020-07-20 RX ADMIN — ENOXAPARIN SODIUM 30 MG: 30 INJECTION SUBCUTANEOUS at 20:21

## 2020-07-20 RX ADMIN — ASPIRIN 325 MG ORAL TABLET 325 MG: 325 PILL ORAL at 07:34

## 2020-07-20 RX ADMIN — CYANOCOBALAMIN TAB 1000 MCG 1000 MCG: 1000 TAB at 07:32

## 2020-07-20 RX ADMIN — METOPROLOL SUCCINATE 50 MG: 50 TABLET, EXTENDED RELEASE ORAL at 07:37

## 2020-07-20 RX ADMIN — PROPOFOL 30 MCG/KG/MIN: 10 INJECTION, EMULSION INTRAVENOUS at 19:55

## 2020-07-20 RX ADMIN — Medication 1 CAPSULE: at 17:54

## 2020-07-20 RX ADMIN — MIDAZOLAM 2 MG: 1 INJECTION INTRAMUSCULAR; INTRAVENOUS at 16:06

## 2020-07-20 RX ADMIN — ATORVASTATIN CALCIUM 40 MG: 40 TABLET, FILM COATED ORAL at 07:31

## 2020-07-20 RX ADMIN — CEFEPIME HYDROCHLORIDE 2 G: 2 INJECTION, POWDER, FOR SOLUTION INTRAVENOUS at 05:10

## 2020-07-20 RX ADMIN — Medication 6 MG: at 07:34

## 2020-07-20 RX ADMIN — Medication 10 ML: at 07:35

## 2020-07-20 RX ADMIN — INSULIN LISPRO 4 UNITS: 100 INJECTION, SOLUTION INTRAVENOUS; SUBCUTANEOUS at 17:31

## 2020-07-20 RX ADMIN — LOSARTAN POTASSIUM AND HYDROCHLOROTHIAZIDE 2 TABLET: 12.5; 5 TABLET ORAL at 07:34

## 2020-07-20 RX ADMIN — DONEPEZIL HYDROCHLORIDE 5 MG: 5 TABLET, FILM COATED ORAL at 20:22

## 2020-07-20 RX ADMIN — MUPIROCIN: 20 OINTMENT TOPICAL at 21:00

## 2020-07-20 RX ADMIN — SODIUM CHLORIDE, PRESERVATIVE FREE 10 ML: 5 INJECTION INTRAVENOUS at 07:47

## 2020-07-20 RX ADMIN — GUAIFENESIN 600 MG: 600 TABLET ORAL at 07:34

## 2020-07-20 RX ADMIN — FAMOTIDINE 20 MG: 10 INJECTION, SOLUTION INTRAVENOUS at 20:23

## 2020-07-20 ASSESSMENT — PULMONARY FUNCTION TESTS
PIF_VALUE: 27
PIF_VALUE: 26

## 2020-07-20 ASSESSMENT — PAIN SCALES - GENERAL
PAINLEVEL_OUTOF10: 0

## 2020-07-20 NOTE — PROGRESS NOTES
Infectious Disease Follow up Notes  Admit Date: 7/12/2020  Hospital Day: 9    Antibiotics :   IV Remdesivir STOP 7/17  Dexamethasone   S/p Tocilizumab on  7/16     CHIEF COMPLAINT:       COVID-19 pneumonia  Resp failure     Subjective interval History :  75 y.o. woman with HTN, DM admitted with cough, sob, fevers and not feeling well recent diagnosed with COVID-19 infection on 7/2 now with worsening symptoms admitted through ED and Lactic acid elevation with mild elevation in CRP, LDH and mild elevation in Ferritin we are consulted for recommendations.  She is using 5 lts nasal cannula and had fever 101 ON Admit.     Remains short of breath in ICU in  respiratory distress intermittent hypoxia when taking off her BIPAP and d/w RN ICU Team d/w family about intubation     Past Medical History:    Past Medical History:   Diagnosis Date    Diabetes mellitus ([address]     NIDDM    Headache(784.0)     Herniated disc, cervical     Hypercholesteremia     Hypertension     Memory loss     short term    Psychiatric problem     Type 2 diabetes mellitus without complication ([facility] [address]        Past Surgical History:    Past Surgical History:   Procedure Laterality Date    SHOULDER SURGERY      TUBAL LIGATION         Current Medications:    Outpatient Medications Marked as Taking for the 7/12/20 encounter [facility] Encounter)   Medication Sig Dispense Refill    SITagliptin (JANUVIA) 100 MG tablet Take 1 tablet by mouth daily For diabetes 90 tablet 3    amLODIPine (NORVASC) 5 MG tablet Take 1 tablet by mouth daily 90 tablet 3    donepezil (ARICEPT) 5 MG tablet Take 1 tablet by mouth nightly 90 tablet 3    traZODone (DESYREL) 100 MG tablet TAKE ONE TABLET BY MOUTH DAILY AS NEEDED FOR SLEEP 90 tablet 3    losartan-hydrochlorothiazide (HYZAAR) 100-25 MG per tablet Take 1 tablet by mouth daily 90 tablet 3    metFORMIN (GLUCOPHAGE) 1000 MG tablet Take 1 tablet by mouth daily (with breakfast) For diabetes 30 tablet 5    vitamin D (ERGOCALCIFEROL) 1.25 MG (61799 UT) CAPS capsule Take 1 capsule by mouth once a week 12 capsule 3    Cyanocobalamin 1000 MCG SUBL Place 1,000 mcg under the tongue daily 90 tablet 3    insulin glargine (LANTUS SOLOSTAR) 100 UNIT/ML injection pen Inject 42 Units into the skin 2 times daily 90 mL 3    gabapentin (NEURONTIN) 300 MG capsule Take 1 capsule by mouth 4 times daily. For nerve pain related to diabetes 360 capsule 3    atorvastatin (LIPITOR) 40 MG tablet Take 1 tablet by mouth daily For cholesterol and heart 90 tablet 3    empagliflozin (JARDIANCE) 25 MG tablet Take 25 mg by mouth daily 90 tablet 3    metoprolol succinate (TOPROL XL) 50 MG extended release tablet Take 1 tablet by mouth daily For heart 90 tablet 3    ondansetron (ZOFRAN ODT) 4 MG disintegrating tablet Take 1 tablet by mouth every 8 hours as needed for Nausea 20 tablet 0    aspirin 325 MG tablet Take 1 tablet by mouth daily 30 tablet 3       Allergies:  Patient has no known allergies. Immunizations :   Immunization History   Administered Date(s) Administered    Influenza Virus Vaccine 09/01/2017    Influenza, High Dose (Fluzone 65 yrs and older) 09/27/2018    Pneumococcal Conjugate 13-valent (Adan Schwartz) 07/26/2019    Pneumococcal Polysaccharide (Ggigiilwu19) 06/06/2018    Tdap (Boostrix, Adacel) 07/26/2019       Social History:     Social History     Tobacco Use    Smoking status: Never Smoker    Smokeless tobacco: Never Used   Substance Use Topics    Alcohol use: No    Drug use: No     Social History     Tobacco Use   Smoking Status Never Smoker   Smokeless Tobacco Never Used      Family History   Problem Relation Age of Onset    Diabetes Mother     High Blood Pressure Mother     Diabetes Father     High Blood Pressure Father          REVIEW OF SYSTEMS:    No fever / chills / sweats. No weight loss. No visual change, eye pain, eye discharge. No oral lesion, sore throat, dysphagia. Denies cough / sputum/Sob   Denies chest pain, palpitations/ dizziness  Denies nausea/ vomiting/abdominal pain/diarrhea. Denies dysuria or change in urinary function. Denies joint swelling or pain. No myalgia, arthralgia. No rashes, skin lesions   Denies focal weakness, sensory change or other neurologic symptoms  No lymph node swelling or tenderness. Cough sob, resp distress     PHYSICAL EXAM:      Vitals:    BP (!) 141/72   Pulse 64   Temp 98.4 °F (36.9 °C) (Oral)   Resp 20   Ht 5' (1.524 m)   Wt 141 lb 12.1 oz (64.3 kg)   SpO2 (!) 88%   BMI 27.68 kg/m²     In-person bedside physical examination deferred. Pursuant to the emergency declaration under the [address] [place] [address] waiver authority and the [employer] Act, this clinical encounter was conducted to provide necessary medical care.   (Also consistent with new provisions and guidance offered by [facility] on March 18, 2020 in setting of COVID 19 outbreak and in order to preserve personal protective equipment in accordance with the flexibilities announced by CMS on March 30, 2020)   References: [email or web address]/Resources/COVID-19/3_18%20Telemed%20Guidance%20Updated%20March%2018. pdf?[email or web address]                      [email or web address]/[email or web address]/Resources/COVID-19/3_18%20Telemed%20Guidance%20Updated%20March%2018. pdf?[email or web address]                      [email or web address] pdf                                Pulmonary: deferred  Abdomen/GI: deferred  Neuro: deferred  Skin: deferred  Musculoskeletal:  deferred  Genitourinary: Deferred  Psych: deferred  Lymphatic/Immunologic: deferred       Data Review:    Lab Results   Component Value Date    WBC 11.0 07/20/2020    HGB 13.6 07/20/2020    HCT 40.9 07/20/2020    MCV 88.2 07/20/2020     07/20/2020     Lab Results   Component Value Date    CREATININE 0.6 07/20/2020    BUN 28 (H) 07/20/2020     07/20/2020    K 4.0 07/20/2020     07/20/2020    CO2 23 07/20/2020       Hepatic Function Panel:   Lab Results   Component Value Date    ALKPHOS 62 07/20/2020    ALT 29 07/20/2020    AST 39 07/20/2020    PROT 6.2 07/20/2020    PROT 7.3 01/08/2013    BILITOT 0.4 07/20/2020    BILIDIR 0.20 10/12/2012    IBILI 0.5 10/12/2012    LABALBU 2.7 07/20/2020       UA:  Lab Results   Component Value Date    COLORU YELLOW 07/12/2020    CLARITYU CLOUDY 07/12/2020    GLUCOSEU >=1000 07/12/2020    GLUCOSEU NEGATIVE 07/24/2011    BILIRUBINUR Negative 07/12/2020    BILIRUBINUR NEGATIVE 07/24/2011    KETUA Negative 07/12/2020    SPECGRAV >1.030 07/12/2020    BLOODU Negative 07/12/2020    PHUR 5.5 07/12/2020    PROTEINU 30 07/12/2020    UROBILINOGEN 1.0 07/12/2020    NITRU Negative 07/12/2020    LEUKOCYTESUR Negative 07/12/2020    LABMICR YES 07/12/2020    URINETYPE Other 07/12/2020      Urine Microscopic:   Lab Results   Component Value Date    LABCAST 20-40 Hyaline 11/29/2014    BACTERIA RARE 11/16/2016    COMU see below 07/12/2020    HYALCAST 0-2 07/12/2020    WBCUA 3-5 07/12/2020    RBCUA 0-2 07/12/2020    EPIU 2-5 07/12/2020     Ferritin  338     CRP 37.9     D dimer  212       MICRO: cultures reviewed and updated by me          Culture, Blood 1 [[id number]]   Collected: 07/12/20 1436    Order Status: Completed  Specimen: Blood  Updated: 07/13/20 1616     Blood Culture, Routine  No Growth to date.  Any change in status will be called. Narrative:      ORDER#: [id number]                          ORDERED BY: JESSE METCALF   SOURCE: Blood                              COLLECTED:  07/12/20 14:36   ANTIBIOTICS AT TAN. :                      RECEIVED :  07/12/20 14:59   If child <=2 yrs old please draw pediatric bottle. ~Blood Culture #1   Performed at:   [facility] since the antigen present in the sample   may be below the detection limit of the test.   Normal Range:Presumptive Negative    Narrative:      ORDER#: [id number]                          ORDERED BY: Annalise Dickson   SOURCE: Urine Voided                       COLLECTED:  07/12/20 23:15   ANTIBIOTICS AT TAN. :                      RECEIVED :  07/12/20 23:40   Performed at:   [facility]   [address] [facility] [place] [address]   Phone [phone]    Culture, Respiratory [[id number]]      Order Status: No result  Specimen: Sputum Expectorated             IMAGING:    XR CHEST 1 VW   Final Result   Multifocal airspace disease. Areas in the right upper and left lower lung   are consolidated. However however, there are areas of improved aeration. XR CHEST PORTABLE   Final Result   Worsening multifocal airspace opacities. XR CHEST PORTABLE   Final Result   Left greater than right basilar airspace disease, concerning for pneumonia   given patient history.                All the pertinent images and reports for the current Hospitalization were reviewed by me     Scheduled Meds:   enoxaparin  30 mg Subcutaneous BID    famotidine  20 mg Oral BID    lactobacillus  1 capsule Oral BID WC    lidocaine 1 % injection  5 mL Intradermal Once    sodium chloride flush  10 mL Intravenous 2 times per day    cefepime  2 g Intravenous Q12H    insulin glargine  25 Units Subcutaneous BID    azithromycin  500 mg Oral QPM    dexamethasone  6 mg Intravenous Daily    amLODIPine  5 mg Oral Daily    aspirin  325 mg Oral Daily    atorvastatin  40 mg Oral Daily    vitamin B-12  1,000 mcg Oral Daily    donepezil  5 mg Oral Nightly    gabapentin  300 mg Oral 4x Daily    losartan-hydroCHLOROthiazide  2 tablet Oral Daily    metoprolol succinate  50 mg Oral Daily    sodium chloride flush  10 mL Intravenous 2 times per day    insulin lispro  0-18 Units Subcutaneous TID     insulin date  7/17  2. CMp x 5 days  3. Cont steroids to finish the course    4. Procal normal and Urine antigens -ve   5. Trend CRP, Ferritin at 409 ,  6. Il -6 is elevated s/p Tocilizumab  On 7/16 given worsening resp status   now will observe    7. Cont IV Cefepime for now   8. D/C Azithromycin   9. RISK for intubation High prognosis poor  10. CXR with worsening changes noted        Discussed with patient/Family and Nursing   Risk of Complications/Morbidity: High      · Illness(es)/ Infection present that pose threat to bodily function. · There is potential for severe exacerbation of infection/side effects of treatment. · Therapy requires intensive monitoring for antimicrobial agent toxicity. Discussed with patient/Family and Nursing staff     Thanks for allowing me to participate in your patient's care and please call me with any questions or concerns.     Silvina Wynn MD  Infectious Disease  [facility] ([facility]) Physician  Phone: [phone]   Fax : [phone]

## 2020-07-20 NOTE — PLAN OF CARE
Problem: Airway Clearance - Ineffective  Goal: Achieve or maintain patent airway  Outcome: Ongoing     Problem: Gas Exchange - Impaired  Goal: Absence of hypoxia  Outcome: Ongoing  Goal: Promote optimal lung function  Outcome: Ongoing     Problem: Breathing Pattern - Ineffective  Goal: Ability to achieve and maintain a regular respiratory rate  Outcome: Ongoing  Goal: Will regain or maintain usual level of consciousness  Outcome: Ongoing  Goal: Complications related to the disease process, condition or treatment will be avoided or minimized  Outcome: Ongoing     Problem: Isolation Precautions - Risk of Spread of Infection  Goal: Prevent transmission of infection  Outcome: Ongoing     Problem: Nutrition Deficits  Goal: Optimize nutrtional status  Outcome: Ongoing     Problem: Risk for Fluid Volume Deficit  Goal: Maintain normal heart rhythm  Outcome: Ongoing  Goal: Maintain absence of muscle cramping  Outcome: Ongoing  Goal: Maintain normal serum potassium, sodium, calcium, phosphorus, and pH  Outcome: Ongoing     Problem: Loneliness or Risk for Loneliness  Goal: Demonstrate positive use of time alone when socialization is not possible  Outcome: Ongoing     Problem: Fatigue  Goal: Verbalize increase energy and improved vitality  Outcome: Ongoing     Problem: Patient Education: Go to Patient Education Activity  Goal: Patient/Family Education  Outcome: Ongoing     Problem: Falls - Risk of:  Goal: Will remain free from falls  Description: Will remain free from falls  Outcome: Ongoing  Goal: Absence of physical injury  Description: Absence of physical injury  Outcome: Ongoing     Problem: Pain:  Goal: Pain level will decrease  Description: Pain level will decrease  Outcome: Ongoing  Goal: Control of acute pain  Description: Control of acute pain  Outcome: Ongoing  Goal: Control of chronic pain  Description: Control of chronic pain  Outcome: Ongoing     Problem: Nutrition  Goal: Optimal nutrition therapy  Outcome: Ongoing     Problem: Skin Integrity:  Goal: Will show no infection signs and symptoms  Description: Will show no infection signs and symptoms  Outcome: Ongoing  Goal: Absence of new skin breakdown  Description: Absence of new skin breakdown  Outcome: Ongoing     Problem: Restraint Use - Nonviolent/Non-Self-Destructive Behavior:  Goal: Absence of restraint indications  Description: Absence of restraint indications  Outcome: Ongoing  Goal: Absence of restraint-related injury  Description: Absence of restraint-related injury  Outcome: Ongoing

## 2020-07-20 NOTE — PROGRESS NOTES
Dose  modulation, iterative reconstruction, and/or weight based adjustment of the  mA/kV was utilized to reduce the radiation dose to as low as reasonably  achievable. COMPARISON:  None    HISTORY:  ORDERING SYSTEM PROVIDED HISTORY: fall, back pain  TECHNOLOGIST PROVIDED HISTORY:  Ordering Physician Provided Reason for Exam: Fall (tripped over grandson  while trying to pick him up, incident happened at [facility], pt c/o neck and  head pain, denies LOc)  Acuity: Acute  Type of Exam: Initial    Acute back pain and chest pain due to fall. Initial encounter. FINDINGS:  Mediastinum: Heart is mildly enlarged. No pericardial effusion. There are  coronary artery calcifications. Within the limitations of a noncontrast  exam, there is no evidence of hilar or mediastinal adenopathy. Visible  portion of the thyroid is unremarkable. Ascending thoracic aorta at the  level of the right pulmonary artery measures 3.7 cm in diameter. Lungs/pleura: Central tracheobronchial airways are normal.  No bronchiectasis  or bronchial wall thickening. No focal consolidation, pleural effusion, or  pneumothorax. Upper Abdomen: No acute abnormality in the upper abdomen. Multiple  collateral vessels are seen in the splenic hilum and left upper quadrant of  the abdomen. Soft Tissues/Bones: No aggressive lytic or blastic bony lesion. No displaced  rib fracture. Impression 1. No acute intrathoracic abnormality. Please note that in the absence of  intravenous contrast, sensitivity of the exam for mediastinal vascular injury  is low. 2. Coronary artery disease. CTPA: No results found for this or any previous visit.     CXR PA/LAT:   Results for orders placed during the hospital encounter of 01/27/18   XR CHEST STANDARD (2 VW)    Narrative EXAMINATION:  TWO VIEWS OF THE CHEST    1/27/2018 7:59 pm    COMPARISON:  10/01/2017    HISTORY:  ORDERING PHYSICIAN PROVIDED HISTORY: cough, fever, tachy  TECHNOLOGIST PROVIDED HISTORY:  Technologist Provided Reason for Exam: cough, fever, tachy  Acuity: Acute  Type of Encounter: Initial    FINDINGS:  No focal pulmonary opacities. No pleural effusion or pneumothorax. Heart  size within normal limits. Prior ACDF. Impression No evidence of pneumonia. CXR portable:   Results for orders placed during the hospital encounter of 07/12/20   XR CHEST PORTABLE    Narrative EXAMINATION:  ONE XRAY VIEW OF THE CHEST    7/16/2020 2:34 pm    COMPARISON:  July 12, 2020    HISTORY:  ORDERING SYSTEM PROVIDED HISTORY: sob  TECHNOLOGIST PROVIDED HISTORY:  Reason for exam:->sob  Reason for Exam: sob    FINDINGS:  Cardiac silhouette is enlarged. No pneumothorax. Worsening multifocal  airspace opacities. Impression Worsening multifocal airspace opacities. Assessment:         Acute hypoxemic respiratory failure   ARDS   [id number]   Acute metabolic encephalopathy   History of dementia with significant confusion. Plan:        -Worsening hypoxemia. After multiple communications with the daughter, she initially wanted to hold off with the intubation. Continue full code. I advised this was a high risk endeavor given her severe hypoxemia and confusion. She was removing the BiPAP without the nurse in the room resulting in severe hypoxemia. During the day, the patient's POA did reverse this decision and allow intubation. She was intubated. -AC VC, 350, PEEP 10  -Decadron 6 mg daily, completed tomorrow  -Remdesivir 7/13-17, Actemra, 7/16,  -Poor prognosis was discussed with daughter. She expressed frustration not understanding why the patient continues to decline. Chest x-ray is worsened.  -Empiric antibiotics of azithromycin, cefepime. Procalcitonin 0.12. Completed 5 days of azithromycin. Defer to ID whether azithromycin completed.    Nutrition  - DIET CARB CONTROL; Carb Control: 4 carb choices (60 gms)/meal  Dietary Nutrition Supplements: Diabetic Oral Supplement  - Intake/Output Summary (Last 24 hours) at 7/20/2020 1249  Last data filed at 7/20/2020 1118  Gross per 24 hour   Intake 1085 ml   Output 1715 ml   Net -630 ml        Access  Arterial      PICC          CVC                     I spent 75 minutes of critical care time with this patient excluding any procedures. This note was transcribed using [id number]. Please disregard any translational errors.       Kael Aparicio Pulmonary, [facility] and [facility] [phone]

## 2020-07-20 NOTE — PROGRESS NOTES
Occupational Therapy    Pt with medical decline and transferred to ICU. Please re-order therapy when patient medically stable to participate.     Betsey Street, OTR/L

## 2020-07-20 NOTE — PROCEDURES
Intubation Procedure Note    Indication: respiratory failure    Consent: family    Procedure:  Sedation: etomidate  Paralytic: rocuronium  Equipment: Glide scope #4 blade    View: Grade 1    Procedure: The cuff was then inflated and the tube was secured appropriately at a distance of 21 cm to the dental ridge    Confirmed by: bilateral breath sounds, an end tidal CO2 detector, absence of sounds over the stomach, tube fogging, adequate chest rise, adequate pulse oximetry reading      No immedicate complication.

## 2020-07-20 NOTE — PROGRESS NOTES
2000: Shift assessment. Patient alert and confused. She is AirVo at [facility] and FIO2 92%. Secured O2 tubing as patient continued to take off O2 tubing and desaturates immediately. Remain on BSWR and vest resiant. Patient offered pudding, and drink. Consumed 100%. Repositioned in bed. HS medications given per order. 2116: Attempted to call patient's daughter Shreya Racer). No response. Patient ripped her AirVo oxygen tubing and placed on HFNC at 15L/m. Sats>90%. 2150: patient's O2 dropping to 70's-80's. Placed back on Airflow at [facility]. O2 sats improved to lower 90's.     2225: RN called again patient's daughter. Talked to [facility] and updated with patient status. Patient's status gave verbal permission to this writer to bring [employer] speaking staff to talk to her mom ( Patient) to calm her down. 0005: Reassesment done. Patient sleeping in bed. restraints loosened and repositioned in bed.     0500: Awake. Fluids offered. More coherent and able to converse. Appreciate of care provided. Repositioned in bed.     0705: Shift hand off to arianna Landin RN. Patient resting quietly in bed. BSWR and vest restriants remain in place.

## 2020-07-20 NOTE — PROGRESS NOTES
gallops. Abdomen: Soft, non-tender, non-distended with normal bowel sounds. Musculoskeletal: No clubbing, cyanosis  Skin: Skin color, texture, turgor normal.  No rashes or lesions. Neurologic:  No focal weakness   Psychiatric: Alert and oriented  Capillary Refill: Brisk,< 3 seconds   Peripheral Pulses: +2 palpable, equal bilaterally       Labs:   Recent Labs     07/18/20  0547 07/19/20  0440 07/20/20  0438   WBC 10.3 7.4 11.0   HGB 13.4 13.5 13.6   HCT 40.9 40.9 40.9    394 394     Recent Labs     07/18/20  0547 07/19/20  0440 07/20/20  0438    134* 136   K 3.9 3.8 4.0    101 103   CO2 23 22 23   BUN 23* 32* 28*   CREATININE 0.5* 0.6 0.6   CALCIUM 8.5 9.1 8.8   PHOS 3.2 4.0 3.5     Recent Labs     07/18/20  0547 07/19/20  0440 07/20/20  0438   AST 34 45* 39*   ALT 25 28 29   BILITOT 0.3 0.4 0.4   ALKPHOS 48 58 62     No results for input(s): INR in the last 72 hours. No results for input(s): [employer] in the last 72 hours. Urinalysis:      Lab Results   Component Value Date    NITRU Negative 07/12/2020    WBCUA 3-5 07/12/2020    BACTERIA RARE 11/16/2016    RBCUA 0-2 07/12/2020    BLOODU Negative 07/12/2020    SPECGRAV >1.030 07/12/2020    GLUCOSEU >=1000 07/12/2020    GLUCOSEU NEGATIVE 07/24/2011       Radiology:  XR CHEST 1 VW   Final Result   Multifocal airspace disease. Areas in the right upper and left lower lung   are consolidated. However however, there are areas of improved aeration. XR CHEST PORTABLE   Final Result   Worsening multifocal airspace opacities. XR CHEST PORTABLE   Final Result   Left greater than right basilar airspace disease, concerning for pneumonia   given patient history.                  Assessment/Plan:    Active Hospital Problems    Diagnosis    Acute respiratory failure with hypoxia ([facility] [address] [J96.01]    COVID-19 virus detected [U07.1]    COVID-19 virus infection [U07.1]     Patient admitted with COVID19 pneumonia initially improved on steroids and remdesivir, then developed fever and increased need of oxygen, transferred to ICU, given one dose of actemra. 1.  COVID-19 pneumonia, initially was on empiric Azithromycin/ceftriaxone,  decadron 6 mg daily, Lovenox BID, respiratory culture, consulted ID and remdesivir started, clinically was improving, then  developed fever and increased need of oxygen, chest xray was worsening. Started on cefepime and received one dose of Actemra. oxygen need was 15 l so patient transferred to ICU, and vapotherm started. Now requiring BiPap. 2. Sepsis due to CODID 19 pneumonia, ongoing management as above. 3. Acute respiratory failure with hypoxia, vapothetm. 4. Type 2 DM, fluctuating, Lantus and high dose SSI.   5. Essential hypertension, continue home meds  6. Mild cognitive impairment, continue home meds    Diet: DIET CARB CONTROL; Carb Control: 4 carb choices (60 gms)/meal  Dietary Nutrition Supplements: Diabetic Oral Supplement  Code Status: Full Code      Guanakito Michael MD

## 2020-07-20 NOTE — PROGRESS NOTES
0700: handoff report received from [facility]. Patient is in bed on optiflow, sating above 90%. IVF infusing via PIV. Wrists restraints and vest restraint in place. NSR on the monitor. [place] in place. 4231: head to toe assessment complete, see flowsheet. Patient is baseline dementia, alert to self and person. Patient desating into the [de-identified] and sustaining. RT in room, increase optiflow to 91%, patient resume back into 90s. 0815: patient desating into the 80s, RT in room non re breather applied, saturation resume back into 90s.   0901: spoke to Dr Dinah Cardona about patient desating into [de-identified] with non re breather and sustaining low saturation. OK to try BiPAP now, Sarath RT notified, BiPAP applied, patient saturation resume back into the 90s. Will continue monitor. 1056: pt desating in to the 76s, RN in room with patient, found BiPAP tubing disconnected, restraints are all in place, connected tubing back, patient resume back into the 90s. Pt c/o being hungry, RN explained to patient the importance of keeping oxygen saturation up. Pt resting in bed, will continue monitor. 1118: pt reassessed, see flowsheet. Pt insisted on eating food, and yelling out that she is hungry, RN gave patient some chocolate supplement drinks, oxygen saturation staying above 90%. No significant changes at this time. 1219: Dr. Dinah Cardona rounded on patient, daughter called and communicated with Dr. Dinah Cardona and RN. 1315: Daughter Tashia Rodríguez called back and said ok to intubation. Perfect served Dinah Cardona. 0130: pt shook her head and disconnect from the BiPAP machine, desating into 70s, RN in room, reoriented patient, BiPAP back on, oxygen saturation resume into 90s. 1440: OK to intubate per daughter, Dr Dinah Cardona, RT and RN at bedside. 1443: propofol gtt started at 20mcg. 1445: 20 Etomidate given per Dr Dinah Cardona. 1446: 48 LARA given per Dr. Dinah Cardona.   1447: pt successfully intubated by Dr. Dinah Cardona, #7.5 21cm at lip line, OG inserted by Dr. Dinah Cardona

## 2020-07-20 NOTE — PROGRESS NOTES
Successful insertion of a right double lumen PICC into basilic vein. PICC tip is in SVC according to [facility] 3CG tip confirmation sytem. Blood return noted, and both lumens flush without resistance. PICC is okay to use. Bed lowered, side rails up, call light in place, restraints tied, cardiac leads in place.     Site CDI    Darby Gee RN updated on POC

## 2020-07-21 LAB
A/G RATIO: 0.8 (ref 1.1–2.2)
ALBUMIN SERPL-MCNC: 2.8 G/DL (ref 3.4–5)
ALP BLD-CCNC: 62 U/L (ref 40–129)
ALT SERPL-CCNC: 40 U/L (ref 10–40)
ANION GAP SERPL CALCULATED.3IONS-SCNC: 9 MMOL/L (ref 3–16)
AST SERPL-CCNC: 48 U/L (ref 15–37)
BASE EXCESS ARTERIAL: 0 MMOL/L (ref -3–3)
BASOPHILS ABSOLUTE: 0 K/UL (ref 0–0.2)
BASOPHILS RELATIVE PERCENT: 0.4 %
BILIRUB SERPL-MCNC: 0.5 MG/DL (ref 0–1)
BUN BLDV-MCNC: 35 MG/DL (ref 7–20)
C-REACTIVE PROTEIN: 2.9 MG/L (ref 0–5.1)
CALCIUM SERPL-MCNC: 8.7 MG/DL (ref 8.3–10.6)
CARBOXYHEMOGLOBIN ARTERIAL: 0.5 % (ref 0–1.5)
CHLORIDE BLD-SCNC: 101 MMOL/L (ref 99–110)
CO2: 25 MMOL/L (ref 21–32)
CREAT SERPL-MCNC: 0.6 MG/DL (ref 0.6–1.2)
D DIMER: 906 NG/ML DDU (ref 0–229)
EOSINOPHILS ABSOLUTE: 0.3 K/UL (ref 0–0.6)
EOSINOPHILS RELATIVE PERCENT: 2.4 %
FERRITIN: 234.8 NG/ML (ref 15–150)
GFR AFRICAN AMERICAN: >60
GFR NON-AFRICAN AMERICAN: >60
GLOBULIN: 3.3 G/DL
GLUCOSE BLD-MCNC: 115 MG/DL (ref 70–99)
GLUCOSE BLD-MCNC: 138 MG/DL (ref 70–99)
GLUCOSE BLD-MCNC: 144 MG/DL (ref 70–99)
GLUCOSE BLD-MCNC: 164 MG/DL (ref 70–99)
GLUCOSE BLD-MCNC: 192 MG/DL (ref 70–99)
GLUCOSE BLD-MCNC: 288 MG/DL (ref 70–99)
GLUCOSE BLD-MCNC: 300 MG/DL (ref 70–99)
GLUCOSE BLD-MCNC: 362 MG/DL (ref 70–99)
HCO3 ARTERIAL: 25.9 MMOL/L (ref 21–29)
HCT VFR BLD CALC: 40.4 % (ref 36–48)
HEMOGLOBIN, ART, EXTENDED: 13.9 G/DL (ref 12–16)
HEMOGLOBIN: 13.3 G/DL (ref 12–16)
LYMPHOCYTES ABSOLUTE: 1 K/UL (ref 1–5.1)
LYMPHOCYTES RELATIVE PERCENT: 8.4 %
MAGNESIUM: 2.3 MG/DL (ref 1.8–2.4)
MCH RBC QN AUTO: 29.1 PG (ref 26–34)
MCHC RBC AUTO-ENTMCNC: 32.9 G/DL (ref 31–36)
MCV RBC AUTO: 88.3 FL (ref 80–100)
METHEMOGLOBIN ARTERIAL: 0.8 %
MONOCYTES ABSOLUTE: 0.3 K/UL (ref 0–1.3)
MONOCYTES RELATIVE PERCENT: 2.1 %
NEUTROPHILS ABSOLUTE: 10.4 K/UL (ref 1.7–7.7)
NEUTROPHILS RELATIVE PERCENT: 86.7 %
O2 CONTENT ARTERIAL: 18 ML/DL
O2 SAT, ARTERIAL: 91.8 %
O2 THERAPY: ABNORMAL
PCO2 ARTERIAL: 45.5 MMHG (ref 35–45)
PDW BLD-RTO: 14.2 % (ref 12.4–15.4)
PERFORMED ON: ABNORMAL
PH ARTERIAL: 7.36 (ref 7.35–7.45)
PHOSPHORUS: 4.1 MG/DL (ref 2.5–4.9)
PLATELET # BLD: 381 K/UL (ref 135–450)
PMV BLD AUTO: 7.9 FL (ref 5–10.5)
PO2 ARTERIAL: 65.6 MMHG (ref 75–108)
POTASSIUM REFLEX MAGNESIUM: 3.9 MMOL/L (ref 3.5–5.1)
RBC # BLD: 4.57 M/UL (ref 4–5.2)
SODIUM BLD-SCNC: 135 MMOL/L (ref 136–145)
TCO2 ARTERIAL: 27.2 MMOL/L
TOTAL PROTEIN: 6.1 G/DL (ref 6.4–8.2)
WBC # BLD: 12 K/UL (ref 4–11)

## 2020-07-21 PROCEDURE — 87205 SMEAR GRAM STAIN: CPT

## 2020-07-21 PROCEDURE — 94750 HC PULMONARY COMPLIANCE STUDY: CPT

## 2020-07-21 PROCEDURE — 99233 SBSQ HOSP IP/OBS HIGH 50: CPT | Performed by: INTERNAL MEDICINE

## 2020-07-21 PROCEDURE — 2580000003 HC RX 258: Performed by: NURSE PRACTITIONER

## 2020-07-21 PROCEDURE — 94003 VENT MGMT INPAT SUBQ DAY: CPT

## 2020-07-21 PROCEDURE — 84100 ASSAY OF PHOSPHORUS: CPT

## 2020-07-21 PROCEDURE — 99291 CRITICAL CARE FIRST HOUR: CPT | Performed by: INTERNAL MEDICINE

## 2020-07-21 PROCEDURE — 85025 COMPLETE CBC W/AUTO DIFF WBC: CPT

## 2020-07-21 PROCEDURE — 85379 FIBRIN DEGRADATION QUANT: CPT

## 2020-07-21 PROCEDURE — 82803 BLOOD GASES ANY COMBINATION: CPT

## 2020-07-21 PROCEDURE — 83735 ASSAY OF MAGNESIUM: CPT

## 2020-07-21 PROCEDURE — APPNB15 APP NON BILLABLE TIME 0-15 MINS: Performed by: NURSE PRACTITIONER

## 2020-07-21 PROCEDURE — 2580000003 HC RX 258: Performed by: INTERNAL MEDICINE

## 2020-07-21 PROCEDURE — 6370000000 HC RX 637 (ALT 250 FOR IP): Performed by: NURSE PRACTITIONER

## 2020-07-21 PROCEDURE — 6370000000 HC RX 637 (ALT 250 FOR IP): Performed by: INTERNAL MEDICINE

## 2020-07-21 PROCEDURE — 6360000002 HC RX W HCPCS: Performed by: NURSE PRACTITIONER

## 2020-07-21 PROCEDURE — 80053 COMPREHEN METABOLIC PANEL: CPT

## 2020-07-21 PROCEDURE — 82728 ASSAY OF FERRITIN: CPT

## 2020-07-21 PROCEDURE — 6360000002 HC RX W HCPCS: Performed by: INTERNAL MEDICINE

## 2020-07-21 PROCEDURE — 94761 N-INVAS EAR/PLS OXIMETRY MLT: CPT

## 2020-07-21 PROCEDURE — 2500000003 HC RX 250 WO HCPCS: Performed by: NURSE PRACTITIONER

## 2020-07-21 PROCEDURE — 86140 C-REACTIVE PROTEIN: CPT

## 2020-07-21 PROCEDURE — 2000000000 HC ICU R&B

## 2020-07-21 PROCEDURE — 2700000000 HC OXYGEN THERAPY PER DAY

## 2020-07-21 PROCEDURE — 87070 CULTURE OTHR SPECIMN AEROBIC: CPT

## 2020-07-21 PROCEDURE — 36600 WITHDRAWAL OF ARTERIAL BLOOD: CPT

## 2020-07-21 RX ORDER — PROPOFOL 10 MG/ML
12 INJECTION, EMULSION INTRAVENOUS CONTINUOUS PRN
Status: DISCONTINUED | OUTPATIENT
Start: 2020-07-21 | End: 2020-07-24

## 2020-07-21 RX ADMIN — SODIUM CHLORIDE, PRESERVATIVE FREE 10 ML: 5 INJECTION INTRAVENOUS at 19:58

## 2020-07-21 RX ADMIN — Medication 125 MCG/HR: at 05:01

## 2020-07-21 RX ADMIN — Medication 1 CAPSULE: at 08:51

## 2020-07-21 RX ADMIN — GABAPENTIN 300 MG: 300 CAPSULE ORAL at 08:51

## 2020-07-21 RX ADMIN — GABAPENTIN 300 MG: 300 CAPSULE ORAL at 14:00

## 2020-07-21 RX ADMIN — FAMOTIDINE 20 MG: 10 INJECTION, SOLUTION INTRAVENOUS at 19:55

## 2020-07-21 RX ADMIN — Medication 75 MCG/HR: at 00:05

## 2020-07-21 RX ADMIN — INSULIN LISPRO 6 UNITS: 100 INJECTION, SOLUTION INTRAVENOUS; SUBCUTANEOUS at 19:46

## 2020-07-21 RX ADMIN — Medication 125 MCG/HR: at 17:15

## 2020-07-21 RX ADMIN — ENOXAPARIN SODIUM 30 MG: 30 INJECTION SUBCUTANEOUS at 08:51

## 2020-07-21 RX ADMIN — MUPIROCIN: 20 OINTMENT TOPICAL at 11:50

## 2020-07-21 RX ADMIN — SODIUM CHLORIDE, PRESERVATIVE FREE 10 ML: 5 INJECTION INTRAVENOUS at 08:52

## 2020-07-21 RX ADMIN — Medication 125 MCG/HR: at 12:45

## 2020-07-21 RX ADMIN — GABAPENTIN 300 MG: 300 CAPSULE ORAL at 19:56

## 2020-07-21 RX ADMIN — INSULIN LISPRO 2 UNITS: 100 INJECTION, SOLUTION INTRAVENOUS; SUBCUTANEOUS at 04:40

## 2020-07-21 RX ADMIN — DONEPEZIL HYDROCHLORIDE 5 MG: 5 TABLET, FILM COATED ORAL at 19:56

## 2020-07-21 RX ADMIN — CYANOCOBALAMIN TAB 1000 MCG 1000 MCG: 1000 TAB at 08:51

## 2020-07-21 RX ADMIN — Medication 1 CAPSULE: at 16:09

## 2020-07-21 RX ADMIN — INSULIN LISPRO 10 UNITS: 100 INJECTION, SOLUTION INTRAVENOUS; SUBCUTANEOUS at 16:24

## 2020-07-21 RX ADMIN — CHLORHEXIDINE GLUCONATE 15 ML: 1.2 RINSE ORAL at 08:26

## 2020-07-21 RX ADMIN — SODIUM CHLORIDE, PRESERVATIVE FREE 10 ML: 5 INJECTION INTRAVENOUS at 08:54

## 2020-07-21 RX ADMIN — FENTANYL CITRATE 25 MCG: 50 INJECTION INTRAMUSCULAR; INTRAVENOUS at 05:00

## 2020-07-21 RX ADMIN — CEFEPIME HYDROCHLORIDE 2 G: 2 INJECTION, POWDER, FOR SOLUTION INTRAVENOUS at 16:12

## 2020-07-21 RX ADMIN — CHLORHEXIDINE GLUCONATE 15 ML: 1.2 RINSE ORAL at 20:00

## 2020-07-21 RX ADMIN — INSULIN LISPRO 2 UNITS: 100 INJECTION, SOLUTION INTRAVENOUS; SUBCUTANEOUS at 12:42

## 2020-07-21 RX ADMIN — Medication 125 MCG/HR: at 21:21

## 2020-07-21 RX ADMIN — AMLODIPINE BESYLATE 5 MG: 5 TABLET ORAL at 08:51

## 2020-07-21 RX ADMIN — ENOXAPARIN SODIUM 30 MG: 30 INJECTION SUBCUTANEOUS at 19:57

## 2020-07-21 RX ADMIN — GABAPENTIN 300 MG: 300 CAPSULE ORAL at 16:09

## 2020-07-21 RX ADMIN — SODIUM CHLORIDE, PRESERVATIVE FREE 10 ML: 5 INJECTION INTRAVENOUS at 19:57

## 2020-07-21 RX ADMIN — ASPIRIN 325 MG ORAL TABLET 325 MG: 325 PILL ORAL at 08:51

## 2020-07-21 RX ADMIN — Medication 6 MG: at 08:51

## 2020-07-21 RX ADMIN — CEFEPIME HYDROCHLORIDE 2 G: 2 INJECTION, POWDER, FOR SOLUTION INTRAVENOUS at 05:30

## 2020-07-21 RX ADMIN — MUPIROCIN: 20 OINTMENT TOPICAL at 19:59

## 2020-07-21 RX ADMIN — FAMOTIDINE 20 MG: 10 INJECTION, SOLUTION INTRAVENOUS at 08:51

## 2020-07-21 RX ADMIN — ATORVASTATIN CALCIUM 40 MG: 40 TABLET, FILM COATED ORAL at 08:51

## 2020-07-21 RX ADMIN — Medication 125 MCG/HR: at 10:00

## 2020-07-21 ASSESSMENT — PULMONARY FUNCTION TESTS
PIF_VALUE: 21
PIF_VALUE: 28
PIF_VALUE: 25
PIF_VALUE: 27
PIF_VALUE: 28
PIF_VALUE: 23
PIF_VALUE: 27

## 2020-07-21 ASSESSMENT — PAIN SCALES - GENERAL
PAINLEVEL_OUTOF10: 0
PAINLEVEL_OUTOF10: 0
PAINLEVEL_OUTOF10: 3
PAINLEVEL_OUTOF10: 0

## 2020-07-21 NOTE — PROGRESS NOTES
Pulmonary Progress Note    Date of Admission: 7/12/2020   LOS: 9 days       CC:  COVID-19    Subjective: Intubated yesterday   Adjusting sedation. Tube feeding started. Not able to wean to 90% . Continues at 100%. ROS:   On vent. PHYSICAL EXAM:   Blood pressure (!) 134/59, pulse 60, temperature 98.5 °F (36.9 °C), temperature source Axillary, resp. rate 20, height 5' (1.524 m), weight 137 lb 12.6 oz (62.5 kg), SpO2 96 %, not currently breastfeeding.'  Due to the current efforts to prevent transmission of COVID-19 and also the need to preserve PPE for other caregivers, a face-to-face encounter with the patient was not performed. That being said, all relevant records and diagnostic tests were reviewed, including laboratory results and imaging. Please reference any relevant documentation elsewhere. Care will be coordinated with the primary service.         Medications:    Scheduled Meds:   enoxaparin  30 mg Subcutaneous BID    sodium chloride flush  10 mL Intravenous 2 times per day    midazolam  2 mg Intravenous Once    chlorhexidine  15 mL Mouth/Throat BID    famotidine (PEPCID) injection  20 mg Intravenous BID    insulin lispro  0-12 Units Subcutaneous Q4H    metoprolol tartrate  25 mg Oral BID    mupirocin   Nasal BID    lactobacillus  1 capsule Oral BID WC    cefepime  2 g Intravenous Q12H    dexamethasone  6 mg Intravenous Daily    amLODIPine  5 mg Oral Daily    aspirin  325 mg Oral Daily    atorvastatin  40 mg Oral Daily    vitamin B-12  1,000 mcg Oral Daily    donepezil  5 mg Oral Nightly    gabapentin  300 mg Oral 4x Daily    losartan-hydroCHLOROthiazide  2 tablet Oral Daily    sodium chloride flush  10 mL Intravenous 2 times per day       Continuous Infusions:   fentaNYL 125 mcg/hr (07/21/20 0501)    propofol Stopped (07/21/20 0315)    dextrose         PRN Meds:  sodium chloride flush, fentanNYL, sodium chloride flush, acetaminophen **OR** acetaminophen, polyethylene glycol, promethazine **OR** ondansetron, glucose, dextrose, glucagon (rDNA), dextrose, benzonatate    Labs reviewed:  CBC:   Recent Labs     07/19/20 0440 07/20/20  0438 07/21/20  0510   WBC 7.4 11.0 12.0*   HGB 13.5 13.6 13.3   HCT 40.9 40.9 40.4   MCV 88.6 88.2 88.3    394 381     BMP:   Recent Labs     07/19/20 0440 07/20/20 0438 07/21/20  0510   * 136 135*   K 3.8 4.0 3.9    103 101   CO2 22 23 25   PHOS 4.0 3.5 4.1   BUN 32* 28* 35*   CREATININE 0.6 0.6 0.6     LIVER PROFILE:   Recent Labs     07/19/20 0440 07/20/20  0438 07/21/20  0510   AST 45* 39* 48*   ALT 28 29 40   BILITOT 0.4 0.4 0.5   ALKPHOS 58 62 62     PT/INR: No results for input(s): PROTIME, INR in the last 72 hours. APTT: No results for input(s): APTT in the last 72 hours. UA:No results for input(s): NITRITE, COLORU, PHUR, LABCAST, WBCUA, RBCUA, MUCUS, TRICHOMONAS, YEAST, BACTERIA, CLARITYU, SPECGRAV, LEUKOCYTESUR, UROBILINOGEN, BILIRUBINUR, BLOODU, GLUCOSEU, AMORPHOUS in the last 72 hours. Invalid input(s): Beverly Pascual  No results for input(s): PH, PCO2, PO2 in the last 72 hours. Cx:      Films:          Assessment:         Acute hypoxemic respiratory failure   ARDS   [id number]   Acute metabolic encephalopathy   History of dementia with significant confusion. Plan:           -AC VC, 350, PEEP 10  -Decadron 6 mg daily, completed    -Remdesivir 7/13-17, Actemra, 7/16,  -Poor prognosis    -Empiric antibiotics cefepime. Defer to ID    - no change. Nutrition  - DIET TUBE FEED CONTINUOUS/CYCLIC NPO; Diabetic; Orogastric; Continuous; 25; 60  -     Intake/Output Summary (Last 24 hours) at 7/21/2020 1019  Last data filed at 7/21/2020 0835  Gross per 24 hour   Intake 1053.5 ml   Output 1530 ml   Net -476.5 ml        Access  Arterial      PICC          CVC                     I spent 34 minutes of critical care time with this patient excluding any procedures.        This note was transcribed using Dragon Dict[employer] software. Please disregard any translational errors.       Kael Aparicio Pulmonary, Sleep and [facility] [phone]

## 2020-07-21 NOTE — PROGRESS NOTES
lesion, sore throat, dysphagia. Denies cough / sputum/Sob   Denies chest pain, palpitations/ dizziness  Denies nausea/ vomiting/abdominal pain/diarrhea. Denies dysuria or change in urinary function. Denies joint swelling or pain. No myalgia, arthralgia. No rashes, skin lesions   Denies focal weakness, sensory change or other neurologic symptoms  No lymph node swelling or tenderness. Cough sob, resp distress     PHYSICAL EXAM:      Vitals:    BP (!) 116/53   Pulse 55   Temp 98.7 °F (37.1 °C) (Axillary)   Resp 20   Ht 5' (1.524 m)   Wt 137 lb 12.6 oz (62.5 kg)   SpO2 98%   BMI 26.91 kg/m²     In-person bedside physical examination deferred. Pursuant to the emergency declaration under the [address] [place] [address], [facility] waiver authority and the [employer] Act, this clinical encounter was conducted to provide necessary medical care.   (Also consistent with new provisions and guidance offered by [facility] on March 18, 2020 in setting of COVID 19 outbreak and in order to preserve personal protective equipment in accordance with the flexibilities announced by CMS on March 30, 2020)   References: [email or web address]://[email or web address]. [facility]/Portals/0/Resources/COVID-19/3_18%20Telemed%20Guidance%20Updated%20March%2018. pdf?[email or web address]                      [email or web address]://[facility]. [facility]/Portals/0/Resources/COVID-19/3_18%20Telemed%20Guidance%20Updated%20March%2018. pdf?fvn=[email or web address]                      [email or web address] pdf                                Pulmonary: deferred  Abdomen/GI: deferred  Neuro: deferred  Skin: deferred  Musculoskeletal:  deferred  Genitourinary: Deferred  Psych: deferred  Lymphatic/Immunologic: deferred       Data Review:    Lab Results   Component Value Date    WBC 12.0 (H) 07/21/2020    HGB 13.3 07/21/2020    HCT 40.4 07/21/2020    MCV 88.3 07/21/2020     07/21/2020     Lab Results   Component Value Date    CREATININE 0.6 07/21/2020    BUN 35 (H) 07/21/2020     (L) 07/21/2020    K 3.9 07/21/2020     07/21/2020    CO2 25 07/21/2020       Hepatic Function Panel:   Lab Results   Component Value Date    ALKPHOS 62 07/21/2020    ALT 40 07/21/2020    AST 48 07/21/2020    PROT 6.1 07/21/2020    PROT 7.3 01/08/2013    BILITOT 0.5 07/21/2020    BILIDIR 0.20 10/12/2012    IBILI 0.5 10/12/2012    LABALBU 2.8 07/21/2020       UA:  Lab Results   Component Value Date    COLORU YELLOW 07/12/2020    CLARITYU CLOUDY 07/12/2020    GLUCOSEU >=1000 07/12/2020    GLUCOSEU NEGATIVE 07/24/2011    BILIRUBINUR Negative 07/12/2020    BILIRUBINUR NEGATIVE 07/24/2011    KETUA Negative 07/12/2020    SPECGRAV >1.030 07/12/2020    BLOODU Negative 07/12/2020    PHUR 5.5 07/12/2020    PROTEINU 30 07/12/2020    UROBILINOGEN 1.0 07/12/2020    NITRU Negative 07/12/2020    LEUKOCYTESUR Negative 07/12/2020    LABMICR YES 07/12/2020    URINETYPE Other 07/12/2020      Urine Microscopic:   Lab Results   Component Value Date    LABCAST 20-40 Hyaline 11/29/2014    BACTERIA RARE 11/16/2016    COMU see below 07/12/2020    HYALCAST 0-2 07/12/2020    WBCUA 3-5 07/12/2020    RBCUA 0-2 07/12/2020    EPIU 2-5 07/12/2020     Ferritin  338     CRP 37.9     D dimer  212       MICRO: cultures reviewed and updated by me          Culture, Blood 1 [[id number]]   Collected: 07/12/20 1436    Order Status: Completed  Specimen: Blood  Updated: 07/13/20 1616     Blood Culture, Routine  No Growth to date.  Any change in status will be called. Narrative:      ORDER#: [id number]                          ORDERED BY: JESSE METCALF   SOURCE: Blood                              COLLECTED:  07/12/20 14:36   ANTIBIOTICS AT TAN. :                      RECEIVED :  07/12/20 14:59   If child <=2 yrs old please draw pediatric bottle. ~Blood Culture #1   Performed at:   [facility] [address] [facility] [address]   Phone [phone]    Culture, Blood 2 [[id number]]   Collected: 07/12/20 1436    Order Status: Completed  Specimen: Blood  Updated: 07/13/20 1616     Culture, Blood 2  No Growth to date.  Any change in status will be called. Narrative:      ORDER#: [id number]                          ORDERED BY: JESSE METCALF   SOURCE: Blood                              COLLECTED:  07/12/20 14:36   ANTIBIOTICS AT TAN. :                      RECEIVED :  07/12/20 15:00   If child <=2 yrs old please draw pediatric bottle. ~Blood Culture #2   Performed at:   [address], [facility] [address]   Phone [phone]    Legionella antigen, urine [[id number]]   Collected: 07/12/20 2315    Order Status: Completed  Specimen: Urine voided  Updated: 07/13/20 1146     L. pneumophila Serogp 1 Ur Ag  --     Presumptive Negative   No Legionella pneumophila serogroup 1 antigens detected. A negative result does not exclude infection with   Legionella pneumophila serogroup 1 nor does it rule out   other microbial-caused respiratory infections or   disease caused by other serogroups of   Legionella pneumophila. Normal Range: Presumptive Negative    Narrative:      ORDER#: [id number]                          ORDERED BY: Belén Corral   SOURCE: Urine Voided                       COLLECTED:  07/12/20 23:15   ANTIBIOTICS AT TAN. :                      RECEIVED :  07/12/20 23:40   Performed at:   [address], [facility] [address]   Phone [phone]    Strep Pneumoniae Antigen [[id number]]   Collected: 07/12/20 2315    Order Status: Completed  Specimen: Urine voided  Updated: 07/13/20 1131     STREP PNEUMONIAE ANTIGEN, URINE  --     Presumptive Negative   Presumptive negative suggests no current or recent   pneumococcal infection.  Infection due to Strep pneumoniae   cannot be ruled out since the antigen present in the sample   may be below the detection limit of the test.   Normal Range:Presumptive Negative    Narrative:      ORDER#: [id number]                          ORDERED BY: Belén Corral   SOURCE: Urine Voided                       COLLECTED:  07/12/20 23:15   ANTIBIOTICS AT TAN. :                      RECEIVED :  07/12/20 23:40   Performed at:   [place] [address] [address] [facility], [place] [address]   Phone [phone]    Culture, Respiratory [[id number]]      Order Status: No result  Specimen: Sputum Expectorated             IMAGING:    XR CHEST PORTABLE   Final Result   Unremarkable limited KUB. NG tube tip projects at the left upper quadrant, overlying the expected   location of the stomach. Infiltrates bilateral lower lungs. XR CHEST PORTABLE   Final Result   Endotracheal tube appears in satisfactory position. NG tube tip is in the proximal stomach with side hole near the GE junction. This should be further advanced. Pulmonary edema pattern noted with cardiomegaly, vascular congestion,   bilateral pleural effusions and bibasilar airspace disease. Right upper extremity PICC line appears in appropriate position without   pneumothorax. XR CHEST 1 VW   Final Result   1. Multifocal airspace opacities which have increased when compared with exam   from July 17, 2020.   2. Silhouetting of the left hemidiaphragm likely reflecting left effusion and   atelectasis. XR CHEST 1 VW   Final Result   Multifocal airspace disease. Areas in the right upper and left lower lung   are consolidated. However however, there are areas of improved aeration. XR CHEST PORTABLE   Final Result   Worsening multifocal airspace opacities. XR CHEST PORTABLE   Final Result   Left greater than right basilar airspace disease, concerning for pneumonia   given patient history.                All the pertinent images and reports for the current Hospitalization were reviewed by me     Scheduled Meds:   enoxaparin  30 mg Subcutaneous BID    sodium chloride flush  10 mL Intravenous 2 times per day    midazolam  2 mg Intravenous Once    chlorhexidine  15 mL Mouth/Throat BID    famotidine (PEPCID) injection  20 mg Intravenous BID    insulin lispro  0-12 Units Subcutaneous Q4H    metoprolol tartrate  25 mg Oral BID    mupirocin   Nasal BID    lactobacillus  1 capsule Oral BID WC    cefepime  2 g Intravenous Q12H    amLODIPine  5 mg Oral Daily    aspirin  325 mg Oral Daily    atorvastatin  40 mg Oral Daily    vitamin B-12  1,000 mcg Oral Daily    donepezil  5 mg Oral Nightly    gabapentin  300 mg Oral 4x Daily    losartan-hydroCHLOROthiazide  2 tablet Oral Daily    sodium chloride flush  10 mL Intravenous 2 times per day       Continuous Infusions:   propofol      fentaNYL 125 mcg/hr (07/21/20 1000)    propofol Stopped (07/21/20 0315)    dextrose         PRN Meds:  propofol, sodium chloride flush, fentanNYL, sodium chloride flush, acetaminophen **OR** acetaminophen, polyethylene glycol, promethazine **OR** ondansetron, glucose, dextrose, glucagon (rDNA), dextrose, benzonatate      Assessment:     Patient Active Problem List   Diagnosis    Cervical spinal stenosis    Acute encephalopathy    Malignant hypertension    Type 2 diabetes mellitus without complication, with long-term current use of insulin (HCC)    Left-sided low back pain with left-sided sciatica    Hypercholesteremia    Primary insomnia    Hypotension    Dizziness    Essential hypertension    Elevated lactic acid level    Lactic acidosis    Atrial ectopy    Vasovagal syncope    Dysrhythmia    Chronic left-sided low back pain with left-sided sciatica    MCI (mild cognitive impairment)    Chest pain    COVID-19 virus infection    Acute respiratory failure with hypoxia ([facility] [address]    COVID-19 virus detected     COVID-19 pneumonia  Lactic acidosis  Fevers  Dm+  Htn+   CXR with Bi lateral changes     Given her presentation this all likely from COVID-19 infection and will benefit from Remdesivir therapy given her DM, HTN watch for progression      Tolerating IV Remdesivir ok and will watch lFTS AND Creat closely    Unfortunately she declined with rise in O2 REquirement and Fevers s/p  IV Tocilizumab on  7/16 now more sob with hypoxia and tx to ICU for close observation     She is getting more confused and taking  Her bIPAP off and hypoxemia and CXR worsening     Unfortunately respiratory status worsened requiring intubation and ventilation currently at 60% and repeat respiratory culture sent    Labs, Microbiology, Radiology and all the pertinent results from current hospitalization and  care every where were reviewed  by me as a part of the evaluation   Plan:   1. Completed IV Remdesivir 200 mg loading followed by x 100 mg daily x s stop date  7/17  2. Creat stable  3. Cont steroids to finish the course    4. Procal normal and Urine antigens -ve   5. Trend CRP, Ferritin at 409 ,  6. Il -6 is elevated s/p Tocilizumab  On 7/16 given worsening resp status    7. Cont IV Cefepime for now   8. Resp cx in process           Discussed with patient/Family and Nursing   Risk of Complications/Morbidity: High      · Illness(es)/ Infection present that pose threat to bodily function. · There is potential for severe exacerbation of infection/side effects of treatment. · Therapy requires intensive monitoring for antimicrobial agent toxicity. Discussed with patient/Family and Nursing staff     Thanks for allowing me to participate in your patient's care and please call me with any questions or concerns.     Joaquim Palacio MD  Infectious Disease  [facility]) Physician  Phone: [phone]   Fax : [phone]

## 2020-07-21 NOTE — CONSULTS
Comprehensive Nutrition Assessment    Type and Reason for Visit:  Consult, Reassess(vent / TF ordering & mgmt)    Nutrition Recommendations/Plan:   Start TF using Glucerna 1.2 formula goal rate of 60 ml per hour. Flush per provider    Nutrition Assessment:  Pt with worsened respiratory status requiring intubation on 7/20. MD consulted for start of TF per OG. Will implement as ordered. Noted pt with poor prognosis per MD.    Malnutrition Assessment:  Malnutrition Status: At risk for malnutrition (Comment)    Context:  Acute Illness     Findings of the 6 clinical characteristics of malnutrition:  Energy Intake:  1 - 75% or less of estimated energy requirements for 7 or more days  Weight Loss:  No significant weight loss     Body Fat Loss:  Unable to assess     Muscle Mass Loss:  Unable to assess    Fluid Accumulation:  No significant fluid accumulation     Strength:  Not Performed    Estimated Daily Nutrient Needs:  Energy (kcal):  2586-5148 kcal (20-25 kcal/kg ABW); Weight Used for Energy Requirements:  Current     Protein (g):  64-77 gm (1-1.2 gm/kg ABW); Weight Used for Protein Requirements:  Current        Fluid (ml/day):  1 ml/kcal; Weight Used for Fluid Requirements:  Current      Nutrition Related Findings:  noted no edema. Noted BM on 7/18. Noted blood sugars up & down with mgmt via lantus & coverage. Steroid use in place. Noted improvement in A1C on 2/5/20 at 6.9. Wounds:  None       Current Nutrition Therapies:    DIET TUBE FEED CONTINUOUS/CYCLIC NPO; Diabetic; Orogastric; Continuous; 25; 60    Anthropometric Measures:  · Height: 5' (152.4 cm)  · Current Body Weight: 138 lb (62.6 kg)   · Admission Body Weight: 134 lb (60.8 kg)    · Usual Body Weight: 140 lb (63.5 kg)     · Ideal Body Weight: 100 lbs; % Ideal Body Weight 138 %   · BMI: 27  · Adjusted Body Weight:  ; No Adjustment   · BMI Categories: Overweight (BMI 25.0-29. 9)       Nutrition Diagnosis:   · Inadequate oral intake related to impaired respiratory funtion as evidenced by intubation      Nutrition Interventions:   Food and/or Nutrient Delivery:  Start Tube Feeding  Nutrition Education/Counseling:  No recommendation at this time   Coordination of Nutrition Care:  Continued Inpatient Monitoring    Goals:  tolerate most appropriate form of nutrition       Nutrition Monitoring and Evaluation:   Behavioral-Environmental Outcomes:  (NA)   Food/Nutrient Intake Outcomes:  Enteral Nutrition Intake/Tolerance  Physical Signs/Symptoms Outcomes:  Biochemical Data, Constipation, Diarrhea, Nausea or Vomiting, Fluid Status or Edema, Nutrition Focused Physical Findings, Skin, Weight     Discharge Planning:     Too soon to determine     Electronically signed by Justice Newton RD, LD on 7/21/20 at 2:07 PM EDT    Contact: [phone]

## 2020-07-21 NOTE — PROGRESS NOTES
0700: handoff report received from [facility]. Patient is intubated/sedated on fentanyl. Sinus cara on the monitor sating above 90%. Respirations easy and unlabored on the vent. Freitas in place, PICC in place, restraints in place. 26Ceil Dharmesh NP about lopressor and losartan-hydrochlorthiazide with pt's current BP and HR. Hold medications per [facility] AT [facility] NP.  9918: head to toe assessment complete, see flowsheet. Pt follow commands at this time, repositioned. Tube feeding is at goal. SB still on the monitor. Sating above 90%. 1030: critical care rounded on patient. Dr Jelani Alford was notified about patient's hypotension 99/48, w/ MAP 61. No new orders. 1055: patient's daughter called and left a voicemail. 1106: pt's daughter called back and updated. 1143: reassessed patient, no significant changes at this time. Patient repositioned. Will continue monitor. 1532: pt reassessed, no significant changes at this time. Patient repositioned. Will continue monitor. 1538: messaged Dr. Jelani Alford about using the 1.5cal tube feed bottle instead of 1.2cal due to shortage. OK to use the 1.5cal tube feed bottle per Dr. Jelani Alford.   1900: handoff report given to [address], RN.    Electronically signed by Eduar Young RN on 7/21/2020 at 7:42 PM

## 2020-07-21 NOTE — PLAN OF CARE
Nutrition Problem #1: Inadequate oral intake  Intervention: Food and/or Nutrient Delivery: Start Tube Feeding  Nutritional Goals: tolerate most appropriate form of nutrition

## 2020-07-21 NOTE — PROGRESS NOTES
XR CHEST 1 VW   Final Result   1. Multifocal airspace opacities which have increased when compared with exam   from July 17, 2020.   2. Silhouetting of the left hemidiaphragm likely reflecting left effusion and   atelectasis. XR CHEST 1 VW   Final Result   Multifocal airspace disease. Areas in the right upper and left lower lung   are consolidated. However however, there are areas of improved aeration. XR CHEST PORTABLE   Final Result   Worsening multifocal airspace opacities. XR CHEST PORTABLE   Final Result   Left greater than right basilar airspace disease, concerning for pneumonia   given patient history. Assessment/Plan:    Active Hospital Problems    Diagnosis    Acute respiratory failure with hypoxia ([facility] [address] [J96.01]    COVID-19 virus detected [U07.1]    COVID-19 virus infection [U07.1]     Patient admitted with COVID19 pneumonia initially improved on steroids and remdesivir, then developed fever and increased need of oxygen, transferred to ICU, given one dose of actemra. 1.  COVID-19 pneumonia, initially was on empiric Azithromycin/ceftriaxone,  decadron 6 mg daily, Lovenox BID, respiratory culture, consulted ID and remdesivir started, clinically was improving, then  developed fever and increased need of oxygen, chest xray was worsening. Started on cefepime and received one dose of Actemra. oxygen need was 15L so patient transferred to ICU, and vapotherm started. Was then requiring BiPap and then subsequently intubated due to inability to tolerate BiPAP. 2. Sepsis due to CODID 19 pneumonia, ongoing management as above. 3. Acute respiratory failure with hypoxia s/p mechanical intubation. 4. Type 2 DM, fluctuating, Lantus and high dose SSI.   5. Essential hypertension, continue home meds  6. Mild cognitive impairment, continue home meds    Diet: DIET TUBE FEED CONTINUOUS/CYCLIC NPO; Diabetic; Orogastric; Continuous; 25; 60  Code Status: Full Code      Poor prognosis    Brandon Aleman MD

## 2020-07-22 LAB
A/G RATIO: 0.9 (ref 1.1–2.2)
ALBUMIN SERPL-MCNC: 3 G/DL (ref 3.4–5)
ALP BLD-CCNC: 72 U/L (ref 40–129)
ALT SERPL-CCNC: 39 U/L (ref 10–40)
ANION GAP SERPL CALCULATED.3IONS-SCNC: 10 MMOL/L (ref 3–16)
AST SERPL-CCNC: 27 U/L (ref 15–37)
BASE EXCESS ARTERIAL: 1.1 MMOL/L (ref -3–3)
BASOPHILS ABSOLUTE: 0 K/UL (ref 0–0.2)
BASOPHILS RELATIVE PERCENT: 0.3 %
BILIRUB SERPL-MCNC: 0.4 MG/DL (ref 0–1)
BUN BLDV-MCNC: 29 MG/DL (ref 7–20)
C-REACTIVE PROTEIN: 1.4 MG/L (ref 0–5.1)
CALCIUM SERPL-MCNC: 8.5 MG/DL (ref 8.3–10.6)
CARBOXYHEMOGLOBIN ARTERIAL: 0 % (ref 0–1.5)
CHLORIDE BLD-SCNC: 98 MMOL/L (ref 99–110)
CO2: 25 MMOL/L (ref 21–32)
CREAT SERPL-MCNC: 0.6 MG/DL (ref 0.6–1.2)
D DIMER: 746 NG/ML DDU (ref 0–229)
EOSINOPHILS ABSOLUTE: 0.1 K/UL (ref 0–0.6)
EOSINOPHILS RELATIVE PERCENT: 0.6 %
FERRITIN: 220.7 NG/ML (ref 15–150)
GFR AFRICAN AMERICAN: >60
GFR NON-AFRICAN AMERICAN: >60
GLOBULIN: 3.4 G/DL
GLUCOSE BLD-MCNC: 281 MG/DL (ref 70–99)
GLUCOSE BLD-MCNC: 284 MG/DL (ref 70–99)
GLUCOSE BLD-MCNC: 291 MG/DL (ref 70–99)
GLUCOSE BLD-MCNC: 301 MG/DL (ref 70–99)
GLUCOSE BLD-MCNC: 305 MG/DL (ref 70–99)
GLUCOSE BLD-MCNC: 324 MG/DL (ref 70–99)
HCO3 ARTERIAL: 26.9 MMOL/L (ref 21–29)
HCT VFR BLD CALC: 41.3 % (ref 36–48)
HEMOGLOBIN, ART, EXTENDED: 14 G/DL (ref 12–16)
HEMOGLOBIN: 13.6 G/DL (ref 12–16)
LYMPHOCYTES ABSOLUTE: 1.2 K/UL (ref 1–5.1)
LYMPHOCYTES RELATIVE PERCENT: 6.7 %
MAGNESIUM: 2.3 MG/DL (ref 1.8–2.4)
MCH RBC QN AUTO: 28.9 PG (ref 26–34)
MCHC RBC AUTO-ENTMCNC: 32.9 G/DL (ref 31–36)
MCV RBC AUTO: 88 FL (ref 80–100)
METHEMOGLOBIN ARTERIAL: 0.7 %
MONOCYTES ABSOLUTE: 0.5 K/UL (ref 0–1.3)
MONOCYTES RELATIVE PERCENT: 2.8 %
NEUTROPHILS ABSOLUTE: 16.2 K/UL (ref 1.7–7.7)
NEUTROPHILS RELATIVE PERCENT: 89.6 %
O2 CONTENT ARTERIAL: 18 ML/DL
O2 SAT, ARTERIAL: 90.7 %
O2 THERAPY: ABNORMAL
PCO2 ARTERIAL: 46.3 MMHG (ref 35–45)
PDW BLD-RTO: 14.1 % (ref 12.4–15.4)
PERFORMED ON: ABNORMAL
PH ARTERIAL: 7.37 (ref 7.35–7.45)
PHOSPHORUS: 2.8 MG/DL (ref 2.5–4.9)
PLATELET # BLD: 364 K/UL (ref 135–450)
PMV BLD AUTO: 8.2 FL (ref 5–10.5)
PO2 ARTERIAL: 63.9 MMHG (ref 75–108)
POTASSIUM REFLEX MAGNESIUM: 4.2 MMOL/L (ref 3.5–5.1)
PROCALCITONIN: 0.08 NG/ML (ref 0–0.15)
RBC # BLD: 4.7 M/UL (ref 4–5.2)
SODIUM BLD-SCNC: 133 MMOL/L (ref 136–145)
TCO2 ARTERIAL: 28.3 MMOL/L
TOTAL PROTEIN: 6.4 G/DL (ref 6.4–8.2)
WBC # BLD: 18.1 K/UL (ref 4–11)

## 2020-07-22 PROCEDURE — 6370000000 HC RX 637 (ALT 250 FOR IP): Performed by: NURSE PRACTITIONER

## 2020-07-22 PROCEDURE — 94761 N-INVAS EAR/PLS OXIMETRY MLT: CPT

## 2020-07-22 PROCEDURE — 85025 COMPLETE CBC W/AUTO DIFF WBC: CPT

## 2020-07-22 PROCEDURE — 84100 ASSAY OF PHOSPHORUS: CPT

## 2020-07-22 PROCEDURE — 6360000002 HC RX W HCPCS: Performed by: INTERNAL MEDICINE

## 2020-07-22 PROCEDURE — 2500000003 HC RX 250 WO HCPCS: Performed by: NURSE PRACTITIONER

## 2020-07-22 PROCEDURE — 2000000000 HC ICU R&B

## 2020-07-22 PROCEDURE — 6360000002 HC RX W HCPCS: Performed by: NURSE PRACTITIONER

## 2020-07-22 PROCEDURE — 94750 HC PULMONARY COMPLIANCE STUDY: CPT

## 2020-07-22 PROCEDURE — 99291 CRITICAL CARE FIRST HOUR: CPT | Performed by: INTERNAL MEDICINE

## 2020-07-22 PROCEDURE — 82728 ASSAY OF FERRITIN: CPT

## 2020-07-22 PROCEDURE — 2700000000 HC OXYGEN THERAPY PER DAY

## 2020-07-22 PROCEDURE — 2580000003 HC RX 258: Performed by: INTERNAL MEDICINE

## 2020-07-22 PROCEDURE — 82803 BLOOD GASES ANY COMBINATION: CPT

## 2020-07-22 PROCEDURE — 36600 WITHDRAWAL OF ARTERIAL BLOOD: CPT

## 2020-07-22 PROCEDURE — 86140 C-REACTIVE PROTEIN: CPT

## 2020-07-22 PROCEDURE — 36415 COLL VENOUS BLD VENIPUNCTURE: CPT

## 2020-07-22 PROCEDURE — 85379 FIBRIN DEGRADATION QUANT: CPT

## 2020-07-22 PROCEDURE — 83735 ASSAY OF MAGNESIUM: CPT

## 2020-07-22 PROCEDURE — 94003 VENT MGMT INPAT SUBQ DAY: CPT

## 2020-07-22 PROCEDURE — 84145 PROCALCITONIN (PCT): CPT

## 2020-07-22 PROCEDURE — 80053 COMPREHEN METABOLIC PANEL: CPT

## 2020-07-22 PROCEDURE — 6370000000 HC RX 637 (ALT 250 FOR IP): Performed by: INTERNAL MEDICINE

## 2020-07-22 PROCEDURE — 2580000003 HC RX 258: Performed by: NURSE PRACTITIONER

## 2020-07-22 PROCEDURE — APPNB15 APP NON BILLABLE TIME 0-15 MINS: Performed by: NURSE PRACTITIONER

## 2020-07-22 PROCEDURE — 99233 SBSQ HOSP IP/OBS HIGH 50: CPT | Performed by: INTERNAL MEDICINE

## 2020-07-22 RX ORDER — SENNA LEAF EXTRACT 176MG/5ML
10 SYRUP ORAL NIGHTLY
Status: DISCONTINUED | OUTPATIENT
Start: 2020-07-22 | End: 2020-07-28 | Stop reason: HOSPADM

## 2020-07-22 RX ORDER — POLYETHYLENE GLYCOL 3350 17 G/17G
17 POWDER, FOR SOLUTION ORAL DAILY
Status: DISCONTINUED | OUTPATIENT
Start: 2020-07-22 | End: 2020-07-24

## 2020-07-22 RX ADMIN — INSULIN LISPRO 9 UNITS: 100 INJECTION, SOLUTION INTRAVENOUS; SUBCUTANEOUS at 12:27

## 2020-07-22 RX ADMIN — Medication 125 MCG/HR: at 09:27

## 2020-07-22 RX ADMIN — METOPROLOL TARTRATE 25 MG: 25 TABLET, FILM COATED ORAL at 20:13

## 2020-07-22 RX ADMIN — ASPIRIN 325 MG ORAL TABLET 325 MG: 325 PILL ORAL at 07:54

## 2020-07-22 RX ADMIN — CEFEPIME HYDROCHLORIDE 2 G: 2 INJECTION, POWDER, FOR SOLUTION INTRAVENOUS at 17:03

## 2020-07-22 RX ADMIN — METOPROLOL TARTRATE 25 MG: 25 TABLET, FILM COATED ORAL at 07:56

## 2020-07-22 RX ADMIN — CHLORHEXIDINE GLUCONATE 15 ML: 1.2 RINSE ORAL at 20:13

## 2020-07-22 RX ADMIN — INSULIN LISPRO 12 UNITS: 100 INJECTION, SOLUTION INTRAVENOUS; SUBCUTANEOUS at 17:30

## 2020-07-22 RX ADMIN — GABAPENTIN 300 MG: 300 CAPSULE ORAL at 17:02

## 2020-07-22 RX ADMIN — CYANOCOBALAMIN TAB 1000 MCG 1000 MCG: 1000 TAB at 07:55

## 2020-07-22 RX ADMIN — Medication 1 CAPSULE: at 17:02

## 2020-07-22 RX ADMIN — LOSARTAN POTASSIUM AND HYDROCHLOROTHIAZIDE 2 TABLET: 12.5; 5 TABLET ORAL at 08:10

## 2020-07-22 RX ADMIN — Medication 125 MCG/HR: at 13:27

## 2020-07-22 RX ADMIN — POLYETHYLENE GLYCOL 3350 17 G: 17 POWDER, FOR SOLUTION ORAL at 08:33

## 2020-07-22 RX ADMIN — AMLODIPINE BESYLATE 5 MG: 5 TABLET ORAL at 07:55

## 2020-07-22 RX ADMIN — Medication 1 CAPSULE: at 07:55

## 2020-07-22 RX ADMIN — GABAPENTIN 300 MG: 300 CAPSULE ORAL at 07:55

## 2020-07-22 RX ADMIN — Medication 125 MCG/HR: at 01:19

## 2020-07-22 RX ADMIN — GABAPENTIN 300 MG: 300 CAPSULE ORAL at 20:13

## 2020-07-22 RX ADMIN — CEFEPIME HYDROCHLORIDE 2 G: 2 INJECTION, POWDER, FOR SOLUTION INTRAVENOUS at 05:00

## 2020-07-22 RX ADMIN — MUPIROCIN: 20 OINTMENT TOPICAL at 07:58

## 2020-07-22 RX ADMIN — DOCUSATE SODIUM 100 MG: 50 LIQUID ORAL at 08:32

## 2020-07-22 RX ADMIN — Medication 125 MCG/HR: at 05:30

## 2020-07-22 RX ADMIN — SODIUM CHLORIDE, PRESERVATIVE FREE 10 ML: 5 INJECTION INTRAVENOUS at 07:56

## 2020-07-22 RX ADMIN — CHLORHEXIDINE GLUCONATE 15 ML: 1.2 RINSE ORAL at 07:56

## 2020-07-22 RX ADMIN — HYDROCODONE BITARTRATE AND ACETAMINOPHEN 10 ML: 176/5 SOLUTION ORAL at 20:13

## 2020-07-22 RX ADMIN — FAMOTIDINE 20 MG: 10 INJECTION, SOLUTION INTRAVENOUS at 20:13

## 2020-07-22 RX ADMIN — INSULIN LISPRO 12 UNITS: 100 INJECTION, SOLUTION INTRAVENOUS; SUBCUTANEOUS at 09:00

## 2020-07-22 RX ADMIN — FAMOTIDINE 20 MG: 10 INJECTION, SOLUTION INTRAVENOUS at 07:55

## 2020-07-22 RX ADMIN — INSULIN LISPRO 6 UNITS: 100 INJECTION, SOLUTION INTRAVENOUS; SUBCUTANEOUS at 04:45

## 2020-07-22 RX ADMIN — INSULIN LISPRO 9 UNITS: 100 INJECTION, SOLUTION INTRAVENOUS; SUBCUTANEOUS at 20:17

## 2020-07-22 RX ADMIN — SODIUM CHLORIDE, PRESERVATIVE FREE 10 ML: 5 INJECTION INTRAVENOUS at 07:57

## 2020-07-22 RX ADMIN — Medication 125 MCG/HR: at 21:25

## 2020-07-22 RX ADMIN — DONEPEZIL HYDROCHLORIDE 5 MG: 5 TABLET, FILM COATED ORAL at 20:13

## 2020-07-22 RX ADMIN — SODIUM CHLORIDE, PRESERVATIVE FREE 10 ML: 5 INJECTION INTRAVENOUS at 20:11

## 2020-07-22 RX ADMIN — ATORVASTATIN CALCIUM 40 MG: 40 TABLET, FILM COATED ORAL at 07:55

## 2020-07-22 RX ADMIN — MUPIROCIN: 20 OINTMENT TOPICAL at 20:14

## 2020-07-22 RX ADMIN — ENOXAPARIN SODIUM 30 MG: 30 INJECTION SUBCUTANEOUS at 20:13

## 2020-07-22 RX ADMIN — Medication 125 MCG/HR: at 17:36

## 2020-07-22 RX ADMIN — INSULIN LISPRO 8 UNITS: 100 INJECTION, SOLUTION INTRAVENOUS; SUBCUTANEOUS at 00:20

## 2020-07-22 RX ADMIN — GABAPENTIN 300 MG: 300 CAPSULE ORAL at 12:32

## 2020-07-22 RX ADMIN — ENOXAPARIN SODIUM 30 MG: 30 INJECTION SUBCUTANEOUS at 07:56

## 2020-07-22 ASSESSMENT — PAIN SCALES - GENERAL
PAINLEVEL_OUTOF10: 0

## 2020-07-22 ASSESSMENT — PULMONARY FUNCTION TESTS
PIF_VALUE: 24
PIF_VALUE: 27
PIF_VALUE: 26
PIF_VALUE: 26
PIF_VALUE: 27
PIF_VALUE: 20
PIF_VALUE: 27
PIF_VALUE: 23
PIF_VALUE: 29
PIF_VALUE: 28
PIF_VALUE: 24
PIF_VALUE: 25
PIF_VALUE: 28
PIF_VALUE: 25
PIF_VALUE: 35
PIF_VALUE: 25
PIF_VALUE: 28
PIF_VALUE: 24
PIF_VALUE: 26
PIF_VALUE: 25
PIF_VALUE: 25

## 2020-07-22 NOTE — PROGRESS NOTES
Pt's HR 50-55, /56 (67). Pt has schedule metoprolol to be given at this time. Hospitalist perfectserved, Dr. Ana Rosa Hendrix covering and updated. Hans Penta not to be given at this time.    Electronically signed by Louann Castrejon RN on 7/21/2020 at 9:08 PM

## 2020-07-22 NOTE — PROGRESS NOTES
0700: handoff report received from [facility]. Patient is resting in bed on the vent with fentanyl sedation infusing via PICC. NSR, sating above 90%. Freitas, restraints are in place. Tube feed running via OG. 7633: head to toe assessment complete, see flowsheet. Pt follow commands, NSR on the monitor sating above 90% on the vent. Pt repositioned, oral care given by RT will continue monitor. 2451: critical care rounded on patient this morning, new orders noted. 1057: daughter Prieto Calderon called and updated. 1153: pt reassessed. No significant changes. 1510: handoff report given to Cristina Moise RN.    Electronically signed by Cara Mercado RN on 7/22/2020 at 3:19 PM

## 2020-07-22 NOTE — PROGRESS NOTES
Physical Therapy    Pt with medical decline and transferred to ICU. Please re-order therapy when patient medically stable to participate.     Electronically signed by Po Xiong PT on 7/22/2020 at 7:23 AM

## 2020-07-22 NOTE — PLAN OF CARE
skin integrity for skin breakdown, turning and repositioning q2h per protocol. Problem: Restraint Use - Nonviolent/Non-Self-Destructive Behavior:  Goal: Absence of restraint indications  Description: Absence of restraint indications  Outcome: Ongoing  Note: Upper extremity soft wrist restraints intact. No S/S of restraint related injury . ROM performed Q2HR.

## 2020-07-22 NOTE — PROGRESS NOTES
Pulmonary Progress Note    Date of Admission: 7/12/2020   LOS: 10 days       CC:  COVID-19    Subjective:     No events over night. Continues on vent. ROS:   On vent. PHYSICAL EXAM:   Blood pressure 128/62, pulse 83, temperature 99.1 °F (37.3 °C), temperature source Oral, resp. rate 24, height 5' (1.524 m), weight 138 lb 10.7 oz (62.9 kg), SpO2 100 %, not currently breastfeeding.'  Due to the current efforts to prevent transmission of COVID-19 and also the need to preserve PPE for other caregivers, a face-to-face encounter with the patient was not performed. That being said, all relevant records and diagnostic tests were reviewed, including laboratory results and imaging. Please reference any relevant documentation elsewhere. Care will be coordinated with the primary service.         Medications:    Scheduled Meds:   enoxaparin  30 mg Subcutaneous BID    sodium chloride flush  10 mL Intravenous 2 times per day    midazolam  2 mg Intravenous Once    chlorhexidine  15 mL Mouth/Throat BID    famotidine (PEPCID) injection  20 mg Intravenous BID    insulin lispro  0-12 Units Subcutaneous Q4H    metoprolol tartrate  25 mg Oral BID    mupirocin   Nasal BID    lactobacillus  1 capsule Oral BID WC    cefepime  2 g Intravenous Q12H    amLODIPine  5 mg Oral Daily    aspirin  325 mg Oral Daily    atorvastatin  40 mg Oral Daily    vitamin B-12  1,000 mcg Oral Daily    donepezil  5 mg Oral Nightly    gabapentin  300 mg Oral 4x Daily    losartan-hydroCHLOROthiazide  2 tablet Oral Daily    sodium chloride flush  10 mL Intravenous 2 times per day       Continuous Infusions:   propofol      fentaNYL 125 mcg/hr (07/22/20 0530)    propofol Stopped (07/21/20 0315)    dextrose         PRN Meds:  propofol, sodium chloride flush, fentanNYL, sodium chloride flush, acetaminophen **OR** acetaminophen, polyethylene glycol, promethazine **OR** ondansetron, glucose, dextrose, glucagon (rDNA), dextrose, benzonatate    Labs reviewed:  CBC:   Recent Labs     07/20/20 0438 07/21/20  0510 07/22/20  0449   WBC 11.0 12.0* 18.1*   HGB 13.6 13.3 13.6   HCT 40.9 40.4 41.3   MCV 88.2 88.3 88.0    381 364     BMP:   Recent Labs     07/20/20 0438 07/21/20  0510 07/22/20 0449    135* 133*   K 4.0 3.9 4.2    101 98*   CO2 23 25 25   PHOS 3.5 4.1 2.8   BUN 28* 35* 29*   CREATININE 0.6 0.6 0.6     LIVER PROFILE:   Recent Labs     07/20/20 0438 07/21/20  0510 07/22/20 0449   AST 39* 48* 27   ALT 29 40 39   BILITOT 0.4 0.5 0.4   ALKPHOS 62 62 72     PT/INR: No results for input(s): PROTIME, INR in the last 72 hours. APTT: No results for input(s): APTT in the last 72 hours. UA:No results for input(s): NITRITE, COLORU, PHUR, LABCAST, WBCUA, RBCUA, MUCUS, TRICHOMONAS, YEAST, BACTERIA, CLARITYU, SPECGRAV, LEUKOCYTESUR, UROBILINOGEN, BILIRUBINUR, BLOODU, GLUCOSEU, AMORPHOUS in the last 72 hours. Invalid input(s): Rhett Shall  No results for input(s): PH, PCO2, PO2 in the last 72 hours. Cx:      Films:          Assessment:         Acute hypoxemic respiratory failure   ARDS   [id number]   Acute metabolic encephalopathy   History of dementia with significant confusion. Plan:           -AC VC, 350, PEEP 10. Able to wean down to 60% FiO2. PaO2 63  -Decadron 6 mg daily, completed    -Remdesivir 7/13-17, Actemra, 7/16,  -Poor prognosis    - check covid labs every other day. -Empiric antibiotics cefepime. Defer to ID    - increase Sliding scale insulin   - check Procalcitonin  With elevated WBC. Steroids stopped. - no BM. Start reg.         Nutrition  - DIET TUBE FEED CONTINUOUS/CYCLIC NPO; Diabetic; Orogastric; Continuous; 25; 60  -     Intake/Output Summary (Last 24 hours) at 7/22/2020 0707  Last data filed at 7/22/2020 9029  Gross per 24 hour   Intake 2387.1 ml   Output 1725 ml   Net 662.1 ml        Access  Arterial      PICC          CVC                     I spent 34  minutes of critical care time with this patient excluding any procedures. This note was transcribed using [id number] [employer]. Please disregard any translational errors.       Kael Aparicio Pulmonary, Sleep and [facility] [phone]

## 2020-07-22 NOTE — PROGRESS NOTES
Infectious Disease Follow up Notes  Admit Date: 7/12/2020  Hospital Day: 11    Antibiotics :   IV Remdesivir STOP 7/17  Dexamethasone   S/p Tocilizumab on  7/16   IV Cefepime   CHIEF COMPLAINT:       COVID-19 pneumonia  Resp failure     Subjective interval History :  75 y.o. woman with HTN, DM admitted with cough, sob, fevers and not feeling well recent diagnosed with COVID-19 infection on 7/2 now with worsening symptoms admitted through ED and Lactic acid elevation with mild elevation in CRP, LDH and mild elevation in Ferritin we are consulted for recommendations.  She is using 5 lts nasal cannula and had fever 101 ON Admit.       Remains intubated on the ventilator in ICU on 40% Fio2 AND WBC up from steroids and new resp cx in process    past Medical History:    Past Medical History:   Diagnosis Date    Diabetes mellitus ([address]     NIDDM    Headache(784.0)     Herniated disc, cervical     Hypercholesteremia     Hypertension     Memory loss     short term    Psychiatric problem     Type 2 diabetes mellitus without complication ([address]        Past Surgical History:    Past Surgical History:   Procedure Laterality Date    SHOULDER SURGERY      TUBAL LIGATION         Current Medications:    Outpatient Medications Marked as Taking for the 7/12/20 encounter [facility] Encounter)   Medication Sig Dispense Refill    SITagliptin (JANUVIA) 100 MG tablet Take 1 tablet by mouth daily For diabetes 90 tablet 3    amLODIPine (NORVASC) 5 MG tablet Take 1 tablet by mouth daily 90 tablet 3    donepezil (ARICEPT) 5 MG tablet Take 1 tablet by mouth nightly 90 tablet 3    traZODone (DESYREL) 100 MG tablet TAKE ONE TABLET BY MOUTH DAILY AS NEEDED FOR SLEEP 90 tablet 3    losartan-hydrochlorothiazide (HYZAAR) 100-25 MG per tablet Take 1 tablet by mouth daily 90 tablet 3    metFORMIN (GLUCOPHAGE) 1000 MG tablet Take 1 tablet by mouth daily (with breakfast) For diabetes 30 tablet 5    vitamin D (ERGOCALCIFEROL) 1.25 MG (78811 UT) CAPS capsule Take 1 capsule by mouth once a week 12 capsule 3    Cyanocobalamin 1000 MCG SUBL Place 1,000 mcg under the tongue daily 90 tablet 3    insulin glargine (LANTUS SOLOSTAR) 100 UNIT/ML injection pen Inject 42 Units into the skin 2 times daily 90 mL 3    gabapentin (NEURONTIN) 300 MG capsule Take 1 capsule by mouth 4 times daily. For nerve pain related to diabetes 360 capsule 3    atorvastatin (LIPITOR) 40 MG tablet Take 1 tablet by mouth daily For cholesterol and heart 90 tablet 3    empagliflozin (JARDIANCE) 25 MG tablet Take 25 mg by mouth daily 90 tablet 3    metoprolol succinate (TOPROL XL) 50 MG extended release tablet Take 1 tablet by mouth daily For heart 90 tablet 3    ondansetron (ZOFRAN ODT) 4 MG disintegrating tablet Take 1 tablet by mouth every 8 hours as needed for Nausea 20 tablet 0    aspirin 325 MG tablet Take 1 tablet by mouth daily 30 tablet 3       Allergies:  Patient has no known allergies. Immunizations :   Immunization History   Administered Date(s) Administered    Influenza Virus Vaccine 09/01/2017    Influenza, High Dose (Fluzone 65 yrs and older) 09/27/2018    Pneumococcal Conjugate 13-valent (Adan Schwartz) 07/26/2019    Pneumococcal Polysaccharide ([id number]) 06/06/2018    Tdap (Boostrix, Adacel) 07/26/2019       Social History:     Social History     Tobacco Use    Smoking status: Never Smoker    Smokeless tobacco: Never Used   Substance Use Topics    Alcohol use: No    Drug use: No     Social History     Tobacco Use   Smoking Status Never Smoker   Smokeless Tobacco Never Used      Family History   Problem Relation Age of Onset    Diabetes Mother     High Blood Pressure Mother     Diabetes Father     High Blood Pressure Father          REVIEW OF SYSTEMS:    No fever / chills / sweats. No weight loss. No visual change, eye pain, eye discharge.     No oral lesion, sore throat, dysphagia. Denies cough / sputum/Sob   Denies chest pain, palpitations/ dizziness  Denies nausea/ vomiting/abdominal pain/diarrhea. Denies dysuria or change in urinary function. Denies joint swelling or pain. No myalgia, arthralgia. No rashes, skin lesions   Denies focal weakness, sensory change or other neurologic symptoms  No lymph node swelling or tenderness. Cough sob, resp distress     PHYSICAL EXAM:      Vitals:    /64   Pulse 61   Temp 99.7 °F (37.6 °C) (Axillary)   Resp 19   Ht 5' (1.524 m)   Wt 138 lb 10.7 oz (62.9 kg)   SpO2 100%   BMI 27.08 kg/m²     In-person bedside physical examination deferred. Pursuant to the emergency declaration under the [address] [place] [address] waiver authority and the [employer] Act, this clinical encounter was conducted to provide necessary medical care.   (Also consistent with new provisions and guidance offered by [facility] on March 18, 2020 in setting of COVID 19 outbreak and in order to preserve personal protective equipment in accordance with the flexibilities announced by CMS on March 30, 2020)   References: https://[email or web address] [facility]/Portals/0/Resources/COVID-19/3_18%20Telemed%20Guidance%20Updated%20March%2018. pdf?[email or web address]                      https://[facility]. [facility]/Portals/0/Resources/COVID-19/3_18%20Telemed%20Guidance%20Updated%20March%2018. pdf?[email or web address]                      http://[employer]/. pdf                                Pulmonary: deferred  Abdomen/GI: deferred  Neuro: deferred  Skin: deferred  Musculoskeletal:  deferred  Genitourinary: Deferred  Psych: deferred  Lymphatic/Immunologic: deferred       Data Review:    Lab Results   Component Value Date    WBC 18.1 (H) 07/22/2020    HGB 13.6 07/22/2020    HCT 41.3 07/22/2020    MCV 88.0 07/22/2020     07/22/2020     Lab Results   Component Value Date    CREATININE 0.6 07/22/2020    BUN 29 (H) 07/22/2020     (L) 07/22/2020    K 4.2 07/22/2020    CL 98 (L) 07/22/2020    CO2 25 07/22/2020       Hepatic Function Panel:   Lab Results   Component Value Date    ALKPHOS 72 07/22/2020    ALT 39 07/22/2020    AST 27 07/22/2020    PROT 6.4 07/22/2020    PROT 7.3 01/08/2013    BILITOT 0.4 07/22/2020    BILIDIR 0.20 10/12/2012    IBILI 0.5 10/12/2012    LABALBU 3.0 07/22/2020       UA:  Lab Results   Component Value Date    COLORU YELLOW 07/12/2020    CLARITYU CLOUDY 07/12/2020    GLUCOSEU >=1000 07/12/2020    GLUCOSEU NEGATIVE 07/24/2011    BILIRUBINUR Negative 07/12/2020    BILIRUBINUR NEGATIVE 07/24/2011    KETUA Negative 07/12/2020    SPECGRAV >1.030 07/12/2020    BLOODU Negative 07/12/2020    PHUR 5.5 07/12/2020    PROTEINU 30 07/12/2020    UROBILINOGEN 1.0 07/12/2020    NITRU Negative 07/12/2020    LEUKOCYTESUR Negative 07/12/2020    LABMICR YES 07/12/2020    URINETYPE Other 07/12/2020      Urine Microscopic:   Lab Results   Component Value Date    LABCAST 20-40 Hyaline 11/29/2014    BACTERIA RARE 11/16/2016    COMU see below 07/12/2020    HYALCAST 0-2 07/12/2020    WBCUA 3-5 07/12/2020    RBCUA 0-2 07/12/2020    EPIU 2-5 07/12/2020     Ferritin  338     CRP 37.9     D dimer  212       MICRO: cultures reviewed and updated by me          Culture, Blood 1 [[id number]]   Collected: 07/12/20 1436    Order Status: Completed  Specimen: Blood  Updated: 07/13/20 1616     Blood Culture, Routine  No Growth to date.  Any change in status will be called. Narrative:      ORDER#: [id number]                          ORDERED BY: JESSE METCALF   SOURCE: Blood                              COLLECTED:  07/12/20 14:36   ANTIBIOTICS AT TAN. :                      RECEIVED :  07/12/20 14:59   If child <=2 yrs old please draw pediatric bottle. ~Blood Culture #1   Performed at:   [facility]   [address] [facility] [address], [place] [address]   Phone [phone]    Culture, Blood 2 [[id number]]   Collected: 07/12/20 1436    Order Status: Completed  Specimen: Blood  Updated: 07/13/20 1616     Culture, Blood 2  No Growth to date.  Any change in status will be called. Narrative:      ORDER#: [id number]                          ORDERED BY: JESSE METCALF   SOURCE: Blood                              COLLECTED:  07/12/20 14:36   ANTIBIOTICS AT TAN. :                      RECEIVED :  07/12/20 15:00   If child <=2 yrs old please draw pediatric bottle. ~Blood Culture #2   Performed at:   [facility]   [address], [place] [address]   Phone [phone]    Legionella antigen, urine [[id number]]   Collected: 07/12/20 2315    Order Status: Completed  Specimen: Urine voided  Updated: 07/13/20 1146     L. pneumophila Serogp 1 Ur Ag  --     Presumptive Negative   No Legionella pneumophila serogroup 1 antigens detected. A negative result does not exclude infection with   Legionella pneumophila serogroup 1 nor does it rule out   other microbial-caused respiratory infections or   disease caused by other serogroups of   Legionella pneumophila. Normal Range: Presumptive Negative    Narrative:      ORDER#: [id number]                          ORDERED BY: Francia Mcguire   SOURCE: Urine Voided                       COLLECTED:  07/12/20 23:15   ANTIBIOTICS AT TAN. :                      RECEIVED :  07/12/20 23:40   Performed at:   [facility]   [address], [place] [address]   Phone [phone]    Strep Pneumoniae Antigen [[id number]]   Collected: 07/12/20 2315    Order Status: Completed  Specimen: Urine voided  Updated: 07/13/20 1131     STREP PNEUMONIAE ANTIGEN, URINE  --     Presumptive Negative   Presumptive negative suggests no current or recent   pneumococcal infection.  Infection due to Strep pneumoniae   cannot be ruled out since the antigen present in the sample   may be below the detection limit of the test.   Normal Range:Presumptive Negative    Narrative:      ORDER#: [id number]                          ORDERED BY: Catie Michaud   SOURCE: Urine Voided                       COLLECTED:  07/12/20 23:15   ANTIBIOTICS AT TAN. :                      RECEIVED :  07/12/20 23:40   Performed at:   [facility]   [address] [facility], [place] [employer] [address]   Phone [phone]    Culture, Respiratory [[id number]]      Order Status: No result  Specimen: Sputum Expectorated                Culture, Respiratory [[id number]]   Collected: 07/21/20 0500    Order Status: Sent  Specimen: Respiratory from Tracheal Aspirate  Updated: 07/22/20 1126     Gram Stain Result  3+ WBC's (Polymorphonuclear)   1+ Epithelial Cells   2+ Budding yeast    Narrative:      ORDER#: [id number]                          ORDERED BY: Summer Devi   SOURCE: Tracheal Aspirate                  COLLECTED:  07/21/20 05:00   ANTIBIOTICS AT TAN. :                      RECEIVED :  07/21/20 05:03   Performed at:   [facility]   [address] [facility], [place] [employer] [address]   Phone [phone]    Culture, Blood 2 [[id number]]   Collected: 07/12/20 1436       IMAGING:    XR CHEST PORTABLE   Final Result   Unremarkable limited KUB. NG tube tip projects at the left upper quadrant, overlying the expected   location of the stomach. Infiltrates bilateral lower lungs. XR CHEST PORTABLE   Final Result   Endotracheal tube appears in satisfactory position. NG tube tip is in the proximal stomach with side hole near the GE junction. This should be further advanced. Pulmonary edema pattern noted with cardiomegaly, vascular congestion,   bilateral pleural effusions and bibasilar airspace disease. Right upper extremity PICC line appears in appropriate position without   pneumothorax.          XR CHEST 1 VW   Final Result   1. Multifocal airspace opacities which have increased when compared with exam   from July 17, 2020.   2. Silhouetting of the left hemidiaphragm likely reflecting left effusion and   atelectasis. XR CHEST 1 VW   Final Result   Multifocal airspace disease. Areas in the right upper and left lower lung   are consolidated. However however, there are areas of improved aeration. XR CHEST PORTABLE   Final Result   Worsening multifocal airspace opacities. XR CHEST PORTABLE   Final Result   Left greater than right basilar airspace disease, concerning for pneumonia   given patient history.                All the pertinent images and reports for the current Hospitalization were reviewed by me     Scheduled Meds:   insulin lispro  0-18 Units Subcutaneous Q4H    docusate  100 mg Oral Daily    senna  10 mL Oral Nightly    polyethylene glycol  17 g Oral Daily    enoxaparin  30 mg Subcutaneous BID    sodium chloride flush  10 mL Intravenous 2 times per day    chlorhexidine  15 mL Mouth/Throat BID    famotidine (PEPCID) injection  20 mg Intravenous BID    metoprolol tartrate  25 mg Oral BID    mupirocin   Nasal BID    lactobacillus  1 capsule Oral BID WC    cefepime  2 g Intravenous Q12H    amLODIPine  5 mg Oral Daily    aspirin  325 mg Oral Daily    atorvastatin  40 mg Oral Daily    vitamin B-12  1,000 mcg Oral Daily    donepezil  5 mg Oral Nightly    gabapentin  300 mg Oral 4x Daily    losartan-hydroCHLOROthiazide  2 tablet Oral Daily       Continuous Infusions:   propofol      fentaNYL 125 mcg/hr (07/22/20 0927)    propofol Stopped (07/21/20 0315)    dextrose         PRN Meds:  propofol, sodium chloride flush, fentanNYL, sodium chloride flush, acetaminophen **OR** acetaminophen, polyethylene glycol, promethazine **OR** ondansetron, glucose, dextrose, glucagon (rDNA), dextrose, benzonatate      Assessment:     Patient Active Problem List   Diagnosis    Cervical spinal stenosis    Acute encephalopathy    Malignant hypertension    Type 2 diabetes mellitus without complication, with long-term current use of insulin (HCC)    Left-sided low back pain with left-sided sciatica    Hypercholesteremia    Primary insomnia    Hypotension    Dizziness    Essential hypertension    Elevated lactic acid level    Lactic acidosis    Atrial ectopy    Vasovagal syncope    Dysrhythmia    Chronic left-sided low back pain with left-sided sciatica    MCI (mild cognitive impairment)    Chest pain    COVID-19 virus infection    Acute respiratory failure with hypoxia ([address]    COVID-19 virus detected     COVID-19 pneumonia  Lactic acidosis  Fevers  Dm+  Htn+   CXR with Bi lateral changes     Given her presentation this all likely from COVID-19 infection and will benefit from Remdesivir therapy given her DM, HTN watch for progression      Tolerating IV Remdesivir ok and will watch lFTS AND Creat closely    Unfortunately she declined with rise in O2 REquirement and Fevers s/p  IV Tocilizumab on  7/16 now more sob with hypoxia and tx to ICU for close observation     She is getting more confused and taking  Her bIPAP off and hypoxemia and CXR worsening     Unfortunately respiratory status worsened requiring intubation and ventilation currently at 60% and repeat respiratory culture sent    Labs, Microbiology, Radiology and all the pertinent results from current hospitalization and  care every where were reviewed  by me as a part of the evaluation   Plan:   1. Completed IV Remdesivir 200 mg loading followed by x 100 mg daily x s stop date  7/17  2. Creat stable  3. Cont steroids to finish the course    4. Procal normal and Urine antigens -ve   5. Trend CRP NOW improving , Ferritin at  220  ,  6. Il -6 is elevated s/p Tocilizumab  On 7/16 given worsening resp status    7. Cont IV Cefepime 2 gm x Q 12 HRS   8.  Resp cx in process           Discussed with patient/Family and Nursing     Risk of Complications/Morbidity: High      · Illness(es)/ Infection present that pose threat to bodily function. · There is potential for severe exacerbation of infection/side effects of treatment. · Therapy requires intensive monitoring for antimicrobial agent toxicity. Discussed with patient/Family and Nursing staff     Thanks for allowing me to participate in your patient's care and please call me with any questions or concerns.     Shantelle Cortez MD  Infectious Disease  [facility] Physician  Phone: [phone]   Fax : [phone]

## 2020-07-22 NOTE — PROGRESS NOTES
Pt repositioned, assessment complete, pt remains on vent, sedated w fentanyl gtt. Pt able to follow commands and nod \" yes\" or \" no \" to questions. POC glucose- 301, covered by sliding scale humalog. Pt given evening medication via NG. Tube feed bottle and tubing changed, iv pumps cleared and cm emptied. Mouth care completed. Pt vitals signs stable, will continue to monitor.        Electronically signed by Derek Verde RN on 7/22/2020 at 5:43 PM

## 2020-07-22 NOTE — PROGRESS NOTES
Hospitalist Progress Note      PCP: Margaret Mckinney MD    Date of Admission: 7/12/2020      Subjective: Pt seen and examined. Intubated. Able to follow commands. Has no complaints. Medications:  Reviewed    Infusion Medications    propofol      fentaNYL 125 mcg/hr (07/22/20 0927)    propofol Stopped (07/21/20 0315)    dextrose       Scheduled Medications    insulin lispro  0-18 Units Subcutaneous Q4H    docusate  100 mg Oral Daily    senna  10 mL Oral Nightly    polyethylene glycol  17 g Oral Daily    enoxaparin  30 mg Subcutaneous BID    sodium chloride flush  10 mL Intravenous 2 times per day    chlorhexidine  15 mL Mouth/Throat BID    famotidine (PEPCID) injection  20 mg Intravenous BID    metoprolol tartrate  25 mg Oral BID    mupirocin   Nasal BID    lactobacillus  1 capsule Oral BID WC    cefepime  2 g Intravenous Q12H    amLODIPine  5 mg Oral Daily    aspirin  325 mg Oral Daily    atorvastatin  40 mg Oral Daily    vitamin B-12  1,000 mcg Oral Daily    donepezil  5 mg Oral Nightly    gabapentin  300 mg Oral 4x Daily    losartan-hydroCHLOROthiazide  2 tablet Oral Daily     PRN Meds: propofol, sodium chloride flush, fentanNYL, sodium chloride flush, acetaminophen **OR** acetaminophen, polyethylene glycol, promethazine **OR** ondansetron, glucose, dextrose, glucagon (rDNA), dextrose, benzonatate      Intake/Output Summary (Last 24 hours) at 7/22/2020 1242  Last data filed at 7/22/2020 1153  Gross per 24 hour   Intake 2306.1 ml   Output 1825 ml   Net 481.1 ml       Physical Exam Performed:    BP (!) 153/74   Pulse 64   Temp 99.7 °F (37.6 °C) (Axillary)   Resp 16   Ht 5' (1.524 m)   Wt 138 lb 10.7 oz (62.9 kg)   SpO2 94%   BMI 27.08 kg/m²     General appearance: Intubated. Neck: Supple  Respiratory:  Intubated. Normal respiratory effort. Clear to auscultation, bilaterally without Rales/Wheezes/Rhonchi.   Cardiovascular: Regular rate and rhythm with normal S1/S2 without murmurs, rubs or gallops. Abdomen: Soft, non-tender, non-distended with normal bowel sounds. Musculoskeletal: No clubbing, cyanosis  Skin: Skin color, texture, turgor normal.  No rashes or lesions. Neurologic:  No focal weakness   Psychiatric: Alert and oriented  Capillary Refill: Brisk,< 3 seconds   Peripheral Pulses: +2 palpable, equal bilaterally       Labs:   Recent Labs     07/20/20 0438 07/21/20  0510 07/22/20  0449   WBC 11.0 12.0* 18.1*   HGB 13.6 13.3 13.6   HCT 40.9 40.4 41.3    381 364     Recent Labs     07/20/20 0438 07/21/20  0510 07/22/20  0449    135* 133*   K 4.0 3.9 4.2    101 98*   CO2 23 25 25   BUN 28* 35* 29*   CREATININE 0.6 0.6 0.6   CALCIUM 8.8 8.7 8.5   PHOS 3.5 4.1 2.8     Recent Labs     07/20/20 0438 07/21/20  0510 07/22/20  0449   AST 39* 48* 27   ALT 29 40 39   BILITOT 0.4 0.5 0.4   ALKPHOS 62 62 72     No results for input(s): INR in the last 72 hours. No results for input(s): [place] in the last 72 hours. Urinalysis:      Lab Results   Component Value Date    NITRU Negative 07/12/2020    WBCUA 3-5 07/12/2020    BACTERIA RARE 11/16/2016    RBCUA 0-2 07/12/2020    BLOODU Negative 07/12/2020    SPECGRAV >1.030 07/12/2020    GLUCOSEU >=1000 07/12/2020    GLUCOSEU NEGATIVE 07/24/2011       Radiology:  XR CHEST PORTABLE   Final Result   Unremarkable limited KUB. NG tube tip projects at the left upper quadrant, overlying the expected   location of the stomach. Infiltrates bilateral lower lungs. XR CHEST PORTABLE   Final Result   Endotracheal tube appears in satisfactory position. NG tube tip is in the proximal stomach with side hole near the GE junction. This should be further advanced. Pulmonary edema pattern noted with cardiomegaly, vascular congestion,   bilateral pleural effusions and bibasilar airspace disease. Right upper extremity PICC line appears in appropriate position without   pneumothorax.          XR CHEST 1 VW Final Result   1. Multifocal airspace opacities which have increased when compared with exam   from July 17, 2020.   2. Silhouetting of the left hemidiaphragm likely reflecting left effusion and   atelectasis. XR CHEST 1 VW   Final Result   Multifocal airspace disease. Areas in the right upper and left lower lung   are consolidated. However however, there are areas of improved aeration. XR CHEST PORTABLE   Final Result   Worsening multifocal airspace opacities. XR CHEST PORTABLE   Final Result   Left greater than right basilar airspace disease, concerning for pneumonia   given patient history. Assessment/Plan:    Active Hospital Problems    Diagnosis    Acute respiratory failure with hypoxia ([facility] [J96.01]    COVID-19 virus detected [U07.1]    COVID-19 virus infection [U07.1]     Patient admitted with COVID19 pneumonia initially improved on steroids and remdesivir, then developed fever and increased need of oxygen, transferred to ICU, given one dose of actemra. 1.  COVID-19 pneumonia, initially was on empiric Azithromycin/ceftriaxone,  decadron 6 mg daily, Lovenox BID, respiratory culture, consulted ID and remdesivir started, clinically was improving, then  developed fever and increased need of oxygen, chest xray was worsening. Started on cefepime and received one dose of Actemra. oxygen need was 15L so patient transferred to ICU, and vapotherm started. Was then requiring BiPap and then subsequently intubated due to inability to tolerate BiPAP. Remains intubated. 2. Sepsis due to CODID 19 pneumonia, ongoing management as above. 3. Acute respiratory failure with hypoxia s/p mechanical intubation. 4. Type 2 DM, fluctuating, high dose SSI.   5. Essential hypertension, continue home meds  6. Mild cognitive impairment, continue home meds    Diet: DIET TUBE FEED CONTINUOUS/CYCLIC NPO; Diabetic; Orogastric; Continuous; 25; 60  Code Status: Full Code      Poor

## 2020-07-22 NOTE — PROGRESS NOTES
Pt's daughter Hernandez Jackson was called by RN and given updates, all questions answered. Pt's secondary contact, daughter Claudette Goldsmith, also called and received updates. Electronically signed by Luis Daniel Gregory RN on 7/22/2020 at 9:19 PM     Pt RASS -1 on vent, follows commands and nods/shakes head to questions. NSR, VSS. Tylenol given once for temp 100.5. Fentanyl sedation continues; tube feed infusing per OG with minimal residuals. No BM overnight.      Electronically signed by Luis Daniel Gregory RN on 7/23/2020 at 6:39 AM

## 2020-07-23 LAB
A/G RATIO: 0.8 (ref 1.1–2.2)
ALBUMIN SERPL-MCNC: 2.7 G/DL (ref 3.4–5)
ALP BLD-CCNC: 70 U/L (ref 40–129)
ALT SERPL-CCNC: 46 U/L (ref 10–40)
ANION GAP SERPL CALCULATED.3IONS-SCNC: 8 MMOL/L (ref 3–16)
AST SERPL-CCNC: 30 U/L (ref 15–37)
BASE EXCESS ARTERIAL: 2.2 MMOL/L (ref -3–3)
BASE EXCESS ARTERIAL: 2.4 MMOL/L (ref -3–3)
BASOPHILS ABSOLUTE: 0.1 K/UL (ref 0–0.2)
BASOPHILS RELATIVE PERCENT: 0.3 %
BILIRUB SERPL-MCNC: 0.3 MG/DL (ref 0–1)
BUN BLDV-MCNC: 27 MG/DL (ref 7–20)
CALCIUM SERPL-MCNC: 8.6 MG/DL (ref 8.3–10.6)
CARBOXYHEMOGLOBIN ARTERIAL: 0.1 % (ref 0–1.5)
CARBOXYHEMOGLOBIN ARTERIAL: 0.3 % (ref 0–1.5)
CHLORIDE BLD-SCNC: 97 MMOL/L (ref 99–110)
CO2: 26 MMOL/L (ref 21–32)
CREAT SERPL-MCNC: 0.6 MG/DL (ref 0.6–1.2)
CULTURE, RESPIRATORY: ABNORMAL
EOSINOPHILS ABSOLUTE: 0.5 K/UL (ref 0–0.6)
EOSINOPHILS RELATIVE PERCENT: 2.9 %
GFR AFRICAN AMERICAN: >60
GFR NON-AFRICAN AMERICAN: >60
GLOBULIN: 3.5 G/DL
GLUCOSE BLD-MCNC: 228 MG/DL (ref 70–99)
GLUCOSE BLD-MCNC: 248 MG/DL (ref 70–99)
GLUCOSE BLD-MCNC: 274 MG/DL (ref 70–99)
GLUCOSE BLD-MCNC: 276 MG/DL (ref 70–99)
GLUCOSE BLD-MCNC: 281 MG/DL (ref 70–99)
GLUCOSE BLD-MCNC: 316 MG/DL (ref 70–99)
GLUCOSE BLD-MCNC: 326 MG/DL (ref 70–99)
GLUCOSE BLD-MCNC: 337 MG/DL (ref 70–99)
GRAM STAIN RESULT: ABNORMAL
HCO3 ARTERIAL: 28.4 MMOL/L (ref 21–29)
HCO3 ARTERIAL: 29.2 MMOL/L (ref 21–29)
HCT VFR BLD CALC: 40.7 % (ref 36–48)
HEMOGLOBIN, ART, EXTENDED: 13.5 G/DL (ref 12–16)
HEMOGLOBIN, ART, EXTENDED: 13.9 G/DL (ref 12–16)
HEMOGLOBIN: 13.2 G/DL (ref 12–16)
LYMPHOCYTES ABSOLUTE: 1.9 K/UL (ref 1–5.1)
LYMPHOCYTES RELATIVE PERCENT: 10.1 %
MAGNESIUM: 2.4 MG/DL (ref 1.8–2.4)
MCH RBC QN AUTO: 28.9 PG (ref 26–34)
MCHC RBC AUTO-ENTMCNC: 32.6 G/DL (ref 31–36)
MCV RBC AUTO: 88.7 FL (ref 80–100)
METHEMOGLOBIN ARTERIAL: 0.4 %
METHEMOGLOBIN ARTERIAL: 0.5 %
MONOCYTES ABSOLUTE: 0.5 K/UL (ref 0–1.3)
MONOCYTES RELATIVE PERCENT: 2.9 %
NEUTROPHILS ABSOLUTE: 15.5 K/UL (ref 1.7–7.7)
NEUTROPHILS RELATIVE PERCENT: 83.8 %
O2 CONTENT ARTERIAL: 16 ML/DL
O2 CONTENT ARTERIAL: 17 ML/DL
O2 SAT, ARTERIAL: 84.9 %
O2 SAT, ARTERIAL: 90.7 %
O2 THERAPY: ABNORMAL
O2 THERAPY: ABNORMAL
ORGANISM: ABNORMAL
PCO2 ARTERIAL: 49.6 MMHG (ref 35–45)
PCO2 ARTERIAL: 53.2 MMHG (ref 35–45)
PDW BLD-RTO: 14.2 % (ref 12.4–15.4)
PERFORMED ON: ABNORMAL
PH ARTERIAL: 7.35 (ref 7.35–7.45)
PH ARTERIAL: 7.37 (ref 7.35–7.45)
PHOSPHORUS: 2.5 MG/DL (ref 2.5–4.9)
PLATELET # BLD: 304 K/UL (ref 135–450)
PMV BLD AUTO: 7.8 FL (ref 5–10.5)
PO2 ARTERIAL: 52.1 MMHG (ref 75–108)
PO2 ARTERIAL: 64.3 MMHG (ref 75–108)
POTASSIUM REFLEX MAGNESIUM: 4.5 MMOL/L (ref 3.5–5.1)
RBC # BLD: 4.59 M/UL (ref 4–5.2)
SODIUM BLD-SCNC: 131 MMOL/L (ref 136–145)
TCO2 ARTERIAL: 30 MMOL/L
TCO2 ARTERIAL: 30.8 MMOL/L
TOTAL PROTEIN: 6.2 G/DL (ref 6.4–8.2)
WBC # BLD: 18.5 K/UL (ref 4–11)

## 2020-07-23 PROCEDURE — APPNB15 APP NON BILLABLE TIME 0-15 MINS: Performed by: NURSE PRACTITIONER

## 2020-07-23 PROCEDURE — 2580000003 HC RX 258: Performed by: INTERNAL MEDICINE

## 2020-07-23 PROCEDURE — 99233 SBSQ HOSP IP/OBS HIGH 50: CPT | Performed by: INTERNAL MEDICINE

## 2020-07-23 PROCEDURE — 82803 BLOOD GASES ANY COMBINATION: CPT

## 2020-07-23 PROCEDURE — 85025 COMPLETE CBC W/AUTO DIFF WBC: CPT

## 2020-07-23 PROCEDURE — 6370000000 HC RX 637 (ALT 250 FOR IP): Performed by: NURSE PRACTITIONER

## 2020-07-23 PROCEDURE — 6360000002 HC RX W HCPCS: Performed by: INTERNAL MEDICINE

## 2020-07-23 PROCEDURE — 84100 ASSAY OF PHOSPHORUS: CPT

## 2020-07-23 PROCEDURE — 6370000000 HC RX 637 (ALT 250 FOR IP): Performed by: INTERNAL MEDICINE

## 2020-07-23 PROCEDURE — 2580000003 HC RX 258: Performed by: NURSE PRACTITIONER

## 2020-07-23 PROCEDURE — 36592 COLLECT BLOOD FROM PICC: CPT

## 2020-07-23 PROCEDURE — 99291 CRITICAL CARE FIRST HOUR: CPT | Performed by: INTERNAL MEDICINE

## 2020-07-23 PROCEDURE — 2700000000 HC OXYGEN THERAPY PER DAY

## 2020-07-23 PROCEDURE — 2000000000 HC ICU R&B

## 2020-07-23 PROCEDURE — 83735 ASSAY OF MAGNESIUM: CPT

## 2020-07-23 PROCEDURE — 80053 COMPREHEN METABOLIC PANEL: CPT

## 2020-07-23 PROCEDURE — 2500000003 HC RX 250 WO HCPCS: Performed by: NURSE PRACTITIONER

## 2020-07-23 PROCEDURE — 94750 HC PULMONARY COMPLIANCE STUDY: CPT

## 2020-07-23 PROCEDURE — 36600 WITHDRAWAL OF ARTERIAL BLOOD: CPT

## 2020-07-23 PROCEDURE — 6360000002 HC RX W HCPCS: Performed by: NURSE PRACTITIONER

## 2020-07-23 PROCEDURE — 94761 N-INVAS EAR/PLS OXIMETRY MLT: CPT

## 2020-07-23 RX ORDER — INSULIN GLARGINE 100 [IU]/ML
42 INJECTION, SOLUTION SUBCUTANEOUS DAILY
Status: DISCONTINUED | OUTPATIENT
Start: 2020-07-23 | End: 2020-07-24

## 2020-07-23 RX ORDER — LACTULOSE 10 G/15ML
20 SOLUTION ORAL 3 TIMES DAILY
Status: DISCONTINUED | OUTPATIENT
Start: 2020-07-23 | End: 2020-07-28 | Stop reason: HOSPADM

## 2020-07-23 RX ADMIN — CHLORHEXIDINE GLUCONATE 15 ML: 1.2 RINSE ORAL at 19:56

## 2020-07-23 RX ADMIN — INSULIN GLARGINE 42 UNITS: 100 INJECTION, SOLUTION SUBCUTANEOUS at 13:21

## 2020-07-23 RX ADMIN — Medication 1 CAPSULE: at 16:20

## 2020-07-23 RX ADMIN — INSULIN LISPRO 6 UNITS: 100 INJECTION, SOLUTION INTRAVENOUS; SUBCUTANEOUS at 00:37

## 2020-07-23 RX ADMIN — ENOXAPARIN SODIUM 30 MG: 30 INJECTION SUBCUTANEOUS at 08:22

## 2020-07-23 RX ADMIN — FAMOTIDINE 20 MG: 10 INJECTION, SOLUTION INTRAVENOUS at 08:22

## 2020-07-23 RX ADMIN — ENOXAPARIN SODIUM 30 MG: 30 INJECTION SUBCUTANEOUS at 19:55

## 2020-07-23 RX ADMIN — INSULIN LISPRO 12 UNITS: 100 INJECTION, SOLUTION INTRAVENOUS; SUBCUTANEOUS at 13:22

## 2020-07-23 RX ADMIN — METOPROLOL TARTRATE 25 MG: 25 TABLET, FILM COATED ORAL at 19:54

## 2020-07-23 RX ADMIN — Medication 125 MCG/HR: at 01:40

## 2020-07-23 RX ADMIN — ASPIRIN 325 MG ORAL TABLET 325 MG: 325 PILL ORAL at 08:22

## 2020-07-23 RX ADMIN — DOCUSATE SODIUM 100 MG: 50 LIQUID ORAL at 08:21

## 2020-07-23 RX ADMIN — INSULIN LISPRO 9 UNITS: 100 INJECTION, SOLUTION INTRAVENOUS; SUBCUTANEOUS at 04:43

## 2020-07-23 RX ADMIN — INSULIN LISPRO 12 UNITS: 100 INJECTION, SOLUTION INTRAVENOUS; SUBCUTANEOUS at 16:50

## 2020-07-23 RX ADMIN — HYDROCODONE BITARTRATE AND ACETAMINOPHEN 10 ML: 176/5 SOLUTION ORAL at 19:55

## 2020-07-23 RX ADMIN — CEFEPIME HYDROCHLORIDE 2 G: 2 INJECTION, POWDER, FOR SOLUTION INTRAVENOUS at 16:21

## 2020-07-23 RX ADMIN — INSULIN LISPRO 9 UNITS: 100 INJECTION, SOLUTION INTRAVENOUS; SUBCUTANEOUS at 19:53

## 2020-07-23 RX ADMIN — DONEPEZIL HYDROCHLORIDE 5 MG: 5 TABLET, FILM COATED ORAL at 19:55

## 2020-07-23 RX ADMIN — GABAPENTIN 300 MG: 300 CAPSULE ORAL at 12:05

## 2020-07-23 RX ADMIN — Medication 125 MCG/HR: at 16:51

## 2020-07-23 RX ADMIN — ATORVASTATIN CALCIUM 40 MG: 40 TABLET, FILM COATED ORAL at 08:22

## 2020-07-23 RX ADMIN — CYANOCOBALAMIN TAB 1000 MCG 1000 MCG: 1000 TAB at 08:22

## 2020-07-23 RX ADMIN — GABAPENTIN 300 MG: 300 CAPSULE ORAL at 19:55

## 2020-07-23 RX ADMIN — MUPIROCIN: 20 OINTMENT TOPICAL at 19:56

## 2020-07-23 RX ADMIN — INSULIN LISPRO 9 UNITS: 100 INJECTION, SOLUTION INTRAVENOUS; SUBCUTANEOUS at 08:48

## 2020-07-23 RX ADMIN — Medication 125 MCG/HR: at 08:11

## 2020-07-23 RX ADMIN — METOPROLOL TARTRATE 25 MG: 25 TABLET, FILM COATED ORAL at 08:22

## 2020-07-23 RX ADMIN — Medication 1 CAPSULE: at 08:22

## 2020-07-23 RX ADMIN — INSULIN LISPRO 6 UNITS: 100 INJECTION, SOLUTION INTRAVENOUS; SUBCUTANEOUS at 23:56

## 2020-07-23 RX ADMIN — FAMOTIDINE 20 MG: 10 INJECTION, SOLUTION INTRAVENOUS at 19:55

## 2020-07-23 RX ADMIN — SODIUM CHLORIDE, PRESERVATIVE FREE 10 ML: 5 INJECTION INTRAVENOUS at 08:23

## 2020-07-23 RX ADMIN — AMLODIPINE BESYLATE 5 MG: 5 TABLET ORAL at 08:22

## 2020-07-23 RX ADMIN — Medication 125 MCG/HR: at 04:21

## 2020-07-23 RX ADMIN — ACETAMINOPHEN 650 MG: 325 TABLET ORAL at 04:50

## 2020-07-23 RX ADMIN — MUPIROCIN: 20 OINTMENT TOPICAL at 08:31

## 2020-07-23 RX ADMIN — LACTULOSE 20 G: 20 SOLUTION ORAL at 13:59

## 2020-07-23 RX ADMIN — GABAPENTIN 300 MG: 300 CAPSULE ORAL at 08:22

## 2020-07-23 RX ADMIN — GABAPENTIN 300 MG: 300 CAPSULE ORAL at 16:20

## 2020-07-23 RX ADMIN — LACTULOSE 20 G: 20 SOLUTION ORAL at 19:55

## 2020-07-23 RX ADMIN — POLYETHYLENE GLYCOL 3350 17 G: 17 POWDER, FOR SOLUTION ORAL at 08:21

## 2020-07-23 RX ADMIN — CHLORHEXIDINE GLUCONATE 15 ML: 1.2 RINSE ORAL at 08:49

## 2020-07-23 RX ADMIN — CEFEPIME HYDROCHLORIDE 2 G: 2 INJECTION, POWDER, FOR SOLUTION INTRAVENOUS at 04:33

## 2020-07-23 RX ADMIN — LOSARTAN POTASSIUM AND HYDROCHLOROTHIAZIDE 2 TABLET: 12.5; 5 TABLET ORAL at 08:21

## 2020-07-23 RX ADMIN — Medication 125 MCG/HR: at 20:08

## 2020-07-23 RX ADMIN — SODIUM CHLORIDE, PRESERVATIVE FREE 10 ML: 5 INJECTION INTRAVENOUS at 20:25

## 2020-07-23 ASSESSMENT — PULMONARY FUNCTION TESTS
PIF_VALUE: 26
PIF_VALUE: 28
PIF_VALUE: 25
PIF_VALUE: 24
PIF_VALUE: 25
PIF_VALUE: 26
PIF_VALUE: 10
PIF_VALUE: 26
PIF_VALUE: 11
PIF_VALUE: 14
PIF_VALUE: 11
PIF_VALUE: 27
PIF_VALUE: 26
PIF_VALUE: 11
PIF_VALUE: 25
PIF_VALUE: 10
PIF_VALUE: 27
PIF_VALUE: 13
PIF_VALUE: 13
PIF_VALUE: 27
PIF_VALUE: 26
PIF_VALUE: 11
PIF_VALUE: 23
PIF_VALUE: 11
PIF_VALUE: 29
PIF_VALUE: 11
PIF_VALUE: 11
PIF_VALUE: 14
PIF_VALUE: 13
PIF_VALUE: 14
PIF_VALUE: 15
PIF_VALUE: 14
PIF_VALUE: 10

## 2020-07-23 ASSESSMENT — PAIN SCALES - GENERAL
PAINLEVEL_OUTOF10: 1
PAINLEVEL_OUTOF10: 0

## 2020-07-23 NOTE — PROGRESS NOTES
Pulmonary Progress Note    Date of Admission: 7/12/2020   LOS: 11 days       CC:  COVID-19    Subjective: Weaned to 50 %         ROS:   On vent. PHYSICAL EXAM:   Blood pressure (!) 112/56, pulse 83, temperature 100.5 °F (38.1 °C), temperature source Axillary, resp. rate 20, height 5' (1.524 m), weight 136 lb 11 oz (62 kg), SpO2 91 %, not currently breastfeeding.'     Gen: No distress. On vent. ENT:   Resp: No accessory muscle use. No crackles. No wheezes. No rhonchi. CV: Regular rate. Regular rhythm. No murmur or rub. No edema. Skin: Warm, dry, normal texture and turgor. No nodule on exposed extremities. M/S: No cyanosis. No clubbing. No joint deformity. Psych: wakes. Follows commands.           Medications:    Scheduled Meds:   insulin lispro  0-18 Units Subcutaneous Q4H    docusate  100 mg Oral Daily    senna  10 mL Oral Nightly    polyethylene glycol  17 g Oral Daily    enoxaparin  30 mg Subcutaneous BID    sodium chloride flush  10 mL Intravenous 2 times per day    chlorhexidine  15 mL Mouth/Throat BID    famotidine (PEPCID) injection  20 mg Intravenous BID    metoprolol tartrate  25 mg Oral BID    mupirocin   Nasal BID    lactobacillus  1 capsule Oral BID WC    cefepime  2 g Intravenous Q12H    amLODIPine  5 mg Oral Daily    aspirin  325 mg Oral Daily    atorvastatin  40 mg Oral Daily    vitamin B-12  1,000 mcg Oral Daily    donepezil  5 mg Oral Nightly    gabapentin  300 mg Oral 4x Daily    losartan-hydroCHLOROthiazide  2 tablet Oral Daily       Continuous Infusions:   propofol      fentaNYL 125 mcg/hr (07/23/20 0421)    propofol Stopped (07/21/20 0315)    dextrose         PRN Meds:  propofol, sodium chloride flush, fentanNYL, sodium chloride flush, acetaminophen **OR** acetaminophen, polyethylene glycol, promethazine **OR** ondansetron, glucose, dextrose, glucagon (rDNA), dextrose, benzonatate    Labs reviewed:  CBC:   Recent Labs     07/21/20  0510 07/22/20  1004 07/23/20  0504   WBC 12.0* 18.1* 18.5*   HGB 13.3 13.6 13.2   HCT 40.4 41.3 40.7   MCV 88.3 88.0 88.7    364 304     BMP:   Recent Labs     07/21/20  0510 07/22/20  0449 07/23/20  0504   * 133* 131*   K 3.9 4.2 4.5    98* 97*   CO2 25 25 26   PHOS 4.1 2.8 2.5   BUN 35* 29* 27*   CREATININE 0.6 0.6 0.6     LIVER PROFILE:   Recent Labs     07/21/20  0510 07/22/20  0449 07/23/20  0504   AST 48* 27 30   ALT 40 39 46*   BILITOT 0.5 0.4 0.3   ALKPHOS 62 72 70     PT/INR: No results for input(s): PROTIME, INR in the last 72 hours. APTT: No results for input(s): APTT in the last 72 hours. UA:No results for input(s): NITRITE, COLORU, PHUR, LABCAST, WBCUA, RBCUA, MUCUS, TRICHOMONAS, YEAST, BACTERIA, CLARITYU, SPECGRAV, LEUKOCYTESUR, UROBILINOGEN, BILIRUBINUR, BLOODU, GLUCOSEU, AMORPHOUS in the last 72 hours. Invalid input(s): Richardcinthya Richeysamy  No results for input(s): PH, PCO2, PO2 in the last 72 hours. Cx:      Films:          Assessment:         Acute hypoxemic respiratory failure   ARDS   [id number]-[id number]   Acute metabolic encephalopathy   History of dementia with significant confusion. Plan:           -AC VC, 350, PEEP 10.  50% FiO2.    -Decadron 6 mg daily, completed    -Remdesivir 7/13-17, Actemra, 7/16,  -Weaning FiO2. Able to wean down to 40%. PEEP decreased to 8. PO2 marginal at 52. Attempt SBT. -Continues to have fevers with a low procalcitonin. Likely secondary to COVID-19. Cefepime per ID. Checking COVID-19 labs every other day  -Bowel regimen. No bowel movement. Increase regimen.             Nutrition  - DIET TUBE FEED CONTINUOUS/CYCLIC NPO; Diabetic; Orogastric; Continuous; 25; 60  -     Intake/Output Summary (Last 24 hours) at 7/23/2020 0655  Last data filed at 7/23/2020 2935  Gross per 24 hour   Intake 2630.8 ml   Output 1515 ml   Net 1115.8 ml        Access  Arterial      PICC          CVC                     I spent 34  minutes of critical care time with this patient excluding any procedures. This note was transcribed using [id number]. Please disregard any translational errors.       Kael Aparicio [facility] [phone]

## 2020-07-23 NOTE — PROGRESS NOTES
Hospitalist Progress Note      PCP: Charles Coates MD    Date of Admission: 7/12/2020      Subjective: Pt seen and examined. Intubated. Able to follow commands. Has no complaints. Medications:  Reviewed    Infusion Medications    propofol      fentaNYL 125 mcg/hr (07/23/20 0811)    propofol Stopped (07/21/20 0315)    dextrose       Scheduled Medications    insulin glargine  42 Units Subcutaneous Daily    lactulose  20 g Oral TID    insulin lispro  0-18 Units Subcutaneous Q4H    docusate  100 mg Oral Daily    senna  10 mL Oral Nightly    polyethylene glycol  17 g Oral Daily    enoxaparin  30 mg Subcutaneous BID    sodium chloride flush  10 mL Intravenous 2 times per day    chlorhexidine  15 mL Mouth/Throat BID    famotidine (PEPCID) injection  20 mg Intravenous BID    metoprolol tartrate  25 mg Oral BID    mupirocin   Nasal BID    lactobacillus  1 capsule Oral BID WC    cefepime  2 g Intravenous Q12H    amLODIPine  5 mg Oral Daily    aspirin  325 mg Oral Daily    atorvastatin  40 mg Oral Daily    vitamin B-12  1,000 mcg Oral Daily    donepezil  5 mg Oral Nightly    gabapentin  300 mg Oral 4x Daily    losartan-hydroCHLOROthiazide  2 tablet Oral Daily     PRN Meds: propofol, sodium chloride flush, fentanNYL, acetaminophen **OR** acetaminophen, polyethylene glycol, promethazine **OR** ondansetron, glucose, dextrose, glucagon (rDNA), dextrose      Intake/Output Summary (Last 24 hours) at 7/23/2020 1410  Last data filed at 7/23/2020 1204  Gross per 24 hour   Intake 2997.8 ml   Output 1490 ml   Net 1507.8 ml       Physical Exam Performed:    BP (!) 143/64   Pulse 72   Temp 99.7 °F (37.6 °C) (Axillary)   Resp 25   Ht 5' (1.524 m)   Wt 136 lb 11 oz (62 kg)   SpO2 95%   BMI 26.69 kg/m²     General appearance: Intubated. Neck: Supple  Respiratory:  Intubated. Normal respiratory effort. Clear to auscultation, bilaterally without Rales/Wheezes/Rhonchi.   Cardiovascular: Regular rate and rhythm with normal S1/S2 without murmurs, rubs or gallops. Abdomen: Soft, non-tender, non-distended with normal bowel sounds. Musculoskeletal: No clubbing, cyanosis  Skin: Skin color, texture, turgor normal.  No rashes or lesions. Neurologic:  No focal weakness   Psychiatric: Alert and oriented  Capillary Refill: Brisk,< 3 seconds   Peripheral Pulses: +2 palpable, equal bilaterally       Labs:   Recent Labs     07/21/20  0510 07/22/20  0449 07/23/20  0504   WBC 12.0* 18.1* 18.5*   HGB 13.3 13.6 13.2   HCT 40.4 41.3 40.7    364 304     Recent Labs     07/21/20  0510 07/22/20  0449 07/23/20  0504   * 133* 131*   K 3.9 4.2 4.5    98* 97*   CO2 25 25 26   BUN 35* 29* 27*   CREATININE 0.6 0.6 0.6   CALCIUM 8.7 8.5 8.6   PHOS 4.1 2.8 2.5     Recent Labs     07/21/20  0510 07/22/20  0449 07/23/20  0504   AST 48* 27 30   ALT 40 39 46*   BILITOT 0.5 0.4 0.3   ALKPHOS 62 72 70     No results for input(s): INR in the last 72 hours. No results for input(s): Kimberli Caul in the last 72 hours. Urinalysis:      Lab Results   Component Value Date    NITRU Negative 07/12/2020    WBCUA 3-5 07/12/2020    BACTERIA RARE 11/16/2016    RBCUA 0-2 07/12/2020    BLOODU Negative 07/12/2020    SPECGRAV >1.030 07/12/2020    GLUCOSEU >=1000 07/12/2020    GLUCOSEU NEGATIVE 07/24/2011       Radiology:  XR CHEST PORTABLE   Final Result   Unremarkable limited KUB. NG tube tip projects at the left upper quadrant, overlying the expected   location of the stomach. Infiltrates bilateral lower lungs. XR CHEST PORTABLE   Final Result   Endotracheal tube appears in satisfactory position. NG tube tip is in the proximal stomach with side hole near the GE junction. This should be further advanced. Pulmonary edema pattern noted with cardiomegaly, vascular congestion,   bilateral pleural effusions and bibasilar airspace disease.       Right upper extremity PICC line appears in appropriate position without   pneumothorax. XR CHEST 1 VW   Final Result   1. Multifocal airspace opacities which have increased when compared with exam   from July 17, 2020.   2. Silhouetting of the left hemidiaphragm likely reflecting left effusion and   atelectasis. XR CHEST 1 VW   Final Result   Multifocal airspace disease. Areas in the right upper and left lower lung   are consolidated. However however, there are areas of improved aeration. XR CHEST PORTABLE   Final Result   Worsening multifocal airspace opacities. XR CHEST PORTABLE   Final Result   Left greater than right basilar airspace disease, concerning for pneumonia   given patient history. Assessment/Plan:    Active Hospital Problems    Diagnosis    Acute respiratory failure with hypoxia ([facility] [address] [J96.01]    COVID-19 virus detected [U07.1]    COVID-19 virus infection [U07.1]     Patient admitted with COVID19 pneumonia initially improved on steroids and remdesivir, then developed fever and increased need of oxygen, transferred to ICU, given one dose of actemra. 1.  COVID-19 pneumonia, initially was on empiric Azithromycin/ceftriaxone,  decadron 6 mg daily, Lovenox BID, respiratory culture, consulted ID and remdesivir started, clinically was improving, then  developed fever and increased need of oxygen, chest xray was worsening. Started on cefepime and received one dose of Actemra. oxygen need was 15L so patient transferred to ICU, and vapotherm started. Was then requiring BiPap and then subsequently intubated due to inability to tolerate BiPAP. Remains intubated. On SBT today. 2. Sepsis due to CODID 19 pneumonia, ongoing management as above. 3. Acute respiratory failure with hypoxia s/p mechanical intubation. 4. Type 2 DM, fluctuating, high dose SSI.   5. Essential hypertension, continue home meds  6. Mild cognitive impairment, continue home meds    Diet: DIET TUBE FEED CONTINUOUS/CYCLIC NPO; Diabetic; Orogastric; Continuous; 25; 60  Code Status: Full Code      Poor prognosis    Sari Brannon MD

## 2020-07-23 NOTE — PROGRESS NOTES
Infectious Disease Follow up Notes  Admit Date: 7/12/2020  Hospital Day: 12    Antibiotics :   IV Remdesivir STOP 7/17  Dexamethasone   S/p Tocilizumab on  7/16   IV Cefepime   CHIEF COMPLAINT:       COVID-19 pneumonia  Resp failure     Subjective interval History :  75 y.o. woman with HTN, DM admitted with cough, sob, fevers and not feeling well recent diagnosed with COVID-19 infection on 7/2 now with worsening symptoms admitted through ED and Lactic acid elevation with mild elevation in CRP, LDH and mild elevation in Ferritin we are consulted for recommendations.  She is using 5 lts nasal cannula and had fever 101 ON Admit.       Remains intubated on the ventilator in ICU Fio2 wean down and SBT In progress and tolerating IV abx ok kimmy RN during rounds      past Medical History:    Past Medical History:   Diagnosis Date    Diabetes mellitus ([address]     NIDDM    Headache(784.0)     Herniated disc, cervical     Hypercholesteremia     Hypertension     Memory loss     short term    Psychiatric problem     Type 2 diabetes mellitus without complication ([facility] [address] [facility]        Past Surgical History:    Past Surgical History:   Procedure Laterality Date    SHOULDER SURGERY      TUBAL LIGATION         Current Medications:    Outpatient Medications Marked as Taking for the 7/12/20 encounter [facility] Encounter)   Medication Sig Dispense Refill    SITagliptin (JANUVIA) 100 MG tablet Take 1 tablet by mouth daily For diabetes 90 tablet 3    amLODIPine (NORVASC) 5 MG tablet Take 1 tablet by mouth daily 90 tablet 3    donepezil (ARICEPT) 5 MG tablet Take 1 tablet by mouth nightly 90 tablet 3    traZODone (DESYREL) 100 MG tablet TAKE ONE TABLET BY MOUTH DAILY AS NEEDED FOR SLEEP 90 tablet 3    losartan-hydrochlorothiazide (HYZAAR) 100-25 MG per tablet Take 1 tablet by mouth daily 90 tablet 3    metFORMIN (GLUCOPHAGE) 1000 MG tablet Take 1 tablet by mouth daily (with breakfast) For diabetes 30 tablet 5    vitamin D (ERGOCALCIFEROL) 1.25 MG (67572 UT) CAPS capsule Take 1 capsule by mouth once a week 12 capsule 3    Cyanocobalamin 1000 MCG SUBL Place 1,000 mcg under the tongue daily 90 tablet 3    insulin glargine (LANTUS SOLOSTAR) 100 UNIT/ML injection pen Inject 42 Units into the skin 2 times daily 90 mL 3    gabapentin (NEURONTIN) 300 MG capsule Take 1 capsule by mouth 4 times daily. For nerve pain related to diabetes 360 capsule 3    atorvastatin (LIPITOR) 40 MG tablet Take 1 tablet by mouth daily For cholesterol and heart 90 tablet 3    empagliflozin (JARDIANCE) 25 MG tablet Take 25 mg by mouth daily 90 tablet 3    metoprolol succinate (TOPROL XL) 50 MG extended release tablet Take 1 tablet by mouth daily For heart 90 tablet 3    ondansetron (ZOFRAN ODT) 4 MG disintegrating tablet Take 1 tablet by mouth every 8 hours as needed for Nausea 20 tablet 0    aspirin 325 MG tablet Take 1 tablet by mouth daily 30 tablet 3       Allergies:  Patient has no known allergies. Immunizations :   Immunization History   Administered Date(s) Administered    Influenza Virus Vaccine 09/01/2017    Influenza, High Dose (Fluzone 65 yrs and older) 09/27/2018    Pneumococcal Conjugate 13-valent (Debby Mitzi) 07/26/2019    Pneumococcal Polysaccharide (Czjuwkoft50) 06/06/2018    Tdap (Boostrix, Adacel) 07/26/2019       Social History:     Social History     Tobacco Use    Smoking status: Never Smoker    Smokeless tobacco: Never Used   Substance Use Topics    Alcohol use: No    Drug use: No     Social History     Tobacco Use   Smoking Status Never Smoker   Smokeless Tobacco Never Used      Family History   Problem Relation Age of Onset    Diabetes Mother     High Blood Pressure Mother     Diabetes Father     High Blood Pressure Father          REVIEW OF SYSTEMS:    No fever / chills / sweats. No weight loss. No visual change, eye pain, eye discharge.     No oral lesion, sore throat, dysphagia. Denies cough / sputum/Sob   Denies chest pain, palpitations/ dizziness  Denies nausea/ vomiting/abdominal pain/diarrhea. Denies dysuria or change in urinary function. Denies joint swelling or pain. No myalgia, arthralgia. No rashes, skin lesions   Denies focal weakness, sensory change or other neurologic symptoms  No lymph node swelling or tenderness. Cough sob, resp distress     PHYSICAL EXAM:      Vitals:    /72   Pulse 83   Temp 100.2 °F (37.9 °C) (Axillary)   Resp 20   Ht 5' (1.524 m)   Wt 136 lb 11 oz (62 kg)   SpO2 97%   BMI 26.69 kg/m²     In-person bedside physical examination deferred. Pursuant to the emergency declaration under the [address] [place] [address] waiver authority and the [employer] Act, this clinical encounter was conducted to provide necessary medical care.   (Also consistent with new provisions and guidance offered by [facility] on March 18, 2020 in setting of COVID 19 outbreak and in order to preserve personal protective equipment in accordance with the flexibilities announced by CMS on March 30, 2020)   References: [email or web address]/[email or web address]/Resources/COVID-19/3_18%20Telemed%20Guidance%20Updated%20March%2018. pdf?[email or web address]                      [email or web address]/[email or web address]/Resources/COVID-19/3_18%20Telemed%20Guidance%20Updated%20March%2018. pdf?azi=[email or web address]                      http://[email or web address] pdf                                Pulmonary: deferred  Abdomen/GI: deferred  Neuro: deferred  Skin: deferred  Musculoskeletal:  deferred  Genitourinary: Deferred  Psych: deferred  Lymphatic/Immunologic: deferred       Data Review:    Lab Results   Component Value Date    WBC 18.5 (H) 07/23/2020    HGB 13.2 07/23/2020    HCT 40.7 07/23/2020    MCV 88.7 07/23/2020  07/23/2020     Lab Results   Component Value Date    CREATININE 0.6 07/23/2020    BUN 27 (H) 07/23/2020     (L) 07/23/2020    K 4.5 07/23/2020    CL 97 (L) 07/23/2020    CO2 26 07/23/2020       Hepatic Function Panel:   Lab Results   Component Value Date    ALKPHOS 70 07/23/2020    ALT 46 07/23/2020    AST 30 07/23/2020    PROT 6.2 07/23/2020    PROT 7.3 01/08/2013    BILITOT 0.3 07/23/2020    BILIDIR 0.20 10/12/2012    IBILI 0.5 10/12/2012    LABALBU 2.7 07/23/2020       UA:  Lab Results   Component Value Date    COLORU YELLOW 07/12/2020    CLARITYU CLOUDY 07/12/2020    GLUCOSEU >=1000 07/12/2020    GLUCOSEU NEGATIVE 07/24/2011    BILIRUBINUR Negative 07/12/2020    BILIRUBINUR NEGATIVE 07/24/2011    KETUA Negative 07/12/2020    SPECGRAV >1.030 07/12/2020    BLOODU Negative 07/12/2020    PHUR 5.5 07/12/2020    PROTEINU 30 07/12/2020    UROBILINOGEN 1.0 07/12/2020    NITRU Negative 07/12/2020    LEUKOCYTESUR Negative 07/12/2020    LABMICR YES 07/12/2020    URINETYPE Other 07/12/2020      Urine Microscopic:   Lab Results   Component Value Date    LABCAST 20-40 Hyaline 11/29/2014    BACTERIA RARE 11/16/2016    COMU see below 07/12/2020    HYALCAST 0-2 07/12/2020    WBCUA 3-5 07/12/2020    RBCUA 0-2 07/12/2020    EPIU 2-5 07/12/2020     Ferritin  220     CRP 37.9     D dimer  746       MICRO: cultures reviewed and updated by me          Culture, Blood 1 [[id number]]   Collected: 07/12/20 1436    Order Status: Completed  Specimen: Blood  Updated: 07/13/20 1616     Blood Culture, Routine  No Growth to date.  Any change in status will be called. Narrative:      ORDER#: [id number]                          ORDERED BY: JESSE METCALF   SOURCE: Blood                              COLLECTED:  07/12/20 14:36   ANTIBIOTICS AT TAN. :                      RECEIVED :  07/12/20 14:59   If child <=2 yrs old please draw pediatric bottle. ~Blood Culture #1   Performed at:   [facility]   [id number] [employer] [address]   Phone [phone]    Culture, Blood 2 [[id number]]   Collected: 07/12/20 1436    Order Status: Completed  Specimen: Blood  Updated: 07/13/20 1616     Culture, Blood 2  No Growth to date.  Any change in status will be called. Narrative:      ORDER#: [id number]                          ORDERED BY: JESSE METCALF   SOURCE: Blood                              COLLECTED:  07/12/20 14:36   ANTIBIOTICS AT TAN. :                      RECEIVED :  07/12/20 15:00   If child <=2 yrs old please draw pediatric bottle. ~Blood Culture #2   Performed at:   [facility]   [address] [facility] [address] [facility] [address] [employer] [address]   Phone [phone]    Legionella antigen, urine [[id number]]   Collected: 07/12/20 2315    Order Status: Completed  Specimen: Urine voided  Updated: 07/13/20 1146     L. pneumophila Serogp 1 Ur Ag  --     Presumptive Negative   No Legionella pneumophila serogroup 1 antigens detected. A negative result does not exclude infection with   Legionella pneumophila serogroup 1 nor does it rule out   other microbial-caused respiratory infections or   disease caused by other serogroups of   Legionella pneumophila. Normal Range: Presumptive Negative    Narrative:      ORDER#: [id number]                          ORDERED BY: Samreen Garcia   SOURCE: Urine Voided                       COLLECTED:  07/12/20 23:15   ANTIBIOTICS AT TAN. :                      RECEIVED :  07/12/20 23:40   Performed at:   [facility]   [address] [facility] [facility] [address] [address] [employer] [address]   Phone [phone]    Strep Pneumoniae Antigen [[id number]]   Collected: 07/12/20 2315    Order Status: Completed  Specimen: Urine voided  Updated: 07/13/20 1131     STREP PNEUMONIAE ANTIGEN, URINE  --     Presumptive Negative   Presumptive negative suggests no current or recent   pneumococcal infection.  Infection due to Strep pneumoniae   cannot be ruled out since the Final Result   1. Multifocal airspace opacities which have increased when compared with exam   from July 17, 2020.   2. Silhouetting of the left hemidiaphragm likely reflecting left effusion and   atelectasis. XR CHEST 1 VW   Final Result   Multifocal airspace disease. Areas in the right upper and left lower lung   are consolidated. However however, there are areas of improved aeration. XR CHEST PORTABLE   Final Result   Worsening multifocal airspace opacities. XR CHEST PORTABLE   Final Result   Left greater than right basilar airspace disease, concerning for pneumonia   given patient history.                All the pertinent images and reports for the current Hospitalization were reviewed by me     Scheduled Meds:   insulin lispro  0-18 Units Subcutaneous Q4H    docusate  100 mg Oral Daily    senna  10 mL Oral Nightly    polyethylene glycol  17 g Oral Daily    enoxaparin  30 mg Subcutaneous BID    sodium chloride flush  10 mL Intravenous 2 times per day    chlorhexidine  15 mL Mouth/Throat BID    famotidine (PEPCID) injection  20 mg Intravenous BID    metoprolol tartrate  25 mg Oral BID    mupirocin   Nasal BID    lactobacillus  1 capsule Oral BID WC    cefepime  2 g Intravenous Q12H    amLODIPine  5 mg Oral Daily    aspirin  325 mg Oral Daily    atorvastatin  40 mg Oral Daily    vitamin B-12  1,000 mcg Oral Daily    donepezil  5 mg Oral Nightly    gabapentin  300 mg Oral 4x Daily    losartan-hydroCHLOROthiazide  2 tablet Oral Daily       Continuous Infusions:   propofol      fentaNYL 125 mcg/hr (07/23/20 0811)    propofol Stopped (07/21/20 0315)    dextrose         PRN Meds:  propofol, sodium chloride flush, fentanNYL, sodium chloride flush, acetaminophen **OR** acetaminophen, polyethylene glycol, promethazine **OR** ondansetron, glucose, dextrose, glucagon (rDNA), dextrose, benzonatate      Assessment:     Patient Active Problem List   Diagnosis    Cervical spinal stenosis    Acute encephalopathy    Malignant hypertension    Type 2 diabetes mellitus without complication, with long-term current use of insulin (HCC)    Left-sided low back pain with left-sided sciatica    Hypercholesteremia    Primary insomnia    Hypotension    Dizziness    Essential hypertension    Elevated lactic acid level    Lactic acidosis    Atrial ectopy    Vasovagal syncope    Dysrhythmia    Chronic left-sided low back pain with left-sided sciatica    MCI (mild cognitive impairment)    Chest pain    COVID-19 virus infection    Acute respiratory failure with hypoxia ([address]    COVID-19 virus detected     COVID-19 pneumonia  Lactic acidosis  Fevers  Dm+  Htn+   CXR with Bi lateral changes     Given her presentation this all likely from COVID-19 infection and will benefit from Remdesivir therapy given her DM, HTN watch for progression      Tolerating IV Remdesivir ok and will watch lFTS AND Creat closely    Unfortunately she declined with rise in O2 REquirement and Fevers s/p  IV Tocilizumab on  7/16 now more sob with hypoxia and tx to ICU for close observation     She is getting more confused and taking  Her bIPAP off and hypoxemia and CXR worsening     Unfortunately respiratory status worsened requiring intubation and ventilation     Fio2 SLOW improvement and SBT in progress to see if she can pass the test     WBC elevation from steroids      Labs, Microbiology, Radiology and all the pertinent results from current hospitalization and  care every where were reviewed  by me as a part of the evaluation   Plan:   1. Completed IV Remdesivir 200 mg loading followed by x 100 mg daily x s stop date  7/17  2. Creat stable  3. Cont steroids to finish the course    4. Procal normal and Urine antigens -ve   5. Trend CRP NOW improving , Ferritin at  220  ,  6. Il -6 is elevated s/p Tocilizumab  On 7/16 given worsening resp status    7. Cont IV Cefepime 2 gm x Q 12 HRS   8.  Resp cx in process 7/21 candida no therapy warranted         Discussed with patient/Family and Nursing     Risk of Complications/Morbidity: High      · Illness(es)/ Infection present that pose threat to bodily function. · There is potential for severe exacerbation of infection/side effects of treatment. · Therapy requires intensive monitoring for antimicrobial agent toxicity. Discussed with patient/Family and Nursing staff     Thanks for allowing me to participate in your patient's care and please call me with any questions or concerns.     Pau Munoz MD  Infectious Disease  [facility]) Physician  Phone: [phone]   Fax : [phone]

## 2020-07-23 NOTE — PROGRESS NOTES
0700: handoff report received from [facility]. 9898: head to toe assessment complete, see flowsheet. Patient follow commands, sedated on fentanyl and vented. NSR on the monitor, sating above 90%. Tube feed running through OGT. KVO and fentanyl gtt infusing via PICC. Freitas and restraints in place. Patient repositioned and oral care given. Will continue monitor. 1204: patient reassessed, see flowsheet. Critical care rounded on patient, SBT per Dr. Zabrina Hou. Pat sating above 90%. Will continue monitor. 1415: ABG drawn by RT and reviewed results. 1446: notified Dr. Zabrina Hou about pt's ABG results. No extubation today. 1620: reassessed pt, no significant changes at this time. Repositioned patient. Will continue monitor. 1915: handoff report given to Jose Manuel Welch and Kenneth Olivo RN.      Electronically signed by Becca Gardner RN on 7/23/2020 at 7:15 PM

## 2020-07-24 LAB
A/G RATIO: 0.9 (ref 1.1–2.2)
ALBUMIN SERPL-MCNC: 3.2 G/DL (ref 3.4–5)
ALP BLD-CCNC: 80 U/L (ref 40–129)
ALT SERPL-CCNC: 36 U/L (ref 10–40)
ANION GAP SERPL CALCULATED.3IONS-SCNC: 13 MMOL/L (ref 3–16)
AST SERPL-CCNC: 25 U/L (ref 15–37)
BASOPHILS ABSOLUTE: 0.1 K/UL (ref 0–0.2)
BASOPHILS RELATIVE PERCENT: 0.4 %
BILIRUB SERPL-MCNC: 0.4 MG/DL (ref 0–1)
BUN BLDV-MCNC: 20 MG/DL (ref 7–20)
C-REACTIVE PROTEIN: 0.9 MG/L (ref 0–5.1)
CALCIUM SERPL-MCNC: 9.4 MG/DL (ref 8.3–10.6)
CHLORIDE BLD-SCNC: 98 MMOL/L (ref 99–110)
CO2: 25 MMOL/L (ref 21–32)
CREAT SERPL-MCNC: <0.5 MG/DL (ref 0.6–1.2)
D DIMER: 886 NG/ML DDU (ref 0–229)
EOSINOPHILS ABSOLUTE: 0.2 K/UL (ref 0–0.6)
EOSINOPHILS RELATIVE PERCENT: 1 %
FERRITIN: 231.2 NG/ML (ref 15–150)
GFR AFRICAN AMERICAN: >60
GFR NON-AFRICAN AMERICAN: >60
GLOBULIN: 3.6 G/DL
GLUCOSE BLD-MCNC: 224 MG/DL (ref 70–99)
GLUCOSE BLD-MCNC: 252 MG/DL (ref 70–99)
GLUCOSE BLD-MCNC: 253 MG/DL (ref 70–99)
GLUCOSE BLD-MCNC: 300 MG/DL (ref 70–99)
GLUCOSE BLD-MCNC: 310 MG/DL (ref 70–99)
GLUCOSE BLD-MCNC: 321 MG/DL (ref 70–99)
HCT VFR BLD CALC: 41.5 % (ref 36–48)
HEMOGLOBIN: 13.8 G/DL (ref 12–16)
LYMPHOCYTES ABSOLUTE: 0.7 K/UL (ref 1–5.1)
LYMPHOCYTES RELATIVE PERCENT: 3.7 %
MAGNESIUM: 2.1 MG/DL (ref 1.8–2.4)
MCH RBC QN AUTO: 29.3 PG (ref 26–34)
MCHC RBC AUTO-ENTMCNC: 33.3 G/DL (ref 31–36)
MCV RBC AUTO: 87.9 FL (ref 80–100)
MONOCYTES ABSOLUTE: 0.6 K/UL (ref 0–1.3)
MONOCYTES RELATIVE PERCENT: 3.1 %
NEUTROPHILS ABSOLUTE: 16.9 K/UL (ref 1.7–7.7)
NEUTROPHILS RELATIVE PERCENT: 91.8 %
PDW BLD-RTO: 14.2 % (ref 12.4–15.4)
PERFORMED ON: ABNORMAL
PHOSPHORUS: 2.7 MG/DL (ref 2.5–4.9)
PLATELET # BLD: 281 K/UL (ref 135–450)
PMV BLD AUTO: 8.2 FL (ref 5–10.5)
POTASSIUM REFLEX MAGNESIUM: 4.5 MMOL/L (ref 3.5–5.1)
RBC # BLD: 4.72 M/UL (ref 4–5.2)
SODIUM BLD-SCNC: 136 MMOL/L (ref 136–145)
TOTAL PROTEIN: 6.8 G/DL (ref 6.4–8.2)
WBC # BLD: 18.4 K/UL (ref 4–11)

## 2020-07-24 PROCEDURE — 6370000000 HC RX 637 (ALT 250 FOR IP): Performed by: INTERNAL MEDICINE

## 2020-07-24 PROCEDURE — 94761 N-INVAS EAR/PLS OXIMETRY MLT: CPT

## 2020-07-24 PROCEDURE — 86140 C-REACTIVE PROTEIN: CPT

## 2020-07-24 PROCEDURE — 36569 INSJ PICC 5 YR+ W/O IMAGING: CPT

## 2020-07-24 PROCEDURE — 84100 ASSAY OF PHOSPHORUS: CPT

## 2020-07-24 PROCEDURE — 6360000002 HC RX W HCPCS: Performed by: NURSE PRACTITIONER

## 2020-07-24 PROCEDURE — 85025 COMPLETE CBC W/AUTO DIFF WBC: CPT

## 2020-07-24 PROCEDURE — 83735 ASSAY OF MAGNESIUM: CPT

## 2020-07-24 PROCEDURE — 99233 SBSQ HOSP IP/OBS HIGH 50: CPT | Performed by: INTERNAL MEDICINE

## 2020-07-24 PROCEDURE — 92610 EVALUATE SWALLOWING FUNCTION: CPT

## 2020-07-24 PROCEDURE — 1200000000 HC SEMI PRIVATE

## 2020-07-24 PROCEDURE — 97129 THER IVNTJ 1ST 15 MIN: CPT

## 2020-07-24 PROCEDURE — 36415 COLL VENOUS BLD VENIPUNCTURE: CPT

## 2020-07-24 PROCEDURE — 2580000003 HC RX 258: Performed by: NURSE PRACTITIONER

## 2020-07-24 PROCEDURE — 80053 COMPREHEN METABOLIC PANEL: CPT

## 2020-07-24 PROCEDURE — 2580000003 HC RX 258: Performed by: INTERNAL MEDICINE

## 2020-07-24 PROCEDURE — C1751 CATH, INF, PER/CENT/MIDLINE: HCPCS

## 2020-07-24 PROCEDURE — 6370000000 HC RX 637 (ALT 250 FOR IP): Performed by: NURSE PRACTITIONER

## 2020-07-24 PROCEDURE — 76937 US GUIDE VASCULAR ACCESS: CPT

## 2020-07-24 PROCEDURE — 2700000000 HC OXYGEN THERAPY PER DAY

## 2020-07-24 PROCEDURE — 99232 SBSQ HOSP IP/OBS MODERATE 35: CPT | Performed by: INTERNAL MEDICINE

## 2020-07-24 PROCEDURE — 6360000002 HC RX W HCPCS: Performed by: INTERNAL MEDICINE

## 2020-07-24 PROCEDURE — 82728 ASSAY OF FERRITIN: CPT

## 2020-07-24 PROCEDURE — 85379 FIBRIN DEGRADATION QUANT: CPT

## 2020-07-24 RX ORDER — SODIUM CHLORIDE 0.9 % (FLUSH) 0.9 %
10 SYRINGE (ML) INJECTION EVERY 12 HOURS SCHEDULED
Status: DISCONTINUED | OUTPATIENT
Start: 2020-07-24 | End: 2020-07-28 | Stop reason: HOSPADM

## 2020-07-24 RX ORDER — INSULIN GLARGINE 100 [IU]/ML
42 INJECTION, SOLUTION SUBCUTANEOUS DAILY
Status: DISCONTINUED | OUTPATIENT
Start: 2020-07-25 | End: 2020-07-26

## 2020-07-24 RX ORDER — POLYETHYLENE GLYCOL 3350 17 G/17G
17 POWDER, FOR SOLUTION ORAL 2 TIMES DAILY
Status: DISCONTINUED | OUTPATIENT
Start: 2020-07-24 | End: 2020-07-28 | Stop reason: HOSPADM

## 2020-07-24 RX ORDER — DOCUSATE SODIUM 100 MG/1
100 CAPSULE, LIQUID FILLED ORAL DAILY
Status: DISCONTINUED | OUTPATIENT
Start: 2020-07-24 | End: 2020-07-27

## 2020-07-24 RX ORDER — SODIUM CHLORIDE 0.9 % (FLUSH) 0.9 %
10 SYRINGE (ML) INJECTION PRN
Status: DISCONTINUED | OUTPATIENT
Start: 2020-07-24 | End: 2020-07-28 | Stop reason: HOSPADM

## 2020-07-24 RX ORDER — LIDOCAINE HYDROCHLORIDE 10 MG/ML
5 INJECTION, SOLUTION EPIDURAL; INFILTRATION; INTRACAUDAL; PERINEURAL ONCE
Status: DISCONTINUED | OUTPATIENT
Start: 2020-07-24 | End: 2020-07-28 | Stop reason: HOSPADM

## 2020-07-24 RX ORDER — INSULIN GLARGINE 100 [IU]/ML
20 INJECTION, SOLUTION SUBCUTANEOUS ONCE
Status: COMPLETED | OUTPATIENT
Start: 2020-07-24 | End: 2020-07-24

## 2020-07-24 RX ADMIN — Medication 125 MCG/HR: at 00:15

## 2020-07-24 RX ADMIN — ENOXAPARIN SODIUM 30 MG: 30 INJECTION SUBCUTANEOUS at 20:12

## 2020-07-24 RX ADMIN — INSULIN GLARGINE 20 UNITS: 100 INJECTION, SOLUTION SUBCUTANEOUS at 14:02

## 2020-07-24 RX ADMIN — INSULIN LISPRO 9 UNITS: 100 INJECTION, SOLUTION INTRAVENOUS; SUBCUTANEOUS at 11:00

## 2020-07-24 RX ADMIN — Medication 1 CAPSULE: at 07:47

## 2020-07-24 RX ADMIN — SODIUM CHLORIDE, PRESERVATIVE FREE 10 ML: 5 INJECTION INTRAVENOUS at 20:14

## 2020-07-24 RX ADMIN — MUPIROCIN: 20 OINTMENT TOPICAL at 12:43

## 2020-07-24 RX ADMIN — POLYETHYLENE GLYCOL 3350 17 G: 17 POWDER, FOR SOLUTION ORAL at 07:46

## 2020-07-24 RX ADMIN — LACTULOSE 20 G: 20 SOLUTION ORAL at 14:34

## 2020-07-24 RX ADMIN — Medication 10 ML: at 20:13

## 2020-07-24 RX ADMIN — INSULIN LISPRO 12 UNITS: 100 INJECTION, SOLUTION INTRAVENOUS; SUBCUTANEOUS at 20:57

## 2020-07-24 RX ADMIN — MUPIROCIN: 20 OINTMENT TOPICAL at 20:14

## 2020-07-24 RX ADMIN — ENOXAPARIN SODIUM 30 MG: 30 INJECTION SUBCUTANEOUS at 07:46

## 2020-07-24 RX ADMIN — Medication 10 ML: at 12:33

## 2020-07-24 RX ADMIN — SODIUM CHLORIDE, PRESERVATIVE FREE 10 ML: 5 INJECTION INTRAVENOUS at 12:42

## 2020-07-24 RX ADMIN — LOSARTAN POTASSIUM AND HYDROCHLOROTHIAZIDE 2 TABLET: 12.5; 5 TABLET ORAL at 07:48

## 2020-07-24 RX ADMIN — LACTULOSE 20 G: 20 SOLUTION ORAL at 07:47

## 2020-07-24 RX ADMIN — DONEPEZIL HYDROCHLORIDE 5 MG: 5 TABLET, FILM COATED ORAL at 20:12

## 2020-07-24 RX ADMIN — DOCUSATE SODIUM 100 MG: 100 CAPSULE, LIQUID FILLED ORAL at 14:34

## 2020-07-24 RX ADMIN — LACTULOSE 20 G: 20 SOLUTION ORAL at 20:13

## 2020-07-24 RX ADMIN — INSULIN LISPRO 9 UNITS: 100 INJECTION, SOLUTION INTRAVENOUS; SUBCUTANEOUS at 18:00

## 2020-07-24 RX ADMIN — GABAPENTIN 300 MG: 300 CAPSULE ORAL at 12:38

## 2020-07-24 RX ADMIN — CYANOCOBALAMIN TAB 1000 MCG 1000 MCG: 1000 TAB at 07:49

## 2020-07-24 RX ADMIN — GABAPENTIN 300 MG: 300 CAPSULE ORAL at 18:20

## 2020-07-24 RX ADMIN — GABAPENTIN 300 MG: 300 CAPSULE ORAL at 20:12

## 2020-07-24 RX ADMIN — GABAPENTIN 300 MG: 300 CAPSULE ORAL at 07:48

## 2020-07-24 RX ADMIN — AMLODIPINE BESYLATE 5 MG: 5 TABLET ORAL at 07:49

## 2020-07-24 RX ADMIN — Medication 1 CAPSULE: at 18:20

## 2020-07-24 RX ADMIN — METOPROLOL TARTRATE 25 MG: 25 TABLET, FILM COATED ORAL at 07:47

## 2020-07-24 RX ADMIN — ASPIRIN 325 MG ORAL TABLET 325 MG: 325 PILL ORAL at 07:48

## 2020-07-24 RX ADMIN — HYDROCODONE BITARTRATE AND ACETAMINOPHEN 10 ML: 176/5 SOLUTION ORAL at 20:13

## 2020-07-24 RX ADMIN — ATORVASTATIN CALCIUM 40 MG: 40 TABLET, FILM COATED ORAL at 07:48

## 2020-07-24 RX ADMIN — CEFEPIME HYDROCHLORIDE 2 G: 2 INJECTION, POWDER, FOR SOLUTION INTRAVENOUS at 04:18

## 2020-07-24 RX ADMIN — INSULIN LISPRO 12 UNITS: 100 INJECTION, SOLUTION INTRAVENOUS; SUBCUTANEOUS at 04:09

## 2020-07-24 RX ADMIN — POLYETHYLENE GLYCOL 3350 17 G: 17 POWDER, FOR SOLUTION ORAL at 20:13

## 2020-07-24 RX ADMIN — METOPROLOL TARTRATE 25 MG: 25 TABLET, FILM COATED ORAL at 20:12

## 2020-07-24 ASSESSMENT — PULMONARY FUNCTION TESTS
PIF_VALUE: 11

## 2020-07-24 ASSESSMENT — PAIN SCALES - GENERAL
PAINLEVEL_OUTOF10: 0

## 2020-07-24 NOTE — PROGRESS NOTES
Comprehensive Nutrition Assessment    Type and Reason for Visit:  Reassess    Nutrition Recommendations/Plan:   Continue CCC(3) diet    Nutrition Assessment:  Follow-up. Pt remains in the ICU but self extubated overnight. Swallowing evaluated per Nursing & diet was just adv to [facility] (3). Noted altered mental status r/t Dementia. Malnutrition Assessment:  Malnutrition Status: At risk for malnutrition (Comment)    Context:  Acute Illness     Findings of the 6 clinical characteristics of malnutrition:  Energy Intake:  1 - 75% or less of estimated energy requirements for 7 or more days  Weight Loss:  No significant weight loss     Body Fat Loss:  Unable to assess     Muscle Mass Loss:  Unable to assess    Fluid Accumulation:  No significant fluid accumulation     Strength:  Not Performed    Estimated Daily Nutrient Needs:  Energy (kcal):  1975-5432 kcal (20-25 kcal/kg ABW); Weight Used for Energy Requirements:  Current     Protein (g):  64-77 gm (1-1.2 gm/kg ABW); Weight Used for Protein Requirements:  Current        Fluid (ml/day):  1 ml/kcal; Weight Used for Fluid Requirements:  Current      Nutrition Related Findings:  Noted pt disimpacted of large BM on 7/23. Bowel regimen in place including Chronulac. Blood sugars remain up & down. Lantus & coverage on board. Noted no edema      Wounds:  (MASD colt area)       Current Nutrition Therapies:    DIET CARB CONTROL; Carb Control: 3 carb choices (45 gms)/meal    Anthropometric Measures:  · Height: 5' (152.4 cm)  · Current Body Weight: 136 lb (61.7 kg)   · Admission Body Weight: 134 lb (60.8 kg)    · Usual Body Weight: 140 lb (63.5 kg)     · Ideal Body Weight: 100 lbs; % Ideal Body Weight 136 %   · BMI: 26.6  · BMI Categories: Overweight (BMI 25.0-29. 9)       Nutrition Diagnosis:   · Altered nutrition-related lab values related to endocrine dysfuntion as evidenced by lab values      Nutrition Interventions:   Food and/or Nutrient Delivery:  Continue Current Diet  Nutrition Education/Counseling:  No recommendation at this time   Coordination of Nutrition Care:  Continued Inpatient Monitoring    Goals:  tolerate most appropriate form of nutrition       Nutrition Monitoring and Evaluation:   Behavioral-Environmental Outcomes:  (NA)   Food/Nutrient Intake Outcomes:  Food and Nutrient Intake  Physical Signs/Symptoms Outcomes:  Biochemical Data, Constipation, Diarrhea, Fluid Status or Edema, Nutrition Focused Physical Findings, Skin, Weight     Discharge Planning:     Too soon to determine     Electronically signed by Kitty Simpson RD, LD on 7/24/20 at 12:38 PM EDT    Contact: [phone]

## 2020-07-24 NOTE — PROGRESS NOTES
Narcotic Waste Documentation    Administered 250 mcg of Fentanyl sedation and wasted 250 mcg per [facility].     Electronically signed by Cruz Muñoz RN on 7/24/2020 at 6:25 AM     Electronically signed by Irene Monge RN on 7/24/2020 at 6:29 AM

## 2020-07-24 NOTE — PROGRESS NOTES
4 Eyes Skin Assessment     The patient is being assess for  Shift Handoff    I agree that 2 RN's have performed a thorough Head to Toe Skin Assessment on the patient. ALL assessment sites listed below have been assessed. Areas assessed by both nurses:   [x]   Head, Face, and Ears   [x]   Shoulders, Back, and Chest  [x]   Arms, Elbows, and Hands   [x]   Coccyx, Sacrum, and IschIum  [x]   Legs, Feet, and Heels        Does the Patient have Skin Breakdown?   Yes LDA WOUND CARE was Initiated documentation include the Marissa-wound, Wound Assessment, Measurements, Dressing Treatment, Drainage, and Color\", (MASD)        James Prevention initiated:  Yes   Wound Care Orders initiated:  Yes      [id number] nurse consulted for Pressure Injury (Stage 3,4, Unstageable, DTI, NWPT, and Complex wounds), New and Established Ostomies:  NA      Nurse 1 eSignature: Electronically signed by Lucila Brar RN on 7/24/20 at 5:13 PM EDT    **SHARE this note so that the co-signing nurse is able to place an eSignature**    Nurse 2 eSignature:

## 2020-07-24 NOTE — PROGRESS NOTES
Rales/Wheezes/Rhonchi. Cardiovascular: Regular rate and rhythm with normal S1/S2 without murmurs, rubs or gallops. Abdomen: Soft, non-tender, non-distended with normal bowel sounds. Musculoskeletal: No clubbing, cyanosis  Skin: Skin color, texture, turgor normal.  No rashes or lesions. Neurologic:  No focal weakness   Psychiatric: Alert and oriented  Capillary Refill: Brisk,< 3 seconds   Peripheral Pulses: +2 palpable, equal bilaterally       Labs:   Recent Labs     07/22/20 0449 07/23/20  0504 07/24/20  0420   WBC 18.1* 18.5* 18.4*   HGB 13.6 13.2 13.8   HCT 41.3 40.7 41.5    304 281     Recent Labs     07/22/20 0449 07/23/20  0504 07/24/20  0420   * 131* 136   K 4.2 4.5 4.5   CL 98* 97* 98*   CO2 25 26 25   BUN 29* 27* 20   CREATININE 0.6 0.6 <0.5*   CALCIUM 8.5 8.6 9.4   PHOS 2.8 2.5 2.7     Recent Labs     07/22/20 0449 07/23/20  0504 07/24/20  0420   AST 27 30 25   ALT 39 46* 36   BILITOT 0.4 0.3 0.4   ALKPHOS 72 70 80     No results for input(s): INR in the last 72 hours. No results for input(s): Vallarie Brunette in the last 72 hours. Urinalysis:      Lab Results   Component Value Date    NITRU Negative 07/12/2020    WBCUA 3-5 07/12/2020    BACTERIA RARE 11/16/2016    RBCUA 0-2 07/12/2020    BLOODU Negative 07/12/2020    SPECGRAV >1.030 07/12/2020    GLUCOSEU >=1000 07/12/2020    GLUCOSEU NEGATIVE 07/24/2011       Radiology:  XR CHEST PORTABLE   Final Result   Unremarkable limited KUB. NG tube tip projects at the left upper quadrant, overlying the expected   location of the stomach. Infiltrates bilateral lower lungs. XR CHEST PORTABLE   Final Result   Endotracheal tube appears in satisfactory position. NG tube tip is in the proximal stomach with side hole near the GE junction. This should be further advanced. Pulmonary edema pattern noted with cardiomegaly, vascular congestion,   bilateral pleural effusions and bibasilar airspace disease.       Right upper extremity PICC line appears in appropriate position without   pneumothorax. XR CHEST 1 VW   Final Result   1. Multifocal airspace opacities which have increased when compared with exam   from July 17, 2020.   2. Silhouetting of the left hemidiaphragm likely reflecting left effusion and   atelectasis. XR CHEST 1 VW   Final Result   Multifocal airspace disease. Areas in the right upper and left lower lung   are consolidated. However however, there are areas of improved aeration. XR CHEST PORTABLE   Final Result   Worsening multifocal airspace opacities. XR CHEST PORTABLE   Final Result   Left greater than right basilar airspace disease, concerning for pneumonia   given patient history. Assessment/Plan:    Active Hospital Problems    Diagnosis    Acute respiratory failure with hypoxia ([facility] [address] [address] [J96.01]    COVID-19 virus detected [U07.1]    COVID-19 virus infection [U07.1]     Patient admitted with COVID19 pneumonia initially improved on steroids and remdesivir, then developed fever and increased need of oxygen, transferred to ICU, given one dose of actemra. 1.  COVID-19 pneumonia, initially was on empiric Azithromycin/ceftriaxone,  decadron 6 mg daily, Lovenox BID, respiratory culture, consulted ID and remdesivir started, clinically was improving, then  developed fever and increased need of oxygen, chest xray was worsening. Started on cefepime and received one dose of Actemra. oxygen need was 15L so patient transferred to ICU, and vapotherm started. Was then requiring BiPap and then subsequently intubated due to inability to tolerate BiPAP. Self-extubated 7/24. Now on 7L high flow. 2. Sepsis due to CODID 19 pneumonia, ongoing management as above. 3. Acute respiratory failure with hypoxia s/p mechanical intubation. 4. Type 2 DM, fluctuating, high dose SSI.   5. Essential hypertension, continue home meds  6. Mild cognitive impairment, continue home meds    Diet: DIET CARB CONTROL; Carb Control: 3 carb choices (45 gms)/meal  Code Status: Full Code      Guarded prognosis    Jocelyne Solo MD

## 2020-07-24 NOTE — PROGRESS NOTES
without significant hoarseness. Pt with congested cough. Pt was able to inconsistently achieve a volitional cough for airway clearance. Oral reflexes diminished to absent. Pt was oriented to po presentations; but stated not hungry. Pt did participate in eval.    2.  Dysphagia Diagnosis: Oropharyngeal Dysphagia concerns 2/2 to covid 19; s/p self extubation; confusion; and variable De SAT of O2 ST levels. Dysphagia characterized by reduced lingual coordination for bolus control; delayed initiation of swallow; with concern for reduced laryngeal elevation and pharyngeal clearance. · Pt with de SAT from 90's to 75 post swallow of liquid  · PT appeared to tolerate thick liquid (mildly thick and moderately thick and puree) but sample size small 2/2 to pt confusion and variable decrease in O2 SATS. · Unable to r/o penetration/aspiration. Recommend keep npo. If MD wants meds po: crush with puree. If MD wants pt to have quality of life po trials with staff;suggest tsp presentations mildly thick/moderately thick with close monitor     3. Pt confusion/physical restlessness/ poor insight further exacerbates risks      Treatment Plan  Requires SLP Intervention: Yes  Duration/Frequency of Treatment: 3 times a week either face to face or via staff collaboration  D/C Recommendations: To be determined       Recommended Diet and Intervention  Diet Solids Recommendation: NPO  Liquid Consistency Recommendation: NPO  · If MD wants meds po: crush with puree. If MD wants pt to have quality of life po trials with staff;suggest tsp presentations mildy thick/moderately thick with close monitor   ·   Compensatory Swallowing Strategies  Compensatory Swallowing Strategies: oral care. · If MD wants meds po: crush with puree. If MD wants pt to have quality of life po trials with staff;suggest tsp presentations mildly thick/moderately thick with close monitor     Treatment/Goals  LTG: As medical status and pt's mental status improves; hopeful return to po diet as was baseline prior to onset/admit  Short-term Goals  Goal 1: Pt will tolerate staff assisted sips of tsp mildly thick (nectar ) , moderately thick (honey ) and puree without overt clinical s/s of penetration  Goal 2: Pending progress in medical status pt will progress to dysphagia puree with mildly thick (nectar thick) liquid without overt clinical s/s post swallow  Long-term Goals  Timeframe for Long-term Goals: Staff goal is for eventual improvement to progress to po diet  Dysphagia Goals: The patient/caregiver will demonstrate understanding of compensatory strategies for improved swallowing safety. General  Chart Reviewed: Yes  Comments: pt with confusion  Behavior/Cognition: Alert;Confused; Distractible(physically restless)  Respiratory Status: O2 via nasal cannula(7L/min O2 via nasal canula)  Communication Observation: (verbal ; but confusion; occasional yelling out; occasional expressing basic needs; poor historian)  Follows Directions: (max cue for one step commands and to sustain positioning)  Dentition: Edentulous  Prior Dysphagia History: no reports of dysphagia history    Patient Positioning: Upright in bed(but difficult to maintain/sustained positioning)    Consistencies Administered: Thin;Dysphagia Pureed (Dysphagia I); Honey - teaspoon;Nectar - teaspoon;Nectar - straw; Thin - teaspoon; Thin - straw    Pain: denied    Vision/Hearing  Vision  Vision: (unable to test)  Hearing  Hearing: (responds to name)    Oral Motor Deficits  Oral/Motor  Oral Motor: Exceptions to [facility]  Labial Sensation: Reduced  Labial Coordination: Reduced  Lingual Strength: Reduced  Lingual Sensation: Reduced  Lingual Coordination: Reduced  Velum: (fairly symmetrical during phonation of /a/)  Gag: Absent   Vocal Quality: Weak but appears audible phonation without significant hoarse quality  Volitional Cough: Congested  Volitional Swallow: Delayed    Oral Phase Dysfunction  Oral Phase  Oral Phase: Exceptions  Oral

## 2020-07-24 NOTE — PROGRESS NOTES
1910: report and handoff received from [facility] w/ Tushar RN. 1930: shift assessment complete, see flow sheets. Pt is intubated, RASS -1, vent settings Spont, Fi02 50 , PEEP 5. Bilateral wrist restraints in place. OG w/ TF @ goal of 60. PICC RUE w/ fentanyl gtt. Freitas patent w/ adequate UO. Following commands, breathing unlabored, 02 sats , NSR.      2253: daughter called for updates, all questions answered. 0000: reassessed. ..no changes, see flowsheets. Will continue to monitor. 0203: vent alarming. Pt found self extubating, pulled out ETT, OG, and R arm PICC. RT called and in room. Placed on HFNC @8L. Fentanyl gtt turned off. Bilateral wrist restraints removed. VSS. Breathing unlabored. Greg Joe NP notified and aware. 0330: Pt complains of needing to have BM. Pt attempts w/out success. Pt disimpacted, large, firm, brown, stool with red streaks removed. 0340: PIV placed in R wrist.     0415: Breathing unlabored, 02 sats 100%, decreased to 7L HFNC.     0455: Pt's daughter updated on pt condition and events, all questions answered. 0530: Veinfinder used to place PIV in R forearm.

## 2020-07-24 NOTE — PROGRESS NOTES
Date of Admission: 7/12/2020   LOS: 12 days         CC:  COVID-19     Subjective:  Self extubated over night. Removed picc.      Awake. Confused.     ROS:   No complaints of chest pain or nausea        PHYSICAL EXAM:   Blood pressure 130/89, pulse 107, temperature 99.2 °F (37.3 °C), temperature source Oral, resp. rate 25, height 5' (1.524 m), weight 136 lb 3.9 oz (61.8 kg), SpO2 (!) 82 %, not currently breastfeeding.'     Gen: No distress. ENT:   Resp: No accessory muscle use. No crackles. No wheezes. No rhonchi. CV: Regular rate. Regular rhythm. No murmur or rub. No edema. Skin: Warm, dry, normal texture and turgor. No nodule on exposed extremities. M/S: No cyanosis. No clubbing. No joint deformity. Psych: wakes.   Follows commands.            Medications:     Scheduled Meds:  Scheduled Medications    lidocaine 1 % injection  5 mL Intradermal Once    sodium chloride flush  10 mL Intravenous 2 times per day    polyethylene glycol  17 g Oral BID    docusate sodium  100 mg Oral Daily    [START ON 7/25/2020] insulin glargine  42 Units Subcutaneous Daily    lactulose  20 g Oral TID    insulin lispro  0-18 Units Subcutaneous Q4H    senna  10 mL Oral Nightly    enoxaparin  30 mg Subcutaneous BID    sodium chloride flush  10 mL Intravenous 2 times per day    metoprolol tartrate  25 mg Oral BID    mupirocin   Nasal BID    lactobacillus  1 capsule Oral BID WC    amLODIPine  5 mg Oral Daily    aspirin  325 mg Oral Daily    atorvastatin  40 mg Oral Daily    vitamin B-12  1,000 mcg Oral Daily    donepezil  5 mg Oral Nightly    gabapentin  300 mg Oral 4x Daily    losartan-hydroCHLOROthiazide  2 tablet Oral Daily           Continuous Infusions:  Infusions Meds    dextrose             PRN Meds:  PRN Medications   sodium chloride flush, sodium chloride flush, acetaminophen **OR** acetaminophen, polyethylene glycol, promethazine **OR** ondansetron, glucose, dextrose, glucagon (rDNA), dextrose    Labs reviewed:  CBC:         Recent Labs     07/22/20 0449 07/23/20  0504 07/24/20  0420   WBC 18.1* 18.5* 18.4*   HGB 13.6 13.2 13.8   HCT 41.3 40.7 41.5   MCV 88.0 88.7 87.9    304 281     BMP:         Recent Labs     07/22/20 0449 07/23/20  0504 07/24/20  0420   * 131* 136   K 4.2 4.5 4.5   CL 98* 97* 98*   CO2 25 26 25   PHOS 2.8 2.5 2.7   BUN 29* 27* 20   CREATININE 0.6 0.6 <0.5*     LIVER PROFILE:         Recent Labs     07/22/20 0449 07/23/20  0504 07/24/20  0420   AST 27 30 25   ALT 39 46* 36   BILITOT 0.4 0.3 0.4   ALKPHOS 72 70 80     PT/INR: No results for input(s): PROTIME, INR in the last 72 hours. APTT: No results for input(s): APTT in the last 72 hours. UA:No results for input(s): NITRITE, COLORU, PHUR, LABCAST, [address], RBCUA, MUCUS, TRICHOMONAS, YEAST, BACTERIA, CLARITYU, SPECGRAV, LEUKOCYTESUR, UROBILINOGEN, BILIRUBINUR, BLOODU, GLUCOSEU, AMORPHOUS in the last 72 hours.     Invalid input(s): KETONESU  No results for input(s): PH, PCO2, PO2 in the last 72 hours.     Cx:        Films:           Assessment:         · Acute hypoxemic respiratory failure  · ARDS  · [id number]-[id number]  · Acute metabolic encephalopathy  · History of dementia with significant confusion.     Plan:            - extubated yesterday  - off tube feeding . Start diet. - Mirilax bid. Colasce. Lactulose. complains of constipation. - consider transfer. Currently on 7 L NC.   -Decadron 6 mg daily, completed    -Remdesivir 7/13-17, Actemra, 7/16,  - high home insulin requirement. lantus decrease to 1/2 home dose, 20 bid for now.   Increase if able to take PO.          Nutrition  - DIET CARB CONTROL; Carb Control: 3 carb choices (45 gms)/meal  -     Intake/Output Summary (Last 24 hours) at 7/24/2020 1502  Last data filed at 7/24/2020 1110      Gross per 24 hour   Intake 1713.51 ml   Output 3030 ml   Net -1316.49 ml        Access  Arterial      PICC          CVC              High risk given hypoxemia.       This

## 2020-07-24 NOTE — PROGRESS NOTES
Infectious Disease Follow up Notes  Admit Date: 7/12/2020  Hospital Day: 13    Antibiotics :   IV Remdesivir STOP 7/17  Dexamethasone   S/p Tocilizumab on  7/16   IV Cefepime   CHIEF COMPLAINT:       COVID-19 pneumonia  Resp failure     Subjective interval History :  75 y.o. woman with HTN, DM admitted with cough, sob, fevers and not feeling well recent diagnosed with COVID-19 infection on 7/2 now with worsening symptoms admitted through ED and Lactic acid elevation with mild elevation in CRP, LDH and mild elevation in Ferritin we are consulted for recommendations.  She is using 5 lts nasal cannula and had fever 101 ON Admit.       Pt self extubated and PICC pulled out and now on nasal cannula using 7 lts  remains confused and no fevers       past Medical History:    Past Medical History:   Diagnosis Date    Diabetes mellitus ([address]     NIDDM    Headache(784.0)     Herniated disc, cervical     Hypercholesteremia     Hypertension     Memory loss     short term    Psychiatric problem     Type 2 diabetes mellitus without complication ([address]        Past Surgical History:    Past Surgical History:   Procedure Laterality Date    SHOULDER SURGERY      TUBAL LIGATION         Current Medications:    Outpatient Medications Marked as Taking for the 7/12/20 encounter [facility] Encounter)   Medication Sig Dispense Refill    SITagliptin (JANUVIA) 100 MG tablet Take 1 tablet by mouth daily For diabetes 90 tablet 3    amLODIPine (NORVASC) 5 MG tablet Take 1 tablet by mouth daily 90 tablet 3    donepezil (ARICEPT) 5 MG tablet Take 1 tablet by mouth nightly 90 tablet 3    traZODone (DESYREL) 100 MG tablet TAKE ONE TABLET BY MOUTH DAILY AS NEEDED FOR SLEEP 90 tablet 3    losartan-hydrochlorothiazide (HYZAAR) 100-25 MG per tablet Take 1 tablet by mouth daily 90 tablet 3    metFORMIN (GLUCOPHAGE) 1000 MG tablet Take 1 tablet by mouth daily (with breakfast) For diabetes 30 tablet 5    vitamin D (ERGOCALCIFEROL) 1.25 MG (09296 UT) CAPS capsule Take 1 capsule by mouth once a week 12 capsule 3    Cyanocobalamin 1000 MCG SUBL Place 1,000 mcg under the tongue daily 90 tablet 3    insulin glargine (LANTUS SOLOSTAR) 100 UNIT/ML injection pen Inject 42 Units into the skin 2 times daily 90 mL 3    gabapentin (NEURONTIN) 300 MG capsule Take 1 capsule by mouth 4 times daily. For nerve pain related to diabetes 360 capsule 3    atorvastatin (LIPITOR) 40 MG tablet Take 1 tablet by mouth daily For cholesterol and heart 90 tablet 3    empagliflozin (JARDIANCE) 25 MG tablet Take 25 mg by mouth daily 90 tablet 3    metoprolol succinate (TOPROL XL) 50 MG extended release tablet Take 1 tablet by mouth daily For heart 90 tablet 3    ondansetron (ZOFRAN ODT) 4 MG disintegrating tablet Take 1 tablet by mouth every 8 hours as needed for Nausea 20 tablet 0    aspirin 325 MG tablet Take 1 tablet by mouth daily 30 tablet 3       Allergies:  Patient has no known allergies. Immunizations :   Immunization History   Administered Date(s) Administered    Influenza Virus Vaccine 09/01/2017    Influenza, High Dose (Fluzone 65 yrs and older) 09/27/2018    Pneumococcal Conjugate 13-valent (Funmilayo Prayer) 07/26/2019    Pneumococcal Polysaccharide ([id number]) 06/06/2018    Tdap (Boostrix, Adacel) 07/26/2019       Social History:     Social History     Tobacco Use    Smoking status: Never Smoker    Smokeless tobacco: Never Used   Substance Use Topics    Alcohol use: No    Drug use: No     Social History     Tobacco Use   Smoking Status Never Smoker   Smokeless Tobacco Never Used      Family History   Problem Relation Age of Onset    Diabetes Mother     High Blood Pressure Mother     Diabetes Father     High Blood Pressure Father          REVIEW OF SYSTEMS:    No fever / chills / sweats. No weight loss. No visual change, eye pain, eye discharge.     No oral lesion, sore 07/24/2020     Lab Results   Component Value Date    CREATININE <0.5 (L) 07/24/2020    BUN 20 07/24/2020     07/24/2020    K 4.5 07/24/2020    CL 98 (L) 07/24/2020    CO2 25 07/24/2020       Hepatic Function Panel:   Lab Results   Component Value Date    ALKPHOS 80 07/24/2020    ALT 36 07/24/2020    AST 25 07/24/2020    PROT 6.8 07/24/2020    PROT 7.3 01/08/2013    BILITOT 0.4 07/24/2020    BILIDIR 0.20 10/12/2012    IBILI 0.5 10/12/2012    LABALBU 3.2 07/24/2020       UA:  Lab Results   Component Value Date    COLORU YELLOW 07/12/2020    CLARITYU CLOUDY 07/12/2020    GLUCOSEU >=1000 07/12/2020    GLUCOSEU NEGATIVE 07/24/2011    BILIRUBINUR Negative 07/12/2020    BILIRUBINUR NEGATIVE 07/24/2011    KETUA Negative 07/12/2020    SPECGRAV >1.030 07/12/2020    BLOODU Negative 07/12/2020    PHUR 5.5 07/12/2020    PROTEINU 30 07/12/2020    UROBILINOGEN 1.0 07/12/2020    NITRU Negative 07/12/2020    LEUKOCYTESUR Negative 07/12/2020    LABMICR YES 07/12/2020    URINETYPE Other 07/12/2020      Urine Microscopic:   Lab Results   Component Value Date    LABCAST 20-40 Hyaline 11/29/2014    BACTERIA RARE 11/16/2016    COMU see below 07/12/2020    HYALCAST 0-2 07/12/2020    WBCUA 3-5 07/12/2020    RBCUA 0-2 07/12/2020    EPIU 2-5 07/12/2020     Ferritin  220     CRP 37.9     D dimer  746       MICRO: cultures reviewed and updated by me          Culture, Blood 1 [[id number]]   Collected: 07/12/20 1436    Order Status: Completed  Specimen: Blood  Updated: 07/13/20 1616     Blood Culture, Routine  No Growth to date.  Any change in status will be called. Narrative:      ORDER#: [id number]                          ORDERED BY: JESSE METCALF   SOURCE: Blood                              COLLECTED:  07/12/20 14:36   ANTIBIOTICS AT TAN. :                      RECEIVED :  07/12/20 14:59   If child <=2 yrs old please draw pediatric bottle. ~Blood Culture #1   Performed at:   [facility]   [address] [place] [employer] [address]   Phone [phone]    Culture, Blood 2 [[id number]]   Collected: 07/12/20 1436    Order Status: Completed  Specimen: Blood  Updated: 07/13/20 1616     Culture, Blood 2  No Growth to date.  Any change in status will be called. Narrative:      ORDER#: [id number]                          ORDERED BY: JESSE METCALF   SOURCE: Blood                              COLLECTED:  07/12/20 14:36   ANTIBIOTICS AT TAN. :                      RECEIVED :  07/12/20 15:00   If child <=2 yrs old please draw pediatric bottle. ~Blood Culture #2   Performed at:   [facility]   [address] [place] [employer] [address]   Phone [phone]    Legionella antigen, urine [[id number]]   Collected: 07/12/20 2315    Order Status: Completed  Specimen: Urine voided  Updated: 07/13/20 1146     L. pneumophila Serogp 1 Ur Ag  --     Presumptive Negative   No Legionella pneumophila serogroup 1 antigens detected. A negative result does not exclude infection with   Legionella pneumophila serogroup 1 nor does it rule out   other microbial-caused respiratory infections or   disease caused by other serogroups of   Legionella pneumophila. Normal Range: Presumptive Negative    Narrative:      ORDER#: [id number]                          ORDERED BY: Alexandr Joseph   SOURCE: Urine Voided                       COLLECTED:  07/12/20 23:15   ANTIBIOTICS AT TAN. :                      RECEIVED :  07/12/20 23:40   Performed at:   [facility]   [address] [employer] [address]   Phone [phone]    Strep Pneumoniae Antigen [[id number]]   Collected: 07/12/20 2315    Order Status: Completed  Specimen: Urine voided  Updated: 07/13/20 1131     STREP PNEUMONIAE ANTIGEN, URINE  --     Presumptive Negative   Presumptive negative suggests no current or recent   pneumococcal infection.  Infection due to Strep pneumoniae   cannot be ruled out since the antigen present in the sample   may be below the detection limit of the test.   Normal Range:Presumptive Negative    Narrative:      ORDER#: [id number]                          ORDERED BY: Dion Zafar   SOURCE: Urine Voided                       COLLECTED:  07/12/20 23:15   ANTIBIOTICS AT TAN. :                      RECEIVED :  07/12/20 23:40   Performed at:   [facility]   [address] [place] [address]   Phone [phone]    Culture, Respiratory [[id number]]      Order Status: No result  Specimen: Sputum Expectorated                Culture, Respiratory [[id number]]   Collected: 07/21/20 0500    Order Status: Sent  Specimen: Respiratory from Tracheal Aspirate  Updated: 07/22/20 1126     Gram Stain Result  3+ WBC's (Polymorphonuclear)   1+ Epithelial Cells   2+ Budding yeast    Narrative:      ORDER#: [id number]                          ORDERED BY: Evelyn Ag   SOURCE: Tracheal Aspirate                  COLLECTED:  07/21/20 05:00   ANTIBIOTICS AT TAN. :                      RECEIVED :  07/21/20 05:03   Performed at:   [facility]   [address] [employer] [address]   Phone [phone]    Culture, Blood 2 [[id number]]   Collected: 07/12/20 1436       IMAGING:    XR CHEST PORTABLE   Final Result   Unremarkable limited KUB. NG tube tip projects at the left upper quadrant, overlying the expected   location of the stomach. Infiltrates bilateral lower lungs. XR CHEST PORTABLE   Final Result   Endotracheal tube appears in satisfactory position. NG tube tip is in the proximal stomach with side hole near the GE junction. This should be further advanced. Pulmonary edema pattern noted with cardiomegaly, vascular congestion,   bilateral pleural effusions and bibasilar airspace disease. Right upper extremity PICC line appears in appropriate position without   pneumothorax.          XR CHEST 1 VW   Final Result   1. Multifocal airspace opacities which have increased when compared with exam   from July 17, 2020.   2. Silhouetting of the left hemidiaphragm likely reflecting left effusion and   atelectasis. XR CHEST 1 VW   Final Result   Multifocal airspace disease. Areas in the right upper and left lower lung   are consolidated. However however, there are areas of improved aeration. XR CHEST PORTABLE   Final Result   Worsening multifocal airspace opacities. XR CHEST PORTABLE   Final Result   Left greater than right basilar airspace disease, concerning for pneumonia   given patient history.                All the pertinent images and reports for the current Hospitalization were reviewed by me     Scheduled Meds:   lidocaine 1 % injection  5 mL Intradermal Once    sodium chloride flush  10 mL Intravenous 2 times per day    insulin glargine  42 Units Subcutaneous Daily    lactulose  20 g Oral TID    insulin lispro  0-18 Units Subcutaneous Q4H    docusate  100 mg Oral Daily    senna  10 mL Oral Nightly    polyethylene glycol  17 g Oral Daily    enoxaparin  30 mg Subcutaneous BID    sodium chloride flush  10 mL Intravenous 2 times per day    metoprolol tartrate  25 mg Oral BID    mupirocin   Nasal BID    lactobacillus  1 capsule Oral BID WC    cefepime  2 g Intravenous Q12H    amLODIPine  5 mg Oral Daily    aspirin  325 mg Oral Daily    atorvastatin  40 mg Oral Daily    vitamin B-12  1,000 mcg Oral Daily    donepezil  5 mg Oral Nightly    gabapentin  300 mg Oral 4x Daily    losartan-hydroCHLOROthiazide  2 tablet Oral Daily       Continuous Infusions:   propofol      dextrose         PRN Meds:  sodium chloride flush, propofol, sodium chloride flush, acetaminophen **OR** acetaminophen, polyethylene glycol, promethazine **OR** ondansetron, glucose, dextrose, glucagon (rDNA), dextrose      Assessment:     Patient Active Problem List   Diagnosis    Cervical spinal stenosis    Acute encephalopathy    Malignant hypertension    Type 2 diabetes mellitus without complication, with long-term current use of insulin (HCC)    Left-sided low back pain with left-sided sciatica    Hypercholesteremia    Primary insomnia    Hypotension    Dizziness    Essential hypertension    Elevated lactic acid level    Lactic acidosis    Atrial ectopy    Vasovagal syncope    Dysrhythmia    Chronic left-sided low back pain with left-sided sciatica    MCI (mild cognitive impairment)    Chest pain    COVID-19 virus infection    Acute respiratory failure with hypoxia ([address]    COVID-19 virus detected     COVID-19 pneumonia  Lactic acidosis  Fevers  Dm+  Htn+   CXR with Bi lateral changes     Given her presentation this all likely from COVID-19 infection and will benefit from Remdesivir therapy given her DM, HTN watch for progression      Tolerating IV Remdesivir ok and will watch lFTS AND Creat closely    Unfortunately she declined with rise in O2 REquirement and Fevers s/p  IV Tocilizumab on  7/16 now more sob with hypoxia and tx to ICU for close observation     She is getting more confused and taking  Her bIPAP off and hypoxemia and CXR worsening     Unfortunately respiratory status worsened requiring intubation and ventilation     She self extubated and now on nasal cannula tolerating it ok and mentation is poor     Labs, Microbiology, Radiology and all the pertinent results from current hospitalization and  care every where were reviewed  by me as a part of the evaluation   Plan:   1. Completed IV Remdesivir 200 mg loading followed by x 100 mg daily x s stop date  7/17  2. Creat stable  3. Cont steroids to finish the course    4. Procal normal and Urine antigens -ve   5. CRP NOW improving , Ferritin at  220  ,  6. Il -6 is elevated s/p Tocilizumab  On 7/16 given worsening resp status    7. D/C  IV Cefepime   8.  Resp cx   7/21 candida no therapy warranted    WILL SIGN off call with any Questions

## 2020-07-24 NOTE — PROGRESS NOTES
Successful insertion of a left double lumen PICC into pt's basilic vein. PICC tip terminates in the SVC according to [facility]lock 3CG tip confirmation system. Blood return noted in both lumens and both lumens flush without resistance. PICC is okay to use    All cardiac leads checked and are back on pt for monitoring. Bed lowered, side rails up, call light in place.     Gala ALAS updated on POC

## 2020-07-24 NOTE — PLAN OF CARE
Nutrition Problem #1: Altered nutrition-related lab values  Intervention: Food and/or Nutrient Delivery: Continue Current Diet  Nutritional Goals: tolerate most appropriate form of nutrition

## 2020-07-25 LAB
A/G RATIO: 0.9 (ref 1.1–2.2)
ALBUMIN SERPL-MCNC: 3.1 G/DL (ref 3.4–5)
ALP BLD-CCNC: 63 U/L (ref 40–129)
ALT SERPL-CCNC: 25 U/L (ref 10–40)
ANION GAP SERPL CALCULATED.3IONS-SCNC: 13 MMOL/L (ref 3–16)
AST SERPL-CCNC: 22 U/L (ref 15–37)
BASOPHILS ABSOLUTE: 0.3 K/UL (ref 0–0.2)
BASOPHILS RELATIVE PERCENT: 1.1 %
BILIRUB SERPL-MCNC: 0.6 MG/DL (ref 0–1)
BUN BLDV-MCNC: 46 MG/DL (ref 7–20)
CALCIUM SERPL-MCNC: 9.1 MG/DL (ref 8.3–10.6)
CHLORIDE BLD-SCNC: 100 MMOL/L (ref 99–110)
CO2: 28 MMOL/L (ref 21–32)
CREAT SERPL-MCNC: 0.5 MG/DL (ref 0.6–1.2)
EOSINOPHILS ABSOLUTE: 0.1 K/UL (ref 0–0.6)
EOSINOPHILS RELATIVE PERCENT: 0.6 %
GFR AFRICAN AMERICAN: >60
GFR NON-AFRICAN AMERICAN: >60
GLOBULIN: 3.3 G/DL
GLUCOSE BLD-MCNC: 176 MG/DL (ref 70–99)
GLUCOSE BLD-MCNC: 183 MG/DL (ref 70–99)
GLUCOSE BLD-MCNC: 184 MG/DL (ref 70–99)
GLUCOSE BLD-MCNC: 193 MG/DL (ref 70–99)
GLUCOSE BLD-MCNC: 214 MG/DL (ref 70–99)
GLUCOSE BLD-MCNC: 328 MG/DL (ref 70–99)
GLUCOSE BLD-MCNC: 332 MG/DL (ref 70–99)
HCT VFR BLD CALC: 36.3 % (ref 36–48)
HEMOGLOBIN: 12 G/DL (ref 12–16)
LYMPHOCYTES ABSOLUTE: 1.7 K/UL (ref 1–5.1)
LYMPHOCYTES RELATIVE PERCENT: 7.2 %
MAGNESIUM: 2.2 MG/DL (ref 1.8–2.4)
MCH RBC QN AUTO: 29.2 PG (ref 26–34)
MCHC RBC AUTO-ENTMCNC: 33.1 G/DL (ref 31–36)
MCV RBC AUTO: 88.4 FL (ref 80–100)
MONOCYTES ABSOLUTE: 1 K/UL (ref 0–1.3)
MONOCYTES RELATIVE PERCENT: 4.2 %
NEUTROPHILS ABSOLUTE: 20.5 K/UL (ref 1.7–7.7)
NEUTROPHILS RELATIVE PERCENT: 86.9 %
PDW BLD-RTO: 14 % (ref 12.4–15.4)
PERFORMED ON: ABNORMAL
PHOSPHORUS: 2.4 MG/DL (ref 2.5–4.9)
PLATELET # BLD: 254 K/UL (ref 135–450)
PMV BLD AUTO: 8.7 FL (ref 5–10.5)
POTASSIUM REFLEX MAGNESIUM: 4 MMOL/L (ref 3.5–5.1)
RBC # BLD: 4.11 M/UL (ref 4–5.2)
SODIUM BLD-SCNC: 141 MMOL/L (ref 136–145)
TOTAL PROTEIN: 6.4 G/DL (ref 6.4–8.2)
WBC # BLD: 23.6 K/UL (ref 4–11)

## 2020-07-25 PROCEDURE — 97164 PT RE-EVAL EST PLAN CARE: CPT

## 2020-07-25 PROCEDURE — 2060000000 HC ICU INTERMEDIATE R&B

## 2020-07-25 PROCEDURE — 2580000003 HC RX 258: Performed by: NURSE PRACTITIONER

## 2020-07-25 PROCEDURE — 6360000002 HC RX W HCPCS: Performed by: INTERNAL MEDICINE

## 2020-07-25 PROCEDURE — 6370000000 HC RX 637 (ALT 250 FOR IP): Performed by: INTERNAL MEDICINE

## 2020-07-25 PROCEDURE — 97530 THERAPEUTIC ACTIVITIES: CPT

## 2020-07-25 PROCEDURE — 36415 COLL VENOUS BLD VENIPUNCTURE: CPT

## 2020-07-25 PROCEDURE — 83735 ASSAY OF MAGNESIUM: CPT

## 2020-07-25 PROCEDURE — 6370000000 HC RX 637 (ALT 250 FOR IP): Performed by: NURSE PRACTITIONER

## 2020-07-25 PROCEDURE — 2580000003 HC RX 258: Performed by: INTERNAL MEDICINE

## 2020-07-25 PROCEDURE — 85025 COMPLETE CBC W/AUTO DIFF WBC: CPT

## 2020-07-25 PROCEDURE — 2700000000 HC OXYGEN THERAPY PER DAY

## 2020-07-25 PROCEDURE — 80053 COMPREHEN METABOLIC PANEL: CPT

## 2020-07-25 PROCEDURE — 84100 ASSAY OF PHOSPHORUS: CPT

## 2020-07-25 PROCEDURE — 94761 N-INVAS EAR/PLS OXIMETRY MLT: CPT

## 2020-07-25 RX ORDER — 0.9 % SODIUM CHLORIDE 0.9 %
500 INTRAVENOUS SOLUTION INTRAVENOUS ONCE
Status: COMPLETED | OUTPATIENT
Start: 2020-07-26 | End: 2020-07-26

## 2020-07-25 RX ADMIN — POLYETHYLENE GLYCOL 3350 17 G: 17 POWDER, FOR SOLUTION ORAL at 21:01

## 2020-07-25 RX ADMIN — METOPROLOL TARTRATE 25 MG: 25 TABLET, FILM COATED ORAL at 21:00

## 2020-07-25 RX ADMIN — DONEPEZIL HYDROCHLORIDE 5 MG: 5 TABLET, FILM COATED ORAL at 21:01

## 2020-07-25 RX ADMIN — INSULIN GLARGINE 42 UNITS: 100 INJECTION, SOLUTION SUBCUTANEOUS at 13:17

## 2020-07-25 RX ADMIN — LACTULOSE 20 G: 20 SOLUTION ORAL at 21:01

## 2020-07-25 RX ADMIN — Medication 10 ML: at 09:44

## 2020-07-25 RX ADMIN — SODIUM CHLORIDE, PRESERVATIVE FREE 10 ML: 5 INJECTION INTRAVENOUS at 21:00

## 2020-07-25 RX ADMIN — LACTULOSE 20 G: 20 SOLUTION ORAL at 09:44

## 2020-07-25 RX ADMIN — MUPIROCIN: 20 OINTMENT TOPICAL at 09:45

## 2020-07-25 RX ADMIN — GABAPENTIN 300 MG: 300 CAPSULE ORAL at 21:00

## 2020-07-25 RX ADMIN — AMLODIPINE BESYLATE 5 MG: 5 TABLET ORAL at 09:43

## 2020-07-25 RX ADMIN — Medication 10 ML: at 20:55

## 2020-07-25 RX ADMIN — ATORVASTATIN CALCIUM 40 MG: 40 TABLET, FILM COATED ORAL at 09:43

## 2020-07-25 RX ADMIN — ENOXAPARIN SODIUM 30 MG: 30 INJECTION SUBCUTANEOUS at 09:41

## 2020-07-25 RX ADMIN — CYANOCOBALAMIN TAB 1000 MCG 1000 MCG: 1000 TAB at 09:43

## 2020-07-25 RX ADMIN — Medication 1 CAPSULE: at 09:43

## 2020-07-25 RX ADMIN — SODIUM CHLORIDE, PRESERVATIVE FREE 10 ML: 5 INJECTION INTRAVENOUS at 09:44

## 2020-07-25 RX ADMIN — ENOXAPARIN SODIUM 30 MG: 30 INJECTION SUBCUTANEOUS at 21:01

## 2020-07-25 RX ADMIN — GABAPENTIN 300 MG: 300 CAPSULE ORAL at 09:43

## 2020-07-25 RX ADMIN — INSULIN LISPRO 3 UNITS: 100 INJECTION, SOLUTION INTRAVENOUS; SUBCUTANEOUS at 09:53

## 2020-07-25 RX ADMIN — LOSARTAN POTASSIUM AND HYDROCHLOROTHIAZIDE 2 TABLET: 12.5; 5 TABLET ORAL at 09:43

## 2020-07-25 RX ADMIN — INSULIN LISPRO 12 UNITS: 100 INJECTION, SOLUTION INTRAVENOUS; SUBCUTANEOUS at 13:18

## 2020-07-25 RX ADMIN — POLYETHYLENE GLYCOL 3350 17 G: 17 POWDER, FOR SOLUTION ORAL at 09:43

## 2020-07-25 RX ADMIN — GABAPENTIN 300 MG: 300 CAPSULE ORAL at 18:07

## 2020-07-25 RX ADMIN — Medication 1 CAPSULE: at 18:07

## 2020-07-25 RX ADMIN — INSULIN LISPRO 3 UNITS: 100 INJECTION, SOLUTION INTRAVENOUS; SUBCUTANEOUS at 18:09

## 2020-07-25 RX ADMIN — GABAPENTIN 300 MG: 300 CAPSULE ORAL at 13:17

## 2020-07-25 RX ADMIN — INSULIN LISPRO 6 UNITS: 100 INJECTION, SOLUTION INTRAVENOUS; SUBCUTANEOUS at 00:09

## 2020-07-25 RX ADMIN — METOPROLOL TARTRATE 25 MG: 25 TABLET, FILM COATED ORAL at 09:43

## 2020-07-25 RX ADMIN — ASPIRIN 325 MG ORAL TABLET 325 MG: 325 PILL ORAL at 09:43

## 2020-07-25 RX ADMIN — ONDANSETRON 4 MG: 2 INJECTION INTRAMUSCULAR; INTRAVENOUS at 21:28

## 2020-07-25 RX ADMIN — DOCUSATE SODIUM 100 MG: 100 CAPSULE, LIQUID FILLED ORAL at 09:43

## 2020-07-25 RX ADMIN — INSULIN LISPRO 3 UNITS: 100 INJECTION, SOLUTION INTRAVENOUS; SUBCUTANEOUS at 03:44

## 2020-07-25 ASSESSMENT — PAIN SCALES - GENERAL
PAINLEVEL_OUTOF10: 0

## 2020-07-25 ASSESSMENT — PAIN SCALES - WONG BAKER: WONGBAKER_NUMERICALRESPONSE: 0

## 2020-07-25 NOTE — PLAN OF CARE
Problem: Airway Clearance - Ineffective  Goal: Achieve or maintain patent airway  Outcome: Ongoing     Problem: Gas Exchange - Impaired  Goal: Absence of hypoxia  Outcome: Ongoing  Goal: Promote optimal lung function  Outcome: Ongoing     Problem: Breathing Pattern - Ineffective  Goal: Ability to achieve and maintain a regular respiratory rate  Outcome: Ongoing     Problem:  Body Temperature -  Risk of, Imbalanced  Goal: Ability to maintain a body temperature within defined limits  Outcome: Ongoing  Goal: Complications related to the disease process, condition or treatment will be avoided or minimized  Outcome: Ongoing

## 2020-07-25 NOTE — PLAN OF CARE
Pain/discomfort being managed with PRN analgesics per MD orders. Pt able to express presence and absence of pain and rate pain appropriately using numerical scale. Patient assessed for fall risk; fall precautions initiated. Patient and family instructed about safety devices. Environment kept free of clutter and adequate lighting provided. Bed locked and in lowest position. Call light within reach. Will continue to monitor. Skin assessment completed every shift. Pt assessed for incontinence, appropriate barrier cream applied prn. Pt encouraged to turn/rotate every 2 hours. Assistance provided if pt unable to do so themselves. Fall risk assessment completed every shift. All precautions in place. Pt has call light within reach at all times. Room clear of clutter. Pt aware to call for assistance when getting up.    Vitals:    07/25/20 1140   BP: 132/74   Pulse: 110   Resp: 15   Temp: 98.3 °F (36.8 °C)   SpO2:        Intake/Output Summary (Last 24 hours) at 7/25/2020 1241  Last data filed at 7/25/2020 1201  Gross per 24 hour   Intake 60 ml   Output 881 ml   Net -821 ml

## 2020-07-25 NOTE — PROGRESS NOTES
cyanosis  Skin: Skin color, texture, turgor normal.  No rashes or lesions. Neurologic:  No focal weakness   Psychiatric: Alert and oriented  Capillary Refill: Brisk,< 3 seconds   Peripheral Pulses: +2 palpable, equal bilaterally       Labs:   Recent Labs     07/23/20  0504 07/24/20  0420 07/25/20  0530   WBC 18.5* 18.4* 23.6*   HGB 13.2 13.8 12.0   HCT 40.7 41.5 36.3    281 254     Recent Labs     07/23/20  0504 07/24/20  0420 07/25/20  0530   * 136 141   K 4.5 4.5 4.0   CL 97* 98* 100   CO2 26 25 28   BUN 27* 20 46*   CREATININE 0.6 <0.5* 0.5*   CALCIUM 8.6 9.4 9.1   PHOS 2.5 2.7 2.4*     Recent Labs     07/23/20  0504 07/24/20  0420 07/25/20  0530   AST 30 25 22   ALT 46* 36 25   BILITOT 0.3 0.4 0.6   ALKPHOS 70 80 63     No results for input(s): INR in the last 72 hours. No results for input(s): Laverda Dancer in the last 72 hours. Urinalysis:      Lab Results   Component Value Date    NITRU Negative 07/12/2020    WBCUA 3-5 07/12/2020    BACTERIA RARE 11/16/2016    RBCUA 0-2 07/12/2020    BLOODU Negative 07/12/2020    SPECGRAV >1.030 07/12/2020    GLUCOSEU >=1000 07/12/2020    GLUCOSEU NEGATIVE 07/24/2011       Radiology:  XR CHEST PORTABLE   Final Result   Unremarkable limited KUB. NG tube tip projects at the left upper quadrant, overlying the expected   location of the stomach. Infiltrates bilateral lower lungs. XR CHEST PORTABLE   Final Result   Endotracheal tube appears in satisfactory position. NG tube tip is in the proximal stomach with side hole near the GE junction. This should be further advanced. Pulmonary edema pattern noted with cardiomegaly, vascular congestion,   bilateral pleural effusions and bibasilar airspace disease. Right upper extremity PICC line appears in appropriate position without   pneumothorax. XR CHEST 1 VW   Final Result   1.  Multifocal airspace opacities which have increased when compared with exam   from July 17, 2020.   2. Silhouetting of the left hemidiaphragm likely reflecting left effusion and   atelectasis. XR CHEST 1 VW   Final Result   Multifocal airspace disease. Areas in the right upper and left lower lung   are consolidated. However however, there are areas of improved aeration. XR CHEST PORTABLE   Final Result   Worsening multifocal airspace opacities. XR CHEST PORTABLE   Final Result   Left greater than right basilar airspace disease, concerning for pneumonia   given patient history. Assessment/Plan:    Active Hospital Problems    Diagnosis    Acute respiratory failure with hypoxia ([facility] [address] [J96.01]    COVID-19 virus detected [U07.1]    COVID-19 virus infection [U07.1]     Patient admitted with COVID19 pneumonia initially improved on steroids and remdesivir, then developed fever and increased need of oxygen, transferred to ICU, given one dose of actemra. 1.  COVID-19 pneumonia, initially was on empiric Azithromycin/ceftriaxone,  decadron 6 mg daily, Lovenox BID, respiratory culture, consulted ID and remdesivir started, clinically was improving, then  developed fever and increased need of oxygen, chest xray was worsening. Started on cefepime and received one dose of Actemra. oxygen need was 15L so patient transferred to ICU, and vapotherm started. Was then requiring BiPap and then subsequently intubated due to inability to tolerate BiPAP. Self-extubated 7/24. Now on 7L high flow. 2. Sepsis due to CODID 19 pneumonia, ongoing management as above. 3. Acute respiratory failure with hypoxia s/p mechanical intubation. 4. Type 2 DM, fluctuating, high dose SSI. 5. Essential hypertension, continue home meds  6. Mild cognitive impairment, continue home meds    Diet: DIET CARB CONTROL; Dysphagia Pureed;  Moderately Thick (Honey)  Code Status: Full Code      Guarded prognosis    Abran Son MD

## 2020-07-25 NOTE — PLAN OF CARE
Ongoing     Problem: Risk for Fluid Volume Deficit  Goal: Maintain normal heart rhythm  7/25/2020 0030 by Joseph Curry RN  Outcome: Ongoing  7/25/2020 0024 by Joseph Curry RN  Outcome: Ongoing  Goal: Maintain absence of muscle cramping  7/25/2020 0030 by Joseph Curry RN  Outcome: Ongoing  7/25/2020 0024 by Joseph Curry RN  Outcome: Ongoing  Goal: Maintain normal serum potassium, sodium, calcium, phosphorus, and pH  7/25/2020 0030 by Joseph Curry RN  Outcome: Ongoing  7/25/2020 0024 by Joseph Curry RN  Outcome: Ongoing     Problem: Loneliness or Risk for Loneliness  Goal: Demonstrate positive use of time alone when socialization is not possible  7/25/2020 0030 by Joseph Curry RN  Outcome: Ongoing  7/25/2020 0024 by Joseph Curry RN  Outcome: Ongoing     Problem: Fatigue  Goal: Verbalize increase energy and improved vitality  7/25/2020 0030 by Joseph Curry RN  Outcome: Ongoing  7/25/2020 0024 by Joseph Curry RN  Outcome: Ongoing     Problem: Patient Education: Go to Patient Education Activity  Goal: Patient/Family Education  7/25/2020 0030 by Joseph Curry RN  Outcome: Ongoing  7/25/2020 0024 by Joseph Curry RN  Outcome: Ongoing     Problem: Falls - Risk of:  Goal: Will remain free from falls  Description: Will remain free from falls  7/25/2020 0030 by Joseph Curry RN  Outcome: Ongoing  7/25/2020 0024 by Joseph Curry RN  Outcome: Ongoing  Goal: Absence of physical injury  Description: Absence of physical injury  7/25/2020 0030 by Joseph Curry RN  Outcome: Ongoing  7/25/2020 0024 by Joseph Curry RN  Outcome: Ongoing     Problem: Pain:  Goal: Pain level will decrease  Description: Pain level will decrease  7/25/2020 0030 by Joseph Curry RN  Outcome: Ongoing  7/25/2020 0024 by Joseph Curry RN  Outcome: Ongoing  Goal: Control of acute pain  Description: Control of acute pain  7/25/2020 0030 by Joseph Curry RN  Outcome:

## 2020-07-25 NOTE — PROGRESS NOTES
Physical Therapy    Facility/Department: [address] PROGRESSIVE CARE  Re-Evaluation. NAME: Estela Johnson  : 1955  MRN: [id number]    Date of Service: 2020    Discharge Recommendations:  Continue to assess pending progress   PT Equipment Recommendations  Other: Will monitor for potential equipt needs. Assessment   Body structures, Functions, Activity limitations: Decreased functional mobility ; Decreased cognition;Decreased endurance  Assessment: 74 y/o female admit 2020 with Acute Respiratory, Pneumonia, COVID +.  2020 Pt transfer to ICU with decline Medical Status. -2020 Pt Intubated. PMH as noted including C-Spine Surg, Shld Surg, DM, Memory Loss. PTA pt living with sign other in apt setting with steps to access. Pt reports independent with daily care and functional mobility pta. Unable to initiate OOB activities at this time due to current level of confusion. Will need to monitor pt's progress as able to proceed with further functional activities. Prognosis: Fair;Good  Decision Making: Medium Complexity  History: 74 y/o female admit 2020 with Acute Respiratory, Pneumonia, COVID +.  2020 Pt transfer to ICU with decline Medical Status. -2020 Pt Intubated. PMH as noted including C-Spine Surg, Shld Surg, DM, Memory Loss. Exam: See above. Clinical Presentation: See above. Patient Education: Role of PT, POC, Need to call for assist.  Barriers to Learning: Anxiety. REQUIRES PT FOLLOW UP: Yes  Activity Tolerance  Activity Tolerance: Patient limited by endurance; Patient limited by cognitive status       Patient Diagnosis(es): The primary encounter diagnosis was Acute respiratory failure with hypoxia ([address] Diagnoses of COVID-19 virus detected and Multifocal pneumonia were also pertinent to this visit.      has a past medical history of Diabetes mellitus ([address] Headache(784.0), Herniated disc, cervical, Hypercholesteremia, Hypertension, Memory loss, Psychiatric problem, and Type 2 diabetes mellitus without complication ([facility] [address] has a past surgical history that includes Tubal ligation and shoulder surgery. Restrictions  Restrictions/Precautions  Restrictions/Precautions: Fall Risk  Position Activity Restriction  Other position/activity restrictions: Droplet Plus Precautions + COVID. O2 7L via High Flow. Diet : Dysphagia, Pureed; Honey Thick. Vision/Hearing  Vision: (Appears functional, unable to fully assess.)  Hearing: (Appears functional, unable to fully assess.)     Subjective  General  Chart Reviewed: Yes  Patient assessed for rehabilitation services?: Yes  Additional Pertinent Hx: 74 y/o female admit 7/12/2020 with Acute Respiratory, Pneumonia, COVID +.  7/17/2020 Pt transfer to ICU with decline Medical Status. 7/20-7/23/2020 Pt Intubated. PMH as noted including C-Spine Surg, Shld Surg, DM, Memory Loss. Family / Caregiver Present: No  Referring Practitioner: Dr. Yoselin Pastor  Diagnosis: Acute Respiratory, Pneumonia, COVID +. Other (Comment): Pt attempts to follow simple commands inconsistent. Subjective  Subjective: Pt agreeable to PT.   Pain Screening  Patient Currently in Pain: Denies          Orientation  Orientation  Overall Orientation Status: Impaired  Orientation Level: Oriented to person(Pt alert to self, aware of \"hospital\" setting although confused to recent events/situation.)  Social/Functional History  Social/Functional History  Lives With: Significant other  Type of Home: Apartment  Home Layout: One level  Home Access: Stairs to enter with rails  Bathroom Shower/Tub: Tub/Shower unit  Bathroom Toilet: Standard  Bathroom Accessibility: Accessible  Home Equipment: (No DME.)  ADL Assistance: Independent  Ambulation Assistance: Independent(Without assist device pta.)  Transfer Assistance: Independent  Active : Yes  Occupation: Retired  Cognition   Cognition  Arousal/Alertness: Delayed responses to stimuli  Following Commands: Inconsistently follows commands  Memory: Decreased recall of recent events  Safety Judgement: Decreased awareness of need for assistance;Decreased awareness of need for safety  Insights: Decreased awareness of deficits  Initiation: Requires cues for some  Sequencing: Requires cues for some    Objective          AROM RLE (degrees)  RLE AROM: WFL  AROM LLE (degrees)  LLE AROM : WFL  AROM RUE (degrees)  RUE AROM : WFL  AROM LUE (degrees)  LUE AROM : WFL  Strength Other  Other: At least 3 3+/5; unable to fully assess due to pt confusion. Bed mobility  Rolling to Left: Minimal assistance  Rolling to Right: Minimal assistance  Comment: Pt able to assist Rolling R/L; defer EOB/OOB per nurse request due to current level of confusion. Transfers  Bed to Chair: Unable to assess  Comment: Pt able to assist Rolling R/L; defer EOB/OOB per nurse request due to current level of confusion. Plan   Plan  Times per week: 3-5x week while in acute care setting. Current Treatment Recommendations: Functional Mobility Training, Transfer Training, Gait Training, Safety Education & Training, Patient/Caregiver Education & Training  Safety Devices  Type of devices: Bed alarm in place, Call light within reach, Left in bed, Nurse notified  Restraints  Initially in place: Yes  Restraints: UE soft mitts. AM-PAC Score  AM-PAC Inpatient Mobility Raw Score : 18 (07/16/20 1243)  AM-PAC Inpatient T-Scale Score : 43.63 (07/16/20 1243)  Mobility Inpatient CMS 0-100% Score: 46.58 (07/16/20 1243)  Mobility Inpatient CMS G-Code Modifier : CK (07/16/20 1243)          Goals  Short term goals  Time Frame for Short term goals: Upon d/c acute care setting. Short term goal 1: Bed Mob SBA. Short term goal 2: Transfers with/without assist device SBA. Short term goal 3: Amb with/without assist device within hospital room setting SBA. Patient Goals   Patient goals : None stated at this time.        Therapy Time   Individual Concurrent Group Co-treatment   Time In 1100         Time Out 1130         Minutes [address]

## 2020-07-25 NOTE — PROGRESS NOTES
2115: Shift assessment and transfer note. Patient alert to self otherwise confused. Remain on oxygen per HFNC at 7L/m with sats upper 80's to lower 90's. Report called to Franco Spear RN resuming patient care. Patient is transferred [id number] in stable condition. This writer called patient daughterKasia Chapa) and notified patient transfer and status. 2130: Transfer handoff completed at bed side on 5N.

## 2020-07-26 ENCOUNTER — APPOINTMENT (OUTPATIENT)
Dept: GENERAL RADIOLOGY | Age: 65
DRG: 871 | End: 2020-07-26
Payer: MEDICARE

## 2020-07-26 LAB
A/G RATIO: 1.2 (ref 1.1–2.2)
ABO/RH: NORMAL
ALBUMIN SERPL-MCNC: 3 G/DL (ref 3.4–5)
ALP BLD-CCNC: 46 U/L (ref 40–129)
ALT SERPL-CCNC: 24 U/L (ref 10–40)
ANION GAP SERPL CALCULATED.3IONS-SCNC: 13 MMOL/L (ref 3–16)
ANTIBODY SCREEN: NORMAL
AST SERPL-CCNC: 22 U/L (ref 15–37)
BASOPHILS ABSOLUTE: 0 K/UL (ref 0–0.2)
BASOPHILS RELATIVE PERCENT: 0 %
BILIRUB SERPL-MCNC: 0.4 MG/DL (ref 0–1)
BUN BLDV-MCNC: 52 MG/DL (ref 7–20)
C-REACTIVE PROTEIN: <0.3 MG/L (ref 0–5.1)
CALCIUM SERPL-MCNC: 8.3 MG/DL (ref 8.3–10.6)
CHLORIDE BLD-SCNC: 108 MMOL/L (ref 99–110)
CO2: 25 MMOL/L (ref 21–32)
CREAT SERPL-MCNC: <0.5 MG/DL (ref 0.6–1.2)
D DIMER: 599 NG/ML DDU (ref 0–229)
EOSINOPHILS ABSOLUTE: 0.2 K/UL (ref 0–0.6)
EOSINOPHILS RELATIVE PERCENT: 1 %
FERRITIN: 257.1 NG/ML (ref 15–150)
GFR AFRICAN AMERICAN: >60
GFR NON-AFRICAN AMERICAN: >60
GLOBULIN: 2.5 G/DL
GLUCOSE BLD-MCNC: 121 MG/DL (ref 70–99)
GLUCOSE BLD-MCNC: 177 MG/DL (ref 70–99)
GLUCOSE BLD-MCNC: 250 MG/DL (ref 70–99)
GLUCOSE BLD-MCNC: 274 MG/DL (ref 70–99)
GLUCOSE BLD-MCNC: 68 MG/DL (ref 70–99)
GLUCOSE BLD-MCNC: 97 MG/DL (ref 70–99)
HCT VFR BLD CALC: 25.7 % (ref 36–48)
HCT VFR BLD CALC: 25.9 % (ref 36–48)
HCT VFR BLD CALC: 26.6 % (ref 36–48)
HCT VFR BLD CALC: 29.2 % (ref 36–48)
HEMOGLOBIN: 8.4 G/DL (ref 12–16)
HEMOGLOBIN: 8.6 G/DL (ref 12–16)
HEMOGLOBIN: 8.6 G/DL (ref 12–16)
HEMOGLOBIN: 9.5 G/DL (ref 12–16)
LYMPHOCYTES ABSOLUTE: 0.9 K/UL (ref 1–5.1)
LYMPHOCYTES RELATIVE PERCENT: 5 %
MAGNESIUM: 2.1 MG/DL (ref 1.8–2.4)
MCH RBC QN AUTO: 29.6 PG (ref 26–34)
MCHC RBC AUTO-ENTMCNC: 33.2 G/DL (ref 31–36)
MCV RBC AUTO: 89 FL (ref 80–100)
METAMYELOCYTES RELATIVE PERCENT: 1 %
MONOCYTES ABSOLUTE: 1.1 K/UL (ref 0–1.3)
MONOCYTES RELATIVE PERCENT: 6 %
NEUTROPHILS ABSOLUTE: 16.5 K/UL (ref 1.7–7.7)
NEUTROPHILS RELATIVE PERCENT: 87 %
OCCULT BLOOD SCREENING: ABNORMAL
OVALOCYTES: ABNORMAL
PDW BLD-RTO: 13.9 % (ref 12.4–15.4)
PERFORMED ON: ABNORMAL
PERFORMED ON: NORMAL
PHOSPHORUS: 3.6 MG/DL (ref 2.5–4.9)
PLATELET # BLD: 166 K/UL (ref 135–450)
PLATELET SLIDE REVIEW: ADEQUATE
PMV BLD AUTO: 8.5 FL (ref 5–10.5)
POLYCHROMASIA: ABNORMAL
POTASSIUM REFLEX MAGNESIUM: 4.2 MMOL/L (ref 3.5–5.1)
RBC # BLD: 2.9 M/UL (ref 4–5.2)
SLIDE REVIEW: ABNORMAL
SODIUM BLD-SCNC: 146 MMOL/L (ref 136–145)
TOTAL PROTEIN: 5.5 G/DL (ref 6.4–8.2)
WBC # BLD: 18.8 K/UL (ref 4–11)

## 2020-07-26 PROCEDURE — 84100 ASSAY OF PHOSPHORUS: CPT

## 2020-07-26 PROCEDURE — 71045 X-RAY EXAM CHEST 1 VIEW: CPT

## 2020-07-26 PROCEDURE — 86850 RBC ANTIBODY SCREEN: CPT

## 2020-07-26 PROCEDURE — 6370000000 HC RX 637 (ALT 250 FOR IP): Performed by: INTERNAL MEDICINE

## 2020-07-26 PROCEDURE — 85379 FIBRIN DEGRADATION QUANT: CPT

## 2020-07-26 PROCEDURE — 6360000002 HC RX W HCPCS: Performed by: NURSE PRACTITIONER

## 2020-07-26 PROCEDURE — 80053 COMPREHEN METABOLIC PANEL: CPT

## 2020-07-26 PROCEDURE — 86900 BLOOD TYPING SEROLOGIC ABO: CPT

## 2020-07-26 PROCEDURE — G0328 FECAL BLOOD SCRN IMMUNOASSAY: HCPCS

## 2020-07-26 PROCEDURE — 85018 HEMOGLOBIN: CPT

## 2020-07-26 PROCEDURE — 2580000003 HC RX 258: Performed by: NURSE PRACTITIONER

## 2020-07-26 PROCEDURE — 2500000003 HC RX 250 WO HCPCS: Performed by: NURSE PRACTITIONER

## 2020-07-26 PROCEDURE — C9113 INJ PANTOPRAZOLE SODIUM, VIA: HCPCS | Performed by: NURSE PRACTITIONER

## 2020-07-26 PROCEDURE — 2060000000 HC ICU INTERMEDIATE R&B

## 2020-07-26 PROCEDURE — 86901 BLOOD TYPING SEROLOGIC RH(D): CPT

## 2020-07-26 PROCEDURE — 83735 ASSAY OF MAGNESIUM: CPT

## 2020-07-26 PROCEDURE — 94761 N-INVAS EAR/PLS OXIMETRY MLT: CPT

## 2020-07-26 PROCEDURE — 6370000000 HC RX 637 (ALT 250 FOR IP): Performed by: NURSE PRACTITIONER

## 2020-07-26 PROCEDURE — 2580000003 HC RX 258: Performed by: INTERNAL MEDICINE

## 2020-07-26 PROCEDURE — 82728 ASSAY OF FERRITIN: CPT

## 2020-07-26 PROCEDURE — 85025 COMPLETE CBC W/AUTO DIFF WBC: CPT

## 2020-07-26 PROCEDURE — 2700000000 HC OXYGEN THERAPY PER DAY

## 2020-07-26 PROCEDURE — 36592 COLLECT BLOOD FROM PICC: CPT

## 2020-07-26 PROCEDURE — 85014 HEMATOCRIT: CPT

## 2020-07-26 PROCEDURE — 86140 C-REACTIVE PROTEIN: CPT

## 2020-07-26 PROCEDURE — 99231 SBSQ HOSP IP/OBS SF/LOW 25: CPT | Performed by: INTERNAL MEDICINE

## 2020-07-26 RX ORDER — INSULIN GLARGINE 100 [IU]/ML
45 INJECTION, SOLUTION SUBCUTANEOUS DAILY
Status: DISCONTINUED | OUTPATIENT
Start: 2020-07-27 | End: 2020-07-27

## 2020-07-26 RX ORDER — 0.9 % SODIUM CHLORIDE 0.9 %
500 INTRAVENOUS SOLUTION INTRAVENOUS ONCE
Status: COMPLETED | OUTPATIENT
Start: 2020-07-26 | End: 2020-07-26

## 2020-07-26 RX ADMIN — FAMOTIDINE 20 MG: 10 INJECTION, SOLUTION INTRAVENOUS at 08:02

## 2020-07-26 RX ADMIN — Medication 1 CAPSULE: at 08:02

## 2020-07-26 RX ADMIN — SODIUM CHLORIDE 8 MG/HR: 9 INJECTION, SOLUTION INTRAVENOUS at 21:25

## 2020-07-26 RX ADMIN — INSULIN LISPRO 9 UNITS: 100 INJECTION, SOLUTION INTRAVENOUS; SUBCUTANEOUS at 08:03

## 2020-07-26 RX ADMIN — SODIUM CHLORIDE 80 MG: 9 INJECTION, SOLUTION INTRAVENOUS at 01:40

## 2020-07-26 RX ADMIN — SODIUM CHLORIDE, PRESERVATIVE FREE 10 ML: 5 INJECTION INTRAVENOUS at 21:37

## 2020-07-26 RX ADMIN — Medication 10 ML: at 08:02

## 2020-07-26 RX ADMIN — GABAPENTIN 300 MG: 300 CAPSULE ORAL at 08:02

## 2020-07-26 RX ADMIN — DONEPEZIL HYDROCHLORIDE 5 MG: 5 TABLET, FILM COATED ORAL at 20:31

## 2020-07-26 RX ADMIN — METOPROLOL TARTRATE 25 MG: 25 TABLET, FILM COATED ORAL at 20:31

## 2020-07-26 RX ADMIN — HYDROCODONE BITARTRATE AND ACETAMINOPHEN 10 ML: 176/5 SOLUTION ORAL at 21:25

## 2020-07-26 RX ADMIN — Medication 1 CAPSULE: at 17:00

## 2020-07-26 RX ADMIN — GABAPENTIN 300 MG: 300 CAPSULE ORAL at 20:31

## 2020-07-26 RX ADMIN — Medication 10 ML: at 20:31

## 2020-07-26 RX ADMIN — GABAPENTIN 300 MG: 300 CAPSULE ORAL at 17:00

## 2020-07-26 RX ADMIN — INSULIN GLARGINE 42 UNITS: 100 INJECTION, SOLUTION SUBCUTANEOUS at 08:04

## 2020-07-26 RX ADMIN — FAMOTIDINE 20 MG: 10 INJECTION, SOLUTION INTRAVENOUS at 00:42

## 2020-07-26 RX ADMIN — SODIUM CHLORIDE 8 MG/HR: 9 INJECTION, SOLUTION INTRAVENOUS at 12:38

## 2020-07-26 RX ADMIN — SODIUM CHLORIDE 500 ML: 9 INJECTION, SOLUTION INTRAVENOUS at 01:32

## 2020-07-26 RX ADMIN — METOPROLOL TARTRATE 25 MG: 25 TABLET, FILM COATED ORAL at 08:02

## 2020-07-26 RX ADMIN — SODIUM CHLORIDE 500 ML: 9 INJECTION, SOLUTION INTRAVENOUS at 00:10

## 2020-07-26 RX ADMIN — FAMOTIDINE 20 MG: 10 INJECTION, SOLUTION INTRAVENOUS at 20:31

## 2020-07-26 RX ADMIN — GABAPENTIN 300 MG: 300 CAPSULE ORAL at 12:38

## 2020-07-26 RX ADMIN — SODIUM CHLORIDE 8 MG/HR: 9 INJECTION, SOLUTION INTRAVENOUS at 02:22

## 2020-07-26 ASSESSMENT — PAIN SCALES - GENERAL
PAINLEVEL_OUTOF10: 0

## 2020-07-26 NOTE — PROGRESS NOTES
Entered pts room too see large amounts of black tarry stool all over bed and patient. Patient cleaned up and sheets changed. Secure message sent to Ally Hodges NP. See new orders. Will continue to monitor.

## 2020-07-26 NOTE — PROGRESS NOTES
Hospitalist Progress Note      PCP: Maximino Caro MD    Date of Admission: 7/12/2020      Subjective: Pt seen and examined. Last night became hypotensive, tachycardic, hypoxic with emesis containing dark brown flecks and melena. Was occult stool positive. Started on Protonix gtt. Oxygen, blood pressure improved this morning. Hgb dropped significantly from 12 yesterday morning to 8.6. No further bowel movement this morning.       Medications:  Reviewed    Infusion Medications    pantoprozole (PROTONIX) infusion 8 mg/hr (07/26/20 0222)    dextrose       Scheduled Medications    [START ON 7/27/2020] insulin glargine  45 Units Subcutaneous Daily    insulin lispro  0-18 Units Subcutaneous 4x Daily AC & HS    famotidine (PEPCID) injection  20 mg Intravenous BID    lidocaine 1 % injection  5 mL Intradermal Once    sodium chloride flush  10 mL Intravenous 2 times per day    polyethylene glycol  17 g Oral BID    docusate sodium  100 mg Oral Daily    lactulose  20 g Oral TID    senna  10 mL Oral Nightly    [Held by provider] enoxaparin  30 mg Subcutaneous BID    sodium chloride flush  10 mL Intravenous 2 times per day    metoprolol tartrate  25 mg Oral BID    lactobacillus  1 capsule Oral BID WC    [Held by provider] amLODIPine  5 mg Oral Daily    atorvastatin  40 mg Oral Daily    vitamin B-12  1,000 mcg Oral Daily    donepezil  5 mg Oral Nightly    gabapentin  300 mg Oral 4x Daily    [Held by provider] losartan-hydroCHLOROthiazide  2 tablet Oral Daily     PRN Meds: sodium chloride flush, sodium chloride flush, acetaminophen **OR** acetaminophen, polyethylene glycol, promethazine **OR** ondansetron, glucose, dextrose, glucagon (rDNA), dextrose      Intake/Output Summary (Last 24 hours) at 7/26/2020 1152  Last data filed at 7/26/2020 0630  Gross per 24 hour   Intake 499 ml   Output 1700 ml   Net -1201 ml       Physical Exam Performed:    /80   Pulse 68   Temp 98.6 °F (37 °C) (Oral)   Resp 16 Ht 5' (1.524 m)   Wt 132 lb 4.4 oz (60 kg)   SpO2 100%   BMI 25.83 kg/m²     General appearance: No acute distress, confused. Neck: Supple  Respiratory: Normal respiratory effort. Clear to auscultation, bilaterally without Rales/Wheezes/Rhonchi. Cardiovascular: Regular rate and rhythm with normal S1/S2 without murmurs, rubs or gallops. Abdomen: Soft, non-tender, non-distended with normal bowel sounds. Musculoskeletal: No clubbing, cyanosis  Skin: Skin color, texture, turgor normal.  No rashes or lesions. Neurologic:  No focal weakness   Psychiatric: Alert and oriented  Capillary Refill: Brisk,< 3 seconds   Peripheral Pulses: +2 palpable, equal bilaterally       Labs:   Recent Labs     07/24/20 0420 07/25/20  0530 07/26/20  0000 07/26/20  0230 07/26/20  0615   WBC 18.4* 23.6*  --   --  18.8*   HGB 13.8 12.0 9.5* 8.6* 8.6*   HCT 41.5 36.3 29.2* 26.6* 25.9*    254  --   --  166     Recent Labs     07/24/20  0420 07/25/20  0530 07/26/20  0615    141 146*   K 4.5 4.0 4.2   CL 98* 100 108   CO2 25 28 25   BUN 20 46* 52*   CREATININE <0.5* 0.5* <0.5*   CALCIUM 9.4 9.1 8.3   PHOS 2.7 2.4* 3.6     Recent Labs     07/24/20  0420 07/25/20  0530 07/26/20  0615   AST 25 22 22   ALT 36 25 24   BILITOT 0.4 0.6 0.4   ALKPHOS 80 63 46     No results for input(s): INR in the last 72 hours. No results for input(s): Serene [place] in the last 72 hours. Urinalysis:      Lab Results   Component Value Date    NITRU Negative 07/12/2020    WBCUA 3-5 07/12/2020    BACTERIA RARE 11/16/2016    RBCUA 0-2 07/12/2020    BLOODU Negative 07/12/2020    SPECGRAV >1.030 07/12/2020    GLUCOSEU >=1000 07/12/2020    GLUCOSEU NEGATIVE 07/24/2011       Radiology:  XR CHEST PORTABLE   Final Result   Enteric tube tip and side port project over the distal stomach. No interval change in bilateral airspace opacities and left pleural effusion. XR CHEST PORTABLE   Final Result   Unremarkable limited KUB.       NG tube tip projects at the left upper quadrant, overlying the expected   location of the stomach. Infiltrates bilateral lower lungs. XR CHEST PORTABLE   Final Result   Endotracheal tube appears in satisfactory position. NG tube tip is in the proximal stomach with side hole near the GE junction. This should be further advanced. Pulmonary edema pattern noted with cardiomegaly, vascular congestion,   bilateral pleural effusions and bibasilar airspace disease. Right upper extremity PICC line appears in appropriate position without   pneumothorax. XR CHEST 1 VW   Final Result   1. Multifocal airspace opacities which have increased when compared with exam   from July 17, 2020.   2. Silhouetting of the left hemidiaphragm likely reflecting left effusion and   atelectasis. XR CHEST 1 VW   Final Result   Multifocal airspace disease. Areas in the right upper and left lower lung   are consolidated. However however, there are areas of improved aeration. XR CHEST PORTABLE   Final Result   Worsening multifocal airspace opacities. XR CHEST PORTABLE   Final Result   Left greater than right basilar airspace disease, concerning for pneumonia   given patient history. Assessment/Plan:    Active Hospital Problems    Diagnosis    Acute respiratory failure with hypoxia ([facility] [address] [J96.01]    COVID-19 virus detected [U07.1]    COVID-19 virus infection [U07.1]     Patient admitted with COVID19 pneumonia initially improved on steroids and remdesivir, then developed fever and increased need of oxygen, transferred to ICU, given one dose of actemra. 1.  COVID-19 pneumonia, initially was on empiric Azithromycin/ceftriaxone,  decadron 6 mg daily, Lovenox BID, respiratory culture, consulted ID and remdesivir started, clinically was improving, then  developed fever and increased need of oxygen, chest xray was worsening. Started on cefepime and received one dose of Actemra. oxygen need was 15L so patient transferred to ICU, and vapotherm started. Was then requiring BiPap and then subsequently intubated due to inability to tolerate BiPAP. Self-extubated 7/24. Now on 4L high flow. 2. Sepsis due to CODID 19 pneumonia, ongoing management as above. 3. Acute respiratory failure with hypoxia s/p mechanical intubation. 4. Type 2 DM, fluctuating, high dose SSI. 5. Essential hypertension, hold BP meds due to hypotensive episode. 6. Mild cognitive impairment, continue home meds  7. Acute blood loss anemia - GI consulted, withholding EGD due to high risk. Continue PPI gtt, stopped Lovenox and ASA, hold BP meds for now. H/H q6h, transfuse < 7.     Diet: Diet NPO Effective Now  Code Status: Full Code      Guarded prognosis    Nay Nowak MD

## 2020-07-26 NOTE — PLAN OF CARE
Problem: Airway Clearance - Ineffective  Goal: Achieve or maintain patent airway  Outcome: Ongoing     Problem: Gas Exchange - Impaired  Goal: Absence of hypoxia  Outcome: Ongoing  Goal: Promote optimal lung function  Outcome: Ongoing     Problem: Breathing Pattern - Ineffective  Goal: Ability to achieve and maintain a regular respiratory rate  Outcome: Ongoing     Problem:  Body Temperature -  Risk of, Imbalanced  Goal: Ability to maintain a body temperature within defined limits  Outcome: Ongoing  Goal: Complications related to the disease process, condition or treatment will be avoided or minimized  Outcome: Ongoing     Problem: Isolation Precautions - Risk of Spread of Infection  Goal: Prevent transmission of infection  Outcome: Ongoing     Problem: Nutrition Deficits  Goal: Optimize nutrtional status  Outcome: Ongoing     Problem: Risk for Fluid Volume Deficit  Goal: Maintain normal heart rhythm  Outcome: Ongoing  Goal: Maintain absence of muscle cramping  Outcome: Ongoing  Goal: Maintain normal serum potassium, sodium, calcium, phosphorus, and pH  Outcome: Ongoing     Problem: Loneliness or Risk for Loneliness  Goal: Demonstrate positive use of time alone when socialization is not possible  Outcome: Ongoing     Problem: Fatigue  Goal: Verbalize increase energy and improved vitality  Outcome: Ongoing     Problem: Patient Education: Go to Patient Education Activity  Goal: Patient/Family Education  Outcome: Ongoing     Problem: Falls - Risk of:  Goal: Will remain free from falls  Description: Will remain free from falls  Outcome: Ongoing  Goal: Absence of physical injury  Description: Absence of physical injury  Outcome: Ongoing     Problem: Pain:  Goal: Pain level will decrease  Description: Pain level will decrease  Outcome: Ongoing  Goal: Control of acute pain  Description: Control of acute pain  Outcome: Ongoing  Goal: Control of chronic pain  Description: Control of chronic pain  Outcome: Ongoing Problem: Nutrition  Goal: Optimal nutrition therapy  Outcome: Ongoing     Problem: Skin Integrity:  Goal: Will show no infection signs and symptoms  Description: Will show no infection signs and symptoms  Outcome: Ongoing  Goal: Absence of new skin breakdown  Description: Absence of new skin breakdown  Outcome: Ongoing     Problem: Restraint Use - Nonviolent/Non-Self-Destructive Behavior:  Goal: Absence of restraint indications  Description: Absence of restraint indications  Outcome: Ongoing  Goal: Absence of restraint-related injury  Description: Absence of restraint-related injury  Outcome: Ongoing

## 2020-07-26 NOTE — PROGRESS NOTES
Patient experienced episode of emesis after drinking 8oz of honey thickened apple juice. Emesis dark brown and speckled in a clear fluid. Patient desated to 86% after episode of emesis on 9L high flow (was 98% on 9L). Patient o2 increased to 10L, pt sating 94% minutes later. Patient stated \"Oh my, I am going to die. \" Secure message sent to Clarke Busch NP. Awaiting response. PRN Zofran given per MAR. Patient resting quietly in bed at this time.

## 2020-07-26 NOTE — PLAN OF CARE
Skin assessment completed every shift. Pt assessed for incontinence, appropriate barrier cream applied prn. Pt encouraged to turn/rotate every 2 hours. Assistance provided if pt unable to do so themselves. Fall risk assessment completed every shift. All precautions in place. Pt has call light within reach at all times. Room clear of clutter. Pt aware to call for assistance when getting up.      Intake/Output Summary (Last 24 hours) at 7/26/2020 0753  Last data filed at 7/26/2020 0630  Gross per 24 hour   Intake 499 ml   Output 1700 ml   Net -1201 ml     Vitals:    07/26/20 0734   BP: (!) 162/88   Pulse: 64   Resp: 16   Temp: 98.6 °F (37 °C)   SpO2: 99%

## 2020-07-26 NOTE — PROGRESS NOTES
[facility] Medicine Progress Update    Patient: Wang Rapp  MRN: [id number]    Gender: female : 1955 Age: 75 y.o. Code Status: Full Code    Brief Note: Called tonight by patient's nurse with concerns regarding her oxygenation. Patient had episode of emesis with dark brown flecks throughout. The nurse was at the bedside and the patient was sitting upright and there was no concern for aspiration. The patient desaturated briefly but after increasing her nasal cannula to 10 L she resumed oxygenating above 94%. Later in the evening patient had a large amount of black tarry stool. Occult stool was sent and H&H was ordered stat. Patient was tachycardic into the 120s to 130s and her blood pressure was 861 systolic. ROS:  Pertinent items are noted in HPI. Vitals:    Vitals:    20 0055   BP:    Pulse:    Resp:    Temp:    SpO2: 97%       Pertinent exam:    Gen: Elderly [place] female sitting upright in bed, nasal cannula in place. Neck: supple, trachea midline, no anterior cervical or SC LAD  Heart:  Normal s1/s2, RRR, no murmurs, gallops, or rubs. No leg edema  Lungs: Rhonchi bilaterally, no use of accessory muscles  Abd: bowel sounds present, soft, nontender, nondistended, no masses  Extrem: No cyanosis, no edema, peripheral pulses 2+ bilaterally, good capillary refill  Skin: no rashes or lesions, normal color/perfusion  Psych:  A & O x3    Neuro: grossly intact, moves all four extremities.         Patient Active Problem List   Diagnosis    Cervical spinal stenosis    Acute encephalopathy    Malignant hypertension    Type 2 diabetes mellitus without complication, with long-term current use of insulin ([address]    Left-sided low back pain with left-sided sciatica    Hypercholesteremia    Primary insomnia    Hypotension    Dizziness    Essential hypertension    Elevated lactic acid level    Lactic acidosis    Atrial ectopy    Vasovagal syncope    Dysrhythmia    Chronic left-sided low back pain with left-sided sciatica    MCI (mild cognitive impairment)    Chest pain    COVID-19 virus infection    Acute respiratory failure with hypoxia ([address]    COVID-19 virus detected         Assessment/Plan:    Active Problems:    COVID-19 virus infection    Acute respiratory failure with hypoxia ([address]    COVID-19 virus detected  Resolved Problems:    * No resolved hospital problems. *      1.  GI bleed - patient had large amount of dark tarry stool. This was guaiac +   - H/H dropped from 12/36 this am to 9.5/29.2.   - blood pressure was low (92/52) so the pt was given 1 L NS saline.   - will repeat hemoglobin after liter is complete. - Type and cross for 2 units and will transfuse if hbg < 8 (pt is symptomatic and oxygen dependent with current oxygen requirement of 9L)  - Protonix bolus and drip started. NG tube was placed. I spent 45 minutes providing critical care for [employer]. This includes chart review, laboratory results and physical examination as well as collaboration with the attending physicians. This time is excluding time spent performing procedures. Electronically signed by ZACHARY Cleaning CNP on 7/26/2020 at 1:31 AM    I discussed findings and management with the resident/Nurse practioner, ANASTASIA Bae, on 07/25/2020  and agree as documented in this note. And changes made to the note. Critical Care time spent not including procedures include > 35 min    1. Acute upper GI bleeding; suspect due to underlying peptic ulcer disease, or stress gastritis  2. Melana  3. Acute blood loss anemia  4. Hypotension  5. COVID-19 infection    Additionally follow-up hemoglobin after 1 L IV fluids of been given. If continues to be hemodynamically unstable will need transfer back to the ICU.      Ruel [facility] physician on call

## 2020-07-26 NOTE — CONSULTS
10 mL Intravenous 2 times per day    polyethylene glycol  17 g Oral BID    docusate sodium  100 mg Oral Daily    insulin glargine  42 Units Subcutaneous Daily    lactulose  20 g Oral TID    senna  10 mL Oral Nightly    [Held by provider] enoxaparin  30 mg Subcutaneous BID    sodium chloride flush  10 mL Intravenous 2 times per day    metoprolol tartrate  25 mg Oral BID    lactobacillus  1 capsule Oral BID WC    [Held by provider] amLODIPine  5 mg Oral Daily    atorvastatin  40 mg Oral Daily    vitamin B-12  1,000 mcg Oral Daily    donepezil  5 mg Oral Nightly    gabapentin  300 mg Oral 4x Daily    [Held by provider] losartan-hydroCHLOROthiazide  2 tablet Oral Daily            REVIEW OF SYSTEMS:       Review of systems not obtained due to patient factors. Objective:   PHYSICAL EXAM:      Vitals:   Vitals:    07/26/20 0418 07/26/20 0430 07/26/20 0600 07/26/20 0734   BP:   102/66 (!) 162/88   Pulse:   78 64   Resp:  20 20 16   Temp:   98.7 °F (37.1 °C) 98.6 °F (37 °C)   TempSrc:   Oral Oral   SpO2:  96% 97% 99%   Weight: 132 lb 4.4 oz (60 kg)      Height:           Pt not examined due to active COVID-19 infection in an effort to limit exposure and preserve PPE. Intake and output:   I/O last 3 completed shifts:   In: 499 [P.O.:480; I.V.:19]  Out: 1700 [Urine:1700]    Lab Data:      CBC:   Recent Labs     07/24/20  0420 07/25/20  0530 07/26/20  0000 07/26/20  0230 07/26/20  0615   WBC 18.4* 23.6*  --   --  18.8*   RBC 4.72 4.11  --   --  2.90*   HGB 13.8 12.0 9.5* 8.6* 8.6*   HCT 41.5 36.3 29.2* 26.6* 25.9*    254  --   --  166   MCV 87.9 88.4  --   --  89.0   MCH 29.3 29.2  --   --  29.6   MCHC 33.3 33.1  --   --  33.2   RDW 14.2 14.0  --   --  13.9        BMP:  Recent Labs     07/24/20  0420 07/25/20  0530 07/26/20  0615    141 146*   K 4.5 4.0 4.2   CL 98* 100 108   CO2 25 28 25   BUN 20 46* 52*   CREATININE <0.5* 0.5* <0.5*   CALCIUM 9.4 9.1 8.3   GLUCOSE 321* 183* 250* Hepatic Function Panel:   Recent Labs     07/24/20  0420 07/25/20  0530 07/26/20  0615   AST 25 22 22   ALT 36 25 24   BILITOT 0.4 0.6 0.4   ALKPHOS 80 63 46       No results for input(s): LIPASE, AMYLASE in the last 72 hours. No results for input(s): PROTIME, INR in the last 72 hours. No results for input(s): PTT in the last 72 hours. No results for input(s): OCCULTBLD in the last 72 hours. Imaging:    XR CHEST PORTABLE   Final Result   Enteric tube tip and side port project over the distal stomach. No interval change in bilateral airspace opacities and left pleural effusion. XR CHEST PORTABLE   Final Result   Unremarkable limited KUB. NG tube tip projects at the left upper quadrant, overlying the expected   location of the stomach. Infiltrates bilateral lower lungs. XR CHEST PORTABLE   Final Result   Endotracheal tube appears in satisfactory position. NG tube tip is in the proximal stomach with side hole near the GE junction. This should be further advanced. Pulmonary edema pattern noted with cardiomegaly, vascular congestion,   bilateral pleural effusions and bibasilar airspace disease. Right upper extremity PICC line appears in appropriate position without   pneumothorax. XR CHEST 1 VW   Final Result   1. Multifocal airspace opacities which have increased when compared with exam   from July 17, 2020.   2. Silhouetting of the left hemidiaphragm likely reflecting left effusion and   atelectasis. XR CHEST 1 VW   Final Result   Multifocal airspace disease. Areas in the right upper and left lower lung   are consolidated. However however, there are areas of improved aeration. XR CHEST PORTABLE   Final Result   Worsening multifocal airspace opacities. XR CHEST PORTABLE   Final Result   Left greater than right basilar airspace disease, concerning for pneumonia   given patient history.                Known drug Allergies:   No Known Allergies        Assessment:   The patient is a 75 y.o. old female  with following problems:    1. Melena--one episode overnight accompanied by 3.5 gm drop in hgb, mild hypotension and tachycardia. Stablilized with 's and HR 60's after 1L fluid bolus and Protonix gtt. No further episodes of bleeding. 2. Anemia due to acute GI blood loss: Hgb dropped from 12>>8.6    3. Active COVID-19 infection with respiratory insufficiency currently requiring 10L O2    Recommendations:   Due to her respiratory insufficiency and active COVID-19 infection, she has a high mortality risk with endoscopic intervention and would likely requiring intubation and mechanical ventilation. She has not had any further melena and has stabilized. I have discussed the case with Anesthesia (Dr. Deforest Collet) and we are in agreement that pt is too high risk currently and we will continue medical management. If pt continues to bleed with deterioration of her clinical status, will need intubation and then will reassess for either CT angio or EGD depending on clinical status. Continue PPI gtt, hold ASA and Lovenox, and keep NPO for now.               Crispin Kwong MD   [facility]

## 2020-07-26 NOTE — PROGRESS NOTES
NG tube placed per order in left nostril. Measuring at 61. Securement device in place. Pt tolerated well. CXRAY to verify placement ordered per protocol.

## 2020-07-27 LAB
A/G RATIO: 1.2 (ref 1.1–2.2)
ALBUMIN SERPL-MCNC: 3.2 G/DL (ref 3.4–5)
ALP BLD-CCNC: 46 U/L (ref 40–129)
ALT SERPL-CCNC: 47 U/L (ref 10–40)
ANION GAP SERPL CALCULATED.3IONS-SCNC: 9 MMOL/L (ref 3–16)
AST SERPL-CCNC: 65 U/L (ref 15–37)
BASOPHILS ABSOLUTE: 0.1 K/UL (ref 0–0.2)
BASOPHILS RELATIVE PERCENT: 0.4 %
BILIRUB SERPL-MCNC: 0.5 MG/DL (ref 0–1)
BUN BLDV-MCNC: 25 MG/DL (ref 7–20)
CALCIUM SERPL-MCNC: 8.3 MG/DL (ref 8.3–10.6)
CHLORIDE BLD-SCNC: 108 MMOL/L (ref 99–110)
CO2: 29 MMOL/L (ref 21–32)
CREAT SERPL-MCNC: <0.5 MG/DL (ref 0.6–1.2)
EOSINOPHILS ABSOLUTE: 0.6 K/UL (ref 0–0.6)
EOSINOPHILS RELATIVE PERCENT: 4.2 %
GFR AFRICAN AMERICAN: >60
GFR NON-AFRICAN AMERICAN: >60
GLOBULIN: 2.6 G/DL
GLUCOSE BLD-MCNC: 111 MG/DL (ref 70–99)
GLUCOSE BLD-MCNC: 145 MG/DL (ref 70–99)
GLUCOSE BLD-MCNC: 65 MG/DL (ref 70–99)
GLUCOSE BLD-MCNC: 67 MG/DL (ref 70–99)
GLUCOSE BLD-MCNC: 75 MG/DL (ref 70–99)
HCT VFR BLD CALC: 23.9 % (ref 36–48)
HCT VFR BLD CALC: 26 % (ref 36–48)
HEMOGLOBIN: 7.9 G/DL (ref 12–16)
HEMOGLOBIN: 8.6 G/DL (ref 12–16)
LYMPHOCYTES ABSOLUTE: 2.2 K/UL (ref 1–5.1)
LYMPHOCYTES RELATIVE PERCENT: 16.7 %
MAGNESIUM: 2.1 MG/DL (ref 1.8–2.4)
MCH RBC QN AUTO: 29.9 PG (ref 26–34)
MCHC RBC AUTO-ENTMCNC: 33 G/DL (ref 31–36)
MCV RBC AUTO: 90.5 FL (ref 80–100)
MONOCYTES ABSOLUTE: 0.8 K/UL (ref 0–1.3)
MONOCYTES RELATIVE PERCENT: 5.9 %
NEUTROPHILS ABSOLUTE: 9.7 K/UL (ref 1.7–7.7)
NEUTROPHILS RELATIVE PERCENT: 72.8 %
PDW BLD-RTO: 14.2 % (ref 12.4–15.4)
PERFORMED ON: ABNORMAL
PERFORMED ON: NORMAL
PHOSPHORUS: 3.5 MG/DL (ref 2.5–4.9)
PLATELET # BLD: 178 K/UL (ref 135–450)
PMV BLD AUTO: 8.6 FL (ref 5–10.5)
POTASSIUM REFLEX MAGNESIUM: 4 MMOL/L (ref 3.5–5.1)
RBC # BLD: 2.87 M/UL (ref 4–5.2)
SODIUM BLD-SCNC: 146 MMOL/L (ref 136–145)
TOTAL PROTEIN: 5.8 G/DL (ref 6.4–8.2)
WBC # BLD: 13.3 K/UL (ref 4–11)

## 2020-07-27 PROCEDURE — C9113 INJ PANTOPRAZOLE SODIUM, VIA: HCPCS | Performed by: NURSE PRACTITIONER

## 2020-07-27 PROCEDURE — 2580000003 HC RX 258: Performed by: INTERNAL MEDICINE

## 2020-07-27 PROCEDURE — 97530 THERAPEUTIC ACTIVITIES: CPT

## 2020-07-27 PROCEDURE — 85018 HEMOGLOBIN: CPT

## 2020-07-27 PROCEDURE — 2060000000 HC ICU INTERMEDIATE R&B

## 2020-07-27 PROCEDURE — 99232 SBSQ HOSP IP/OBS MODERATE 35: CPT | Performed by: INTERNAL MEDICINE

## 2020-07-27 PROCEDURE — U0003 INFECTIOUS AGENT DETECTION BY NUCLEIC ACID (DNA OR RNA); SEVERE ACUTE RESPIRATORY SYNDROME CORONAVIRUS 2 (SARS-COV-2) (CORONAVIRUS DISEASE [COVID-19]), AMPLIFIED PROBE TECHNIQUE, MAKING USE OF HIGH THROUGHPUT TECHNOLOGIES AS DESCRIBED BY CMS-2020-01-R: HCPCS

## 2020-07-27 PROCEDURE — 6370000000 HC RX 637 (ALT 250 FOR IP): Performed by: INTERNAL MEDICINE

## 2020-07-27 PROCEDURE — 85014 HEMATOCRIT: CPT

## 2020-07-27 PROCEDURE — 6360000002 HC RX W HCPCS: Performed by: NURSE PRACTITIONER

## 2020-07-27 PROCEDURE — 2500000003 HC RX 250 WO HCPCS: Performed by: NURSE PRACTITIONER

## 2020-07-27 PROCEDURE — 6370000000 HC RX 637 (ALT 250 FOR IP): Performed by: NURSE PRACTITIONER

## 2020-07-27 PROCEDURE — 2580000003 HC RX 258: Performed by: NURSE PRACTITIONER

## 2020-07-27 PROCEDURE — 2700000000 HC OXYGEN THERAPY PER DAY

## 2020-07-27 PROCEDURE — 94761 N-INVAS EAR/PLS OXIMETRY MLT: CPT

## 2020-07-27 PROCEDURE — 85025 COMPLETE CBC W/AUTO DIFF WBC: CPT

## 2020-07-27 PROCEDURE — 84100 ASSAY OF PHOSPHORUS: CPT

## 2020-07-27 PROCEDURE — 80053 COMPREHEN METABOLIC PANEL: CPT

## 2020-07-27 PROCEDURE — 83735 ASSAY OF MAGNESIUM: CPT

## 2020-07-27 RX ORDER — INSULIN GLARGINE 100 [IU]/ML
20 INJECTION, SOLUTION SUBCUTANEOUS NIGHTLY
Status: DISCONTINUED | OUTPATIENT
Start: 2020-07-27 | End: 2020-07-28

## 2020-07-27 RX ADMIN — ATORVASTATIN CALCIUM 40 MG: 40 TABLET, FILM COATED ORAL at 10:12

## 2020-07-27 RX ADMIN — SODIUM CHLORIDE, PRESERVATIVE FREE 10 ML: 5 INJECTION INTRAVENOUS at 21:00

## 2020-07-27 RX ADMIN — METOPROLOL TARTRATE 25 MG: 25 TABLET, FILM COATED ORAL at 20:58

## 2020-07-27 RX ADMIN — FAMOTIDINE 20 MG: 10 INJECTION, SOLUTION INTRAVENOUS at 10:11

## 2020-07-27 RX ADMIN — DONEPEZIL HYDROCHLORIDE 5 MG: 5 TABLET, FILM COATED ORAL at 20:58

## 2020-07-27 RX ADMIN — METOPROLOL TARTRATE 25 MG: 25 TABLET, FILM COATED ORAL at 10:12

## 2020-07-27 RX ADMIN — GABAPENTIN 300 MG: 300 CAPSULE ORAL at 20:59

## 2020-07-27 RX ADMIN — GABAPENTIN 300 MG: 300 CAPSULE ORAL at 10:11

## 2020-07-27 RX ADMIN — Medication 1 CAPSULE: at 18:26

## 2020-07-27 RX ADMIN — Medication 1 CAPSULE: at 10:12

## 2020-07-27 RX ADMIN — GABAPENTIN 300 MG: 300 CAPSULE ORAL at 14:32

## 2020-07-27 RX ADMIN — SODIUM CHLORIDE 8 MG/HR: 9 INJECTION, SOLUTION INTRAVENOUS at 11:25

## 2020-07-27 RX ADMIN — Medication 10 ML: at 10:13

## 2020-07-27 RX ADMIN — DEXTROSE MONOHYDRATE 12.5 G: 25 INJECTION, SOLUTION INTRAVENOUS at 11:37

## 2020-07-27 RX ADMIN — LACTULOSE 20 G: 20 SOLUTION ORAL at 20:59

## 2020-07-27 RX ADMIN — Medication 10 ML: at 20:59

## 2020-07-27 RX ADMIN — INSULIN GLARGINE 20 UNITS: 100 INJECTION, SOLUTION SUBCUTANEOUS at 21:00

## 2020-07-27 RX ADMIN — DOCUSATE SODIUM 100 MG: 100 CAPSULE, LIQUID FILLED ORAL at 10:12

## 2020-07-27 RX ADMIN — FAMOTIDINE 20 MG: 10 INJECTION, SOLUTION INTRAVENOUS at 20:58

## 2020-07-27 RX ADMIN — HYDROCODONE BITARTRATE AND ACETAMINOPHEN 10 ML: 176/5 SOLUTION ORAL at 20:58

## 2020-07-27 RX ADMIN — CYANOCOBALAMIN TAB 1000 MCG 1000 MCG: 1000 TAB at 10:12

## 2020-07-27 RX ADMIN — AMLODIPINE BESYLATE 5 MG: 5 TABLET ORAL at 10:15

## 2020-07-27 RX ADMIN — GABAPENTIN 300 MG: 300 CAPSULE ORAL at 18:26

## 2020-07-27 RX ADMIN — POLYETHYLENE GLYCOL 3350 17 G: 17 POWDER, FOR SOLUTION ORAL at 21:00

## 2020-07-27 RX ADMIN — SODIUM CHLORIDE 8 MG/HR: 9 INJECTION, SOLUTION INTRAVENOUS at 21:35

## 2020-07-27 ASSESSMENT — PAIN SCALES - GENERAL
PAINLEVEL_OUTOF10: 0
PAINLEVEL_OUTOF10: 0

## 2020-07-27 NOTE — PROGRESS NOTES
mob  Assistance / Modification: CGA for mobility, mod A for ADLs  OT Education: OT Role;Transfer Training;Plan of Care;ADL Adaptive Strategies;Precautions  Barriers to Learning: Cognition  REQUIRES OT FOLLOW UP: Yes  Activity Tolerance  Activity Tolerance: Patient limited by fatigue  Activity Tolerance: Pt very limp d/t weakness, reports activities are \"too hard\" because she felt physically weak. Despite encouragement to attempt pt declined sitting up to chair. Pt O2 sats dropped to 84% on 3L of O2 following bed mobility - required ~1 minute to return to 90%. Safety Devices  Safety Devices in place: Yes(EVETTE Reeves) notified)  Type of devices: Nurse notified;Gait belt;Call light within reach; Left in bed;Bed alarm in place           Patient Diagnosis(es): The primary encounter diagnosis was Acute respiratory failure with hypoxia ([address] Diagnoses of COVID-19 virus detected and Multifocal pneumonia were also pertinent to this visit. has a past medical history of Diabetes mellitus ([address] Headache(784.0), Herniated disc, cervical, Hypercholesteremia, Hypertension, Memory loss, Psychiatric problem, and Type 2 diabetes mellitus without complication ([address] has a past surgical history that includes Tubal ligation and shoulder surgery. Restrictions  Restrictions/Precautions  Restrictions/Precautions: Fall Risk  Position Activity Restriction  Other position/activity restrictions: Droplet Plus Precautions + COVID. O2 3L. Diet : NPO    Subjective   General  Chart Reviewed: Yes  Patient assessed for rehabilitation services?: Yes  Additional Pertinent Hx: Pt admitted 7/12/20 for worsening cough and SOB. Pt found to have O2 saturations of 78% on room air with a fever of 101F. Pt COVID+. Pt required increased O2 over the past week and required transfer to ICU and intubation. Intubated 7/20, self-exubated 7/25 after getting out of restraints. RN was able to remove wrist restraints this am 7/27.   Family / Caregiver Present: No  Referring Practitioner: Sonali Beauchamp MD  Diagnosis: COVID19  Subjective  Subjective: Pt met bedside for re-evaluation following extubation and transfer to progressive care. Pt resting supine in bed. Pt reporting \"I feel tired\". Patient Currently in Pain: (No complaints of pain throughout session)  Vital Signs  Temp: 98.4 °F (36.9 °C)  Temp Source: Oral  Pulse: 64  Heart Rate Source: Monitor  Resp: 16  BP: 119/65  BP Location: Right upper arm  BP Upper/Lower: Upper  Patient Position: Semi fowlers  Level of Consciousness: Alert  MEWS Score: 1  Patient Currently in Pain: (No complaints of pain throughout session)  Oxygen Therapy  SpO2: 100 %  Pulse Oximeter Device Mode: Continuous  Pulse Oximeter Device Location: Finger  O2 Device: Nasal cannula  O2 Flow Rate (L/min): 3 L/min(decreased to 2L )     Social/Functional History  Social/Functional History  Lives With: Significant other  Type of Home: Apartment  Home Layout: One level  Home Access: Stairs to enter with rails  Bathroom Shower/Tub: Tub/Shower unit  Bathroom Toilet: Standard  Bathroom Accessibility: Accessible  Home Equipment: (No DME.)  ADL Assistance: Independent  Ambulation Assistance: Independent(Without assist device pta.)  Transfer Assistance: Independent  Active : Yes  Occupation: Retired       Objective   Vision: Within Functional Limits  Hearing: Within functional limits      Orientation  Overall Orientation Status: Impaired  Orientation Level: Oriented to person;Oriented to place; Disoriented to time;Disoriented to situation     Balance  Sitting Balance: Stand by assistance  Standing Balance: Minimal assistance(Hand held assist)  Standing Balance  Time: ~15 seconds x2  Activity: Static stand    Functional Mobility  Activity: Other  Assist Level: Minimal assistance  Functional Mobility Comments: Pt took a side step to R to [facility] with hand held min A and hand support on bed rail.     ADL  Additional Comments: PTA pt reports independence in self-care tasks. Pt with decreased cognition at this time and cannot provide specifics. Anticipate pt to require min/mod A for bathing/dressing/toileting needs. Tone RUE  RUE Tone: Normotonic  Tone LUE  LUE Tone: Normotonic  Coordination  Movements Are Fluid And Coordinated: Yes     Bed mobility  Supine to Sit: Stand by assistance  Sit to Supine: Stand by assistance  Comment: After sitting EOB pt continues to report \"I need to lie down\". Transfers  Sit to stand: Contact guard assistance  Stand to sit: Contact guard assistance  Transfer Comments: CGA for sit <> stand from EOB to hand held assist - pt very wobbly and presents with buckling knees however able to correct with consistent cueing. Pt declined further activity reporting several times \"I just need to sit down this is too much\". Pt completed x2 sit <> stands at EOB     Cognition  Overall Cognitive Status: Exceptions  Arousal/Alertness: Delayed responses to stimuli  Following Commands: Follows one step commands with increased time; Follows one step commands with repetition  Memory: Decreased recall of recent events;Decreased short term memory  Safety Judgement: Decreased awareness of need for assistance;Decreased awareness of need for safety  Insights: Decreased awareness of deficits  Initiation: Requires cues for some  Sequencing: Requires cues for some  Cognition Comment: Pt required frequent reorientation and asked several times if she could have some water. Plan   Plan  Times per week: 3-5  Current Treatment Recommendations: Strengthening, Endurance Training, Balance Training, Functional Mobility Training, Safety Education & Training, Patient/Caregiver Education & Training, Self-Care / ADL, Equipment Evaluation, Education, & procurement      AM-PAC Score    Adrienne Rodriguez scored a 15/24 on the AM-PAC ADL Inpatient form.  Current research shows that an AM-PAC score of 17 or less is typically not associated with a discharge to the patient's home setting. Based on the patient's AM-PAC score and their current ADL deficits, it is recommended that the patient have 3-5 sessions per week of Occupational Therapy at d/c to increase the patient's independence. Please see assessment section for further patient specific details. If patient discharges prior to next session this note will serve as a discharge summary. Please see below for the latest assessment towards goals. AM-PAC Inpatient Daily Activity Raw Score: 15 (07/27/20 1155)  AM-PAC Inpatient ADL T-Scale Score : 34.69 (07/27/20 1155)  ADL Inpatient CMS 0-100% Score: 56.46 (07/27/20 1155)  ADL Inpatient CMS G-Code Modifier : CK (07/27/20 1155)    Goals  Short term goals  Time Frame for Short term goals: prior to d/c: goals remain appropriate upon reassessment: Status - ongoing as of 7/27  Short term goal 1: Pt will complete functional mobility and transfers with SPV  Short term goal 2: Pt will complete toileting with SBA  Short term goal 3: Pt will complete bathing with SBA  Short term goal 4: Pt will complete dressing with SBA  Short term goal 5: Pt will tolerate 3+ minutes of standing activity to increase strength and activity tolerance for self-care and transfers with O2 sats WFL. Patient Goals   Patient goals : Pt did not report goal at this time, difficulty understanding question. Therapy Time   Individual Concurrent Group Co-treatment   Time In 1045         Time Out 1115         Minutes 30         Timed Code Treatment Minutes: 30 Minutes     If pt is discharged prior to next OT session, this note will serve as the discharge summary.     Elisa Rowland, SUSAN/B#[id number]

## 2020-07-27 NOTE — PLAN OF CARE
Problem: Airway Clearance - Ineffective  Goal: Achieve or maintain patent airway  7/27/2020 0531 by Carmen Riddle RN  Outcome: Ongoing   Nonproductive cough     Problem: Breathing Pattern - Ineffective  Goal: Ability to achieve and maintain a regular respiratory rate  7/27/2020 0531 by Carmen Riddle RN  Outcome: Ongoing  Weaning oxygen as tolerate per pt      Problem: Body Temperature -  Risk of, Imbalanced  Goal: Ability to maintain a body temperature within defined limits  7/27/2020 0531 by Carmen Riddle RN  Outcome: Ongoing   Temp taken 4q and as needed     Problem: Isolation Precautions - Risk of Spread of Infection  Goal: Prevent transmission of infection  7/27/2020 0531 by Carmen Riddle RN  Outcome: Ongoing  COVID ppe worn      Problem: Nutrition Deficits  Goal: Optimize nutrtional status  7/27/2020 0531 by Carmen Riddle RN  Outcome: Ongoing  Advancing diet as tolerated per pt      Problem: Falls - Risk of:  Goal: Will remain free from falls  Description: Will remain free from falls  7/27/2020 0531 by Carmen Riddle RN  Outcome: Ongoing  Patient is wearing nonskid socks. Bed is alarmed, locked, and in lowest position. Room is free of clutter. Call light is within reach and used appropriately. Fall risk assessed per protocol. Will continue to monitor. Problem: Pain:  Description: Pain management should include both nonpharmacologic and pharmacologic interventions. Goal: Pain level will decrease  Description: Pain level will decrease  7/27/2020 0531 by Carmen Riddle RN  Outcome: Ongoing   No complaints of pain or discomfort     Problem: Nutrition  Goal: Optimal nutrition therapy  7/27/2020 0531 by Carmen Riddle RN  Outcome: Ongoing   Advancing diet as tolerated per pt     Problem: Skin Integrity:  Goal: Will show no infection signs and symptoms  Description: Will show no infection signs and symptoms  7/27/2020 0531 by Carmen Riddle RN  Skin assessment completed every shift per protocol. Pt assessed for incontinence, appropriate barrier cream applied as needed. Pt encouraged to turn/rotate every 2 hours. Assistance provided if pt unable to do so themselves. Will continue to monitor.

## 2020-07-27 NOTE — CARE COORDINATION
Spoke with patient's daughter, Felicia Rabago, regarding PT/OT notes and recommendations. She works at [facility] and wants to see if they can accept her there for rehab. She will talk with director and call back. She requested a new Covid be ran since her last positive was 7/2/2020. [employer] message sent to Dr. Amber Mascorro to request Covid test repeat. He is in agreement for repeat testing for SNF placement. Electronically signed by Raegan Figueroa RN on 7/27/2020 at 1:01 PM     Received a call back from Felicia Rabago. She advised [facility] cannot take her if the Covid test is positive. She agreed to a referral to [facility] in case her Covid comes back positive.     Electronically signed by Raegan Figueroa RN on 7/27/2020 at 1:51 PM     Left message for Pooja Blackman at [facility] advising of the referral.    Electronically signed by Raegan Figueroa RN on 7/27/2020 at 2:20 PM

## 2020-07-27 NOTE — PROGRESS NOTES
Speech Language Pathology  [facility]   Speech Therapy  Daily Dysphagia Treatment Note    Pt has been identified as a COVID 23 confirmed and/or r/o. [address] to reduced pt contact limites SLP direct contact with [facility] pt for Dysphagia treatment f/u. Thus Dysphagia treatment f/u will  included SLP completing a chart review of current medical DX admit and course of this encounter; chart review of any relevant dysphagia history; discuss with and collaboration with pt's RN; if pt is able to use the telephone; pt's caregiver and/or POA; and if warranted any review of media for any SNF baseline information; and discussion with referring MD. Collaboration does include discussion regarding mentation; respiratory status; discussion of overt and less overt s/s of penetration/aspiration to monitor. SLP education includes examples of s/s of penetration/aspiration. SLP education does include discussion of assessment of swallowing function. Alma Mead  AGE: 75 y.o. GENDER: female  : 1955  [id number]  EPISODE DATE:  2020     ADMISSION DATE: 2020  ADMITTING DIAGNOSIS:The primary encounter diagnosis was Acute respiratory failure with hypoxia ([address] Diagnoses of COVID-19 virus detected and Multifocal pneumonia were also pertinent to this visit. ·  has Cervical spinal stenosis; Acute encephalopathy; Malignant hypertension; Type 2 diabetes mellitus without complication, with long-term current use of insulin ([address] Left-sided low back pain with left-sided sciatica; Hypercholesteremia; Primary insomnia; Hypotension; Dizziness; Essential hypertension; Elevated lactic acid level; Lactic acidosis; Atrial ectopy; Vasovagal syncope; Dysrhythmia; Chronic left-sided low back pain with left-sided sciatica; MCI (mild cognitive impairment);  Chest pain;   · covid 19 virus detected  · S/p intubated  · Pt reportedly extubated self 2020 overnight at 0203 am  · Per Case Management Documentation of pt's baseline: resides in a an apartment with her boyfriend; independent prior to admit  · Documentation also indicating baseline confusion/cognitive deficits  · English second language. Pt is originally from the  [facility]: 7/12/2020     Treatment Diagnosis: Dysphagia     Chart review:   Chart Review  · MD History and Physical Documentation revealed:   History Of Present Illness:     The patient is O 88 y.o. female with hx HTN, HLD, and type 2 DM who presents to [facility] with cough and shortness of breath. Patient recently diagnosed positive for COVID-19. States that she has been having progressively worsening productive cough and shortness of breath over the past few days. Also having body aches. Denies fever, chest pain, abdominal pain, nausea, vomiting, constipation, diarrhea, and dysuria. She was found to have an oxygen saturation of 78% on room air. She was initially placed on 6L and saturating well at 98%. She was febrile to 101F in the ED. CXR showed possible multifocal pneumonia so she was started on Azithromycin and ceftriaxone. Labs were otherwise unremarkable. · At time of admit was on a regular consistency carb control diet  · Pt was active with PT and OT and ambulating short distances with assist  · 7/17/2020 medical status change and transferred to ICU vapotherm was switch to heated high flow cannula. Pt confused and kept trying to take off. O2 SATs decreased to 70's. · 7/20/2020 Intubated and sedated on fentanyl  · 7/21/2020  NG TF initiated  · Pt reportedly extubated self 7/24/ 2020 overnight at 0203 am  · NG TF was removed  · 7/24/2020 oral meds provided and MD put in order for SLP consult for Dysphagia Evaluation and for a po diet. Pt was confused; physically restless and O2 SATS dropped from the 90's to the 70's. Recommendation for npo at that time. IF MD wants po staff assisted tsp thick liquids with close monitor.  IF MD wants po meds krunal hand give with puree with close monitor. If status improves hopefully can gradually upgrade. · 7/27/2020: SLP f/u in collaboration with RN. Now out of ICU and on 5N. O2 weaned to 2L/min. PO diet was initiated;clear liquid diet. RN reports clear liquids due to concern for GI bleed    Subjective:     Current diet   Clear Liquid but epic order also has dysphagia puree with moderately thick liquids     Comments regarding tolerating Current Diet:   · RN reports doing well with po  · RN reports doing well with whole meds      Objective/ Treatment f/u Impressions:   7/24/2020 Initial SLP Clinical Evaluation of Swallowing Treatment Diagnosis: Dysphagia Diagnosis: Oropharyngeal Dysphagia concerns 2/2 to covid 19; s/p self extubation; confusion; and variable De SAT of O2 ST levels. Dysphagia characterized by reduced lingual coordination for bolus control; delayed initiation of swallow; with concern for reduced laryngeal elevation and pharyngeal clearance. Pt with de SAT from 90's to 75 post swallow of liquid. PT appeared to tolerate thick liquid (mildly thick and moderately thick and puree) but sample size small 2/2 to pt confusion and variable decrease in O2 SATS. Unable to r/o penetration/aspiration. Recommend keep npo. If MD wants meds po: crush with puree. If MD wants pt to have quality of life po trials with staff;suggest tsp presentations mildly thick/moderately thick with close monitor      7/27/2020: PO diet was initiated per MD orders and staff reporting pt's respiratory status and mentation improved  Chart review 7/27/2020 and SLP collaboration with RN f/u 7/27/2020 at 1630 pm   Respiratory status:   Weaned to 2L/min O2 via nasal canula     Pain   Per chart review; \"Patient Currently in Pain: Denies\"     Cognitive/Behavior   RN reporting pt is more alert; oriented to self and place but not to date or situation. Pt is verbally responsive but confused.  Pt following directions with variable need for assist or re-direct    · PO diet toleration   Pt with po diet initiated today at 1200 per order documentation date (clear liquids dysphagia puree with moderately thick liquids). However RN reporting pt it taking clear thin liquids  RN reports pt took 240ml and 26-50%  RN reports taking meds po without problems    Dysphagia Goals:   LTG: As medical status and pt's mental status improves; hopeful return to po diet as was baseline prior to onset/admit  Short-term Goals  Goal 1: Pt will tolerate staff assisted sips of tsp mildly thick (nectar ) , moderately thick (honey ) and puree without overt clinical s/s of penetration: staff is reporting goal is met  Goal 2: Pending progress in medical status pt will progress to dysphagia puree with mildly thick (nectar thick) liquid without overt clinical s/s post swallow: per staff goal is met  Goal 3: The patient/caregiver will demonstrate understanding of compensatory strategies for improved swallowing safety.: continue    Diet Recommendations:  continue as ordered by MD  Clear liquids, dysphagia pure with moderately thick liquids unless otherwise ordered by MD   Meds with puree  · Staff Ed in overt s/s of penetration/aspiration post swallow or during po (ie coughing/choking) and less overt s/s of penetration/aspiration  post swallow or during po (ie voice quality changes; throat clearing; wet gurgled voice; decrease in O2 SATS; SOB)    Compensatory Swallowing Strategies: oral care. upright for all meals; minimize distractions; aspiration precautions    Education:  Consulted and agree with results and recommendations: RN  Last SLP face to face with pt was 7/24/2020  [facility] Staff Ed in overt s/s of penetration/aspiration post swallow or during po (ie coughing/choking) and less overt s/s of penetration/aspiration  post swallow or during po (ie voice quality changes; throat clearing; wet gurgled voice; decrease in O2 SATS; SOB)   Collaboration does include discussion regarding mentation; respiratory status;

## 2020-07-27 NOTE — PROGRESS NOTES
Hospitalist Progress Note      PCP: Margaret Mckinney MD    Date of Admission: 7/12/2020      Subjective: Pt seen and examined. Looks and feels well today. Medications:  Reviewed    Infusion Medications    pantoprozole (PROTONIX) infusion 8 mg/hr (07/27/20 1125)    dextrose       Scheduled Medications    insulin glargine  20 Units Subcutaneous Nightly    insulin lispro  0-18 Units Subcutaneous 4x Daily AC & HS    famotidine (PEPCID) injection  20 mg Intravenous BID    lidocaine 1 % injection  5 mL Intradermal Once    sodium chloride flush  10 mL Intravenous 2 times per day    polyethylene glycol  17 g Oral BID    docusate sodium  100 mg Oral Daily    lactulose  20 g Oral TID    senna  10 mL Oral Nightly    [Held by provider] enoxaparin  30 mg Subcutaneous BID    sodium chloride flush  10 mL Intravenous 2 times per day    metoprolol tartrate  25 mg Oral BID    lactobacillus  1 capsule Oral BID WC    amLODIPine  5 mg Oral Daily    atorvastatin  40 mg Oral Daily    vitamin B-12  1,000 mcg Oral Daily    donepezil  5 mg Oral Nightly    gabapentin  300 mg Oral 4x Daily    [Held by provider] losartan-hydroCHLOROthiazide  2 tablet Oral Daily     PRN Meds: sodium chloride flush, sodium chloride flush, acetaminophen **OR** acetaminophen, polyethylene glycol, promethazine **OR** ondansetron, glucose, dextrose, glucagon (rDNA), dextrose      Intake/Output Summary (Last 24 hours) at 7/27/2020 1159  Last data filed at 7/27/2020 1009  Gross per 24 hour   Intake 340 ml   Output 650 ml   Net -310 ml       Physical Exam Performed:    /65   Pulse 64   Temp 98.4 °F (36.9 °C) (Oral)   Resp 16   Ht 5' (1.524 m)   Wt 132 lb 0.9 oz (59.9 kg)   SpO2 100%   BMI 25.79 kg/m²     General appearance: No acute distress, confused. Neck: Supple  Respiratory: Normal respiratory effort. Clear to auscultation, bilaterally without Rales/Wheezes/Rhonchi.   Cardiovascular: Regular rate and rhythm with normal S1/S2 without murmurs, rubs or gallops. Abdomen: Soft, non-tender, non-distended with normal bowel sounds. Musculoskeletal: No clubbing, cyanosis  Skin: Skin color, texture, turgor normal.  No rashes or lesions. Neurologic:  No focal weakness   Psychiatric: Alert and oriented  Capillary Refill: Brisk,< 3 seconds   Peripheral Pulses: +2 palpable, equal bilaterally       Labs:   Recent Labs     07/25/20  0530  07/26/20  0615 07/26/20  1705 07/27/20  0100 07/27/20  0838   WBC 23.6*  --  18.8*  --   --  13.3*   HGB 12.0   < > 8.6* 8.4* 7.9* 8.6*   HCT 36.3   < > 25.9* 25.7* 23.9* 26.0*     --  166  --   --  178    < > = values in this interval not displayed. Recent Labs     07/25/20  0530 07/26/20  0615 07/27/20  0838    146* 146*   K 4.0 4.2 4.0    108 108   CO2 28 25 29   BUN 46* 52* 25*   CREATININE 0.5* <0.5* <0.5*   CALCIUM 9.1 8.3 8.3   PHOS 2.4* 3.6 3.5     Recent Labs     07/25/20  0530 07/26/20  0615 07/27/20  0838   AST 22 22 65*   ALT 25 24 47*   BILITOT 0.6 0.4 0.5   ALKPHOS 63 46 46     No results for input(s): INR in the last 72 hours. No results for input(s): [place] in the last 72 hours. Urinalysis:      Lab Results   Component Value Date    NITRU Negative 07/12/2020    WBCUA 3-5 07/12/2020    BACTERIA RARE 11/16/2016    RBCUA 0-2 07/12/2020    BLOODU Negative 07/12/2020    SPECGRAV >1.030 07/12/2020    GLUCOSEU >=1000 07/12/2020    GLUCOSEU NEGATIVE 07/24/2011       Radiology:  XR CHEST PORTABLE   Final Result   Enteric tube tip and side port project over the distal stomach. No interval change in bilateral airspace opacities and left pleural effusion. XR CHEST PORTABLE   Final Result   Unremarkable limited KUB. NG tube tip projects at the left upper quadrant, overlying the expected   location of the stomach. Infiltrates bilateral lower lungs. XR CHEST PORTABLE   Final Result   Endotracheal tube appears in satisfactory position.       NG tube tip is in the proximal stomach with side hole near the GE junction. This should be further advanced. Pulmonary edema pattern noted with cardiomegaly, vascular congestion,   bilateral pleural effusions and bibasilar airspace disease. Right upper extremity PICC line appears in appropriate position without   pneumothorax. XR CHEST 1 VW   Final Result   1. Multifocal airspace opacities which have increased when compared with exam   from July 17, 2020.   2. Silhouetting of the left hemidiaphragm likely reflecting left effusion and   atelectasis. XR CHEST 1 VW   Final Result   Multifocal airspace disease. Areas in the right upper and left lower lung   are consolidated. However however, there are areas of improved aeration. XR CHEST PORTABLE   Final Result   Worsening multifocal airspace opacities. XR CHEST PORTABLE   Final Result   Left greater than right basilar airspace disease, concerning for pneumonia   given patient history. Assessment/Plan:    Active Hospital Problems    Diagnosis    Acute respiratory failure with hypoxia ([facility] [address] [J96.01]    COVID-19 virus detected [U07.1]    COVID-19 virus infection [U07.1]     Patient admitted with COVID19 pneumonia initially improved on steroids and remdesivir, then developed fever and increased need of oxygen, transferred to ICU, given one dose of actemra. 1.  COVID-19 pneumonia, initially was on empiric Azithromycin/ceftriaxone,  decadron 6 mg daily, Lovenox BID, respiratory culture, consulted ID and remdesivir started, clinically was improving, then  developed fever and increased need of oxygen, chest xray was worsening. Started on cefepime and received one dose of Actemra. oxygen need was 15L so patient transferred to ICU, and vapotherm started. Was then requiring BiPap and then subsequently intubated due to inability to tolerate BiPAP. Self-extubated 7/24. Now on 2L nasal cannula.  Pulmonology signed

## 2020-07-27 NOTE — PROGRESS NOTES
INPATIENT CONSULTATION:    IDENTIFYING DATA/REASON FOR CONSULTATION   PATIENT:  Dariel Wills  MRN:  [id number]  ADMIT DATE: 2020  TIME OF EVALUATION: 2020 4:07 PM  HOSPITAL STAY:   LOS: 15 days   CONSULTING PHYSICIAN: Jeff Armas MD   REASON FOR CONSULTATION: melena    Subjective:    Due to COVID infection patient was not seen in person. History obtained from RN. Patient has not had any further melenic stools. No complaints of abdominal pain. Currently on 2 L O2    MEDICATIONS   SCHEDULED:  insulin glargine, 20 Units, Nightly  insulin lispro, 0-18 Units, 4x Daily AC & HS  famotidine (PEPCID) injection, 20 mg, BID  lidocaine 1 % injection, 5 mL, Once  sodium chloride flush, 10 mL, 2 times per day  polyethylene glycol, 17 g, BID  lactulose, 20 g, TID  senna, 10 mL, Nightly  [Held by provider] enoxaparin, 30 mg, BID  sodium chloride flush, 10 mL, 2 times per day  metoprolol tartrate, 25 mg, BID  lactobacillus, 1 capsule, BID WC  amLODIPine, 5 mg, Daily  atorvastatin, 40 mg, Daily  vitamin B-12, 1,000 mcg, Daily  donepezil, 5 mg, Nightly  gabapentin, 300 mg, 4x Daily  [Held by provider] losartan-hydroCHLOROthiazide, 2 tablet, Daily      FLUIDS/DRIPS:     pantoprozole (PROTONIX) infusion 8 mg/hr (20 1125)    dextrose       PRNs: sodium chloride flush, 10 mL, PRN  sodium chloride flush, 10 mL, PRN  acetaminophen, 650 mg, Q6H PRN    Or  acetaminophen, 650 mg, Q6H PRN  polyethylene glycol, 17 g, Daily PRN  promethazine, 12.5 mg, Q6H PRN    Or  ondansetron, 4 mg, Q6H PRN  glucose, 15 g, PRN  dextrose, 12.5 g, PRN  glucagon (rDNA), 1 mg, PRN  dextrose, 100 mL/hr, PRN      ALLERGIES:  No Known Allergies      PHYSICAL EXAM   VITALS:  /65   Pulse 64   Temp 98.4 °F (36.9 °C) (Oral)   Resp 16   Ht 5' (1.524 m)   Wt 132 lb 0.9 oz (59.9 kg)   SpO2 100%   BMI 25.79 kg/m²   TEMPERATURE:  Current - Temp: 98.4 °F (36.9 °C);  Max - Temp  Av.4 °F (36.9 °C)  Min: 98.3 °F (36.8 °C)  Max: 98.5 °F (36.9 °C)    Physical Exam:  Due to COVID-19 infection patient not examined in person    [facility]   Laboratory   Recent Labs     07/25/20  0530  07/26/20  0615 07/26/20  1705 07/27/20  0100 07/27/20  0838   WBC 23.6*  --  18.8*  --   --  13.3*   HGB 12.0   < > 8.6* 8.4* 7.9* 8.6*   HCT 36.3   < > 25.9* 25.7* 23.9* 26.0*   MCV 88.4  --  89.0  --   --  90.5     --  166  --   --  178    < > = values in this interval not displayed. Recent Labs     07/25/20  0530 07/26/20  0615 07/27/20  0838    146* 146*   K 4.0 4.2 4.0    108 108   CO2 28 25 29   PHOS 2.4* 3.6 3.5   BUN 46* 52* 25*   CREATININE 0.5* <0.5* <0.5*     Recent Labs     07/25/20  0530 07/26/20  0615 07/27/20  0838   AST 22 22 65*   ALT 25 24 47*   BILITOT 0.6 0.4 0.5   ALKPHOS 63 46 46     No results for input(s): LIPASE, AMYLASE in the last 72 hours. No results for input(s): PROTIME, INR in the last 72 hours. Imaging  XR CHEST PORTABLE   Final Result   Enteric tube tip and side port project over the distal stomach. No interval change in bilateral airspace opacities and left pleural effusion. XR CHEST PORTABLE   Final Result   Unremarkable limited KUB. NG tube tip projects at the left upper quadrant, overlying the expected   location of the stomach. Infiltrates bilateral lower lungs. XR CHEST PORTABLE   Final Result   Endotracheal tube appears in satisfactory position. NG tube tip is in the proximal stomach with side hole near the GE junction. This should be further advanced. Pulmonary edema pattern noted with cardiomegaly, vascular congestion,   bilateral pleural effusions and bibasilar airspace disease. Right upper extremity PICC line appears in appropriate position without   pneumothorax. XR CHEST 1 VW   Final Result   1.  Multifocal airspace opacities which have increased when compared with exam   from July 17, 2020.   2. Silhouetting of the left hemidiaphragm likely reflecting left effusion and   atelectasis. XR CHEST 1 VW   Final Result   Multifocal airspace disease. Areas in the right upper and left lower lung   are consolidated. However however, there are areas of improved aeration. XR CHEST PORTABLE   Final Result   Worsening multifocal airspace opacities. XR CHEST PORTABLE   Final Result   Left greater than right basilar airspace disease, concerning for pneumonia   given patient history. Endoscopy      ASSESSMENT AND RECOMMENDATIONS   Mac Alonso is a 75 y.o. female with PMH of DMII. HTN, HLD,  who presented 7/12/20 with SOB, Cough, Fever. Admitted with Covid 19 pneumonia. Hospital course complicated by hypoxic respiratory failure requiring intubation 7/20 through 7/24. On 7/25 pt developed melena with a 3.5 gm drop in hgb. GI consulted for GI Bleed    1. Melena: No further episodes today. 2. Acute blood loss anemia: Stable past 24 hours  3. Covid-19 infection  4. Hypoxic respiratory failure. Supplemental oxygen weaned down to 2 L    RECOMMENDATIONS:    No further melenic stools and hemoglobin stable past 24 hours. Due to COVID-19 infection and respiratory failure will defer EGD at this time, continue to treat medically with PPI gtt. Continue to monitor H&H and stool output      If you have any questions or need any further information, please feel free to contact us [phone]. Thank you for allowing us to participate in the care of Mac Alonso. The note was completed using Dragon voice recognition transcription. Every effort was made to ensure accuracy; however, inadvertent transcription errors may be present despite my best efforts to edit errors. eJt WILDE    Attending physician addendum:      I have reviewed the patient's medical record and pertinent labs and clinical imaging. I have personally staffed the case with ANDRY Presley.   I agree with her consultation note, assessment and plans as written above. I have made appropriate modifications and edited her assessment and plan where needed to reflect my impression and plans for this patient. The patient has not had any further melenic bowel movements. Her hemoglobin was 8.6. She has been medically managed for GI bleed. She had been ASA prior to the bleeding episode. /61   Pulse 81   Temp 98.1 °F (36.7 °C) (Oral)   Resp 16   Ht 5' (1.524 m)   Wt 132 lb 0.9 oz (59.9 kg)   SpO2 93%   BMI 25.79 kg/m²    She was not examined personally in light of her COVID-19 infection. Impression:   1) Acute blood loss anemia with suspected upper GI source- possible PUD vs gastritis vs AVM. 2) Covid-19 infection with pneumonia  3) Elevated liver enzymes- suspect related to #2. 4) DM-2  5) Dementia reported    Plan:  Continue PPI IV bid  Advance diet to full liquids  Check Hpylori stool antigen  We are electing for conservative medical management at this time. We could consider endoscopic evaluation after she has recovered completely from her COVID-19 infection. No plan for endoscopy at this time. Thank you for allowing me to participate in this patient's care. If there are any questions or concerns regarding this patient, or the plan we have set in place, please feel free to contact me at [phone].      [facility], DO

## 2020-07-27 NOTE — PROGRESS NOTES
CC: COVID-19     Subjective:        ROS:         PHYSICAL EXAM:   Blood pressure 130/89, pulse 107, temperature 99.2 °F (37.3 °C), temperature source Oral, resp. rate 25, height 5' (1.524 m), weight 136 lb 3.9 oz (61.8 kg), SpO2 (!) 82 %, not currently breastfeeding.'  Due to the current efforts to prevent transmission of COVID-19 and also the need to preserve PPE for other caregivers, a face-to-face encounter with the patient was not performed. That being said, all relevant records and diagnostic tests were reviewed, including laboratory results and imaging. Please reference any relevant documentation elsewhere.  Care will be coordinated with the primary service.           Medications:     Scheduled Meds:  Scheduled Medications    lidocaine 1 % injection  5 mL Intradermal Once    sodium chloride flush  10 mL Intravenous 2 times per day    polyethylene glycol  17 g Oral BID    docusate sodium  100 mg Oral Daily    [START ON 7/25/2020] insulin glargine  42 Units Subcutaneous Daily    lactulose  20 g Oral TID    insulin lispro  0-18 Units Subcutaneous Q4H    senna  10 mL Oral Nightly    enoxaparin  30 mg Subcutaneous BID    sodium chloride flush  10 mL Intravenous 2 times per day    metoprolol tartrate  25 mg Oral BID    mupirocin   Nasal BID    lactobacillus  1 capsule Oral BID WC    amLODIPine  5 mg Oral Daily    aspirin  325 mg Oral Daily    atorvastatin  40 mg Oral Daily    vitamin B-12  1,000 mcg Oral Daily    donepezil  5 mg Oral Nightly    gabapentin  300 mg Oral 4x Daily    losartan-hydroCHLOROthiazide  2 tablet Oral Daily           Continuous Infusions:  Infusions Meds    dextrose             PRN Meds:  PRN Medications   sodium chloride flush, sodium chloride flush, acetaminophen **OR** acetaminophen, polyethylene glycol, promethazine **OR** ondansetron, glucose, dextrose, glucagon (rDNA), dextrose        Labs reviewed:  CBC:         Recent Labs     07/22/20  0449 07/23/20  8073 07/24/20  0420   WBC 18.1* 18.5* 18.4*   HGB 13.6 13.2 13.8   HCT 41.3 40.7 41.5   MCV 88.0 88.7 87.9    304 281     BMP:         Recent Labs     07/22/20  0449 07/23/20  0504 07/24/20  0420   * 131* 136   K 4.2 4.5 4.5   CL 98* 97* 98*   CO2 25 26 25   PHOS 2.8 2.5 2.7   BUN 29* 27* 20   CREATININE 0.6 0.6 <0.5*     LIVER PROFILE:         Recent Labs     07/22/20  0449 07/23/20  0504 07/24/20  0420   AST 27 30 25   ALT 39 46* 36   BILITOT 0.4 0.3 0.4   ALKPHOS 72 70 80     PT/INR: No results for input(s): PROTIME, INR in the last 72 hours. APTT: No results for input(s): APTT in the last 72 hours. UA:No results for input(s): NITRITE, COLORU, PHUR, LABCAST, [address], RBCUA, MUCUS, TRICHOMONAS, YEAST, BACTERIA, CLARITYU, SPECGRAV, LEUKOCYTESUR, UROBILINOGEN, BILIRUBINUR, BLOODU, GLUCOSEU, AMORPHOUS in the last 72 hours.     Invalid input(s): KETONESU  No results for input(s): PH, PCO2, PO2 in the last 72 hours.     Cx:        Films:           Assessment:         · Acute hypoxemic respiratory failure  · ARDS  · [id number]  · Acute metabolic encephalopathy  · History of dementia with significant confusion.     Plan:            -Continues to improve. Weaned down to 3 L nasal cannula. Continue to wean to 90% saturations.  -Decadron 6 mg daily, completed    -Remdesivir 7/13-17, Actemra, 7/16,  -Continue supportive care. If patient's oxygen continues to improve, okay to discharge.      Nutrition  - DIET CARB CONTROL; Carb Control: 3 carb choices (45 gms)/meal  -     Intake/Output Summary (Last 24 hours) at 7/24/2020 1502  Last data filed at 7/24/2020 1110  Gross per 24 hour   Intake 1713.51 ml   Output 3030 ml   Net -1316.49 ml             This note was transcribed using [id number].  Please disregard any translational errors.        [facility]  [phone]

## 2020-07-27 NOTE — PROGRESS NOTES
Pulmonary Progress Note    Date of Admission: 7/12/2020   LOS: 15 days     Chief Complaint   Patient presents with    Shortness of Breath     positive COVID test     Cough    Fever    Fatigue       Assessment:     Acute Hypoxemic Respiratory Failure with SAO2 <90% on Room Air  COVID pneumonia  Acute delirium on chronic dementia    Plan:      -O2 requirements improving, wean goal sat greater than 90%  -Completed Decadron, remdesivir and Actemra    From pulmonary standpoint no further inpatient recommendations, we will sign off at this time. 24 Hour Events/Subjective  SPO2 72% when I walked over the room, her oxygen is not in her nose, or anywhere close. Nasal cannula replace and O2 sats rising. Patient very confused still. ROS:   Unable to obtain due to encephalopathy      Intake/Output Summary (Last 24 hours) at 7/27/2020 0825  Last data filed at 7/26/2020 1250  Gross per 24 hour   Intake 100 ml   Output 450 ml   Net -350 ml         PHYSICAL EXAM:   Blood pressure (!) 145/83, pulse 84, temperature 98.4 °F (36.9 °C), temperature source Oral, resp. rate 16, height 5' (1.524 m), weight 132 lb 0.9 oz (59.9 kg), SpO2 98 %, not currently breastfeeding.'  Gen:  No acute distress. Eyes: PERRL. Anicteric sclera. No conjunctival injection. ENT: No discharge. Posterior oropharynx clear. External appearance of ears and nose normal.  Neck: Trachea midline. No mass   Resp:  No crackles. No wheezes. No rhonchi. No dullness on percussion. CV: Regular rate. Regular rhythm. No murmur or rub. No edema. GI: Soft, Non-tender. Non-distended. +BS  Skin: Warm, dry, w/o erythema. Lymph: No cervical or supraclavicular LAD. M/S: No cyanosis. No clubbing. Neuro:  no focal neurologic deficit. Moves all extremities  Psych: Awake and alert, disoriented.   Currently with very poor judgment and insight      Medications:    Scheduled Meds:   insulin glargine  45 Units Subcutaneous Daily    insulin lispro  0-18 Units Subcutaneous 4x Daily AC & HS    famotidine (PEPCID) injection  20 mg Intravenous BID    lidocaine 1 % injection  5 mL Intradermal Once    sodium chloride flush  10 mL Intravenous 2 times per day    polyethylene glycol  17 g Oral BID    docusate sodium  100 mg Oral Daily    lactulose  20 g Oral TID    senna  10 mL Oral Nightly    [Held by provider] enoxaparin  30 mg Subcutaneous BID    sodium chloride flush  10 mL Intravenous 2 times per day    metoprolol tartrate  25 mg Oral BID    lactobacillus  1 capsule Oral BID WC    [Held by provider] amLODIPine  5 mg Oral Daily    atorvastatin  40 mg Oral Daily    vitamin B-12  1,000 mcg Oral Daily    donepezil  5 mg Oral Nightly    gabapentin  300 mg Oral 4x Daily    [Held by provider] losartan-hydroCHLOROthiazide  2 tablet Oral Daily       Continuous Infusions:   pantoprozole (PROTONIX) infusion 8 mg/hr (07/26/20 2125)    dextrose         PRN Meds:  sodium chloride flush, sodium chloride flush, acetaminophen **OR** acetaminophen, polyethylene glycol, promethazine **OR** ondansetron, glucose, dextrose, glucagon (rDNA), dextrose    Labs reviewed:  CBC:   Recent Labs     07/25/20  0530  07/26/20  0615 07/26/20  1705 07/27/20  0100   WBC 23.6*  --  18.8*  --   --    HGB 12.0   < > 8.6* 8.4* 7.9*   HCT 36.3   < > 25.9* 25.7* 23.9*   MCV 88.4  --  89.0  --   --      --  166  --   --     < > = values in this interval not displayed. BMP:   Recent Labs     07/25/20  0530 07/26/20  0615    146*   K 4.0 4.2    108   CO2 28 25   PHOS 2.4* 3.6   BUN 46* 52*   CREATININE 0.5* <0.5*     LIVER PROFILE:   Recent Labs     07/25/20  0530 07/26/20  0615   AST 22 22   ALT 25 24   BILITOT 0.6 0.4   ALKPHOS 63 46     PT/INR: No results for input(s): PROTIME, INR in the last 72 hours. APTT: No results for input(s): APTT in the last 72 hours.   UA:No results for input(s): NITRITE, COLORU, PHUR, LABCAST, WBCUA, RBCUA, MUCUS, TRICHOMONAS, YEAST, BACTERIA, CLARITYU, SPECGRAV, LEUKOCYTESUR, UROBILINOGEN, BILIRUBINUR, BLOODU, GLUCOSEU, AMORPHOUS in the last 72 hours. Invalid input(s): Dominique Byrd  No results for input(s): PH, PCO2, PO2 in the last 72 hours. Films:  Radiology Review:  Pertinent images / reports were reviewed as a part of this visit. CT Chest w/ contrast: No results found for this or any previous visit. CT Chest w/o contrast:   Results for orders placed during the hospital encounter of 02/23/19   CT CHEST WO CONTRAST    Narrative EXAMINATION:  CT OF THE CHEST WITHOUT CONTRAST 2/23/2019 1:10 pm    TECHNIQUE:  CT of the chest was performed without the administration of intravenous  contrast. Multiplanar reformatted images are provided for review. Dose  modulation, iterative reconstruction, and/or weight based adjustment of the  mA/kV was utilized to reduce the radiation dose to as low as reasonably  achievable. COMPARISON:  None    HISTORY:  ORDERING SYSTEM PROVIDED HISTORY: fall, back pain  TECHNOLOGIST PROVIDED HISTORY:  Ordering Physician Provided Reason for Exam: Fall (tripped over grandson  while trying to pick him up, incident happened at [facility], pt c/o neck and  head pain, denies LOc)  Acuity: Acute  Type of Exam: Initial    Acute back pain and chest pain due to fall. Initial encounter. FINDINGS:  Mediastinum: Heart is mildly enlarged. No pericardial effusion. There are  coronary artery calcifications. Within the limitations of a noncontrast  exam, there is no evidence of hilar or mediastinal adenopathy. Visible  portion of the thyroid is unremarkable. Ascending thoracic aorta at the  level of the right pulmonary artery measures 3.7 cm in diameter. Lungs/pleura: Central tracheobronchial airways are normal.  No bronchiectasis  or bronchial wall thickening. No focal consolidation, pleural effusion, or  pneumothorax. Upper Abdomen: No acute abnormality in the upper abdomen.   Multiple  collateral vessels are seen in the splenic hilum and left upper quadrant of  the abdomen. Soft Tissues/Bones: No aggressive lytic or blastic bony lesion. No displaced  rib fracture. Impression 1. No acute intrathoracic abnormality. Please note that in the absence of  intravenous contrast, sensitivity of the exam for mediastinal vascular injury  is low. 2. Coronary artery disease. CTPA: No results found for this or any previous visit. CXR PA/LAT:   Results for orders placed during the hospital encounter of 01/27/18   XR CHEST STANDARD (2 VW)    Narrative EXAMINATION:  TWO VIEWS OF THE CHEST    1/27/2018 7:59 pm    COMPARISON:  10/01/2017    HISTORY:  ORDERING PHYSICIAN PROVIDED HISTORY: cough, fever, tachy  TECHNOLOGIST PROVIDED HISTORY:  Technologist Provided Reason for Exam: cough, fever, tachy  Acuity: Acute  Type of Encounter: Initial    FINDINGS:  No focal pulmonary opacities. No pleural effusion or pneumothorax. Heart  size within normal limits. Prior ACDF. Impression No evidence of pneumonia. CXR portable:   Results for orders placed during the hospital encounter of 07/12/20   XR CHEST PORTABLE    Narrative EXAMINATION:  ONE XRAY VIEW OF THE CHEST    7/26/2020 4:30 am    COMPARISON:  07/20/2020    HISTORY:  ORDERING SYSTEM PROVIDED HISTORY: NG tube placement  TECHNOLOGIST PROVIDED HISTORY:  Reason for exam:->NG tube placement  Reason for Exam: NG tube placement  Type of Exam: Subsequent/Follow-up    FINDINGS:  Orthopedic hardware in the cervical spine. Enteric tube is stable in  positioning. No significant interval change in bilateral airspace opacities  and left pleural effusion. Stable cardiac silhouette. Interval placement of  a left central venous catheter with the tip projecting over the proximal SVC. The osseous structures otherwise stable. Radiopaque density over the right  upper quadrant is likely external to the patient.       Impression Enteric tube tip and side port project over the distal stomach. No interval change in bilateral airspace opacities and left pleural effusion. This note was transcribed using [id number] [address]. Please disregard any translational errors.     Thank you for this consult,    Madhu Chun [address] [facility], [facility] Medicine

## 2020-07-27 NOTE — CARE COORDINATION
[facility]  Hypoglycemia Event and Prevention Plan      NAME: Raymundo Salas [facility] RECORD NUMBER:  [id number]  AGE: 75 y.o. GENDER: female  : 1955  EPISODE DATE:  2020     Data     Recent Labs     20  0745 20  1207 20  1734 20  1819 20  0737   POCGLU 274* 121* 68* 177* 97 75       Medications  Scheduled Medications:   insulin glargine  45 Units Subcutaneous Daily    insulin lispro  0-18 Units Subcutaneous 4x Daily AC & HS    famotidine (PEPCID) injection  20 mg Intravenous BID    lidocaine 1 % injection  5 mL Intradermal Once    sodium chloride flush  10 mL Intravenous 2 times per day    polyethylene glycol  17 g Oral BID    docusate sodium  100 mg Oral Daily    lactulose  20 g Oral TID    senna  10 mL Oral Nightly    [Held by provider] enoxaparin  30 mg Subcutaneous BID    sodium chloride flush  10 mL Intravenous 2 times per day    metoprolol tartrate  25 mg Oral BID    lactobacillus  1 capsule Oral BID WC    [Held by provider] amLODIPine  5 mg Oral Daily    atorvastatin  40 mg Oral Daily    vitamin B-12  1,000 mcg Oral Daily    donepezil  5 mg Oral Nightly    gabapentin  300 mg Oral 4x Daily    [Held by provider] losartan-hydroCHLOROthiazide  2 tablet Oral Daily       Diet  Current diet/supplement order: Diet NPO Effective Now     Recorded PO: PO Meals Eaten (%): 26 - 50% last meal in flowsheets      Action      Physician Notified of event: Yes   Guanakito Michael MD    Recommend reducing Lantus dose.     Responce     Achieved POCT Blood Glucose greater than 70 mg/dl: Yes      Medication plan updated: Yes        Electronically signed by Ayana Sandoval RN on 2020 at 8:44 AM

## 2020-07-28 VITALS
BODY MASS INDEX: 25.93 KG/M2 | HEART RATE: 80 BPM | HEIGHT: 60 IN | RESPIRATION RATE: 20 BRPM | SYSTOLIC BLOOD PRESSURE: 110 MMHG | TEMPERATURE: 97.6 F | OXYGEN SATURATION: 96 % | WEIGHT: 132.06 LBS | DIASTOLIC BLOOD PRESSURE: 58 MMHG

## 2020-07-28 LAB
A/G RATIO: 1.1 (ref 1.1–2.2)
ALBUMIN SERPL-MCNC: 2.9 G/DL (ref 3.4–5)
ALP BLD-CCNC: 43 U/L (ref 40–129)
ALT SERPL-CCNC: 53 U/L (ref 10–40)
ANION GAP SERPL CALCULATED.3IONS-SCNC: 7 MMOL/L (ref 3–16)
AST SERPL-CCNC: 58 U/L (ref 15–37)
BASOPHILS ABSOLUTE: 0.1 K/UL (ref 0–0.2)
BASOPHILS RELATIVE PERCENT: 0.5 %
BILIRUB SERPL-MCNC: 0.5 MG/DL (ref 0–1)
BUN BLDV-MCNC: 14 MG/DL (ref 7–20)
C-REACTIVE PROTEIN: <0.3 MG/L (ref 0–5.1)
CALCIUM SERPL-MCNC: 8.2 MG/DL (ref 8.3–10.6)
CHLORIDE BLD-SCNC: 103 MMOL/L (ref 99–110)
CO2: 28 MMOL/L (ref 21–32)
CREAT SERPL-MCNC: <0.5 MG/DL (ref 0.6–1.2)
D DIMER: 868 NG/ML DDU (ref 0–229)
EOSINOPHILS ABSOLUTE: 1 K/UL (ref 0–0.6)
EOSINOPHILS RELATIVE PERCENT: 8.6 %
FERRITIN: 238.9 NG/ML (ref 15–150)
GFR AFRICAN AMERICAN: >60
GFR NON-AFRICAN AMERICAN: >60
GLOBULIN: 2.6 G/DL
GLUCOSE BLD-MCNC: 191 MG/DL (ref 70–99)
GLUCOSE BLD-MCNC: 201 MG/DL (ref 70–99)
GLUCOSE BLD-MCNC: 65 MG/DL (ref 70–99)
GLUCOSE BLD-MCNC: 89 MG/DL (ref 70–99)
HCT VFR BLD CALC: 24.9 % (ref 36–48)
HEMOGLOBIN: 8.1 G/DL (ref 12–16)
LYMPHOCYTES ABSOLUTE: 2.3 K/UL (ref 1–5.1)
LYMPHOCYTES RELATIVE PERCENT: 19.7 %
MAGNESIUM: 2 MG/DL (ref 1.8–2.4)
MCH RBC QN AUTO: 29.6 PG (ref 26–34)
MCHC RBC AUTO-ENTMCNC: 32.6 G/DL (ref 31–36)
MCV RBC AUTO: 90.7 FL (ref 80–100)
MONOCYTES ABSOLUTE: 0.8 K/UL (ref 0–1.3)
MONOCYTES RELATIVE PERCENT: 6.8 %
NEUTROPHILS ABSOLUTE: 7.7 K/UL (ref 1.7–7.7)
NEUTROPHILS RELATIVE PERCENT: 64.4 %
PDW BLD-RTO: 14.1 % (ref 12.4–15.4)
PERFORMED ON: ABNORMAL
PERFORMED ON: ABNORMAL
PERFORMED ON: NORMAL
PHOSPHORUS: 3.2 MG/DL (ref 2.5–4.9)
PLATELET # BLD: 177 K/UL (ref 135–450)
PMV BLD AUTO: 8.7 FL (ref 5–10.5)
POTASSIUM REFLEX MAGNESIUM: 4.3 MMOL/L (ref 3.5–5.1)
RBC # BLD: 2.74 M/UL (ref 4–5.2)
SARS-COV-2, PCR: DETECTED
SODIUM BLD-SCNC: 138 MMOL/L (ref 136–145)
TOTAL PROTEIN: 5.5 G/DL (ref 6.4–8.2)
WBC # BLD: 11.9 K/UL (ref 4–11)

## 2020-07-28 PROCEDURE — 97129 THER IVNTJ 1ST 15 MIN: CPT

## 2020-07-28 PROCEDURE — 6370000000 HC RX 637 (ALT 250 FOR IP): Performed by: INTERNAL MEDICINE

## 2020-07-28 PROCEDURE — 2500000003 HC RX 250 WO HCPCS: Performed by: NURSE PRACTITIONER

## 2020-07-28 PROCEDURE — 2580000003 HC RX 258: Performed by: NURSE PRACTITIONER

## 2020-07-28 PROCEDURE — 83735 ASSAY OF MAGNESIUM: CPT

## 2020-07-28 PROCEDURE — C9113 INJ PANTOPRAZOLE SODIUM, VIA: HCPCS | Performed by: NURSE PRACTITIONER

## 2020-07-28 PROCEDURE — 86140 C-REACTIVE PROTEIN: CPT

## 2020-07-28 PROCEDURE — 6360000002 HC RX W HCPCS: Performed by: NURSE PRACTITIONER

## 2020-07-28 PROCEDURE — 6370000000 HC RX 637 (ALT 250 FOR IP): Performed by: NURSE PRACTITIONER

## 2020-07-28 PROCEDURE — 84100 ASSAY OF PHOSPHORUS: CPT

## 2020-07-28 PROCEDURE — 82728 ASSAY OF FERRITIN: CPT

## 2020-07-28 PROCEDURE — 97530 THERAPEUTIC ACTIVITIES: CPT

## 2020-07-28 PROCEDURE — 80053 COMPREHEN METABOLIC PANEL: CPT

## 2020-07-28 PROCEDURE — 85025 COMPLETE CBC W/AUTO DIFF WBC: CPT

## 2020-07-28 PROCEDURE — 85379 FIBRIN DEGRADATION QUANT: CPT

## 2020-07-28 PROCEDURE — 2580000003 HC RX 258: Performed by: INTERNAL MEDICINE

## 2020-07-28 PROCEDURE — 92526 ORAL FUNCTION THERAPY: CPT

## 2020-07-28 RX ORDER — PANTOPRAZOLE SODIUM 20 MG/1
40 TABLET, DELAYED RELEASE ORAL DAILY
Qty: 30 TABLET | Refills: 0 | Status: SHIPPED | OUTPATIENT
Start: 2020-07-28 | End: 2020-07-28 | Stop reason: SDUPTHER

## 2020-07-28 RX ORDER — PANTOPRAZOLE SODIUM 40 MG/1
40 TABLET, DELAYED RELEASE ORAL 2 TIMES DAILY
Qty: 60 TABLET | Refills: 0 | Status: SHIPPED | OUTPATIENT
Start: 2020-07-28 | End: 2020-08-25 | Stop reason: SDUPTHER

## 2020-07-28 RX ORDER — INSULIN GLARGINE 100 [IU]/ML
20 INJECTION, SOLUTION SUBCUTANEOUS 2 TIMES DAILY
Qty: 90 ML | Refills: 3 | Status: SHIPPED | OUTPATIENT
Start: 2020-07-28 | End: 2020-12-14 | Stop reason: SDUPTHER

## 2020-07-28 RX ORDER — INSULIN GLARGINE 100 [IU]/ML
10 INJECTION, SOLUTION SUBCUTANEOUS NIGHTLY
Status: DISCONTINUED | OUTPATIENT
Start: 2020-07-28 | End: 2020-07-28 | Stop reason: HOSPADM

## 2020-07-28 RX ADMIN — FAMOTIDINE 20 MG: 10 INJECTION, SOLUTION INTRAVENOUS at 08:14

## 2020-07-28 RX ADMIN — LACTULOSE 20 G: 20 SOLUTION ORAL at 08:14

## 2020-07-28 RX ADMIN — ATORVASTATIN CALCIUM 40 MG: 40 TABLET, FILM COATED ORAL at 08:14

## 2020-07-28 RX ADMIN — Medication 1 CAPSULE: at 08:14

## 2020-07-28 RX ADMIN — GABAPENTIN 300 MG: 300 CAPSULE ORAL at 08:13

## 2020-07-28 RX ADMIN — GABAPENTIN 300 MG: 300 CAPSULE ORAL at 12:10

## 2020-07-28 RX ADMIN — SODIUM CHLORIDE 8 MG/HR: 9 INJECTION, SOLUTION INTRAVENOUS at 10:15

## 2020-07-28 RX ADMIN — INSULIN LISPRO 4 UNITS: 100 INJECTION, SOLUTION INTRAVENOUS; SUBCUTANEOUS at 12:10

## 2020-07-28 RX ADMIN — CYANOCOBALAMIN TAB 1000 MCG 1000 MCG: 1000 TAB at 08:13

## 2020-07-28 RX ADMIN — LACTULOSE 20 G: 20 SOLUTION ORAL at 12:10

## 2020-07-28 RX ADMIN — SODIUM CHLORIDE, PRESERVATIVE FREE 10 ML: 5 INJECTION INTRAVENOUS at 08:19

## 2020-07-28 RX ADMIN — Medication 10 ML: at 08:15

## 2020-07-28 RX ADMIN — AMLODIPINE BESYLATE 5 MG: 5 TABLET ORAL at 08:14

## 2020-07-28 RX ADMIN — POLYETHYLENE GLYCOL 3350 17 G: 17 POWDER, FOR SOLUTION ORAL at 08:20

## 2020-07-28 RX ADMIN — METOPROLOL TARTRATE 25 MG: 25 TABLET, FILM COATED ORAL at 08:13

## 2020-07-28 NOTE — CARE COORDINATION
CASE MANAGEMENT DISCHARGE SUMMARY:    DISCHARGE DATE: 07/28/2020    DISCHARGED TO: [facility]                REPORT NUMBER: [id number]                FAX NUMBER: [phone]    TRANSPORTATION: [facility]             TIME: 5 pm     PREFERRED PHARMACY: at facility    INSURANCE PRECERT OBTAINED: N/A    HENS COMPLETED    Patient's daughter, nurse, and Jesus Meza at [facility] aware of transportation time.     Electronically signed by Bella Castillo RN on 7/28/2020 at 2:27 PM

## 2020-07-28 NOTE — PROGRESS NOTES
INPATIENT CONSULTATION:    IDENTIFYING DATA/REASON FOR CONSULTATION   PATIENT:  Yojana Lazar  MRN:  [id number]  ADMIT DATE: 2020  TIME OF EVALUATION: 2020 2:55 PM  HOSPITAL STAY:   LOS: 16 days   CONSULTING PHYSICIAN: Sari Brannon MD   REASON FOR CONSULTATION: melena    Subjective:    Due to COVID infection patient was not seen in person. History obtained from RN. Patient stools are dark, non melenic.  hgb stable. Pt being discharge to SNF today    MEDICATIONS   SCHEDULED:  insulin glargine, 10 Units, Nightly  insulin lispro, 0-12 Units, TID WC  insulin lispro, 0-6 Units, Nightly  famotidine (PEPCID) injection, 20 mg, BID  lidocaine 1 % injection, 5 mL, Once  sodium chloride flush, 10 mL, 2 times per day  polyethylene glycol, 17 g, BID  lactulose, 20 g, TID  senna, 10 mL, Nightly  [Held by provider] enoxaparin, 30 mg, BID  sodium chloride flush, 10 mL, 2 times per day  metoprolol tartrate, 25 mg, BID  lactobacillus, 1 capsule, BID WC  amLODIPine, 5 mg, Daily  atorvastatin, 40 mg, Daily  vitamin B-12, 1,000 mcg, Daily  donepezil, 5 mg, Nightly  gabapentin, 300 mg, 4x Daily  [Held by provider] losartan-hydroCHLOROthiazide, 2 tablet, Daily      FLUIDS/DRIPS:     pantoprozole (PROTONIX) infusion 8 mg/hr (20 1015)    dextrose       PRNs: sodium chloride flush, 10 mL, PRN  sodium chloride flush, 10 mL, PRN  acetaminophen, 650 mg, Q6H PRN    Or  acetaminophen, 650 mg, Q6H PRN  polyethylene glycol, 17 g, Daily PRN  promethazine, 12.5 mg, Q6H PRN    Or  ondansetron, 4 mg, Q6H PRN  glucose, 15 g, PRN  dextrose, 12.5 g, PRN  glucagon (rDNA), 1 mg, PRN  dextrose, 100 mL/hr, PRN      ALLERGIES:  No Known Allergies      PHYSICAL EXAM   VITALS:  BP (!) 110/58   Pulse 80   Temp 97.6 °F (36.4 °C) (Axillary)   Resp 20   Ht 5' (1.524 m)   Wt 132 lb 0.9 oz (59.9 kg)   SpO2 96%   BMI 25.79 kg/m²   TEMPERATURE:  Current - Temp: 97.6 °F (36.4 °C);  Max - Temp  Av.3 °F (36.8 °C)  Min: 97.6 °F (36.4 case with ANDRY Hoskins. I agree with her consultation note, exam findings, assessment and plans  as written above. I have made appropriate modifications and edited her assessment and plan where needed to reflect my impression and plans for this patient. The patient has had no further melenic stool. Her hemoglobin has been stable. It appears that she is being discharged to a skilled nursing facility today. She will continue her PPI twice daily and have a CBC in 1 week. Suspect she likely had gastritis versus peptic ulcer disease. Outpatient H. pylori stool antigen can also be considered. Her LFT elevation is likely related to her coronavirus infection. These should improve over time. Patient may benefit from an elective endoscopy after she has completely recovered from her COVID-19 infection    Thank you for allowing me to participate in this patient's care. If there are any questions or concerns regarding this patient, or the plan we have set in place, please feel free to contact me at [phone].      Betty Mcgraw, DO

## 2020-07-28 NOTE — PROGRESS NOTES
Speech Language Pathology  [facility]   Speech Therapy  Daily Dysphagia Treatment Note        Tessie Torres  AGE: 75 y.o. GENDER: female  : 1955  [id number]  EPISODE DATE:  2020     ADMISSION DATE: 2020  ADMITTING DIAGNOSIS:The primary encounter diagnosis was Acute respiratory failure with hypoxia ([facility] Diagnoses of COVID-19 virus detected and Multifocal pneumonia were also pertinent to this visit. ·  has Cervical spinal stenosis; Acute encephalopathy; Malignant hypertension; Type 2 diabetes mellitus without complication, with long-term current use of insulin ([facility] [facility] Left-sided low back pain with left-sided sciatica; Hypercholesteremia; Primary insomnia; Hypotension; Dizziness; Essential hypertension; Elevated lactic acid level; Lactic acidosis; Atrial ectopy; Vasovagal syncope; Dysrhythmia; Chronic left-sided low back pain with left-sided sciatica; MCI (mild cognitive impairment); Chest pain;   · covid 19 virus detected  · S/p intubated  · Pt reportedly extubated self 2020 overnight at 0203 am  · Per Case Management Documentation of pt's baseline: resides in a an apartment with her boyfriend; independent prior to admit  · Documentation also indicating baseline confusion/cognitive deficits  · English second language. Pt is originally from the [address] [place], [place] [address]ia: 2020     Treatment Diagnosis: Dysphagia     Chart review:   Chart Review  · MD History and Physical Documentation revealed:   History Of Present Illness:     The patient is D 87 y.o. female with hx HTN, HLD, and type 2 DM who presents to [facility] with cough and shortness of breath. Patient recently diagnosed positive for COVID-19. States that she has been having progressively worsening productive cough and shortness of breath over the past few days. Also having body aches. Denies fever, chest pain, abdominal pain, nausea, vomiting, constipation, diarrhea, and dysuria.  She was found to have an oxygen saturation of 78% on room air. She was initially placed on 6L and saturating well at 98%. She was febrile to 101F in the ED. CXR showed possible multifocal pneumonia so she was started on Azithromycin and ceftriaxone. Labs were otherwise unremarkable. · At time of admit was on a regular consistency carb control diet  · Pt was active with PT and OT and ambulating short distances with assist  · 7/17/2020 medical status change and transferred to ICU vapotherm was switch to heated high flow cannula. Pt confused and kept trying to take off. O2 SATs decreased to 70's. · 7/20/2020 Intubated and sedated on fentanyl  · 7/21/2020  NG TF initiated  · Pt reportedly extubated self 7/24/ 2020 overnight at 0203 am  · NG TF was removed  · 7/24/2020 oral meds provided and MD put in order for SLP consult for Dysphagia Evaluation and for a po diet. Pt was confused; physically restless and O2 SATS dropped from the 90's to the 70's. Recommendation for npo at that time. IF MD wants po staff assisted tsp thick liquids with close monitor. IF MD wants po meds krunal hand give with puree with close monitor. If status improves hopefully can gradually upgrade. · 7/27/2020: SLP f/u in collaboration with RN. Now out of ICU and on 5N. O2 weaned to 2L/min. PO diet was initiated;clear liquid diet. RN reports clear liquids due to concern for GI bleed  · 7/28/2020: Face to face Skilled SLP re-assessment of dysphagia and diet upgrade. D/C planning in progress by MD and plan is for SNF.     Subjective:     Current diet    full liquid but epic order also has dysphagia puree with moderately thick liquids     Comments regarding tolerating Current Diet:   · RN reports doing well with po  · RN reports doing well with whole meds      Objective/ Treatment f/u Impressions:   7/24/2020 Initial SLP Clinical Evaluation of Swallowing (face to face)Treatment Diagnosis: Dysphagia Diagnosis: Oropharyngeal Dysphagia concerns 2/2 to covid 19; s/p self extubation; confusion; and variable De SAT of O2 ST levels. Dysphagia characterized by reduced lingual coordination for bolus control; delayed initiation of swallow; with concern for reduced laryngeal elevation and pharyngeal clearance. Pt with de SAT from 90's to 75 post swallow of liquid. PT appeared to tolerate thick liquid (mildly thick and moderately thick and puree) but sample size small 2/2 to pt confusion and variable decrease in O2 SATS. Unable to r/o penetration/aspiration. Recommend keep npo. If MD wants meds po: crush with puree. If MD wants pt to have quality of life po trials with staff;suggest tsp presentations mildly thick/moderately thick with close monitor      7/28/2020: Face to face Dysphagia re-assessment. Pt with improving mentation; improving oral and pharyngeal phase of the swallow and sustained O2 SATS in the 90's. Pt appears much improved as compared to initial evaluation 7/24/2020. Request diet upgrade to dysphagia soft/bite size with thin liquids with close monitor. See below for therapy activities.  Respiratory status:   Weaned to 2L/min O2 via nasal canula     Pain   Pt denies pain     Cognitive/Behavior   Pt is alert and oriented to self and partially to situation. With use of written cues pt is oriented to place, date. Pt able to re-position self in bed to more upright position (80 degrees plus upright and midline).  This is a significant improvement as compared to 7/24/2020)  Pt able to follow one step commands and sustain attention to varied tasks  Oral Motor exam revealed good oral motor rom/strength bilaterally for labial/buccal and lingual  Pt is edentulous; pt reports \"dentures are at home\"  Voice audible/strong  Volitional cough strong  Residual diminished oral reflexes  Pt able to self feed (ie cup; utensils and finger foods)    · PO diet toleration   Thin liquids via cup: sustained O2 SATS 95 to 98 without overt clinical s/s of penetration/aspiration  Thick liquids (mildly thick and moderately thick) via cup: sustained O2 SATS 95 to 98 without overt clinical s/s of penetration/aspiration  Puree via tsp: sustained O2 SATS 95 to 98 without overt clinical s/s of penetration/aspiration  Minced and moist food consistencies: mastication appeared functional ; A-P oral transit appeared timely; sustained O2 SATS 95 to 98 without overt clinical s/s of penetration/aspiration  Soft/bite size food consistency: prolonged mastication which appeared functional; variable rate of A-P oral transit; sustained O2 SATS 95 to 98 without overt clinical s/s of penetration/aspiration  Regular hard solid: prolonged with incomplete mastication ; variable rate of A-P oral transit; sustained O2 SATS 95 to 98 without overt clinical s/s of penetration/aspiration.  Pt did complain \"hard to chew\"    Dysphagia Goals:   LTG: As medical status and pt's mental status improves; hopeful return to po diet as was baseline prior to onset/admit: improving  Short-term Goals  Goal 1: Pt will tolerate staff assisted sips of tsp mildly thick (nectar ) , moderately thick (honey ) and puree without overt clinical s/s of penetration:  goal is met/exceeded  Goal 2: Pending progress in medical status pt will progress to dysphagia puree with mildly thick (nectar thick) liquid without overt clinical s/s post swallow:  goal is met/exceeded  Goal 3: Pt will tolerate upgraded goal of dysphagia soft/bite size with thin liquids without overt clinical s/s of penetration/aspiration or pt complaints: New Goal  Goal 4: The patient/caregiver will demonstrate understanding of compensatory strategies for improved swallowing safety.: continue    Diet Recommendations:  Request diet upgrade to dysphagia soft/bite size with  thin liquids  Meds as desired with close monitor    · Staff /pt Ed in overt s/s of penetration/aspiration post swallow or during po (ie coughing/choking) and less overt s/s of penetration/aspiration  post swallow or during po

## 2020-07-28 NOTE — DISCHARGE INSTR - COC
Continuity of Care Form    Patient Name: Pretty Baeza   :  1955  MRN:  [id number]    Admit date:  2020  Discharge date:  20    Code Status Order: Full Code   Advance Directives:   [facility] Documentation     Date/Time Healthcare Directive Type of Healthcare Directive Copy in [address] [place] [address] [address] Agent's Name Healthcare Agent's Phone Number    20 8061  No, patient does not have an advance directive for healthcare treatment -- -- -- -- --    20 2310  No, patient does not have an advance directive for healthcare treatment -- -- -- -- --          Admitting Physician:  Jaclyn Manzano MD  PCP: Mahsa Gonzales MD    Discharging Nurse: [facility] Unit/Room#: E0W-6953/7589-65  Discharging Unit Phone Number: [phone]    Emergency Contact:   Extended Emergency Contact Information  Primary Emergency Contact: Brendan Rollisn  Address: [address] Phone: [phone]  Relation: Child  Secondary Emergency Contact: Gaviota Tatum  Address: [address] Phone: [phone]  Relation: Child    Past Surgical History:  Past Surgical History:   Procedure Laterality Date    SHOULDER SURGERY      TUBAL LIGATION         Immunization History:   Immunization History   Administered Date(s) Administered    Influenza Virus Vaccine 2017    Influenza, High Dose (Fluzone 65 yrs and older) 2018    Pneumococcal Conjugate 13-valent (Alice Teena) 2019    Pneumococcal Polysaccharide (Nolabzamu77) 2018    Tdap (Boostrix, Adacel) 2019       Active Problems:  Patient Active Problem List   Diagnosis Code    Cervical spinal stenosis M48.02    Acute encephalopathy G93.40    Malignant hypertension I10    Type 2 diabetes mellitus without complication, with long-term current use of insulin ([facility] E11.9, Z79.4    Left-sided low back pain with left-sided sciatica M54.42    Hypercholesteremia E78.00    Primary insomnia F51.01    Hypotension I95.9    Dizziness R42    Essential hypertension I10    Elevated lactic acid level R79.89    Lactic acidosis E87.2    Atrial ectopy I49.1    Vasovagal syncope R55    Dysrhythmia I49.9    Chronic left-sided low back pain with left-sided sciatica M54.42, G89.29    MCI (mild cognitive impairment) G31.84    Chest pain R07.9    COVID-19 virus infection U07.1    Acute respiratory failure with hypoxia (HCC) J96.01    COVID-19 virus detected U07.1       Isolation/Infection:   Isolation          Droplet Plus        Patient Infection Status     Infection Onset Added Last Indicated Last Indicated By Review Planned Expiration Resolved Resolved By    COVID-19 07/02/20 07/06/20 07/27/20 COVID-19  09/21/20      Resolved    COVID-19 Rule Out 07/02/20 07/02/20 07/02/20 Covid-19 Ambulatory (Ordered)   07/06/20 Rule-Out Test Resulted          Nurse Assessment:  Last Vital Signs: BP (!) 110/58   Pulse 80   Temp 97.6 °F (36.4 °C) (Axillary)   Resp 20   Ht 5' (1.524 m)   Wt 132 lb 0.9 oz (59.9 kg)   SpO2 96%   BMI 25.79 kg/m²     Last documented pain score (0-10 scale): Pain Level: 0  Last Weight:   Wt Readings from Last 1 Encounters:   07/27/20 132 lb 0.9 oz (59.9 kg)     Mental Status:  disoriented and alert    IV Access:  - PICC - site  L Upper Arm, insertion date: Removed 7/28    Nursing Mobility/ADLs:  Walking   Assisted  Transfer  Assisted  Bathing  Assisted  Dressing  Assisted  Toileting  Assisted  Feeding  Assisted  Med Admin  Dependent  Med Delivery   whole    Wound Care Documentation and Therapy:        Elimination:  Continence:   · Bowel: No  · Bladder: No  Urinary Catheter: None   Colostomy/Ileostomy/Ileal Conduit: No       Date of Last BM: 7/28/20    Intake/Output Summary (Last 24 hours) at 7/28/2020 1228  Last data filed at 7/28/2020 0800  Gross per 24 hour   Intake 1140 ml   Output 300 ml   Net 840 ml     I/O last 3 completed shifts: In: 1080 [P.O.:1080]  Out: 500 [Urine:500]    Safety Concerns: At Risk for Falls    Impairments/Disabilities:      None    Nutrition Therapy:  Current Nutrition Therapy:   - Oral Diet:  Carb Control 5 carbs/meal (2000kcals/day) and Dysphagia 3 advanced    Routes of Feeding: Oral  Liquids: Thin Liquids  Daily Fluid Restriction: no  Last Modified Barium Swallow with Video (Video Swallowing Test): not done    Treatments at the Time of Hospital Discharge:   Respiratory Treatments: NA  Oxygen Therapy:  is on oxygen at 2 L/min per nasal cannula.   Ventilator:    - No ventilator support    Rehab Therapies: Physical Therapy, Occupational Therapy and Speech/Language Therapy  Weight Bearing Status/Restrictions: No weight bearing restirctions  Other Medical Equipment (for information only, NOT a DME order):  hospital bed  Other Treatments: ***    Patient's personal belongings (please select all that are sent with patient):  None    RN SIGNATURE:  Electronically signed by Erin Winters RN on 7/28/20 at 2:48 PM EDT    CASE MANAGEMENT/SOCIAL WORK SECTION    Inpatient Status Date: 07/12/2020    Readmission Risk Assessment Score:  Readmission Risk              Risk of Unplanned Readmission:        25           Discharging to Facility/ Agency   · Name: [facility]  · Address: [place] [address] [address] [place] [address]  · Phone: [phone]  · Fax: [phone]    / signature: Electronically signed by Rosalee Plaza RN on 7/28/20 at 2:24 PM EDT    PHYSICIAN SECTION    Prognosis: Good    Condition at Discharge: Stable    Rehab Potential (if transferring to Rehab): Good    Recommended Labs or Other Treatments After Discharge: meds as prescribed, follow-up with GI for endoscopic evaluation as an outpatient    Physician Certification: I certify the above information and transfer of Stefanie Solorio  is necessary for the continuing treatment of the diagnosis listed and that she requires [place] for less 30 days.      Update Admission H&P: No change in H&P    PHYSICIAN SIGNATURE:  Electronically signed by Helen Gross MD on 7/28/20 at 12:28 PM EDT

## 2020-07-28 NOTE — PROGRESS NOTES
Comprehensive Nutrition Assessment    Type and Reason for Visit:  Reassess    Nutrition Recommendations/Plan:   Current diet \"Full liquid; Dysphagia Pureed; Moderately thick (honey)\". Start Magic Cup BID. Nutrition Assessment:  Diet downgraded per SLP recommendations to pureed, moderately thick. Pt went NPO and diet resumed as clear liquids and advanced to full liquids. Order currently reads \"Full liquid; Dysphagia Pureed; Moderately Thick (honey)\". Unsure if pt is only on full liquid or receiving pureed tray. SLP following. Little PO intakes, will trial supplement. Malnutrition Assessment:  Malnutrition Status: At risk for malnutrition (Comment)    Context:  Acute Illness     Findings of the 6 clinical characteristics of malnutrition:  Energy Intake:  1 - 75% or less of estimated energy requirements for 7 or more days  Weight Loss:  No significant weight loss     Body Fat Loss:  Unable to assess     Muscle Mass Loss:  Unable to assess    Fluid Accumulation:  No significant fluid accumulation     Strength:  Not Performed    Estimated Daily Nutrient Needs:  Energy (kcal):  1571-2165 kcal (20-25 kcal/kg ABW)  Protein (g):  64-77 gm (1-1.2 gm/kg ABW)  Fluid (ml/day):  1 ml/kcal    Nutrition Related Findings:  BM 7/28; Blood sugars fluctuating      Wounds:  (MASD colt area)       Current Nutrition Therapies:    DIET FULL LIQUID; Dysphagia Pureed; Moderately Thick (Honey)    Anthropometric Measures:  · Height: 5' (152.4 cm)  · Current Body Weight: 132 lb (59.9 kg)   · Admission Body Weight: 134 lb (60.8 kg)    · Usual Body Weight: 140 lb (63.5 kg)     · Ideal Body Weight: 100 lbs; % Ideal Body Weight 132 %   · BMI: 25.8  · BMI Categories: Overweight (BMI 25.0-29. 9)       Nutrition Diagnosis:   · Altered nutrition-related lab values related to endocrine dysfuntion as evidenced by lab values      Nutrition Interventions:   Food and/or Nutrient Delivery:  Continue Current Diet, Start Oral Nutrition Supplement  Nutrition Education/Counseling:  No recommendation at this time   Coordination of Nutrition Care:  Continued Inpatient Monitoring    Goals:  tolerate most appropriate form of nutrition       Nutrition Monitoring and Evaluation:   Food/Nutrient Intake Outcomes:  Food and Nutrient Intake, Supplement Intake  Physical Signs/Symptoms Outcomes:  Biochemical Data, Constipation, Diarrhea, Fluid Status or Edema, Nutrition Focused Physical Findings, Skin, Weight     Discharge Planning:     Too soon to determine     Electronically signed by Dixie El RD, LD on 7/28/20 at 12:26 PM EDT    Contact: [phone]

## 2020-07-28 NOTE — PROGRESS NOTES
Patient discharged to [facility] via stretcher at [facility] with [address] Sent with belongings. PICC removed with no complications. Telemetry removed. Report called to Roro Jimenez at [facility] who asked for us to call back in 5 minutes.   Electronically signed by Zoila Polk RN on 7/28/2020 at 6:08 PM

## 2020-07-28 NOTE — PROGRESS NOTES
Physical Therapy  Facility/Department: [address] PROGRESSIVE CARE  Daily Treatment Note  NAME: Tessie Torres  : 1955  MRN: [id number]    Date of Service: 2020    Discharge Recommendations:  3-5 sessions per week, Patient would benefit from continued therapy after discharge        Assessment   Body structures, Functions, Activity limitations: Decreased functional mobility ; Decreased cognition;Decreased endurance  Assessment: Pt limited by fatigue, desaturating quickly with activity on 2 L. Her sitting/standing tolerance is very poor, and she would benefit from continued therapy to improve her endurance, safety awareness, and independence performing all mobility tasks. Recommend low-moderate frequency therapy upon D/C. Tessie Torres scored a 14/24 on the AM-PAC short mobility form. Current research shows that an AM-PAC score of 17 or less is typically not associated with a discharge to the patient's home setting. Based on the patient's AM-PAC score and their current functional mobility deficits, it is recommended that the patient have 3-5 sessions per week of Physical Therapy at d/c to increase the patient's independence. Please see assessment section for further patient specific details. If patient discharges prior to next session this note will serve as a discharge summary. Please see below for the latest assessment towards goals. History: 76 y/o female admit 2020 with Acute Respiratory, Pneumonia, COVID +.  2020 Pt transfer to ICU with decline Medical Status. -2020 Pt Intubated. PMH as noted including C-Spine Surg, Shld Surg, DM, Memory Loss. PT Education: Goals; General Safety;PT Role;Orientation;Plan of Care;Transfer Training;Energy Conservation  Activity Tolerance  Activity Tolerance: Pt limited endurance, becomes very anxious easily, max cues to redirect and control breathing.      Patient Diagnosis(es): The primary encounter diagnosis was Acute respiratory failure with hypoxia ([facility] [address] Diagnoses of COVID-19 virus detected and Multifocal pneumonia were also pertinent to this visit. has a past medical history of Diabetes mellitus ([address] Headache(784.0), Herniated disc, cervical, Hypercholesteremia, Hypertension, Memory loss, Psychiatric problem, and Type 2 diabetes mellitus without complication ([address] has a past surgical history that includes Tubal ligation and shoulder surgery. Restrictions  Restrictions/Precautions  Restrictions/Precautions: Fall Risk  Position Activity Restriction  Other position/activity restrictions: Droplet Plus Precautions + COVID. O2 2L. Diet : Liquid  Subjective   General  Chart Reviewed: Yes  Additional Pertinent Hx: 76 y/o female admit 7/12/2020 with Acute Respiratory, Pneumonia, COVID +.  7/17/2020 Pt transfer to ICU with decline Medical Status. 7/20-7/23/2020 Pt Intubated. PMH as noted including C-Spine Surg, Shld Surg, DM, Memory Loss. Subjective  Subjective: Pt laying in bed. Covered in loose BM. Requesting assist to be cleaned. PT and PCA to assist.          Orientation  Orientation  Overall Orientation Status: Impaired  Orientation Level: Oriented to person    Objective      Bed mobility  Rolling to Left: Contact guard assistance  Rolling to Right: Contact guard assistance  Supine to Sit: Stand by assistance  Sit to Supine: Stand by assistance  Scooting: Dependent/Total;2 Person assistance     Transfers  Sit to Stand: Contact guard assistance;Dependent/Total(CGA to stedy)  Stand to sit: Contact guard assistance;Dependent/Total(CGA from stedy)     Balance  Comments: Pt initially able to sit EOB with SBA, on 2 L. O2. She was then able to stand to stedy briefly while PT and PCA assisted her with doffing her soiled brief. Pt quickly began to c/o lightheadedness and dizziness, and required supine rest.  SPO2 decreased to mid 60s. PT increased O2 to 4 L.,and with cues for pursed-lip breathing pt's SPO2 rebounded to 92%.   Pt then able to assist with rolling L/R multiple trials while PT and PCA performed colt-care (total assist). After pt was cleaned, she completed an additional 2 min. sitting trial at EOB (on 4 L., SPO2 ranged from 88-92%). She was again limited d/t fatigue, and required supine rest.    AM-PAC Score  AM-PAC Inpatient Mobility Raw Score : 14 (07/28/20 1158)  AM-PAC Inpatient T-Scale Score : 38.1 (07/28/20 1158)  Mobility Inpatient CMS 0-100% Score: 61.29 (07/28/20 1158)  Mobility Inpatient CMS G-Code Modifier : CL (07/28/20 1158)          Goals  Short term goals  Time Frame for Short term goals: Upon d/c acute care setting. Short term goal 1: Bed Mob SBA. Short term goal 2: Transfers with/without assist device SBA. Short term goal 3: Amb with/without assist device within hospital room setting SBA. Patient Goals   Patient goals : None stated at this time. Plan    Plan  Times per week: 3-5x week while in acute care setting. Current Treatment Recommendations: Functional Mobility Training, Transfer Training, Gait Training, Safety Education & Training, Patient/Caregiver Education & Training  Safety Devices  Type of devices: All fall risk precautions in place, Bed alarm in place, Call light within reach, Gait belt, Nurse notified, Left in bed  Restraints  Initially in place: No  Restraints: UE soft mitts.      Therapy Time   Individual Concurrent Group Co-treatment   Time In 0920         Time Out 1145         Minutes 40         Timed Code Treatment Minutes: 36 Minutes       Foreign Francis PT    Electronically signed by Foreign Francis PT [id number] on 7/28/2020 at 11:59 AM

## 2020-07-28 NOTE — DISCHARGE SUMMARY
Hospital Medicine Discharge Summary    Patient ID: Nupur Marquez      Patient's PCP: Marielena Horan MD    Admit Date: 7/12/2020     Discharge Date:   7/28/2020    Admitting Physician: Denver Salles, MD     Discharge Physician: Denver Salles, MD     Discharge Diagnoses: Active Hospital Problems    Diagnosis Date Noted    Acute respiratory failure with hypoxia ([facility] [address] [J96.01]     COVID-19 virus detected [U07.1]     COVID-19 virus infection [U07.1] 07/12/2020       The patient was seen and examined on day of discharge and this discharge summary is in conjunction with any daily progress note from day of discharge. Hospital Course: The patient is a 75 y.o. female with hx HTN, HLD, and type 2 DM who presents to [facility] with cough and shortness of breath. Patient recently diagnosed positive for COVID-19. States that she has been having progressively worsening productive cough and shortness of breath over the past few days. Also having body aches. Denies fever, chest pain, abdominal pain, nausea, vomiting, constipation, diarrhea, and dysuria. She was found to have an oxygen saturation of 78% on room air. She was initially placed on 6L and saturating well at 98%. She was febrile to 101F in the ED. CXR showed possible multifocal pneumonia so she was started on Azithromycin and ceftriaxone. Labs were otherwise unremarkable. 1.  COVID-19 pneumonia, initially was on empiric Azithromycin/ceftriaxone,  decadron 6 mg daily, Lovenox BID, respiratory culture, consulted ID and remdesivir started, clinically was improving, then  developed fever and increased need of oxygen, chest xray was worsening. Started on cefepime and received one dose of Actemra. oxygen need was 15L so patient transferred to ICU, and vapotherm started. Was then requiring BiPap and then subsequently intubated due to inability to tolerate BiPAP. Self-extubated 7/24. Now on 2L nasal cannula. Pulmonology signed off.  Patient is stable for discharge to SNF. 2. Sepsis due to CODID 19 pneumonia, ongoing management as above.   3. Acute respiratory failure with hypoxia s/p mechanical intubation. Now extubated, doing well on 2L of oxygen. Further titration of oxygen at SNF at discharge. 4. Type 2 DM, fluctuating, high dose SSI. Switch Lantus to evening and decrease dose due to poor oral intake. Further titration of insulin at SNF at discharge  5. Essential hypertension, continue Norvasc. Stop other anti-hypertensive meds at discharrge. 6. Mild cognitive impairment, continue home meds  7. Acute blood loss anemia - GI consulted, withholding EGD due to high risk. Continue PPI gtt, stopped Lovenox and ASA. H/H q8h, transfuse < 7. Patient advanced diet and tolerated well. Hgb stable today. Switch from PPI gtt to PPI BID at discharge. Can consider outpatient endoscopy after recovering from [facility]. Exam:     BP (!) 110/58   Pulse 80   Temp 97.6 °F (36.4 °C) (Axillary)   Resp 20   Ht 5' (1.524 m)   Wt 132 lb 0.9 oz (59.9 kg)   SpO2 96%   BMI 25.79 kg/m²       General appearance:  No apparent distress, appears stated age and cooperative. HEENT:  Normal cephalic, atraumatic without obvious deformity. Pupils equal, round, and reactive to light. Extra ocular muscles intact. Conjunctivae/corneas clear. Neck: Supple, with full range of motion. No jugular venous distention. Trachea midline. Respiratory:  Normal respiratory effort. Clear to auscultation, bilaterally without Rales/Wheezes/Rhonchi. Cardiovascular:  Regular rate and rhythm with normal S1/S2 without murmurs, rubs or gallops. Abdomen: Soft, non-tender, non-distended with normal bowel sounds. Musculoskeletal:  No clubbing, cyanosis or edema bilaterally. Full range of motion without deformity. Skin: Skin color, texture, turgor normal.  No rashes or lesions. Neurologic:  Neurovascularly intact without any focal sensory/motor deficits.  Cranial nerves: II-XII intact, grossly non-focal.  Psychiatric:  Alert and oriented, thought content appropriate, normal insight  Capillary Refill: Brisk,< 3 seconds   Peripheral Pulses: +2 palpable, equal bilaterally       Labs: For convenience and continuity at follow-up the following most recent labs are provided:      CBC:    Lab Results   Component Value Date    WBC 11.9 07/28/2020    HGB 8.1 07/28/2020    HCT 24.9 07/28/2020     07/28/2020       Renal:    Lab Results   Component Value Date     07/28/2020    K 4.3 07/28/2020     07/28/2020    CO2 28 07/28/2020    BUN 14 07/28/2020    CREATININE <0.5 07/28/2020    CALCIUM 8.2 07/28/2020    PHOS 3.2 07/28/2020         Significant Diagnostic Studies    Radiology:   XR CHEST PORTABLE   Final Result   Enteric tube tip and side port project over the distal stomach. No interval change in bilateral airspace opacities and left pleural effusion. XR CHEST PORTABLE   Final Result   Unremarkable limited KUB. NG tube tip projects at the left upper quadrant, overlying the expected   location of the stomach. Infiltrates bilateral lower lungs. XR CHEST PORTABLE   Final Result   Endotracheal tube appears in satisfactory position. NG tube tip is in the proximal stomach with side hole near the GE junction. This should be further advanced. Pulmonary edema pattern noted with cardiomegaly, vascular congestion,   bilateral pleural effusions and bibasilar airspace disease. Right upper extremity PICC line appears in appropriate position without   pneumothorax. XR CHEST 1 VW   Final Result   1. Multifocal airspace opacities which have increased when compared with exam   from July 17, 2020.   2. Silhouetting of the left hemidiaphragm likely reflecting left effusion and   atelectasis. XR CHEST 1 VW   Final Result   Multifocal airspace disease. Areas in the right upper and left lower lung   are consolidated.   However however, there are areas of improved aeration. XR CHEST PORTABLE   Final Result   Worsening multifocal airspace opacities. XR CHEST PORTABLE   Final Result   Left greater than right basilar airspace disease, concerning for pneumonia   given patient history.                 Consults:     IP CONSULT TO HOSPITALIST  IP CONSULT TO INFECTIOUS DISEASES  IP CONSULT TO PULMONOLOGY  IP CONSULT TO DIETITIAN  IP CONSULT TO GI    Disposition:  SNF    Condition at Discharge: Stable    Discharge Instructions/Follow-up:  meds as prescribed, consider outpatient follow-up with GI for endoscopy    Code Status:  Full Code     Activity: activity as tolerated    Diet: carb control diet; dysphagia soft and bite-sized      Discharge Medications:     Current Discharge Medication List           Details   metoprolol tartrate (LOPRESSOR) 25 MG tablet Take 1 tablet by mouth 2 times daily  Qty: 60 tablet, Refills: 3      pantoprazole (PROTONIX) 20 MG tablet Take 2 tablets by mouth daily  Qty: 30 tablet, Refills: 0              Details   insulin glargine (LANTUS SOLOSTAR) 100 UNIT/ML injection pen Inject 20 Units into the skin 2 times daily  Qty: 90 mL, Refills: 3    Associated Diagnoses: Type 2 diabetes mellitus without complication, with long-term current use of insulin ([facility])              Details   SITagliptin (JANUVIA) 100 MG tablet Take 1 tablet by mouth daily For diabetes  Qty: 90 tablet, Refills: 3    Associated Diagnoses: Type 2 diabetes mellitus with diabetic mononeuropathy, with long-term current use of insulin ([facility])      amLODIPine (NORVASC) 5 MG tablet Take 1 tablet by mouth daily  Qty: 90 tablet, Refills: 3    Associated Diagnoses: Malignant hypertension      donepezil (ARICEPT) 5 MG tablet Take 1 tablet by mouth nightly  Qty: 90 tablet, Refills: 3    Associated Diagnoses: Memory loss      traZODone (DESYREL) 100 MG tablet TAKE ONE TABLET BY MOUTH DAILY AS NEEDED FOR SLEEP  Qty: 90 tablet, Refills: 3    Associated Diagnoses: Primary insomnia metFORMIN (GLUCOPHAGE) 1000 MG tablet Take 1 tablet by mouth daily (with breakfast) For diabetes  Qty: 30 tablet, Refills: 5    Associated Diagnoses: Type 2 diabetes mellitus with diabetic mononeuropathy, with long-term current use of insulin ([address] Malignant hypertension      vitamin D (ERGOCALCIFEROL) 1.25 MG (81351 UT) CAPS capsule Take 1 capsule by mouth once a week  Qty: 12 capsule, Refills: 3    Associated Diagnoses: Vitamin D deficiency      Cyanocobalamin 1000 MCG SUBL Place 1,000 mcg under the tongue daily  Qty: 90 tablet, Refills: 3    Associated Diagnoses: Low vitamin B12 level      gabapentin (NEURONTIN) 300 MG capsule Take 1 capsule by mouth 4 times daily. For nerve pain related to diabetes  Qty: 360 capsule, Refills: 3    Associated Diagnoses: Type 2 diabetes mellitus with diabetic mononeuropathy, with long-term current use of insulin ([facility])      atorvastatin (LIPITOR) 40 MG tablet Take 1 tablet by mouth daily For cholesterol and heart  Qty: 90 tablet, Refills: 3    Associated Diagnoses: Type 2 diabetes mellitus without complication, with long-term current use of insulin ([address] Hypercholesteremia      empagliflozin (JARDIANCE) 25 MG tablet Take 25 mg by mouth daily  Qty: 90 tablet, Refills: 3      ondansetron (ZOFRAN ODT) 4 MG disintegrating tablet Take 1 tablet by mouth every 8 hours as needed for Nausea  Qty: 20 tablet, Refills: 0      aspirin 325 MG tablet Take 1 tablet by mouth daily  Qty: 30 tablet, Refills: 3      Acetaminophen 650 MG TABS Take 650 mg by mouth every 4 hours as needed for Pain  Qty: 120 tablet, Refills: 1    Associated Diagnoses: Cervical spinal stenosis             Time Spent on discharge is more than 30 minutes in the examination, evaluation, counseling and review of medications and discharge plan. Signed:    Sonny Matson MD   7/28/2020      Thank you Angelo Mallory MD for the opportunity to be involved in this patient's care.  If you have any questions or concerns please

## 2020-07-28 NOTE — CARE COORDINATION
Received a call from Amrik Norton at [facility] at 1020 am advising they can accept the patient in the Covid unit. Discharge order noted. Call to Ernestina Cueto, patient's daughter. Advised that [facility] can accept the patient today. She is in agreement. Reviewed IMM. Left message for Amrik Norton at [facility] to call back regarding accepting the patient today. Left message for [address] [place] [address] to call back for transportation arrangement. Electronically signed by Juan Mera RN on 7/28/2020 at 1:30 PM     Spoke with Stephane Viveros at [facility], transportation arranged for 5 pm.      Amrik Norton updated at [facility].     Electronically signed by Juan Mera RN on 7/28/2020 at 2:25 PM

## 2020-07-28 NOTE — PLAN OF CARE
Problem: Airway Clearance - Ineffective  Goal: Achieve or maintain patent airway  Outcome: Ongoing     Problem: Gas Exchange - Impaired  Goal: Absence of hypoxia  Outcome: Ongoing  Goal: Promote optimal lung function  Outcome: Ongoing     Problem: Breathing Pattern - Ineffective  Goal: Ability to achieve and maintain a regular respiratory rate  Outcome: Ongoing     Problem:  Body Temperature -  Risk of, Imbalanced  Goal: Ability to maintain a body temperature within defined limits  Outcome: Ongoing  Goal: Complications related to the disease process, condition or treatment will be avoided or minimized  Outcome: Ongoing     Problem: Isolation Precautions - Risk of Spread of Infection  Goal: Prevent transmission of infection  Outcome: Ongoing

## 2020-07-29 ENCOUNTER — CARE COORDINATION (OUTPATIENT)
Dept: CASE MANAGEMENT | Age: 65
End: 2020-07-29

## 2020-08-19 ENCOUNTER — CARE COORDINATION (OUTPATIENT)
Dept: CASE MANAGEMENT | Age: 65
End: 2020-08-19

## 2020-08-19 PROCEDURE — 1111F DSCHRG MED/CURRENT MED MERGE: CPT | Performed by: INTERNAL MEDICINE

## 2020-08-19 NOTE — CARE COORDINATION
Up  Future Appointments   Date Time Provider Nael Pinto   8/25/2020  2:00 PM ZACHARY Otero - CNP [facility]       Joesphine Merlin, RN

## 2020-08-25 PROBLEM — Z79.4 TYPE 2 DIABETES MELLITUS WITH DIABETIC MONONEUROPATHY, WITH LONG-TERM CURRENT USE OF INSULIN (HCC): Status: ACTIVE | Noted: 2020-08-25

## 2020-08-25 PROBLEM — E11.41 TYPE 2 DIABETES MELLITUS WITH DIABETIC MONONEUROPATHY, WITH LONG-TERM CURRENT USE OF INSULIN (HCC): Status: ACTIVE | Noted: 2020-08-25

## 2020-09-05 ENCOUNTER — HOSPITAL ENCOUNTER (EMERGENCY)
Age: 65
Discharge: HOME | End: 2020-09-05
Payer: MEDICARE

## 2020-09-05 ENCOUNTER — APPOINTMENT (OUTPATIENT)
Dept: GENERAL RADIOLOGY | Age: 65
End: 2020-09-05
Payer: MEDICARE

## 2020-09-05 VITALS
HEART RATE: 56 BPM | BODY MASS INDEX: 24.54 KG/M2 | OXYGEN SATURATION: 100 % | DIASTOLIC BLOOD PRESSURE: 60 MMHG | RESPIRATION RATE: 16 BRPM | TEMPERATURE: 98.2 F | SYSTOLIC BLOOD PRESSURE: 143 MMHG | WEIGHT: 125 LBS | HEIGHT: 60 IN

## 2020-09-05 LAB
A/G RATIO: 1.1 (ref 1.1–2.2)
ALBUMIN SERPL-MCNC: 4.1 G/DL (ref 3.4–5)
ALP BLD-CCNC: 47 U/L (ref 40–129)
ALT SERPL-CCNC: 11 U/L (ref 10–40)
ANION GAP SERPL CALCULATED.3IONS-SCNC: 10 MMOL/L (ref 3–16)
AST SERPL-CCNC: 20 U/L (ref 15–37)
BASOPHILS ABSOLUTE: 0.1 K/UL (ref 0–0.2)
BASOPHILS RELATIVE PERCENT: 1 %
BILIRUB SERPL-MCNC: 0.5 MG/DL (ref 0–1)
BUN BLDV-MCNC: 11 MG/DL (ref 7–20)
CALCIUM SERPL-MCNC: 9.7 MG/DL (ref 8.3–10.6)
CHLORIDE BLD-SCNC: 98 MMOL/L (ref 99–110)
CO2: 32 MMOL/L (ref 21–32)
CREAT SERPL-MCNC: 0.6 MG/DL (ref 0.6–1.2)
EKG ATRIAL RATE: 56 BPM
EKG DIAGNOSIS: NORMAL
EKG P AXIS: 65 DEGREES
EKG P-R INTERVAL: 182 MS
EKG Q-T INTERVAL: 446 MS
EKG QRS DURATION: 76 MS
EKG QTC CALCULATION (BAZETT): 430 MS
EKG R AXIS: -18 DEGREES
EKG T AXIS: -26 DEGREES
EKG VENTRICULAR RATE: 56 BPM
EOSINOPHILS ABSOLUTE: 0.3 K/UL (ref 0–0.6)
EOSINOPHILS RELATIVE PERCENT: 3.6 %
ESTIMATED AVERAGE GLUCOSE: 145.6 MG/DL
FERRITIN: 26.8 NG/ML (ref 15–150)
GFR AFRICAN AMERICAN: >60
GFR NON-AFRICAN AMERICAN: >60
GLOBULIN: 3.7 G/DL
GLUCOSE BLD-MCNC: 118 MG/DL (ref 70–99)
HBA1C MFR BLD: 6.7 %
HCT VFR BLD CALC: 31.3 % (ref 36–48)
HEMOGLOBIN: 9.8 G/DL (ref 12–16)
IRON SATURATION: 7 % (ref 15–50)
IRON: 25 UG/DL (ref 37–145)
LYMPHOCYTES ABSOLUTE: 2.4 K/UL (ref 1–5.1)
LYMPHOCYTES RELATIVE PERCENT: 28.4 %
MAGNESIUM: 1.9 MG/DL (ref 1.8–2.4)
MCH RBC QN AUTO: 24.8 PG (ref 26–34)
MCHC RBC AUTO-ENTMCNC: 31.4 G/DL (ref 31–36)
MCV RBC AUTO: 78.8 FL (ref 80–100)
MONOCYTES ABSOLUTE: 0.8 K/UL (ref 0–1.3)
MONOCYTES RELATIVE PERCENT: 9.3 %
NEUTROPHILS ABSOLUTE: 4.9 K/UL (ref 1.7–7.7)
NEUTROPHILS RELATIVE PERCENT: 57.7 %
PDW BLD-RTO: 20.1 % (ref 12.4–15.4)
PLATELET # BLD: 401 K/UL (ref 135–450)
PMV BLD AUTO: 7.4 FL (ref 5–10.5)
POTASSIUM REFLEX MAGNESIUM: 3 MMOL/L (ref 3.5–5.1)
RBC # BLD: 3.98 M/UL (ref 4–5.2)
SODIUM BLD-SCNC: 140 MMOL/L (ref 136–145)
TOTAL IRON BINDING CAPACITY: 381 UG/DL (ref 260–445)
TOTAL PROTEIN: 7.8 G/DL (ref 6.4–8.2)
TROPONIN: <0.01 NG/ML
WBC # BLD: 8.4 K/UL (ref 4–11)

## 2020-09-05 PROCEDURE — 83036 HEMOGLOBIN GLYCOSYLATED A1C: CPT

## 2020-09-05 PROCEDURE — 85025 COMPLETE CBC W/AUTO DIFF WBC: CPT

## 2020-09-05 PROCEDURE — 83735 ASSAY OF MAGNESIUM: CPT

## 2020-09-05 PROCEDURE — 6370000000 HC RX 637 (ALT 250 FOR IP): Performed by: PHYSICIAN ASSISTANT

## 2020-09-05 PROCEDURE — 84484 ASSAY OF TROPONIN QUANT: CPT

## 2020-09-05 PROCEDURE — 83550 IRON BINDING TEST: CPT

## 2020-09-05 PROCEDURE — 99284 EMERGENCY DEPT VISIT MOD MDM: CPT

## 2020-09-05 PROCEDURE — 93005 ELECTROCARDIOGRAM TRACING: CPT | Performed by: PHYSICIAN ASSISTANT

## 2020-09-05 PROCEDURE — 80053 COMPREHEN METABOLIC PANEL: CPT

## 2020-09-05 PROCEDURE — 83540 ASSAY OF IRON: CPT

## 2020-09-05 PROCEDURE — 82728 ASSAY OF FERRITIN: CPT

## 2020-09-05 PROCEDURE — 71101 X-RAY EXAM UNILAT RIBS/CHEST: CPT

## 2020-09-05 PROCEDURE — 93010 ELECTROCARDIOGRAM REPORT: CPT | Performed by: INTERNAL MEDICINE

## 2020-09-05 RX ORDER — POTASSIUM CHLORIDE 20 MEQ/1
40 TABLET, EXTENDED RELEASE ORAL ONCE
Status: COMPLETED | OUTPATIENT
Start: 2020-09-05 | End: 2020-09-05

## 2020-09-05 RX ORDER — ACETAMINOPHEN 500 MG
1000 TABLET ORAL ONCE
Status: COMPLETED | OUTPATIENT
Start: 2020-09-05 | End: 2020-09-05

## 2020-09-05 RX ADMIN — POTASSIUM CHLORIDE 40 MEQ: 1500 TABLET, EXTENDED RELEASE ORAL at 13:47

## 2020-09-05 RX ADMIN — ACETAMINOPHEN 1000 MG: 500 TABLET, FILM COATED ORAL at 12:42

## 2020-09-05 ASSESSMENT — PAIN DESCRIPTION - LOCATION
LOCATION: RIB CAGE
LOCATION: OTHER (COMMENT)

## 2020-09-05 ASSESSMENT — PAIN SCALES - GENERAL
PAINLEVEL_OUTOF10: 5
PAINLEVEL_OUTOF10: 7
PAINLEVEL_OUTOF10: 8
PAINLEVEL_OUTOF10: 5
PAINLEVEL_OUTOF10: 9

## 2020-09-05 ASSESSMENT — ENCOUNTER SYMPTOMS
ABDOMINAL PAIN: 0
NAUSEA: 0
SHORTNESS OF BREATH: 0
VOMITING: 0
BACK PAIN: 0
COUGH: 1
SORE THROAT: 0

## 2020-09-05 ASSESSMENT — PAIN DESCRIPTION - ORIENTATION
ORIENTATION: RIGHT

## 2020-09-05 ASSESSMENT — PAIN DESCRIPTION - PAIN TYPE
TYPE: ACUTE PAIN

## 2020-09-05 ASSESSMENT — PAIN DESCRIPTION - PROGRESSION
CLINICAL_PROGRESSION: NOT CHANGED
CLINICAL_PROGRESSION: GRADUALLY IMPROVING

## 2020-09-05 ASSESSMENT — PAIN - FUNCTIONAL ASSESSMENT
PAIN_FUNCTIONAL_ASSESSMENT: PREVENTS OR INTERFERES SOME ACTIVE ACTIVITIES AND ADLS
PAIN_FUNCTIONAL_ASSESSMENT: 0-10

## 2020-09-05 ASSESSMENT — PAIN DESCRIPTION - FREQUENCY
FREQUENCY: CONTINUOUS

## 2020-09-05 ASSESSMENT — PAIN DESCRIPTION - DESCRIPTORS
DESCRIPTORS: ACHING
DESCRIPTORS: OTHER (COMMENT);SHARP
DESCRIPTORS: ACHING

## 2020-09-05 ASSESSMENT — PAIN DESCRIPTION - ONSET: ONSET: ON-GOING

## 2020-09-05 NOTE — ED PROVIDER NOTES
[facility]      Pt Name: Isidro Krabbe  MRN: [id number]  [place] 1955  Date of evaluation: 9/5/2020  Provider: Billie Reeves PA-C    This patient was not seen and evaluated by the attending physician No att. providers found. CHIEF COMPLAINT       Chief Complaint   Patient presents with    Labs Only     was supposed to get outpatient labs on the 25th or so of last month    Rib Pain     right side rib pain, spouse states the patient hit the corner of the table when she was falling. patient states she got dizzy    Cough     x 3 days         HISTORYOF PRESENT ILLNESS  (Location/Symptom, Timing/Onset, Context/Setting, Quality, Duration, Modifying Factors, Severity.)   Isidro Krabbe is a 75 y.o. female who presents to the emergency department after being sent to the ER when the lab was closed. She says she originally just came in because she had some outpatient lab work ordered on August 25 that she has not yet gotten done. When she discovered the lab was closed she was instructed to come to the emergency department. She did want to be evaluated as well for right-sided rib pain after a fall. She says she fell 3 days ago, thinks maybe she got dizzy, is not sure. She struck her ribs on the coffee table. She did not hit her head or lose consciousness. She denies any dizziness at this time. Reports a slight nonproductive cough for the past 2 to 3 days. Otherwise denies any complaints, chest pain, shortness of breath, abdominal pain, nausea, vomiting or diarrhea. Nursing Notes were reviewedand I agree. REVIEW OF SYSTEMS    (2-9 systems for level 4, 10 or more forlevel 5)     Review of Systems   Constitutional: Negative for chills and fever. HENT: Negative for sore throat. Respiratory: Positive for cough. Negative for shortness of breath. Cardiovascular: Positive for chest pain (rib pain only from the fall). Gastrointestinal: Negative for abdominal pain, nausea and vomiting. Genitourinary: Negative for difficulty urinating and dysuria. Musculoskeletal: Negative for back pain. Skin: Negative for rash. Neurological: Positive for dizziness. Negative for light-headedness and headaches. Psychiatric/Behavioral: The patient is not nervous/anxious. All other systems reviewed and are negative. Except as noted above the remainder ofthe review of systems was reviewed and negative. PAST MEDICALHISTORY         Diagnosis Date    Diabetes mellitus ([facility] [address]     NIDDM    Headache(784.0)     Herniated disc, cervical     Hypercholesteremia     Hypertension     Memory loss     short term    Psychiatric problem     Type 2 diabetes mellitus without complication ([facility] [address]        SURGICAL HISTORY           Procedure Laterality Date    SHOULDER SURGERY      TUBAL LIGATION         CURRENT MEDICATIONS       Previous Medications    ACETAMINOPHEN 650 MG TABS    Take 650 mg by mouth every 4 hours as needed for Pain    AMLODIPINE (NORVASC) 5 MG TABLET    Take 1 tablet by mouth daily    ASPIRIN 325 MG TABLET    Take 1 tablet by mouth daily    ATORVASTATIN (LIPITOR) 40 MG TABLET    Take 1 tablet by mouth daily For cholesterol and heart    BENZONATATE (TESSALON) 100 MG CAPSULE    Take 1 capsule by mouth 3 times daily as needed for Cough    CYANOCOBALAMIN 1000 MCG SUBL    Place 1,000 mcg under the tongue daily    DONEPEZIL (ARICEPT) 5 MG TABLET    Take 1 tablet by mouth nightly    EMPAGLIFLOZIN (JARDIANCE) 25 MG TABLET    Take 25 mg by mouth daily    GABAPENTIN (NEURONTIN) 300 MG CAPSULE    Take 1 capsule by mouth 4 times daily.  For nerve pain related to diabetes    INSULIN GLARGINE (LANTUS SOLOSTAR) 100 UNIT/ML INJECTION PEN    Inject 20 Units into the skin 2 times daily    METFORMIN (GLUCOPHAGE) 1000 MG TABLET    Take 1 tablet by mouth daily (with breakfast) For diabetes    ONDANSETRON (ZOFRAN ODT) 4 MG DISINTEGRATING TABLET Take 1 tablet by mouth every 8 hours as needed for Nausea    PANTOPRAZOLE (PROTONIX) 40 MG TABLET    Take 1 tablet by mouth 2 times daily    SERTRALINE (ZOLOFT) 25 MG TABLET    Take 1 tablet by mouth daily    SITAGLIPTIN (JANUVIA) 100 MG TABLET    Take 1 tablet by mouth daily For diabetes    TRAZODONE (DESYREL) 100 MG TABLET    TAKE ONE TABLET BY MOUTH DAILY AS NEEDED FOR SLEEP    VITAMIN D (ERGOCALCIFEROL) 1.25 MG (70426 UT) CAPS CAPSULE    Take 1 capsule by mouth once a week       ALLERGIES     Patient has no known allergies. FAMILY HISTORY           Problem Relation Age of Onset    Diabetes Mother     High Blood Pressure Mother     Diabetes Father     High Blood Pressure Father      Family Status   Relation Name Status    Mother      Father      Sister     Cecily Muta Brother      Sister  Alive    Sister  Alive    Sister  Alive    Sister  Alive    Sister  Alive    Brother      Brother      Brother          SOCIAL HISTORY    reports that she has never smoked. She has never used smokeless tobacco. She reports that she does not drink alcohol or use drugs. PHYSICAL EXAM    (up to 7 for level 4, 8 or more for level 5)     ED Triage Vitals [20 1218]   BP Temp Temp Source Pulse Resp SpO2 Height Weight   133/62 98.2 °F (36.8 °C) Oral 66 16 (!) 84 % 5' (1.524 m) 125 lb (56.7 kg)       Physical Exam  Vitals signs and nursing note reviewed. Constitutional:       General: She is not in acute distress. Appearance: She is well-developed. HENT:      Head: Normocephalic and atraumatic. Mouth/Throat:      Mouth: Mucous membranes are moist.   Eyes:      Extraocular Movements: Extraocular movements intact. Pupils: Pupils are equal, round, and reactive to light. Neck:      Musculoskeletal: Neck supple. Cardiovascular:      Rate and Rhythm: Normal rate and regular rhythm. Heart sounds: Normal heart sounds.    Pulmonary:      Effort: Pulmonary effort is normal. No respiratory distress. Breath sounds: Normal breath sounds. Chest:      Chest wall: Tenderness (and ecchymosis right lower ribs, no flail chest or crepitance) present. Abdominal:      General: Abdomen is flat. Tenderness: There is no abdominal tenderness. Musculoskeletal: Normal range of motion. Skin:     General: Skin is warm and dry. Neurological:      Mental Status: She is alert and oriented to person, place, and time. Psychiatric:         Behavior: Behavior normal.            DIAGNOSTIC RESULTS     EK-lead EKG interpreted as sinus bradycardia with a rate of 56 bpm. No ST elevation or depression on my review. Please see Dr. Calvin Wong note for full interpretation. When compared with EKG dated 20, no significant change found. RADIOLOGY:   Non-plain film images such as CT, Ultrasound and MRI are read by the radiologist.Plain radiographic images are visualized and preliminarily interpreted by Vitaly Painter PA-C with the below findings:        Interpretation per the Radiologist below, if available at the time of this note:    XR RIBS RIGHT INCLUDE CHEST (MIN 3 VIEWS)   Preliminary Result   Improved aeration of the lungs with some residual interstitial   disease/probable fibrosis noted. No acute osseous injury of the ribs.              LABS:  Labs Reviewed   COMPREHENSIVE METABOLIC PANEL W/ REFLEX TO MG FOR LOW K - Abnormal; Notable for the following components:       Result Value    Potassium reflex Magnesium 3.0 (*)     Chloride 98 (*)     Glucose 118 (*)     All other components within normal limits    Narrative:     Performed at:  [address] [address]   Phone [phone]   IRON AND TIBC - Abnormal; Notable for the following components:    Iron 25 (*)     Iron Saturation 7 (*)     All other components within normal limits    Narrative:     Performed at:  [facility]  [address] [place] [address]   Phone [phone]   CBC WITH AUTO DIFFERENTIAL - Abnormal; Notable for the following components:    RBC 3.98 (*)     Hemoglobin 9.8 (*)     Hematocrit 31.3 (*)     MCV 78.8 (*)     MCH 24.8 (*)     RDW 20.1 (*)     All other components within normal limits    Narrative:     Performed at:  [facility]  [address] [address]   Phone [phone]   TROPONIN    Narrative:     Performed at:  [facility]  [address] [address]   Phone [phone]   HEMOGLOBIN A1C   FERRITIN   MAGNESIUM       All other labs were within normal range or not returned as of this dictation. EMERGENCY DEPARTMENT COURSE and DIFFERENTIAL DIAGNOSIS/MDM:   Vitals:    Vitals:    09/05/20 1218 09/05/20 1219 09/05/20 1228 09/05/20 1243   BP: 133/62   (!) 116/58   Pulse: 66   67   Resp: 16   16   Temp: 98.2 °F (36.8 °C)      TempSrc: Oral      SpO2: (!) 84% 100% 100%    Weight: 125 lb (56.7 kg)      Height: 5' (1.524 m)           I have evaluated this patient. My supervising physician was available for consultation. The patient was noted to be hypoxic on arrival however is supposed to be on oxygen at all times. They state her oxygen is in the car. She was then placed on our oxygen with improvement. She denies any shortness of breath. X-ray of the chest and ribs showed no acute fracture. There is no pneumonia. The patient's potassium is low at 3.0 and this was replaced orally here. She was made aware of this and the need for follow-up with primary care. Renal function was normal, she was anemic but actually above baseline. Due to possible dizziness associated with the fall, an EKG and troponin were obtained and these were unremarkable. The patient was reassured. She was discharged in stable condition.     Discussed results, diagnosis and plan with patient

## 2020-09-05 NOTE — ED NOTES
Reviewed dc instructions all questions answered; no apparent distress noted       Axel Izaguirre RN  09/05/20 0229

## 2020-09-05 NOTE — ED PROVIDER NOTES
I was available for consultation during patient's ED stay. Patient was cared for by JB. I did not evaluate or participate in patient care.     The Ekg interpreted by me shows  Sinus bradycardia with a rate of 56, normal axis, , QRS 76, QTc 430, no ST elevations, T wave inversions in the inferior leads unchanged from prior, T wave inversions anterior lateral leads unchanged from prior, no acute change compared EKG from 7/12/2020      Fiona Waddell MD  [facility]  9/5/20  1:08 PM EDT          Fiona Waddell MD  09/05/20 0755

## 2020-10-20 ENCOUNTER — HOSPITAL ENCOUNTER (OUTPATIENT)
Dept: GENERAL RADIOLOGY | Age: 65
Discharge: HOME | End: 2020-10-20
Payer: MEDICARE

## 2020-10-20 ENCOUNTER — HOSPITAL ENCOUNTER (OUTPATIENT)
Age: 65
Discharge: HOME | End: 2020-10-20
Payer: MEDICARE

## 2020-10-20 LAB
BILIRUBIN URINE: NEGATIVE
BLOOD, URINE: NEGATIVE
CLARITY: CLEAR
COLOR: YELLOW
GLUCOSE URINE: >=1000 MG/DL
KETONES, URINE: NEGATIVE MG/DL
LEUKOCYTE ESTERASE, URINE: NEGATIVE
MICROSCOPIC EXAMINATION: ABNORMAL
NITRITE, URINE: NEGATIVE
PH UA: 6 (ref 5–8)
PROTEIN UA: NEGATIVE MG/DL
SPECIFIC GRAVITY UA: 1.02 (ref 1–1.03)
URINE TYPE: ABNORMAL
UROBILINOGEN, URINE: 0.2 E.U./DL

## 2020-10-20 PROCEDURE — 87086 URINE CULTURE/COLONY COUNT: CPT

## 2020-10-20 PROCEDURE — 81003 URINALYSIS AUTO W/O SCOPE: CPT

## 2020-10-20 PROCEDURE — 71046 X-RAY EXAM CHEST 2 VIEWS: CPT

## 2020-10-22 PROBLEM — D50.9 IRON DEFICIENCY ANEMIA, UNSPECIFIED: Status: ACTIVE | Noted: 2020-10-22

## 2020-10-22 PROBLEM — K90.9 INTESTINAL MALABSORPTION: Status: ACTIVE | Noted: 2020-10-22

## 2020-10-22 LAB — URINE CULTURE, ROUTINE: NORMAL

## 2020-10-22 RX ORDER — SODIUM CHLORIDE 0.9 % (FLUSH) 0.9 %
10 SYRINGE (ML) INJECTION PRN
Status: CANCELLED | OUTPATIENT
Start: 2020-10-29

## 2020-10-22 RX ORDER — EPINEPHRINE 1 MG/ML
0.3 INJECTION, SOLUTION, CONCENTRATE INTRAVENOUS PRN
Status: CANCELLED | OUTPATIENT
Start: 2020-10-29

## 2020-10-22 RX ORDER — SODIUM CHLORIDE 9 MG/ML
INJECTION, SOLUTION INTRAVENOUS CONTINUOUS
Status: CANCELLED | OUTPATIENT
Start: 2020-10-29

## 2020-10-22 RX ORDER — DIPHENHYDRAMINE HYDROCHLORIDE 50 MG/ML
50 INJECTION INTRAMUSCULAR; INTRAVENOUS ONCE
Status: CANCELLED | OUTPATIENT
Start: 2020-10-29

## 2020-10-22 RX ORDER — METHYLPREDNISOLONE SODIUM SUCCINATE 125 MG/2ML
125 INJECTION, POWDER, LYOPHILIZED, FOR SOLUTION INTRAMUSCULAR; INTRAVENOUS ONCE
Status: CANCELLED | OUTPATIENT
Start: 2020-10-29

## 2020-10-27 ENCOUNTER — HOSPITAL ENCOUNTER (OUTPATIENT)
Dept: INFUSION THERAPY | Age: 65
Setting detail: INFUSION SERIES
Discharge: HOME | End: 2020-10-27
Payer: MEDICARE

## 2020-10-27 ENCOUNTER — OFFICE VISIT (OUTPATIENT)
Dept: PULMONOLOGY | Age: 65
End: 2020-10-27
Payer: MEDICARE

## 2020-10-27 VITALS
TEMPERATURE: 98.1 F | HEART RATE: 69 BPM | OXYGEN SATURATION: 92 % | HEIGHT: 60 IN | SYSTOLIC BLOOD PRESSURE: 113 MMHG | WEIGHT: 136.8 LBS | BODY MASS INDEX: 26.86 KG/M2 | DIASTOLIC BLOOD PRESSURE: 65 MMHG | RESPIRATION RATE: 16 BRPM

## 2020-10-27 VITALS
TEMPERATURE: 97.8 F | HEART RATE: 71 BPM | RESPIRATION RATE: 16 BRPM | SYSTOLIC BLOOD PRESSURE: 111 MMHG | DIASTOLIC BLOOD PRESSURE: 71 MMHG | OXYGEN SATURATION: 92 %

## 2020-10-27 DIAGNOSIS — K90.9 INTESTINAL MALABSORPTION, UNSPECIFIED TYPE: ICD-10-CM

## 2020-10-27 DIAGNOSIS — D50.9 IRON DEFICIENCY ANEMIA, UNSPECIFIED IRON DEFICIENCY ANEMIA TYPE: Primary | ICD-10-CM

## 2020-10-27 PROCEDURE — 6360000002 HC RX W HCPCS: Performed by: INTERNAL MEDICINE

## 2020-10-27 PROCEDURE — 1090F PRES/ABSN URINE INCON ASSESS: CPT | Performed by: INTERNAL MEDICINE

## 2020-10-27 PROCEDURE — 4040F PNEUMOC VAC/ADMIN/RCVD: CPT | Performed by: INTERNAL MEDICINE

## 2020-10-27 PROCEDURE — G8417 CALC BMI ABV UP PARAM F/U: HCPCS | Performed by: INTERNAL MEDICINE

## 2020-10-27 PROCEDURE — 1123F ACP DISCUSS/DSCN MKR DOCD: CPT | Performed by: INTERNAL MEDICINE

## 2020-10-27 PROCEDURE — 99214 OFFICE O/P EST MOD 30 MIN: CPT | Performed by: INTERNAL MEDICINE

## 2020-10-27 PROCEDURE — G8484 FLU IMMUNIZE NO ADMIN: HCPCS | Performed by: INTERNAL MEDICINE

## 2020-10-27 PROCEDURE — 2580000003 HC RX 258: Performed by: INTERNAL MEDICINE

## 2020-10-27 PROCEDURE — 96365 THER/PROPH/DIAG IV INF INIT: CPT

## 2020-10-27 PROCEDURE — G8427 DOCREV CUR MEDS BY ELIG CLIN: HCPCS | Performed by: INTERNAL MEDICINE

## 2020-10-27 PROCEDURE — 1036F TOBACCO NON-USER: CPT | Performed by: INTERNAL MEDICINE

## 2020-10-27 PROCEDURE — G8400 PT W/DXA NO RESULTS DOC: HCPCS | Performed by: INTERNAL MEDICINE

## 2020-10-27 PROCEDURE — 3017F COLORECTAL CA SCREEN DOC REV: CPT | Performed by: INTERNAL MEDICINE

## 2020-10-27 RX ORDER — METHYLPREDNISOLONE SODIUM SUCCINATE 125 MG/2ML
125 INJECTION, POWDER, LYOPHILIZED, FOR SOLUTION INTRAMUSCULAR; INTRAVENOUS ONCE
Status: CANCELLED | OUTPATIENT
Start: 2020-11-03

## 2020-10-27 RX ORDER — EPINEPHRINE 1 MG/ML
0.3 INJECTION, SOLUTION, CONCENTRATE INTRAVENOUS PRN
Status: CANCELLED | OUTPATIENT
Start: 2020-11-03

## 2020-10-27 RX ORDER — SODIUM CHLORIDE 0.9 % (FLUSH) 0.9 %
10 SYRINGE (ML) INJECTION PRN
Status: CANCELLED | OUTPATIENT
Start: 2020-11-03

## 2020-10-27 RX ORDER — SODIUM CHLORIDE 0.9 % (FLUSH) 0.9 %
10 SYRINGE (ML) INJECTION PRN
Status: DISCONTINUED | OUTPATIENT
Start: 2020-10-27 | End: 2020-10-28 | Stop reason: HOSPADM

## 2020-10-27 RX ORDER — SODIUM CHLORIDE 9 MG/ML
INJECTION, SOLUTION INTRAVENOUS CONTINUOUS
Status: CANCELLED | OUTPATIENT
Start: 2020-11-03

## 2020-10-27 RX ORDER — SODIUM CHLORIDE 9 MG/ML
INJECTION, SOLUTION INTRAVENOUS CONTINUOUS
Status: ACTIVE | OUTPATIENT
Start: 2020-10-27 | End: 2020-10-27

## 2020-10-27 RX ORDER — DIPHENHYDRAMINE HYDROCHLORIDE 50 MG/ML
50 INJECTION INTRAMUSCULAR; INTRAVENOUS ONCE
Status: CANCELLED | OUTPATIENT
Start: 2020-11-03

## 2020-10-27 RX ADMIN — FERRIC CARBOXYMALTOSE INJECTION 750 MG: 50 INJECTION, SOLUTION INTRAVENOUS at 11:46

## 2020-10-27 RX ADMIN — Medication 10 ML: at 11:37

## 2020-10-27 NOTE — PROGRESS NOTES
Outpatient [address] VISIT    NAME:  Armen Jefferson  YOB: 1955  MEDICAL RECORD NUMBER:  [id number]  EPISODE DATE:  10/27/2020    Patient arrived to [facility]   [] per wheelchair   [x] ambulatory     Has the patient had a previous problem with Injectafer or Iron Infusions? No  Any current infection or illness? No   Patient on antibiotics? No   History of Hypertension? Yes     Patient has some short term memory loss and unable to review her medications. She just came from Dr. Roberta Stevenson office and meds reviewed over there. There is a male friend with her who helps to care for her but he is not familiar with her medications. Patient states she has been tired lately Tires easily with regular activity. Some lightheadedness at times. Denies any SOB or chest pain. Patient was hospitalized in July this year with COVID pneumonia and was ventilated at that time. They have a home health Nurse named Joellen Mora who puts her medicine out for her a daily container. Is the patient experiencing any:  Fatigue:   []   None  []   Increase over baseline but not altering normal activities  [x]   Moderate of causing difficulty performing some activities  []   Severe or loss of ability to perform some activities  []   Bedridden or disabling     Dizziness or Lightheadedness: Some lightheadedness at times  []   None  [x]   No Interference  []   Interferes with functioning but not activities of daily living  []   Interferes with daily activies  []   Bedridden or disabling     Shortness of Breath:    []   None   [x]   Dyspneic on exertion   []   Dyspnea with normal activities  []   Dyspnea at rest    Edema: none Location of edema: nothing    Tachycardia: No    Heart Palpitations: No      Chest Pain: No      /71   Pulse 71   Temp 97.8 °F (36.6 °C) (Oral)   Resp 16   SpO2 92%   No data found.     Current Lab Data:  Hemoglobin/Hematocrit:    Lab Results Component Value Date    HGB 8.9 10/26/2020    HCT 29.4 10/26/2020     IRON:    Lab Results   Component Value Date    IRON 25 09/05/2020     Iron Saturation:    Lab Results   Component Value Date    LABIRON 7 09/05/2020     TIBC:    Lab Results   Component Value Date    TIBC 381 09/05/2020     FERRITIN:    Lab Results   Component Value Date    FERRITIN 26.8 09/05/2020       Dose Administered: 750 mg  Todays dose is number 1 out of2 ordered for this patient. Response to treatment:  Well tolerated by patient. Denies any headache, dizziness, itching or discomfort. Education:    Needs reinforcement    Scheduled to return for next dose of Injectafer on November 4, 2020.      Electronically signed by Cheyenne Almendarez RN on 10/27/2020 at 5:44 PM.

## 2020-10-27 NOTE — PROGRESS NOTES
Chief complaint  This is a 77y.o. year old female  who comes to see me with a chief complaint of   Chief Complaint   Patient presents with    New Patient     ref by Dr. Og Enriquez for hypoxia        HPI  Here with a cc of hypoxia    She is technically not new as she was seen by Drs Esther Nyhan and Donato Amaya back in July    She had respiratory failure due to COVID-19. She was discharged to rehab with oxygen. She uses oxygen 2-3 liters with exertion and sleep. Although she is not using it with sleep as much as before. She arrived with saturations of 87% on room air, but did increase to 92% on recheck after being sedated for 20+ minutes. She does not say much. Her living partner did most of the talking. It seems that she is getting better. Has a visiting RN that says she is getting better. Completed home PT. She downplays any significant SOB    Flu shot up to date     She is seen together with Sarath Schafer who is also being seen today     Past Medical History:   Diagnosis Date    Diabetes mellitus ([facility])     NIDDM    Headache(784.0)     Herniated disc, cervical     Hypercholesteremia     Hypertension     Intestinal malabsorption 10/22/2020    Iron deficiency anemia, unspecified 10/22/2020    Memory loss     short term    Pneumonia due to 2019 novel coronavirus     Psychiatric problem     Type 2 diabetes mellitus without complication ([facility])        Past Surgical History:   Procedure Laterality Date    SHOULDER SURGERY Left     TUBAL LIGATION         Social History     Socioeconomic History    Marital status:       Spouse name: Not on file    Number of children: 3    Years of education: Not on file    Highest education level: Not on file   Occupational History    Occupation: retired   Social Needs    Financial resource strain: Not very hard    Food insecurity     Worry: Never true     Inability: Never true    Transportation needs     Medical: No     Non-medical: No   Tobacco Use    Smoking status: Never Smoker    Smokeless tobacco: Never Used   Substance and Sexual Activity    Alcohol use: Not Currently    Drug use: No    Sexual activity: Yes     Partners: Male   Lifestyle    Physical activity     Days per week: Not on file     Minutes per session: Not on file    Stress: Not on file   Relationships    Social connections     Talks on phone: Not on file     Gets together: Not on file     Attends Congregational service: Not on file     Active member of club or organization: Not on file     Attends meetings of clubs or organizations: Not on file     Relationship status: Not on file    Intimate partner violence     Fear of current or ex partner: Not on file     Emotionally abused: Not on file     Physically abused: Not on file     Forced sexual activity: Not on file   Other Topics Concern    Not on file   Social History Narrative    Lives with        Family History   Problem Relation Age of Onset    Diabetes Mother     High Blood Pressure Mother     Stroke Mother     Diabetes Father     High Blood Pressure Father     Stroke Father          Current Outpatient Medications:     traZODone (DESYREL) 100 MG tablet, TAKE ONE TABLET BY MOUTH DAILY AS NEEDED FOR SLEEP, Disp: 90 tablet, Rfl: 0    atorvastatin (LIPITOR) 40 MG tablet, Take 1 tablet by mouth daily For cholesterol and heart, Disp: 90 tablet, Rfl: 0    amLODIPine (NORVASC) 5 MG tablet, Take 1 tablet by mouth daily, Disp: 90 tablet, Rfl: 0    losartan-hydroCHLOROthiazide (HYZAAR) 100-25 MG per tablet, Take 1 tablet by mouth daily, Disp: 90 tablet, Rfl: 0    metFORMIN (GLUCOPHAGE) 1000 MG tablet, Take 1 tablet by mouth daily (with breakfast) For diabetes, Disp: 30 tablet, Rfl: 0    empagliflozin (JARDIANCE) 25 MG tablet, Take 25 mg by mouth daily, Disp: 90 tablet, Rfl: 0    insulin glargine (LANTUS SOLOSTAR) 100 UNIT/ML injection pen, Inject 20 Units into the skin 2 times daily, Disp: 90 mL, Rfl: 3    vitamin D (ERGOCALCIFEROL) 1.25 MG (38871 UT) CAPS capsule, Take 1 capsule by mouth once a week, Disp: 12 capsule, Rfl: 3    gabapentin (NEURONTIN) 300 MG capsule, Take 1 capsule by mouth 4 times daily. For nerve pain related to diabetes, Disp: 360 capsule, Rfl: 3    aspirin 325 MG tablet, Take 1 tablet by mouth daily (Patient not taking: Reported on 10/27/2020), Disp: 30 tablet, Rfl: 3      ROS: She does not speak much, her partner Cristino Munoz provided most of the information   GENERAL:  No fevers, chills, or night sweats  HEENT:  No double vision, blurry vision, or difficulty swallowing  HEART:  No chest pain, no palpitations  LUNGS:  Improving SOB, low oxygen levels at times   ABDOMEN:  No abdominal pains, no changes in stools  GENITOURINARY:  No increased frequency, no blood in urine  EXTREMITIES:  No swelling, no lesions  NEURO:  No numbness or tingling, no seizures  SKIN:  No new rashes, no changes in hair or nails  LYMPH:  No masses, no swelling neck, armpits, or groin    PHYSICAL EXAM:  Vitals:    10/27/20 0934   BP:    Pulse: 69   Resp:    Temp:    SpO2: 92%       GENERAL:  Mild confusion, limited verbal   HEENT:  No scleral icterus, no conjunctival irritation  NECK:  No thyromegaly, no bruits  LYMPH:  No cervical or supraclavicular adenopathy  HEART:  Regular rate and rhythm, no murmurs  LUNGS:  Faint crackles bilaterally  ABDOMEN:  No distention, no organomegaly  EXTREMITIES:  No edema, no digital clubbing  NEURO:  No localizing deficits, CN II-XII intact    Pulmonary Function Testing  None    Chest imaging from 10/2020 is reviewed. My interpretation is bilateral airspace disease, worse in the left lung.  Looks to be improving from previous CXR     ECHO 2016  EF 55-60%  Lab Results   Component Value Date    WBC 7.2 10/26/2020    HGB 8.9 (L) 10/26/2020    HCT 29.4 (L) 10/26/2020    MCV 70.7 (L) 10/26/2020     10/26/2020       No results found for: BNP    Lab Results   Component Value Date    CREATININE 0.8 10/15/2020    BUN 13 10/15/2020     10/15/2020    K 4.6 10/15/2020    CL 97 (L) 10/15/2020    CO2 27 10/15/2020       I reviewed above labs and studies pertinent to this visit and date    Assessment/Plan:  1. Acute respiratory failure with hypoxia (HCC)  On the long, slow path to recovery. Imaging has been improving and need for oxygen is related to her COVID-19 pneumonia. I suspect she will get back to normal but there are some people who will have permanent damage. She needs to focus on keeping oxygen levels >88% and needs to use oxygen every night with sleep. If she consistently is measuring in the 90's% off oxygen, the oxygen can be discontinued. Continue to exercise and do PT at home     2. Pneumonia due to COVID-19 virus  Back in July. Imaging is slowly improving. 3. Abnormal CXR  Residual airspace disease from [facility]. Getting better.   Hopefully will normalize in time    Follow up in 4 months     Flu shot up to date

## 2020-10-27 NOTE — PATIENT INSTRUCTIONS
Use oxygen with exertion and sleep. Try to keep oxygen levels above 88%    Stay active.       Follow up in 4 months

## 2020-10-27 NOTE — Clinical Note
Jake Vinson,    Thanks for referral.  She was seen by my partners while sick with COVID back in July. Seems like she is slowly getting better. Needs to keep using oxygen with exertion and sleep. Once oxygen levels consistently in the 90's oxygen can be discontinued. Hopefully they will improve with time.       Thanks  Eri Acuna

## 2020-11-04 ENCOUNTER — OFFICE VISIT (OUTPATIENT)
Dept: PRIMARY CARE CLINIC | Age: 65
End: 2020-11-04
Payer: MEDICARE

## 2020-11-04 ENCOUNTER — HOSPITAL ENCOUNTER (OUTPATIENT)
Dept: INFUSION THERAPY | Age: 65
Setting detail: INFUSION SERIES
Discharge: HOME | End: 2020-11-04
Payer: MEDICARE

## 2020-11-04 VITALS
OXYGEN SATURATION: 98 % | DIASTOLIC BLOOD PRESSURE: 67 MMHG | SYSTOLIC BLOOD PRESSURE: 111 MMHG | HEART RATE: 68 BPM | TEMPERATURE: 98.4 F | RESPIRATION RATE: 18 BRPM

## 2020-11-04 DIAGNOSIS — D50.9 IRON DEFICIENCY ANEMIA, UNSPECIFIED IRON DEFICIENCY ANEMIA TYPE: Primary | ICD-10-CM

## 2020-11-04 DIAGNOSIS — K90.9 INTESTINAL MALABSORPTION, UNSPECIFIED TYPE: ICD-10-CM

## 2020-11-04 PROCEDURE — 99212 OFFICE O/P EST SF 10 MIN: CPT | Performed by: NURSE PRACTITIONER

## 2020-11-04 PROCEDURE — 96365 THER/PROPH/DIAG IV INF INIT: CPT

## 2020-11-04 PROCEDURE — 2580000003 HC RX 258: Performed by: INTERNAL MEDICINE

## 2020-11-04 PROCEDURE — 6360000002 HC RX W HCPCS: Performed by: INTERNAL MEDICINE

## 2020-11-04 PROCEDURE — 96366 THER/PROPH/DIAG IV INF ADDON: CPT

## 2020-11-04 RX ORDER — SODIUM CHLORIDE 0.9 % (FLUSH) 0.9 %
10 SYRINGE (ML) INJECTION PRN
Status: CANCELLED | OUTPATIENT
Start: 2020-11-04

## 2020-11-04 RX ORDER — SODIUM CHLORIDE 0.9 % (FLUSH) 0.9 %
10 SYRINGE (ML) INJECTION PRN
Status: DISCONTINUED | OUTPATIENT
Start: 2020-11-04 | End: 2020-11-04 | Stop reason: ALTCHOICE

## 2020-11-04 RX ORDER — EPINEPHRINE 1 MG/ML
0.3 INJECTION, SOLUTION, CONCENTRATE INTRAVENOUS PRN
Status: CANCELLED | OUTPATIENT
Start: 2020-11-04

## 2020-11-04 RX ORDER — METHYLPREDNISOLONE SODIUM SUCCINATE 125 MG/2ML
125 INJECTION, POWDER, LYOPHILIZED, FOR SOLUTION INTRAMUSCULAR; INTRAVENOUS ONCE
Status: CANCELLED | OUTPATIENT
Start: 2020-11-04

## 2020-11-04 RX ORDER — DIPHENHYDRAMINE HYDROCHLORIDE 50 MG/ML
50 INJECTION INTRAMUSCULAR; INTRAVENOUS ONCE
Status: CANCELLED | OUTPATIENT
Start: 2020-11-04

## 2020-11-04 RX ORDER — SODIUM CHLORIDE 9 MG/ML
INJECTION, SOLUTION INTRAVENOUS CONTINUOUS
Status: CANCELLED | OUTPATIENT
Start: 2020-11-04

## 2020-11-04 RX ADMIN — FERRIC CARBOXYMALTOSE INJECTION 750 MG: 50 INJECTION, SOLUTION INTRAVENOUS at 13:26

## 2020-11-04 RX ADMIN — Medication 10 ML: at 13:26

## 2020-11-04 RX ADMIN — Medication 10 ML: at 15:05

## 2020-11-04 RX ADMIN — Medication 20 ML: at 15:24

## 2020-11-04 ASSESSMENT — PAIN SCALES - GENERAL
PAINLEVEL_OUTOF10: 0

## 2020-11-04 NOTE — PROGRESS NOTES
Outpatient [address] VISIT    NAME:  Qi Wooten  YOB: 1955  MEDICAL RECORD NUMBER:  [id number]  EPISODE DATE:  11/4/2020    Patient arrived to [place] [address]   [] per wheelchair   [x] ambulatory     Patient to Outpatient Infusion center for second Injectafer Infusion. Patient states that she had a Covid-19 test today just prior to visit today. Patient unsure why she had test but states that she had been Covid-19 positive in July, 2020. A call was placed to Danielito López RN and she reported that patient is a poor historian and that the reason she had Covid-19 test today was because she is scheduled for Pulmonary Function tests next week. Has the patient had a previous problem with Injectafer or Iron Infusions? No - patient denies any problems with her last infusion. Any current infection or illness? No - Patient had Covid-19 test today due to being scheduled for PFT next week. Patient denies symptoms of Covid-19 other than fatigue and shortness of breath. She reports that this is due to [facility] blood\". Patient on antibiotics? No     History of Hypertension?  Yes  - reports taking antihypertensives as prescribed    Is the patient experiencing any:  Fatigue:   []   None  []   Increase over baseline but not altering normal activities  [x]   Moderate of causing difficulty performing some activities - patient states that she is able to perform all of her own ADL's but that she needs to take frequent rest periods  []   Severe or loss of ability to perform some activities  []   Bedridden or disabling     Dizziness or Lightheadedness:   []   None  []   No Interference  [x]   Interferes with functioning but not activities of daily living - patient states that she gets lightheaded when she \"moves around too quickly\"  []   Interferes with daily activies  []   Bedridden or disabling     Shortness of Breath:   []   None   []   Dyspneic on exertion   [x]   Dyspnea with normal activities - when performing ADL's, when walking moderate distances  []   Dyspnea at rest    Edema: None noted    Tachycardia: No - pt. denies    Heart Palpitations: No - pt. denies     Chest Pain: No - pt. denies     /63   Pulse 74   Temp 98.2 °F (36.8 °C) (Temporal)   Resp 18   SpO2 96%   Patient Vitals for the past 2 hrs:   BP Temp Temp src Pulse Resp SpO2   11/04/20 1313 104/63 98.2 °F (36.8 °C) Temporal 74 18 96 %       Current Lab Data:  Hemoglobin/Hematocrit:    Lab Results   Component Value Date    HGB 8.9 10/26/2020    HCT 29.4 10/26/2020     IRON:    Lab Results   Component Value Date    IRON 25 09/05/2020     Iron Saturation:    Lab Results   Component Value Date    LABIRON 7 09/05/2020     TIBC:    Lab Results   Component Value Date    TIBC 381 09/05/2020     FERRITIN:    Lab Results   Component Value Date    FERRITIN 26.8 09/05/2020       Dose Administered: 750 mg  Todays dose is number 2 out of 2 ordered for this patient. Response to treatment:  Patient had slight nausea and mild headache after 15 minutes of infusion. Infusion rate was decreased and patient tolerated remaining infusion without any further complaints. Education:    Verbal and written discharge instructions reviewed with patient and . Both verbalized understanding. Written copy given via AVS    Patient and  aware to schedule follow up appointment with Dr Sergey Cavanaugh.      Electronically signed by Елена Pickard RN on 11/4/2020 at 1:51 PM

## 2020-11-04 NOTE — PROGRESS NOTES
Patient presented to [facility] drive up clinic for preop testing. Patient was swabbed and given information advising them to remain isolated until procedure date.

## 2020-11-05 LAB — SARS-COV-2: NOT DETECTED

## 2020-11-09 ENCOUNTER — HOSPITAL ENCOUNTER (OUTPATIENT)
Dept: PULMONOLOGY | Age: 65
Discharge: HOME | End: 2020-11-09
Payer: MEDICARE

## 2020-11-09 NOTE — PROGRESS NOTES
Pt unble to complete FVC and wishes to reschedule when feeling better.  This therapist left message for Mary Emerson, ordering provider

## 2020-12-04 ENCOUNTER — OFFICE VISIT (OUTPATIENT)
Dept: PRIMARY CARE CLINIC | Age: 65
End: 2020-12-04
Payer: MEDICARE

## 2020-12-04 PROCEDURE — G8417 CALC BMI ABV UP PARAM F/U: HCPCS | Performed by: NURSE PRACTITIONER

## 2020-12-04 PROCEDURE — 99211 OFF/OP EST MAY X REQ PHY/QHP: CPT | Performed by: NURSE PRACTITIONER

## 2020-12-04 PROCEDURE — G8428 CUR MEDS NOT DOCUMENT: HCPCS | Performed by: NURSE PRACTITIONER

## 2020-12-05 LAB — SARS-COV-2: NOT DETECTED

## 2020-12-15 ENCOUNTER — HOSPITAL ENCOUNTER (OUTPATIENT)
Dept: WOMENS IMAGING | Age: 65
Discharge: HOME | End: 2020-12-15
Payer: MEDICARE

## 2020-12-15 PROCEDURE — 77080 DXA BONE DENSITY AXIAL: CPT

## 2021-01-04 ENCOUNTER — HOSPITAL ENCOUNTER (OUTPATIENT)
Dept: MRI IMAGING | Age: 66
Discharge: HOME | End: 2021-01-04
Payer: MEDICARE

## 2021-01-04 DIAGNOSIS — G31.84 MILD COGNITIVE IMPAIRMENT: ICD-10-CM

## 2021-01-04 PROCEDURE — 70551 MRI BRAIN STEM W/O DYE: CPT

## 2021-01-05 ENCOUNTER — HOSPITAL ENCOUNTER (OUTPATIENT)
Dept: WOMENS IMAGING | Age: 66
Discharge: HOME | End: 2021-01-05
Payer: MEDICARE

## 2021-01-05 DIAGNOSIS — Z12.31 ENCOUNTER FOR SCREENING MAMMOGRAM FOR BREAST CANCER: ICD-10-CM

## 2021-01-05 PROCEDURE — 77063 BREAST TOMOSYNTHESIS BI: CPT

## 2021-01-20 DIAGNOSIS — E11.9 TYPE 2 DIABETES MELLITUS WITHOUT COMPLICATION, WITH LONG-TERM CURRENT USE OF INSULIN (HCC): ICD-10-CM

## 2021-01-20 DIAGNOSIS — E78.00 HYPERCHOLESTEREMIA: ICD-10-CM

## 2021-01-20 DIAGNOSIS — I10 ESSENTIAL HYPERTENSION: ICD-10-CM

## 2021-01-20 DIAGNOSIS — Z79.4 TYPE 2 DIABETES MELLITUS WITHOUT COMPLICATION, WITH LONG-TERM CURRENT USE OF INSULIN (HCC): ICD-10-CM

## 2021-01-20 PROBLEM — M85.80 OSTEOPENIA: Status: ACTIVE | Noted: 2021-01-20

## 2021-01-20 PROBLEM — D64.9 ANEMIA: Status: ACTIVE | Noted: 2021-01-20

## 2021-01-20 LAB
A/G RATIO: 1.3 (ref 1.1–2.2)
ALBUMIN SERPL-MCNC: 4.5 G/DL (ref 3.4–5)
ALP BLD-CCNC: 66 U/L (ref 40–129)
ALT SERPL-CCNC: 23 U/L (ref 10–40)
ANION GAP SERPL CALCULATED.3IONS-SCNC: 13 MMOL/L (ref 3–16)
AST SERPL-CCNC: 27 U/L (ref 15–37)
BASOPHILS ABSOLUTE: 0.1 K/UL (ref 0–0.2)
BASOPHILS RELATIVE PERCENT: 0.8 %
BILIRUB SERPL-MCNC: 0.3 MG/DL (ref 0–1)
BUN BLDV-MCNC: 12 MG/DL (ref 7–20)
CALCIUM SERPL-MCNC: 11.2 MG/DL (ref 8.3–10.6)
CHLORIDE BLD-SCNC: 98 MMOL/L (ref 99–110)
CO2: 29 MMOL/L (ref 21–32)
CREAT SERPL-MCNC: 0.7 MG/DL (ref 0.6–1.2)
EOSINOPHILS ABSOLUTE: 0.4 K/UL (ref 0–0.6)
EOSINOPHILS RELATIVE PERCENT: 4.4 %
GFR AFRICAN AMERICAN: >60
GFR NON-AFRICAN AMERICAN: >60
GLOBULIN: 3.4 G/DL
GLUCOSE BLD-MCNC: 127 MG/DL (ref 70–99)
HCT VFR BLD CALC: 44.8 % (ref 36–48)
HEMOGLOBIN: 14.5 G/DL (ref 12–16)
LYMPHOCYTES ABSOLUTE: 2.4 K/UL (ref 1–5.1)
LYMPHOCYTES RELATIVE PERCENT: 24.9 %
MCH RBC QN AUTO: 28.6 PG (ref 26–34)
MCHC RBC AUTO-ENTMCNC: 32.3 G/DL (ref 31–36)
MCV RBC AUTO: 88.5 FL (ref 80–100)
MONOCYTES ABSOLUTE: 0.8 K/UL (ref 0–1.3)
MONOCYTES RELATIVE PERCENT: 8.7 %
NEUTROPHILS ABSOLUTE: 5.9 K/UL (ref 1.7–7.7)
NEUTROPHILS RELATIVE PERCENT: 61.2 %
PDW BLD-RTO: 18.2 % (ref 12.4–15.4)
PLATELET # BLD: 244 K/UL (ref 135–450)
PMV BLD AUTO: 9.4 FL (ref 5–10.5)
POTASSIUM SERPL-SCNC: 4.2 MMOL/L (ref 3.5–5.1)
RBC # BLD: 5.06 M/UL (ref 4–5.2)
SODIUM BLD-SCNC: 140 MMOL/L (ref 136–145)
TOTAL PROTEIN: 7.9 G/DL (ref 6.4–8.2)
WBC # BLD: 9.6 K/UL (ref 4–11)

## 2021-01-21 DIAGNOSIS — K90.9 INTESTINAL MALABSORPTION, UNSPECIFIED TYPE: ICD-10-CM

## 2021-01-21 LAB
ESTIMATED AVERAGE GLUCOSE: 208.7 MG/DL
HBA1C MFR BLD: 8.9 %
PARATHYROID HORMONE INTACT: 12.3 PG/ML (ref 14–72)
VITAMIN D 25-HYDROXY: 45.9 NG/ML

## 2021-02-03 ENCOUNTER — HOSPITAL ENCOUNTER (EMERGENCY)
Age: 66
Discharge: HOME | End: 2021-02-03
Attending: EMERGENCY MEDICINE
Payer: MEDICARE

## 2021-02-03 ENCOUNTER — APPOINTMENT (OUTPATIENT)
Dept: CT IMAGING | Age: 66
End: 2021-02-03
Payer: MEDICARE

## 2021-02-03 ENCOUNTER — APPOINTMENT (OUTPATIENT)
Dept: GENERAL RADIOLOGY | Age: 66
End: 2021-02-03
Payer: MEDICARE

## 2021-02-03 VITALS
DIASTOLIC BLOOD PRESSURE: 61 MMHG | SYSTOLIC BLOOD PRESSURE: 140 MMHG | TEMPERATURE: 98.7 F | RESPIRATION RATE: 23 BRPM | HEIGHT: 60 IN | OXYGEN SATURATION: 96 % | WEIGHT: 141.31 LBS | BODY MASS INDEX: 27.74 KG/M2 | HEART RATE: 68 BPM

## 2021-02-03 DIAGNOSIS — K85.90 ACUTE PANCREATITIS WITHOUT INFECTION OR NECROSIS, UNSPECIFIED PANCREATITIS TYPE: Primary | ICD-10-CM

## 2021-02-03 LAB
A/G RATIO: 1.1 (ref 1.1–2.2)
ALBUMIN SERPL-MCNC: 4.4 G/DL (ref 3.4–5)
ALP BLD-CCNC: 75 U/L (ref 40–129)
ALT SERPL-CCNC: 38 U/L (ref 10–40)
ANION GAP SERPL CALCULATED.3IONS-SCNC: 12 MMOL/L (ref 3–16)
AST SERPL-CCNC: 76 U/L (ref 15–37)
BASOPHILS ABSOLUTE: 0.1 K/UL (ref 0–0.2)
BASOPHILS RELATIVE PERCENT: 0.5 %
BILIRUB SERPL-MCNC: 0.7 MG/DL (ref 0–1)
BUN BLDV-MCNC: 11 MG/DL (ref 7–20)
CALCIUM SERPL-MCNC: 9.7 MG/DL (ref 8.3–10.6)
CHLORIDE BLD-SCNC: 103 MMOL/L (ref 99–110)
CO2: 28 MMOL/L (ref 21–32)
CREAT SERPL-MCNC: 0.7 MG/DL (ref 0.6–1.2)
EOSINOPHILS ABSOLUTE: 0.2 K/UL (ref 0–0.6)
EOSINOPHILS RELATIVE PERCENT: 1.3 %
GFR AFRICAN AMERICAN: >60
GFR NON-AFRICAN AMERICAN: >60
GLOBULIN: 3.9 G/DL
GLUCOSE BLD-MCNC: 130 MG/DL (ref 70–99)
HCT VFR BLD CALC: 43.1 % (ref 36–48)
HEMOGLOBIN: 14.1 G/DL (ref 12–16)
LIPASE: 63 U/L (ref 13–60)
LYMPHOCYTES ABSOLUTE: 3 K/UL (ref 1–5.1)
LYMPHOCYTES RELATIVE PERCENT: 22.6 %
MAGNESIUM: 2.1 MG/DL (ref 1.8–2.4)
MCH RBC QN AUTO: 28.9 PG (ref 26–34)
MCHC RBC AUTO-ENTMCNC: 32.8 G/DL (ref 31–36)
MCV RBC AUTO: 88.3 FL (ref 80–100)
MONOCYTES ABSOLUTE: 0.8 K/UL (ref 0–1.3)
MONOCYTES RELATIVE PERCENT: 6.3 %
NEUTROPHILS ABSOLUTE: 9.2 K/UL (ref 1.7–7.7)
NEUTROPHILS RELATIVE PERCENT: 69.3 %
PDW BLD-RTO: 13.8 % (ref 12.4–15.4)
PLATELET # BLD: 227 K/UL (ref 135–450)
PLATELET SLIDE REVIEW: ADEQUATE
PMV BLD AUTO: 8.6 FL (ref 5–10.5)
POTASSIUM REFLEX MAGNESIUM: 3 MMOL/L (ref 3.5–5.1)
RBC # BLD: 4.88 M/UL (ref 4–5.2)
SODIUM BLD-SCNC: 143 MMOL/L (ref 136–145)
TOTAL PROTEIN: 8.3 G/DL (ref 6.4–8.2)
TROPONIN: <0.01 NG/ML
WBC # BLD: 13.3 K/UL (ref 4–11)

## 2021-02-03 PROCEDURE — 96376 TX/PRO/DX INJ SAME DRUG ADON: CPT

## 2021-02-03 PROCEDURE — 99284 EMERGENCY DEPT VISIT MOD MDM: CPT

## 2021-02-03 PROCEDURE — 6370000000 HC RX 637 (ALT 250 FOR IP): Performed by: EMERGENCY MEDICINE

## 2021-02-03 PROCEDURE — 93005 ELECTROCARDIOGRAM TRACING: CPT | Performed by: EMERGENCY MEDICINE

## 2021-02-03 PROCEDURE — 85025 COMPLETE CBC W/AUTO DIFF WBC: CPT

## 2021-02-03 PROCEDURE — 84484 ASSAY OF TROPONIN QUANT: CPT

## 2021-02-03 PROCEDURE — 96375 TX/PRO/DX INJ NEW DRUG ADDON: CPT

## 2021-02-03 PROCEDURE — 71046 X-RAY EXAM CHEST 2 VIEWS: CPT

## 2021-02-03 PROCEDURE — 83690 ASSAY OF LIPASE: CPT

## 2021-02-03 PROCEDURE — 83735 ASSAY OF MAGNESIUM: CPT

## 2021-02-03 PROCEDURE — 80053 COMPREHEN METABOLIC PANEL: CPT

## 2021-02-03 PROCEDURE — 6360000002 HC RX W HCPCS: Performed by: EMERGENCY MEDICINE

## 2021-02-03 PROCEDURE — 2500000003 HC RX 250 WO HCPCS: Performed by: EMERGENCY MEDICINE

## 2021-02-03 PROCEDURE — 96374 THER/PROPH/DIAG INJ IV PUSH: CPT

## 2021-02-03 RX ORDER — ONDANSETRON 2 MG/ML
4 INJECTION INTRAMUSCULAR; INTRAVENOUS ONCE
Status: COMPLETED | OUTPATIENT
Start: 2021-02-03 | End: 2021-02-03

## 2021-02-03 RX ORDER — ONDANSETRON 4 MG/1
4 TABLET, ORALLY DISINTEGRATING ORAL EVERY 8 HOURS PRN
Qty: 20 TABLET | Refills: 0 | Status: SHIPPED | OUTPATIENT
Start: 2021-02-03 | End: 2022-02-21 | Stop reason: SDUPTHER

## 2021-02-03 RX ORDER — DICYCLOMINE HYDROCHLORIDE 10 MG/1
10 CAPSULE ORAL EVERY 6 HOURS PRN
Qty: 20 CAPSULE | Refills: 0 | Status: SHIPPED | OUTPATIENT
Start: 2021-02-03 | End: 2022-02-21 | Stop reason: SDUPTHER

## 2021-02-03 RX ORDER — KETOROLAC TROMETHAMINE 30 MG/ML
30 INJECTION, SOLUTION INTRAMUSCULAR; INTRAVENOUS ONCE
Status: COMPLETED | OUTPATIENT
Start: 2021-02-03 | End: 2021-02-03

## 2021-02-03 RX ORDER — FAMOTIDINE 20 MG/1
20 TABLET, FILM COATED ORAL 2 TIMES DAILY
Qty: 60 TABLET | Refills: 0 | Status: ON HOLD | OUTPATIENT
Start: 2021-02-03 | End: 2021-02-17 | Stop reason: HOSPADM

## 2021-02-03 RX ADMIN — Medication 20 MG: at 18:56

## 2021-02-03 RX ADMIN — ONDANSETRON 4 MG: 2 INJECTION INTRAMUSCULAR; INTRAVENOUS at 20:39

## 2021-02-03 RX ADMIN — ONDANSETRON 4 MG: 2 INJECTION INTRAMUSCULAR; INTRAVENOUS at 18:56

## 2021-02-03 RX ADMIN — POTASSIUM BICARBONATE 40 MEQ: 782 TABLET, EFFERVESCENT ORAL at 20:00

## 2021-02-03 RX ADMIN — KETOROLAC TROMETHAMINE 30 MG: 30 INJECTION, SOLUTION INTRAMUSCULAR at 19:41

## 2021-02-03 ASSESSMENT — PAIN DESCRIPTION - PROGRESSION: CLINICAL_PROGRESSION: GRADUALLY WORSENING

## 2021-02-03 ASSESSMENT — PAIN DESCRIPTION - LOCATION: LOCATION: BACK;CHEST

## 2021-02-03 ASSESSMENT — PAIN DESCRIPTION - ORIENTATION: ORIENTATION: LEFT

## 2021-02-03 ASSESSMENT — PAIN DESCRIPTION - ONSET: ONSET: ON-GOING

## 2021-02-03 ASSESSMENT — PAIN SCALES - GENERAL
PAINLEVEL_OUTOF10: 9
PAINLEVEL_OUTOF10: 9

## 2021-02-03 ASSESSMENT — PAIN DESCRIPTION - PAIN TYPE: TYPE: ACUTE PAIN

## 2021-02-03 ASSESSMENT — PAIN DESCRIPTION - FREQUENCY: FREQUENCY: CONTINUOUS

## 2021-02-03 NOTE — ED NOTES
Bed: 10  Expected date: 2/3/21  Expected time: 5:26 PM  Means of arrival: [facility]  Comments:  65F chest/abd pain started around [address] Javi RN  02/03/21 5315

## 2021-02-04 LAB
EKG ATRIAL RATE: 61 BPM
EKG DIAGNOSIS: NORMAL
EKG P AXIS: 87 DEGREES
EKG P-R INTERVAL: 212 MS
EKG Q-T INTERVAL: 390 MS
EKG QRS DURATION: 88 MS
EKG QTC CALCULATION (BAZETT): 392 MS
EKG R AXIS: -21 DEGREES
EKG T AXIS: 37 DEGREES
EKG VENTRICULAR RATE: 61 BPM

## 2021-02-04 PROCEDURE — 93010 ELECTROCARDIOGRAM REPORT: CPT | Performed by: INTERNAL MEDICINE

## 2021-02-04 NOTE — ED PROVIDER NOTES
CHIEF COMPLAINT  Chest Pain (started 5 hours ago )      HISTORY OF PRESENT ILLNESS  Shea Dale is a 77 y.o. female who presents to the ED complaining of epigastric abdominal pain that started 5 hours prior to arrival.  States the pain radiates into her back. Patient denies any nausea and vomiting. Chief complaint states chest pain but patient actually having epigastric pain. Denies any fevers or chills. No lower abdominal pain. Normal urinary and bowel habits. Patient denies any chronic alcohol use. States she does drink occasionally. Denies any shortness of breath, cough. No tobacco use. No other complaints, modifying factors or associated symptoms. Nursing notes reviewed. Past Medical History:   Diagnosis Date    Diabetes mellitus ([facility] [address]     NIDDM    Headache(784.0)     Herniated disc, cervical     Hypercholesteremia     Hypertension     Intestinal malabsorption 10/22/2020    Iron deficiency anemia, unspecified 10/22/2020    Memory loss     short term    Osteopenia of neck of left femur     dexa 12/2020, repeat 12/2022    Pneumonia due to 2019 novel coronavirus     Psychiatric problem     Type 2 diabetes mellitus without complication ([facility] [address]      Past Surgical History:   Procedure Laterality Date    BREAST SURGERY      biopsy    KIDNEY STONE SURGERY      SHOULDER SURGERY Left     TUBAL LIGATION       Family History   Problem Relation Age of Onset    Diabetes Mother     High Blood Pressure Mother     Stroke Mother     Diabetes Father     High Blood Pressure Father     Stroke Father      Social History     Socioeconomic History    Marital status:       Spouse name: Not on file    Number of children: 3    Years of education: Not on file    Highest education level: Not on file   Occupational History    Occupation: retired   Social Needs    Financial resource strain: Not very hard    Food insecurity     Worry: Never true     Inability: Never true  Transportation needs     Medical: No     Non-medical: No   Tobacco Use    Smoking status: Never Smoker    Smokeless tobacco: Never Used   Substance and Sexual Activity    Alcohol use: Not Currently    Drug use: No    Sexual activity: Yes     Partners: Male   Lifestyle    Physical activity     Days per week: Not on file     Minutes per session: Not on file    Stress: Not on file   Relationships    Social connections     Talks on phone: Not on file     Gets together: Not on file     Attends Jainism service: Not on file     Active member of club or organization: Not on file     Attends meetings of clubs or organizations: Not on file     Relationship status: Not on file    Intimate partner violence     Fear of current or ex partner: Not on file     Emotionally abused: Not on file     Physically abused: Not on file     Forced sexual activity: Not on file   Other Topics Concern    Not on file   Social History Narrative    Lives with      Current Facility-Administered Medications   Medication Dose Route Frequency Provider Last Rate Last Admin    ketorolac (TORADOL) injection 30 mg  30 mg Intravenous Once Jeb Daley MD        potassium chloride (KLOR-CON) packet 40 mEq  40 mEq Oral Once Jeb Daley MD         Current Outpatient Medications   Medication Sig Dispense Refill    famotidine (PEPCID) 20 MG tablet Take 1 tablet by mouth 2 times daily 60 tablet 0    dicyclomine (BENTYL) 10 MG capsule Take 1 capsule by mouth every 6 hours as needed (cramps) 20 capsule 0    ondansetron (ZOFRAN ODT) 4 MG disintegrating tablet Take 1 tablet by mouth every 8 hours as needed for Nausea 20 tablet 0    insulin glargine (LANTUS SOLOSTAR) 100 UNIT/ML injection pen Inject 25 Units into the skin 2 times daily 90 mL 0    losartan (COZAAR) 100 MG tablet Take 1 tablet by mouth daily 90 tablet 1    traZODone (DESYREL) 100 MG tablet TAKE ONE TABLET BY MOUTH DAILY AS NEEDED FOR SLEEP 90 tablet 0  atorvastatin (LIPITOR) 40 MG tablet Take 1 tablet by mouth daily For cholesterol and heart 90 tablet 0    amLODIPine (NORVASC) 5 MG tablet Take 1 tablet by mouth daily 90 tablet 0    metFORMIN (GLUCOPHAGE) 1000 MG tablet Take 1 tablet by mouth daily (with breakfast) For diabetes 30 tablet 0    empagliflozin (JARDIANCE) 25 MG tablet Take 25 mg by mouth daily 90 tablet 0    gabapentin (NEURONTIN) 300 MG capsule Take 1 capsule by mouth 4 times daily. For nerve pain related to diabetes 360 capsule 3    aspirin 325 MG tablet Take 1 tablet by mouth daily 30 tablet 3     No Known Allergies      REVIEW OF SYSTEMS  10 systems reviewed, pertinent positives per HPI otherwise noted to be negative    PHYSICAL EXAM  BP (!) 140/64   Pulse 58   Temp 98.7 °F (37.1 °C) (Oral)   Resp 18   Ht 5' (1.524 m)   Wt 141 lb 5 oz (64.1 kg)   SpO2 98%   BMI 27.60 kg/m²      CONSTITUTIONAL: AOx4, cooperative with exam, afebrile   HEAD: normocephalic, atraumatic   EYES: PERRL, EOMI, anicteric sclera   ENT: Moist mucous membranes, uvula midline   LUNGS: Bilateral breath sounds, CTAB, no rales/ronchi/wheezes   CARDIOVASCULAR: RRR, normal S1/S2, no m/r/g, 2+ pulses throughout   ABDOMEN: Soft, epigastric abdominal tenderness, no rebound tenderness or guarding, no palpable masses, no rigidity, non-distended, +BS   NEUROLOGIC:  MAEx4, GCS 15   MUSCULOSKELETAL: No clubbing, cyanosis or edema   SKIN: No rash, pallor or wounds on exposed surfaces         RADIOLOGY  X-RAYS:  I have reviewed radiologic plain film image(s). ALL OTHER NON-PLAIN FILM IMAGES SUCH AS CT, ULTRASOUND AND MRI HAVE BEEN READ BY THE RADIOLOGIST. XR CHEST (2 VW)   Final Result   Patchy heterogeneous opacity is again seen within the mid to lower lungs,   which can reflect residual or recurrent edema or pneumonitis in the   appropriate clinical setting. Chronic interstitial lung disease is an   additional differential consideration.                 EKG INTERPRETATION Sinus rhythm with first-degree AV block and a rate of 61, normal axis, , QRS 88, QTc 392, no ST elevations, minimal to criteria for LVH, nonspecific ST changes, no acute change compared EKG from 9/5/2020 except for increase in CA    PROCEDURES    ED COURSE/MDM  Pancreatitis, gastritis, gastroenteritis, URI, ACS/MI, biliary colic, cholecystitis  Patient seen and evaluated. History and physical as above. Nontoxic, afebrile. Patient with epigastric abdominal pain and tenderness on exam.  No peritoneal signs. Plan for routine abdominal labs, troponin, EKG, chest x-ray and urinalysis. Will provide Zofran and Pepcid for symptomatic treatment. ED Course as of Feb 03 1940   Wed Feb 03, 2021 1939 Patient found to have mildly elevated  lipase at 63. Also has hypokalemia at 3.0. Slightly elevated white count at 13.3 which is likely reactive and normal kidney function with creatinine 0.7. Patient does have elevated AST at 76 which is concerning for acute alcohol abuse but patient denies any chronic alcohol use. Patient updated that she likely has acute pancreatitis. Patient treated with Toradol for pain control. Potassium replaced with 40 mEq Klor-Con. Discussed discharge with outpatient follow-up. Patient agreeable. Discussed bland diet as well as clear fluids. Patient discharged with Pepcid, Zofran and plan for outpatient follow-up PCP. Return instruction provided. All questions answered.     [DS]      ED Course User Index  [DS] Clint Lunfsord MD I estimate there is LOW risk for ACUTE APPENDICITIS, BOWEL OBSTRUCTION, CHOLECYSTITIS, DIVERTICULITIS, INCARCERATED HERNIA, PELVIC INFLAMMATORY DISEASE, PERFORATED BOWEL or ULCER, PREGNANCY, or TUBO-OVARIAN ABSCESS, thus I consider the discharge disposition reasonable. Also, there is no evidence or peritonitis, sepsis, or toxicity. Odalis Maurice and I have discussed the diagnosis and risks, and we agree with discharging home to follow-up with their primary doctor. We also discussed returning to the Emergency Department immediately if new or worsening symptoms occur. We have discussed the symptoms which are most concerning (e.g., bloody stool, fever, changing or worsening pain, vomiting) that necessitate immediate return. Patient was given scripts for the following medications. I counseled patient how to take these medications. New Prescriptions    DICYCLOMINE (BENTYL) 10 MG CAPSULE    Take 1 capsule by mouth every 6 hours as needed (cramps)    FAMOTIDINE (PEPCID) 20 MG TABLET    Take 1 tablet by mouth 2 times daily    ONDANSETRON (ZOFRAN ODT) 4 MG DISINTEGRATING TABLET    Take 1 tablet by mouth every 8 hours as needed for Nausea           CLINICAL IMPRESSION  1. Acute pancreatitis without infection or necrosis, unspecified pancreatitis type        Blood pressure (!) 140/64, pulse 58, temperature 98.7 °F (37.1 °C), temperature source Oral, resp. rate 18, height 5' (1.524 m), weight 141 lb 5 oz (64.1 kg), SpO2 98 %, not currently breastfeeding. DISPOSITION  Patient was discharged to home in good condition. ZACHARY Lamas - IVETTE  [address] [phone]    Call today  For a follow up appointment. Disclaimer: All medical record entries made by Nori hinds.       (Please note that this note was completed with a voice recognition program. Every attempt was made to edit the dictations, but inevitably there remain words that are mis-transcribed.) Debbi Gonsalez MD  02/03/21 1941

## 2021-02-04 NOTE — ED NOTES
Discharge instructions reviewed with patient, patient verbalized understanding  PIV removed without complications  Patient taken to exit in wheelchair     Barbara Goodwin RN  02/03/21 2052

## 2021-02-10 NOTE — FLOWSHEET NOTE
Pt. Is scheduled for a colonoscopy and EGD with Dr. Elida Brito on 2/17/21. During a PAT call, the patient said that she \"did not want the test-can't you just cancel it. \"  She is somewhat confused. Appears to have some issues with daily living and comprehending instructions. Will contact Dr. Devon Briones office to enquire on how to proceed.

## 2021-02-12 NOTE — PROGRESS NOTES
[address] [facility] time_______2/17/21 1000_____        Surgery time____1100________    Take the following medications with a sip of water:    Do not eat or drink anything after 12:00 midnight prior to your surgery. This includes water chewing gum, mints and ice chips. You may brush your teeth and gargle the morning of your surgery, but do not swallow the water     Please see your family doctor/pediatrician for a history and physical and/or concerning medications. Bring any test results/reports from your physicians office. If you are under the care of a heart doctor or specialist doctor, please be aware that you may be asked to them for clearance    You may be asked to stop blood thinners such as Coumadin, Plavix, Fragmin, Lovenox, etc., or any anti-inflammatories such as:  Aspirin, Ibuprofen, Advil, Naproxen prior to your surgery. We also ask that you stop any OTC medications such as fish oil, vitamin E, glucosamine, garlic, Multivitamins, COQ 10, etc.    We ask that you do not smoke 24 hours prior to surgery  We ask that you do not  drink any alcoholic beverages 24 hours prior to surgery     You must make arrangements for a responsible adult to take you home after your surgery. For your safety you will not be allowed to leave alone or drive yourself home. Your surgery will be cancelled if you do not have a ride home. Also for your safety, it is strongly suggested that someone stay with you the first 24 hours after your surgery. A parent or legal guardian must accompany a child scheduled for surgery and plan to stay at the hospital until the child is discharged. Please do not bring other children with you. For your comfort, please wear simple loose fitting clothing to the hospital.  Please do not bring valuables.     Do not wear any make-up or nail polish on your fingers or toes For your safety, please do not wear any jewelry or body piercing's on the day of surgery. All jewelry must be removed. If you have dentures, they will be removed before going to operating room. For your convenience, we will provide you with a container. If you wear contact lenses or glasses, they will be removed, please bring a case for them. If you have a living will and a durable power of  for healthcare, please bring in a copy. As part of our patient safety program to minimize surgical site infections, we ask you to do the following:    · Please notify your surgeon if you develop any illness between         now and the  day of your surgery. · This includes a cough, cold, fever, sore throat, nausea,         or vomiting, and diarrhea, etc.  ·  Please notify your surgeon if you experience dizziness, shortness         of breath or blurred vision between now and the time of your surgery. Do not shave your operative site 96 hours prior to surgery. For face and neck surgery, men may use an electric razor 48 hours   prior to surgery. You may shower the night before surgery or the morning of   your surgery with an antibacterial soap. You will need to bring a photo ID and insurance card    [facility] has an onsite pharmacy, would you like to utilize our pharmacy     If you will be staying overnight and use a C-pap machine, please bring   your C-pap to hospital     Our goal is to provide you with excellent care, therefore, visitors will be limited to two(2) in the room at a time so that we may focus on providing this care for you. Please contact pre-admission testing if you have any further questions. [facility] phone number:  [phone]  Please note these are generalized instructions for all surgical cases, you may be provided with more specific instructions according to your surgery.

## 2021-02-12 NOTE — FLOWSHEET NOTE
Preoperative Screening for Elective Surgery/Invasive Procedures While COVID-19 present in the community    ? Have you tested positive or have been told to self-isolate for COVID-19 like symptoms within the past 28 days? ? Do you currently have any of the following symptoms? o Fever >100.0 F or 99.9 F in immunocompromised patients? o New onset cough, shortness of breath or difficulty breathing?  o New onset sore throat, myalgia (muscle aches and pains), headache, loss of taste/smell or diarrhea? ? Have you had a potential exposure to COVID-19 within the past 14 days by:  o Close contact with a confirmed case? o Close contact with a healthcare worker,  or essential infrastructure worker (grocery store, [employer], gas station, public utilities or transportation)? o Do you reside in a congregate setting such as; skilled nursing facility, adult home, correctional facility, homeless shelter or other institutional setting?  o Have you had recent travel to a known COVID-19 hotspot? Indicate if the patient has a positive screen by answering yes to one or more of the above questions. Patients who test positive or screen positive prior to surgery or on the day of surgery should be evaluated in conjunction with the surgeon/proceduralist/anesthesiologist to determine the urgency of the procedure. no to all above. Pt. Was hospitalized in 7/ 2020 at [address] [place] [address] for covid-19.  Copy in chart

## 2021-02-16 ENCOUNTER — ANESTHESIA EVENT (OUTPATIENT)
Dept: ENDOSCOPY | Age: 66
End: 2021-02-16
Payer: MEDICARE

## 2021-02-17 ENCOUNTER — ANESTHESIA (OUTPATIENT)
Dept: ENDOSCOPY | Age: 66
End: 2021-02-17
Payer: MEDICARE

## 2021-02-17 ENCOUNTER — HOSPITAL ENCOUNTER (OUTPATIENT)
Age: 66
Setting detail: OUTPATIENT SURGERY
Discharge: HOME | End: 2021-02-17
Attending: INTERNAL MEDICINE | Admitting: INTERNAL MEDICINE
Payer: MEDICARE

## 2021-02-17 VITALS
HEART RATE: 66 BPM | DIASTOLIC BLOOD PRESSURE: 76 MMHG | OXYGEN SATURATION: 100 % | HEIGHT: 60 IN | TEMPERATURE: 98 F | RESPIRATION RATE: 16 BRPM | WEIGHT: 130.2 LBS | BODY MASS INDEX: 25.56 KG/M2 | SYSTOLIC BLOOD PRESSURE: 147 MMHG

## 2021-02-17 VITALS — OXYGEN SATURATION: 100 % | DIASTOLIC BLOOD PRESSURE: 65 MMHG | SYSTOLIC BLOOD PRESSURE: 101 MMHG

## 2021-02-17 DIAGNOSIS — D50.9 IRON DEFICIENCY ANEMIA, UNSPECIFIED IRON DEFICIENCY ANEMIA TYPE: ICD-10-CM

## 2021-02-17 LAB
GLUCOSE BLD-MCNC: 168 MG/DL (ref 70–99)
PERFORMED ON: ABNORMAL

## 2021-02-17 PROCEDURE — 7100000010 HC PHASE II RECOVERY - FIRST 15 MIN: Performed by: INTERNAL MEDICINE

## 2021-02-17 PROCEDURE — 3609012400 HC EGD TRANSORAL BIOPSY SINGLE/MULTIPLE: Performed by: INTERNAL MEDICINE

## 2021-02-17 PROCEDURE — 7100000011 HC PHASE II RECOVERY - ADDTL 15 MIN: Performed by: INTERNAL MEDICINE

## 2021-02-17 PROCEDURE — 3609027000 HC COLONOSCOPY: Performed by: INTERNAL MEDICINE

## 2021-02-17 PROCEDURE — 88305 TISSUE EXAM BY PATHOLOGIST: CPT

## 2021-02-17 PROCEDURE — 3700000000 HC ANESTHESIA ATTENDED CARE: Performed by: INTERNAL MEDICINE

## 2021-02-17 PROCEDURE — 3700000001 HC ADD 15 MINUTES (ANESTHESIA): Performed by: INTERNAL MEDICINE

## 2021-02-17 PROCEDURE — 6360000002 HC RX W HCPCS: Performed by: NURSE ANESTHETIST, CERTIFIED REGISTERED

## 2021-02-17 PROCEDURE — 2580000003 HC RX 258: Performed by: ANESTHESIOLOGY

## 2021-02-17 PROCEDURE — 2500000003 HC RX 250 WO HCPCS: Performed by: NURSE ANESTHETIST, CERTIFIED REGISTERED

## 2021-02-17 PROCEDURE — 2709999900 HC NON-CHARGEABLE SUPPLY: Performed by: INTERNAL MEDICINE

## 2021-02-17 RX ORDER — PANTOPRAZOLE SODIUM 40 MG/1
40 TABLET, DELAYED RELEASE ORAL
Qty: 90 TABLET | Refills: 1
Start: 2021-02-17 | End: 2021-03-02 | Stop reason: CLARIF

## 2021-02-17 RX ORDER — SODIUM CHLORIDE 0.9 % (FLUSH) 0.9 %
10 SYRINGE (ML) INJECTION EVERY 12 HOURS SCHEDULED
Status: DISCONTINUED | OUTPATIENT
Start: 2021-02-17 | End: 2021-02-17 | Stop reason: HOSPADM

## 2021-02-17 RX ORDER — PROPOFOL 10 MG/ML
INJECTION, EMULSION INTRAVENOUS PRN
Status: DISCONTINUED | OUTPATIENT
Start: 2021-02-17 | End: 2021-02-17 | Stop reason: SDUPTHER

## 2021-02-17 RX ORDER — ONDANSETRON 2 MG/ML
4 INJECTION INTRAMUSCULAR; INTRAVENOUS
Status: DISCONTINUED | OUTPATIENT
Start: 2021-02-17 | End: 2021-02-17 | Stop reason: HOSPADM

## 2021-02-17 RX ORDER — LIDOCAINE HYDROCHLORIDE 20 MG/ML
INJECTION, SOLUTION EPIDURAL; INFILTRATION; INTRACAUDAL; PERINEURAL PRN
Status: DISCONTINUED | OUTPATIENT
Start: 2021-02-17 | End: 2021-02-17 | Stop reason: SDUPTHER

## 2021-02-17 RX ORDER — SODIUM CHLORIDE 9 MG/ML
INJECTION, SOLUTION INTRAVENOUS CONTINUOUS
Status: DISCONTINUED | OUTPATIENT
Start: 2021-02-17 | End: 2021-02-17 | Stop reason: HOSPADM

## 2021-02-17 RX ORDER — SODIUM CHLORIDE 0.9 % (FLUSH) 0.9 %
10 SYRINGE (ML) INJECTION PRN
Status: DISCONTINUED | OUTPATIENT
Start: 2021-02-17 | End: 2021-02-17 | Stop reason: HOSPADM

## 2021-02-17 RX ADMIN — PROPOFOL 30 MG: 10 INJECTION, EMULSION INTRAVENOUS at 09:16

## 2021-02-17 RX ADMIN — PROPOFOL 20 MG: 10 INJECTION, EMULSION INTRAVENOUS at 09:20

## 2021-02-17 RX ADMIN — PROPOFOL 30 MG: 10 INJECTION, EMULSION INTRAVENOUS at 09:18

## 2021-02-17 RX ADMIN — PROPOFOL 70 MG: 10 INJECTION, EMULSION INTRAVENOUS at 09:14

## 2021-02-17 RX ADMIN — LIDOCAINE HYDROCHLORIDE 100 MG: 20 INJECTION, SOLUTION EPIDURAL; INFILTRATION; INTRACAUDAL; PERINEURAL at 09:14

## 2021-02-17 RX ADMIN — PROPOFOL 20 MG: 10 INJECTION, EMULSION INTRAVENOUS at 09:34

## 2021-02-17 RX ADMIN — PROPOFOL 30 MG: 10 INJECTION, EMULSION INTRAVENOUS at 09:37

## 2021-02-17 RX ADMIN — SODIUM CHLORIDE: 9 INJECTION, SOLUTION INTRAVENOUS at 08:58

## 2021-02-17 RX ADMIN — PROPOFOL 30 MG: 10 INJECTION, EMULSION INTRAVENOUS at 09:27

## 2021-02-17 NOTE — H&P
I have explained the risk, benefits, and alternatives to the procedure; the patient understands and agrees to proceed. The patient was counseled at length about the risks of jordi Covid-19 during their perioperative period and any recovery window from their procedure. The patient was made aware that jordi Covid-19  may worsen their prognosis for recovering from their procedure  and lend to a higher morbidity and/or mortality risk. All material risks, benefits, and reasonable alternatives including postponing the procedure were discussed. The patient does wish to proceed with the procedure at this time.       Max Seals DO  2/17/2021

## 2021-02-17 NOTE — ANESTHESIA PRE PROCEDURE
[facility] Department of Anesthesiology  Pre-Anesthesia Evaluation/Consultation       Name:  Denisse Thurman  : 1955  Age:  77 y.o.                                            MRN:  [id number]  Date: 2021           Surgeon: Surgeon(s):  Ezekiel Pineda DO    Procedure: Procedure(s):  COLONOSCOPY DIAGNOSTIC  EGD ESOPHAGOGASTRODUODENOSCOPY     No Known Allergies  Patient Active Problem List   Diagnosis    Cervical spinal stenosis    Acute encephalopathy    Malignant hypertension    Type 2 diabetes mellitus without complication, with long-term current use of insulin ([address]    Left-sided low back pain with left-sided sciatica    Hypercholesteremia    Primary insomnia    Hypotension    Dizziness    Essential hypertension    Elevated lactic acid level    Lactic acidosis    Atrial ectopy    Vasovagal syncope    Dysrhythmia    Chronic left-sided low back pain with left-sided sciatica    MCI (mild cognitive impairment)    Chest pain    COVID-19 virus infection    Acute respiratory failure with hypoxia ([address]    COVID-19 virus detected    Type 2 diabetes mellitus with diabetic mononeuropathy, with long-term current use of insulin (HCC)    Iron deficiency anemia, unspecified    Intestinal malabsorption    Osteopenia    Anemia     Past Medical History:   Diagnosis Date    Diabetes mellitus ([address]     NIDDM    Headache(784.0)     Herniated disc, cervical     Hypercholesteremia     Hypertension     Intestinal malabsorption 10/22/2020    Iron deficiency anemia, unspecified 10/22/2020    Memory loss     short term    Osteopenia of neck of left femur     dexa 2020, repeat 2022    Pneumonia due to 2019 novel coronavirus 2020    hospitalized in [facility]    Psychiatric problem     Type 2 diabetes mellitus without complication ([address]      Past Surgical History:   Procedure Laterality Date    BREAST SURGERY      biopsy    KIDNEY STONE SURGERY  SHOULDER SURGERY Left     TUBAL LIGATION       Social History     Tobacco Use    Smoking status: Never Smoker    Smokeless tobacco: Never Used   Substance Use Topics    Alcohol use: Not Currently    Drug use: No     Medications  No current facility-administered medications on file prior to encounter. Current Outpatient Medications on File Prior to Encounter   Medication Sig Dispense Refill    insulin glargine (LANTUS SOLOSTAR) 100 UNIT/ML injection pen Inject 25 Units into the skin 2 times daily 90 mL 0    losartan (COZAAR) 100 MG tablet Take 1 tablet by mouth daily 90 tablet 1    traZODone (DESYREL) 100 MG tablet TAKE ONE TABLET BY MOUTH DAILY AS NEEDED FOR SLEEP 90 tablet 0    atorvastatin (LIPITOR) 40 MG tablet Take 1 tablet by mouth daily For cholesterol and heart 90 tablet 0    amLODIPine (NORVASC) 5 MG tablet Take 1 tablet by mouth daily 90 tablet 0    metFORMIN (GLUCOPHAGE) 1000 MG tablet Take 1 tablet by mouth daily (with breakfast) For diabetes 30 tablet 0    empagliflozin (JARDIANCE) 25 MG tablet Take 25 mg by mouth daily 90 tablet 0    gabapentin (NEURONTIN) 300 MG capsule Take 1 capsule by mouth 4 times daily. For nerve pain related to diabetes 360 capsule 3    aspirin 325 MG tablet Take 1 tablet by mouth daily 30 tablet 3     No current facility-administered medications for this encounter. Vital Signs (Current)   Vitals:    02/15/21 1301   Weight: 141 lb (64 kg)   Height: 5' (1.524 m)                                          BP Readings from Last 3 Encounters:   02/03/21 (!) 140/61   01/20/21 110/62   11/20/20 110/60     Vital Signs Statistics (for past 48 hrs)     No data recorded  BP Readings from Last 3 Encounters:   02/03/21 (!) 140/61   01/20/21 110/62   11/20/20 110/60       BMI  Body mass index is 27.54 kg/m². Estimated body mass index is 27.54 kg/m² as calculated from the following:    Height as of this encounter: 5' (1.524 m). Weight as of this encounter: 141 lb (64 kg). CBC   Lab Results   Component Value Date    WBC 13.3 02/03/2021    RBC 4.88 02/03/2021    HGB 14.1 02/03/2021    HCT 43.1 02/03/2021    MCV 88.3 02/03/2021    RDW 13.8 02/03/2021     02/03/2021     CMP    Lab Results   Component Value Date     02/03/2021    K 3.0 02/03/2021     02/03/2021    CO2 28 02/03/2021    BUN 11 02/03/2021    CREATININE 0.7 02/03/2021    GFRAA >60 02/03/2021    GFRAA >60 04/27/2013    AGRATIO 1.1 02/03/2021    LABGLOM >60 02/03/2021    GLUCOSE 130 02/03/2021    PROT 8.3 02/03/2021    PROT 7.3 01/08/2013    CALCIUM 9.7 02/03/2021    BILITOT 0.7 02/03/2021    ALKPHOS 75 02/03/2021    AST 76 02/03/2021    ALT 38 02/03/2021     BMP    Lab Results   Component Value Date     02/03/2021    K 3.0 02/03/2021     02/03/2021    CO2 28 02/03/2021    BUN 11 02/03/2021    CREATININE 0.7 02/03/2021    CALCIUM 9.7 02/03/2021    GFRAA >60 02/03/2021    GFRAA >60 04/27/2013    LABGLOM >60 02/03/2021    GLUCOSE 130 02/03/2021     POCGlucose  No results for input(s): GLUCOSE in the last 72 hours.    Coags    Lab Results   Component Value Date    PROTIME 9.9 10/01/2017    INR 0.88 10/01/2017    APTT 32.2 48/94/1831     HCG (If Applicable)   Lab Results   Component Value Date    PREGTESTUR Negative 02/12/2015      ABGs   Lab Results   Component Value Date    PHART 7.348 07/23/2020    PO2ART 64.3 07/23/2020    LJA5KVM 53.2 07/23/2020    DMT0SDD 29.2 07/23/2020    BEART 2.4 07/23/2020    B4JHVOCO 90.7 07/23/2020      Type & Screen (If Applicable)  No results found for: LABABO, LABRH                         BMI: Wt Readings from Last 3 Encounters:       NPO Status:                          Anesthesia Evaluation  Patient summary reviewed no history of anesthetic complications:   Airway: Mallampati: II        Dental:          Pulmonary:       (-) COPD                           Cardiovascular:    (+) hypertension:, hyperlipidemia (-) valvular problems/murmurs                Neuro/Psych:   (+) neuromuscular disease:, headaches: tension headaches, psychiatric history:            GI/Hepatic/Renal:   (+) renal disease: kidney stones, bowel prep,           Endo/Other:    (+) DiabetesType II DM, , electrolyte abnormalities, .    (-) hypothyroidism, hyperthyroidism               Abdominal:           Vascular: negative vascular ROS. Anesthesia Plan      MAC     ASA 3       Induction: intravenous. Anesthetic plan and risks discussed with patient. Plan discussed with CRNA. Attending anesthesiologist reviewed and agrees with Pre Eval content              This pre-anesthesia assessment may be used as a history and physical.    DOS STAFF ADDENDUM:    Pt seen and examined, chart reviewed (including anesthesia, drug and allergy history). No interval changes to history and physical examination. Anesthetic plan, risks, benefits, alternatives, and personnel involved discussed with patient. Patient verbalized an understanding and agrees to proceed.       Aziza Dixon MD  February 17, 2021  8:32 AM

## 2021-02-17 NOTE — ANESTHESIA POSTPROCEDURE EVALUATION
Department of Anesthesiology  Postprocedure Note    Patient: Nettie Sawant  MRN: [id number]  YOB: 1955  Date of evaluation: 2/17/2021  Time:  10:46 AM     Procedure Summary     Date: 02/17/21 Room / Location: [address]    Anesthesia Start: 4841 Anesthesia Stop: 0948    Procedures:       COLONOSCOPY DIAGNOSTIC (N/A )      EGD BIOPSY (N/A ) Diagnosis:       Iron deficiency anemia, unspecified iron deficiency anemia type      (Iron Deficiency Anemia)    Surgeons: Jolynn Lynn DO Responsible Provider: Birdie Mcburney, MD    Anesthesia Type: MAC ASA Status: 3          Anesthesia Type: MAC    Sridevi Phase I: Sridevi Score: 10    Sridevi Phase II: Sridevi Score: 10    Last vitals: Reviewed and per EMR flowsheets.        Anesthesia Post Evaluation    Patient location during evaluation: bedside  Patient participation: complete - patient participated  Level of consciousness: awake  Pain score: 0  Airway patency: patent  Nausea & Vomiting: no nausea and no vomiting  Complications: no  Cardiovascular status: blood pressure returned to baseline  Respiratory status: acceptable  Hydration status: euvolemic

## 2021-02-17 NOTE — OP NOTE
Endoscopy Note    Patient: Donnie Michael  : 1955  Acct#:     Procedure: Esophagogastroduodenoscopy with biopsy                       Colonoscopy with polypectomy (snare cautery), polypectomy (cold biopsy)    Date:  2021     Endoscopist:   Loreta Minor DO    Referring Physician:  Cindy Morgan, APRN - CNP    Indications: This is a 77y.o. year old female who presents today with iron deficiency anemia. Previous Colonoscopy: N/A  Date: N/A  Greater than 3 years: N/A    Postoperative Diagnosis:  1) The esophagus was normal without evidence of esophagitis, Hamlin's esophagus, strictures, rings, furrowing, masses, or nodules. 2) There was a small 1 cm sliding hiatal hernia noted. 3) There was moderate antral and pre-pyloric erosions noted. This was biopsied. 4) The villous pattern appeared normal, but given symptoms, multiple small bowel biopsies were obtained to evaluate for celiac disease. 5) A 3 mm polyp in the ascending colon was removed completely with biopsy polypectomy. 6) A 3 mm polyp in the transverse colon was removed completely with biopsy polypectomy. 7) A 7 mm polyp in the transverse colon was removed completely with snare cautery polypectomy. 8) A 3 mm polyp in the distal transverse colon was removed completely with biopsy polypectomy. 9) Mild left sided diverticulosis was noted  10) A 2 mm polyp in the rectum was removed completely with biopsy polypectomy. 11) A 2 mm polyp in the rectum was removed completely with biopsy polypectomy. 12) Moderate internal hemorrhoids were noted. Anesthesia:  The patient was administered TIVA per anesthesiology team.  Please see their operative records for full details of medications administered. Consent:  The patient or their legal guardian has signed a consent, and is aware of the potential risks, benefits, alternatives, and potential complications of this procedure. These include, but are not limited to hemorrhage, bleeding, post procedural pain, perforation, phlebitis, aspiration, hypotension, hypoxia, cardiovascular events such as arryhthmia, and possibly death. Additionally, the possibility of missed colonic polyps and interval colon cancer was discussed in the consent. Description of Procedure: The patient was then taken to the endoscopy suite, placed in the left lateral decubitus position and the above IV sedation was administrered. The Olympus video endoscope was placed through the patient's oropharynx without difficulty to the extent of the 2nd portion of the duodenum. Both forward and retroflexed views of the stomach were obtained. Findings:     1) The esophagus was normal without evidence of esophagitis, Hamlin's esophagus, strictures, rings, furrowing, masses, or nodules. 2) There was a small 1 cm sliding hiatal hernia noted. 3) There was moderate antral and pre-pyloric erosions noted. This was biopsied. 4) The villous pattern appeared normal, but given symptoms, multiple small bowel biopsies were obtained to evaluate for celiac disease. The scope was then withdrawn back into the stomach, it was decompressed, and the scope was completely withdrawn. A digital rectal examination was performed and revealed negative without mass, lesions or tenderness, internal hemorrhoids noted. The Olympus video colonoscope was placed in the patient's rectum under digital direction and advanced to the cecum. The cecum was identified by characteristic anatomy and ballottment. The prep was good. The ileocecal valve was identified. The terminal ileum was normal appearing. The scope was then withdrawn back through the cecum, ascending, transverse, descending, sigmoid colon, and rectum. Careful circumferential examination of the mucosa in these areas demonstrated:    A 3 mm polyp in the ascending colon was removed completely with biopsy polypectomy. 6) A 3 mm polyp in the transverse colon was removed completely with biopsy polypectomy. 7) A 7 mm polyp in the transverse colon was removed completely with snare cautery polypectomy. 8) A 3 mm polyp in the distal transverse colon was removed completely with biopsy polypectomy. 9) Mild left sided diverticulosis was noted  10) A 2 mm polyp in the rectum was removed completely with biopsy polypectomy. 11) A 2 mm polyp in the rectum was removed completely with biopsy polypectomy. The scope was then withdrawn into the rectum and retroflexed. The retroflexed view of the anal verge and rectum demonstrates moderate internal hemorrhoids. The scope was straightened, the colon was decompressed and the scope was withdrawn from the patient. The patient tolerated the procedure well and was taken to the PACU in good condition.     Estimated blood loss:  <5ml      ID Type Source Tests Collected by Time Destination   A : A. bx small bowel Tissue Tissue SURGICAL PATHOLOGY [facility] DO 2/17/2021 1202    B : B. bx antrum Tissue Tissue SURGICAL PATHOLOGY [facility] DO 2/17/2021 9706    C : C. bx ascending and transverse polyps x3  Tissue Colon SURGICAL PATHOLOGY [facility] DO 2/17/2021 0930    D : D. snared transverse polyp Tissue Colon SURGICAL PATHOLOGY [facility] DO 2/17/2021 5977    E : E. bx rectal polyps x2  Tissue Tissue SURGICAL PATHOLOGY [facility] DO 2/17/2021 1480         Impression:    1) See post procedure diagnoses    Recommendations: 1) The patient had colon polyp(s) successfully removed today. Micky Kappa is a small risk for these sites to bleed for up to several weeks out from the procedure. The patient is instructed to call if there is any significant amounts of  rectal bleeding, abdominal pain, dizziness, or other complaints thought to be related to the procedure.     2) The patient is recommended to have a repeat colonoscopy in 3 years per current screening/surveillance guidelines if health permits  3) Start pantoprazole daily for the gastritis  4) Continue supportive care for now    Opal Siddiqui, [address] [facility], [facility] [address] [facility]

## 2021-02-22 DIAGNOSIS — R77.9 ELEVATED BLOOD PROTEIN: ICD-10-CM

## 2021-02-24 LAB
ALBUMIN SERPL-MCNC: 3.8 G/DL (ref 3.1–4.9)
ALPHA-1-GLOBULIN: 0.3 G/DL (ref 0.2–0.4)
ALPHA-2-GLOBULIN: 0.9 G/DL (ref 0.4–1.1)
BETA GLOBULIN: 1.4 G/DL (ref 0.9–1.6)
GAMMA GLOBULIN: 2.2 G/DL (ref 0.6–1.8)
TOTAL PROTEIN: 8.5 G/DL (ref 6.4–8.2)

## 2021-02-26 LAB — SPE/IFE INTERPRETATION: NORMAL

## 2021-03-02 ENCOUNTER — OFFICE VISIT (OUTPATIENT)
Dept: PULMONOLOGY | Age: 66
End: 2021-03-02
Payer: MEDICARE

## 2021-03-02 VITALS
WEIGHT: 134.4 LBS | RESPIRATION RATE: 16 BRPM | HEART RATE: 69 BPM | HEIGHT: 60 IN | SYSTOLIC BLOOD PRESSURE: 130 MMHG | OXYGEN SATURATION: 96 % | BODY MASS INDEX: 26.39 KG/M2 | DIASTOLIC BLOOD PRESSURE: 76 MMHG | TEMPERATURE: 97.5 F

## 2021-03-02 DIAGNOSIS — J12.82 PNEUMONIA DUE TO COVID-19 VIRUS: ICD-10-CM

## 2021-03-02 DIAGNOSIS — J96.01 ACUTE RESPIRATORY FAILURE WITH HYPOXIA (HCC): Primary | ICD-10-CM

## 2021-03-02 DIAGNOSIS — U07.1 PNEUMONIA DUE TO COVID-19 VIRUS: ICD-10-CM

## 2021-03-02 PROCEDURE — 4040F PNEUMOC VAC/ADMIN/RCVD: CPT | Performed by: INTERNAL MEDICINE

## 2021-03-02 PROCEDURE — 1090F PRES/ABSN URINE INCON ASSESS: CPT | Performed by: INTERNAL MEDICINE

## 2021-03-02 PROCEDURE — 99214 OFFICE O/P EST MOD 30 MIN: CPT | Performed by: INTERNAL MEDICINE

## 2021-03-02 PROCEDURE — G8427 DOCREV CUR MEDS BY ELIG CLIN: HCPCS | Performed by: INTERNAL MEDICINE

## 2021-03-02 PROCEDURE — G8399 PT W/DXA RESULTS DOCUMENT: HCPCS | Performed by: INTERNAL MEDICINE

## 2021-03-02 PROCEDURE — G8417 CALC BMI ABV UP PARAM F/U: HCPCS | Performed by: INTERNAL MEDICINE

## 2021-03-02 PROCEDURE — 1123F ACP DISCUSS/DSCN MKR DOCD: CPT | Performed by: INTERNAL MEDICINE

## 2021-03-02 PROCEDURE — G8484 FLU IMMUNIZE NO ADMIN: HCPCS | Performed by: INTERNAL MEDICINE

## 2021-03-02 PROCEDURE — 3017F COLORECTAL CA SCREEN DOC REV: CPT | Performed by: INTERNAL MEDICINE

## 2021-03-02 PROCEDURE — 1036F TOBACCO NON-USER: CPT | Performed by: INTERNAL MEDICINE

## 2021-03-02 ASSESSMENT — COPD QUESTIONNAIRES
QUESTION7_SLEEPQUALITY: 1
QUESTION4_WALKINCLINE: 0
CAT_TOTALSCORE: 2
QUESTION8_ENERGYLEVEL: 1
QUESTION6_LEAVINGHOUSE: 0

## 2021-03-02 NOTE — PROGRESS NOTES
Chief complaint  This is a 77y.o. year old female  who comes to see me with a chief complaint of   Chief Complaint   Patient presents with    Follow-up       HPI  Here with a cc of hypoxia, COVID    Last visit she was on oxygen after being diagnosed with COVID. Today she says she is using oxygen rarely and not sure she should turn it in yet. She does get SOB from time to time but nothing like it was when she was sick with COVID      She is seen together with Celi Brooks who is also being seen today     Past Medical History:   Diagnosis Date    Diabetes mellitus ([address]     NIDDM    Headache(784.0)     Herniated disc, cervical     Hypercholesteremia     Hypertension     Intestinal malabsorption 10/22/2020    Iron deficiency anemia, unspecified 10/22/2020    Memory loss     short term    Osteopenia of neck of left femur     dexa 12/2020, repeat 12/2022    Pneumonia due to 2019 novel coronavirus 07/2020    hospitalized in [facility]    Psychiatric problem     Type 2 diabetes mellitus without complication [facility])        Past Surgical History:   Procedure Laterality Date    BREAST SURGERY      biopsy    COLONOSCOPY N/A 2/17/2021    COLONOSCOPY DIAGNOSTIC performed by Maria Gibbons DO at [address]     TUBAL LIGATION      UPPER GASTROINTESTINAL ENDOSCOPY N/A 2/17/2021    EGD BIOPSY performed by Maria Gibbons DO at [address] [facility] History     Socioeconomic History    Marital status:       Spouse name: Not on file    Number of children: 3    Years of education: Not on file    Highest education level: Not on file   Occupational History    Occupation: retired   Social Needs    Financial resource strain: Not very hard    Food insecurity     Worry: Never true     Inability: Never true    Transportation needs     Medical: No     Non-medical: No   Tobacco Use    Smoking status: Never Smoker    hours as needed for Nausea, Disp: 20 tablet, Rfl: 0    losartan (COZAAR) 100 MG tablet, Take 1 tablet by mouth daily, Disp: 90 tablet, Rfl: 1    traZODone (DESYREL) 100 MG tablet, TAKE ONE TABLET BY MOUTH DAILY AS NEEDED FOR SLEEP, Disp: 90 tablet, Rfl: 0    atorvastatin (LIPITOR) 40 MG tablet, Take 1 tablet by mouth daily For cholesterol and heart, Disp: 90 tablet, Rfl: 0    amLODIPine (NORVASC) 5 MG tablet, Take 1 tablet by mouth daily, Disp: 90 tablet, Rfl: 0    empagliflozin (JARDIANCE) 25 MG tablet, Take 25 mg by mouth daily, Disp: 90 tablet, Rfl: 0    aspirin 325 MG tablet, Take 1 tablet by mouth daily, Disp: 30 tablet, Rfl: 3    sertraline (ZOLOFT) 25 MG tablet, TAKE ONE TABLET BY MOUTH DAILY (Patient not taking: Reported on 3/2/2021), Disp: 90 tablet, Rfl: 0    gabapentin (NEURONTIN) 300 MG capsule, Take 1 capsule by mouth 4 times daily for 90 days. For nerve pain related to diabetes (Patient not taking: Reported on 3/2/2021), Disp: 360 capsule, Rfl: 0    pantoprazole (PROTONIX) 40 MG tablet, Take 1 tablet by mouth every morning (before breakfast) (Patient not taking: Reported on 3/2/2021), Disp: 90 tablet, Rfl: 1      PHYSICAL EXAM:  Vitals:    03/02/21 0933   BP: 130/76   Pulse: 69   Resp: 16   Temp: 97.5 °F (36.4 °C)   SpO2: 96%       GENERAL:  More awake and alert today   HEENT:  No scleral icterus, no conjunctival irritation  NECK:  No thyromegaly, no bruits  LYMPH:  No cervical or supraclavicular adenopathy  HEART:  Regular rate and rhythm, no murmurs  LUNGS:  Clear but diminished   ABDOMEN:  No distention, no organomegaly  EXTREMITIES:  No edema, no digital clubbing  NEURO:  No localizing deficits, CN II-XII intact    Pulmonary Function Testing  None    Chest imaging from 10/2020 is reviewed. My interpretation is bilateral airspace disease, worse in the left lung.  Looks to be improving from previous CXR     ECHO 2016  EF 55-60%  Lab Results   Component Value Date    WBC 13.3 (H) 02/03/2021 HGB 14.1 02/03/2021    HCT 43.1 02/03/2021    MCV 88.3 02/03/2021     02/03/2021       No results found for: BNP    Lab Results   Component Value Date    CREATININE 0.5 (L) 02/22/2021    BUN 8 02/22/2021     02/22/2021    K 4.3 02/22/2021    CL 99 02/22/2021    CO2 28 02/22/2021       I reviewed above labs and studies pertinent to this visit and date    Assessment/Plan:  1. Acute respiratory failure with hypoxia (HCC)  This was due to COVID>  This is likely resolving if not improved. Will get 6 MWT to assess and then discontinue oxygen if there is no further need    2. Pneumonia due to COVID-19 virus  Seems to be almost recovered.   Her residual SOB is likely due to COVID recovery    Follow up in 6 months     Flu shot up to date

## 2021-03-09 ENCOUNTER — HOSPITAL ENCOUNTER (OUTPATIENT)
Dept: CARDIAC REHAB | Age: 66
Setting detail: THERAPIES SERIES
Discharge: HOME | End: 2021-03-09
Payer: MEDICARE

## 2021-03-09 DIAGNOSIS — J12.82 PNEUMONIA DUE TO COVID-19 VIRUS: ICD-10-CM

## 2021-03-09 DIAGNOSIS — J96.01 ACUTE RESPIRATORY FAILURE WITH HYPOXIA (HCC): ICD-10-CM

## 2021-03-09 DIAGNOSIS — U07.1 PNEUMONIA DUE TO COVID-19 VIRUS: ICD-10-CM

## 2021-03-09 PROCEDURE — 94618 PULMONARY STRESS TESTING: CPT

## 2021-03-09 PROCEDURE — 94618 PULMONARY STRESS TESTING: CPT | Performed by: INTERNAL MEDICINE

## 2021-03-09 NOTE — PROGRESS NOTES
[facility] Cardiopulmonary Rehabilitation    Qualifying Data for Home Oxygen        Resting Oxygen Saturation on Room Air:   97%    Exercise Oxygen Saturation on Room Air:  88%      Resting Oxygen Saturation on Oxygen:  97% 2LPM      Exercise Oxygen Saturation on Oxygen: 96% 2LPM

## 2021-03-09 NOTE — PROCEDURES
[facility] Cardiopulmonary Rehabilitation   Six Minute Walk Test    Charity Soni  LULI O.B: 1955  Account Number: [de-identified]  AGE: 77 y.o.     OP test [x]   Pulmonary Rehab PRE []  POST   []    Diagnosis: Acute respiratory failure w/ hypoxia, PNA due to COVID 19      Referring Physician: Dr. Pita Ruvalcaba    Gender []  male [x] female AGE: 77     Height: 5 ft 0 in 152 cm    Weight: 136 lbs 62 kg  Blood Pressure 142/74    Medications affecting heart rate:  NA    Supplemental O2 during the test []  no [x] yes 2 lpm     [x] continuous []  intermittent    Device : [x] NC []  HFNC    []  oximizer  [] mask      Laps completed: 9 (1 lap = 100 ft)        Baseline (resting) Walking End of Test (recovery)      Heart Rate 84   100  83    BP  142/74   152/84  140/80    Dyspnea 0.5   2  0.5 (Abrahan scale)    Fatigue 0.5   3  0.5 (Abrahan scale)    SpO2  97% RA  88% RA     O2 97% 2LPM  96% 2LPM 99% 2LPM     Stopped or paused before 6 mins? [x]  no [] yes How long? Symptoms at end of exercise:[] angina  [] dizziness  []  hip, leg, calf, back pain       [x]  no complaints []  other    Number of laps: 9 (x 30.48 meters)= 274 + final partial lap: 0     Total distance walked in 6 mins: 274 meters    Predicted distance: 459 meters  Percent predicted: 60%              [] normal       [x] reduced     Summary: This is an outpatient 6 minute walk test, performed on supplemental oxygen. The patient ambulated 274 meters which is abnormal- 60-% predicted. Her, heart rate response was normal, the blood pressure response was normal, oxygen saturations were abnormal as she desaturated while ambulating on room air. The patient desaturated to 88% while ambulating on room air, and was placed on 2 L of oxygen to keep saturations above 90%. The degree of symptoms based on the Abrahan Dyspnea/Fatigue scale were increased with testing. This test does indicate the need for supplemental oxygen.           Raymona Baumgarten, DO  Pulmonary Rehab Director

## 2021-03-29 ENCOUNTER — TELEPHONE (OUTPATIENT)
Dept: PULMONOLOGY | Age: 66
End: 2021-03-29

## 2021-04-23 DIAGNOSIS — R94.5 ABNORMAL RESULTS OF LIVER FUNCTION STUDIES: ICD-10-CM

## 2021-04-23 DIAGNOSIS — R74.8 ELEVATED LIVER ENZYMES: ICD-10-CM

## 2021-04-23 LAB
AMYLASE: 119 U/L (ref 25–115)
FERRITIN: 309.6 NG/ML (ref 15–150)
IRON SATURATION: 45 % (ref 15–50)
IRON: 132 UG/DL (ref 37–145)
LIPASE: 70 U/L (ref 13–60)
TOTAL IRON BINDING CAPACITY: 292 UG/DL (ref 260–445)

## 2021-05-07 ENCOUNTER — HOSPITAL ENCOUNTER (OUTPATIENT)
Dept: CT IMAGING | Age: 66
Discharge: HOME | End: 2021-05-07
Payer: MEDICARE

## 2021-05-07 ENCOUNTER — HOSPITAL ENCOUNTER (OUTPATIENT)
Dept: ULTRASOUND IMAGING | Age: 66
Discharge: HOME | End: 2021-05-07
Payer: MEDICARE

## 2021-05-07 DIAGNOSIS — R74.8 ELEVATED AMYLASE AND LIPASE: ICD-10-CM

## 2021-05-07 DIAGNOSIS — R74.8 ELEVATED AMYLASE: ICD-10-CM

## 2021-05-07 DIAGNOSIS — R79.89 ELEVATED LFTS: ICD-10-CM

## 2021-05-07 DIAGNOSIS — R74.8 ELEVATED LIPASE: ICD-10-CM

## 2021-05-07 PROCEDURE — 76705 ECHO EXAM OF ABDOMEN: CPT

## 2021-05-07 PROCEDURE — 74177 CT ABD & PELVIS W/CONTRAST: CPT

## 2021-05-07 PROCEDURE — 6360000004 HC RX CONTRAST MEDICATION: Performed by: NURSE PRACTITIONER

## 2021-05-07 RX ADMIN — IOHEXOL 50 ML: 240 INJECTION, SOLUTION INTRATHECAL; INTRAVASCULAR; INTRAVENOUS; ORAL at 11:31

## 2021-05-07 RX ADMIN — IOPAMIDOL 75 ML: 755 INJECTION, SOLUTION INTRAVENOUS at 11:31

## 2021-07-19 NOTE — PLAN OF CARE
Problem: Airway Clearance - Ineffective  Goal: Achieve or maintain patent airway  7/28/2020 1451 by Margret Guadarrama RN  Outcome: Ongoing     Problem: Gas Exchange - Impaired  Goal: Absence of hypoxia  7/28/2020 1451 by Margret Guadarrama RN  Outcome: Ongoing     Problem: Gas Exchange - Impaired  Goal: Promote optimal lung function  7/28/2020 1451 by Margret Guadarrama RN  Outcome: Ongoing     Problem: Breathing Pattern - Ineffective  Goal: Ability to achieve and maintain a regular respiratory rate  7/28/2020 1451 by Margret Guadarrama RN  Outcome: Ongoing     Problem: Body Temperature -  Risk of, Imbalanced  Goal: Ability to maintain a body temperature within defined limits  7/28/2020 1451 by Margret Guadarrama RN  Outcome: Ongoing     Problem:  Body Temperature -  Risk of, Imbalanced  Goal: Complications related to the disease process, condition or treatment will be avoided or minimized  7/28/2020 1451 by Margret Guadarrama RN  Outcome: Ongoing     Problem: Isolation Precautions - Risk of Spread of Infection  Goal: Prevent transmission of infection  7/28/2020 1451 by Margret Guadarrama RN  Outcome: Ongoing     Problem: Nutrition Deficits  Goal: Optimize nutrtional status  7/28/2020 1451 by Margret Guadarrama RN  Outcome: Ongoing     Problem: Risk for Fluid Volume Deficit  Goal: Maintain absence of muscle cramping  7/28/2020 1451 by Margret Guadrarama RN  Outcome: Ongoing  Electronically signed by Margrte Guadarrama RN on 7/28/2020 at 2:52 PM not examined

## 2021-09-07 ENCOUNTER — OFFICE VISIT (OUTPATIENT)
Dept: PULMONOLOGY | Age: 66
End: 2021-09-07
Payer: MEDICARE

## 2021-09-07 VITALS
TEMPERATURE: 97.5 F | SYSTOLIC BLOOD PRESSURE: 140 MMHG | BODY MASS INDEX: 26.9 KG/M2 | OXYGEN SATURATION: 95 % | HEIGHT: 60 IN | WEIGHT: 137 LBS | DIASTOLIC BLOOD PRESSURE: 70 MMHG | RESPIRATION RATE: 16 BRPM | HEART RATE: 73 BPM

## 2021-09-07 DIAGNOSIS — J96.01 ACUTE RESPIRATORY FAILURE WITH HYPOXIA (HCC): Primary | ICD-10-CM

## 2021-09-07 DIAGNOSIS — J12.82 PNEUMONIA DUE TO COVID-19 VIRUS: ICD-10-CM

## 2021-09-07 DIAGNOSIS — U07.1 PNEUMONIA DUE TO COVID-19 VIRUS: ICD-10-CM

## 2021-09-07 PROCEDURE — G8417 CALC BMI ABV UP PARAM F/U: HCPCS | Performed by: INTERNAL MEDICINE

## 2021-09-07 PROCEDURE — 1036F TOBACCO NON-USER: CPT | Performed by: INTERNAL MEDICINE

## 2021-09-07 PROCEDURE — 4040F PNEUMOC VAC/ADMIN/RCVD: CPT | Performed by: INTERNAL MEDICINE

## 2021-09-07 PROCEDURE — G8427 DOCREV CUR MEDS BY ELIG CLIN: HCPCS | Performed by: INTERNAL MEDICINE

## 2021-09-07 PROCEDURE — G8399 PT W/DXA RESULTS DOCUMENT: HCPCS | Performed by: INTERNAL MEDICINE

## 2021-09-07 PROCEDURE — 3017F COLORECTAL CA SCREEN DOC REV: CPT | Performed by: INTERNAL MEDICINE

## 2021-09-07 PROCEDURE — 1123F ACP DISCUSS/DSCN MKR DOCD: CPT | Performed by: INTERNAL MEDICINE

## 2021-09-07 PROCEDURE — 1090F PRES/ABSN URINE INCON ASSESS: CPT | Performed by: INTERNAL MEDICINE

## 2021-09-07 PROCEDURE — 99213 OFFICE O/P EST LOW 20 MIN: CPT | Performed by: INTERNAL MEDICINE

## 2021-09-07 ASSESSMENT — COPD QUESTIONNAIRES
QUESTION7_SLEEPQUALITY: 0
QUESTION3_CHESTTIGHTNESS: 0
QUESTION1_COUGHFREQUENCY: 0
QUESTION2_CHESTPHLEGM: 0
CAT_TOTALSCORE: 0
QUESTION6_LEAVINGHOUSE: 0
QUESTION4_WALKINCLINE: 0
QUESTION5_HOMEACTIVITIES: 0
QUESTION8_ENERGYLEVEL: 0

## 2021-09-07 NOTE — PROGRESS NOTES
Chief complaint  This is a 77y.o. year old female  who comes to see me with a chief complaint of   Chief Complaint   Patient presents with    COPD       HPI  Here with a cc of hypoxia, COVID    Seems that her breathing has recovered. No longer using oxygen and DC order was sent in the past but never picked up. No concerns at this time. NO history of underlying lung disease. She is seen together with Gricel Lopez who is also being seen today       Current Outpatient Medications:     amLODIPine (NORVASC) 5 MG tablet, Take 1 tablet by mouth daily, Disp: 90 tablet, Rfl: 0    atorvastatin (LIPITOR) 40 MG tablet, Take 1 tablet by mouth daily For cholesterol and heart, Disp: 90 tablet, Rfl: 0    donepezil (ARICEPT) 5 MG tablet, Take 1 tablet by mouth nightly, Disp: 90 tablet, Rfl: 0    empagliflozin (JARDIANCE) 25 MG tablet, Take 25 mg by mouth daily, Disp: 90 tablet, Rfl: 0    gabapentin (NEURONTIN) 300 MG capsule, Take 1 capsule by mouth 3 times daily as needed (neuropathy) for up to 30 days. , Disp: 90 capsule, Rfl: 0    insulin glargine (LANTUS SOLOSTAR) 100 UNIT/ML injection pen, Inject 30 Units into the skin 2 times daily, Disp: 54 mL, Rfl: 0    Insulin Pen Needle 31G X 5 MM MISC, 1 each by Does not apply route daily, Disp: 300 each, Rfl: 3    losartan (COZAAR) 100 MG tablet, TAKE ONE TABLET BY MOUTH DAILY, Disp: 90 tablet, Rfl: 0    metFORMIN (GLUCOPHAGE) 1000 MG tablet, Take 1 tablet by mouth daily (with breakfast) For diabetes, Disp: 90 tablet, Rfl: 0    SITagliptin (JANUVIA) 100 MG tablet, Take 1 tablet by mouth daily, Disp: 90 tablet, Rfl: 0    vitamin B-12 (CYANOCOBALAMIN) 1000 MCG tablet, Take 1 tablet by mouth daily, Disp: 90 tablet, Rfl: 0    traZODone (DESYREL) 100 MG tablet, TAKE ONE TABLET BY MOUTH DAILY AS NEEDED FOR SLEEP, Disp: 90 tablet, Rfl: 0    blood glucose test strips (FREESTYLE LITE) strip, 1 each by In Vitro route 4 times daily (before meals and nightly), Disp: 300 virus  Resolving/recovering.   Denies any significant SOB at this time       Follow up PRN

## 2021-09-07 NOTE — PATIENT INSTRUCTIONS
Will send order to discontinue oxygen to [address] [place] [address]    Follow up as needed

## 2022-01-24 PROBLEM — G30.9 ALZHEIMER'S DISEASE, UNSPECIFIED (CODE) (HCC): Status: ACTIVE | Noted: 2022-01-24

## 2022-06-13 ENCOUNTER — HOSPITAL ENCOUNTER (OUTPATIENT)
Dept: MRI IMAGING | Age: 67
Discharge: HOME | End: 2022-06-13
Payer: MEDICARE

## 2022-06-13 DIAGNOSIS — F03.90 DEMENTIA WITHOUT BEHAVIORAL DISTURBANCE, UNSPECIFIED DEMENTIA TYPE: ICD-10-CM

## 2022-06-13 PROCEDURE — 70551 MRI BRAIN STEM W/O DYE: CPT

## 2023-01-07 ENCOUNTER — HOSPITAL ENCOUNTER (EMERGENCY)
Age: 68
Discharge: HOME | End: 2023-01-07
Attending: STUDENT IN AN ORGANIZED HEALTH CARE EDUCATION/TRAINING PROGRAM
Payer: MEDICARE

## 2023-01-07 ENCOUNTER — APPOINTMENT (OUTPATIENT)
Dept: CT IMAGING | Age: 68
End: 2023-01-07
Payer: MEDICARE

## 2023-01-07 ENCOUNTER — APPOINTMENT (OUTPATIENT)
Dept: GENERAL RADIOLOGY | Age: 68
End: 2023-01-07
Payer: MEDICARE

## 2023-01-07 VITALS
BODY MASS INDEX: 23.56 KG/M2 | DIASTOLIC BLOOD PRESSURE: 81 MMHG | HEART RATE: 82 BPM | SYSTOLIC BLOOD PRESSURE: 142 MMHG | RESPIRATION RATE: 17 BRPM | OXYGEN SATURATION: 99 % | HEIGHT: 60 IN | WEIGHT: 120 LBS | TEMPERATURE: 98.3 F

## 2023-01-07 DIAGNOSIS — R42 DIZZINESS: Primary | ICD-10-CM

## 2023-01-07 DIAGNOSIS — R11.0 NAUSEA: ICD-10-CM

## 2023-01-07 LAB
AMORPHOUS: NORMAL /HPF
ANION GAP SERPL CALCULATED.3IONS-SCNC: 9 MMOL/L (ref 3–16)
BASOPHILS ABSOLUTE: 0.1 K/UL (ref 0–0.2)
BASOPHILS RELATIVE PERCENT: 0.7 %
BILIRUBIN URINE: NEGATIVE
BLOOD, URINE: NEGATIVE
BUN BLDV-MCNC: 11 MG/DL (ref 7–20)
CALCIUM SERPL-MCNC: 9.8 MG/DL (ref 8.3–10.6)
CHLORIDE BLD-SCNC: 101 MMOL/L (ref 99–110)
CLARITY: CLEAR
CO2: 29 MMOL/L (ref 21–32)
COLOR: YELLOW
CREAT SERPL-MCNC: 0.6 MG/DL (ref 0.6–1.2)
EOSINOPHILS ABSOLUTE: 0.2 K/UL (ref 0–0.6)
EOSINOPHILS RELATIVE PERCENT: 2.4 %
EPITHELIAL CELLS, UA: NORMAL /HPF (ref 0–5)
GFR SERPL CREATININE-BSD FRML MDRD: >60 ML/MIN/{1.73_M2}
GLUCOSE BLD-MCNC: 219 MG/DL (ref 70–99)
GLUCOSE BLD-MCNC: 264 MG/DL
GLUCOSE BLD-MCNC: 264 MG/DL (ref 70–99)
GLUCOSE URINE: 500 MG/DL
HCT VFR BLD CALC: 42.8 % (ref 36–48)
HEMOGLOBIN: 14.3 G/DL (ref 12–16)
KETONES, URINE: NEGATIVE MG/DL
LEUKOCYTE ESTERASE, URINE: NEGATIVE
LYMPHOCYTES ABSOLUTE: 2.4 K/UL (ref 1–5.1)
LYMPHOCYTES RELATIVE PERCENT: 27.4 %
MCH RBC QN AUTO: 29.9 PG (ref 26–34)
MCHC RBC AUTO-ENTMCNC: 33.5 G/DL (ref 31–36)
MCV RBC AUTO: 89.1 FL (ref 80–100)
MICROSCOPIC EXAMINATION: YES
MONOCYTES ABSOLUTE: 0.6 K/UL (ref 0–1.3)
MONOCYTES RELATIVE PERCENT: 7.3 %
NEUTROPHILS ABSOLUTE: 5.5 K/UL (ref 1.7–7.7)
NEUTROPHILS RELATIVE PERCENT: 62.2 %
NITRITE, URINE: NEGATIVE
PDW BLD-RTO: 12.9 % (ref 12.4–15.4)
PERFORMED ON: ABNORMAL
PH UA: 6.5 (ref 5–8)
PLATELET # BLD: 246 K/UL (ref 135–450)
PMV BLD AUTO: 9.2 FL (ref 5–10.5)
POTASSIUM REFLEX MAGNESIUM: 4 MMOL/L (ref 3.5–5.1)
PRO-BNP: 87 PG/ML (ref 0–124)
PROTEIN UA: 100 MG/DL
RBC # BLD: 4.8 M/UL (ref 4–5.2)
RBC UA: NORMAL /HPF (ref 0–4)
SODIUM BLD-SCNC: 139 MMOL/L (ref 136–145)
SPECIFIC GRAVITY UA: 1.01 (ref 1–1.03)
TROPONIN: <0.01 NG/ML
URINE REFLEX TO CULTURE: ABNORMAL
URINE TYPE: ABNORMAL
UROBILINOGEN, URINE: 0.2 E.U./DL
WBC # BLD: 8.8 K/UL (ref 4–11)
WBC UA: NORMAL /HPF (ref 0–5)

## 2023-01-07 PROCEDURE — 70498 CT ANGIOGRAPHY NECK: CPT

## 2023-01-07 PROCEDURE — 83880 ASSAY OF NATRIURETIC PEPTIDE: CPT

## 2023-01-07 PROCEDURE — 70450 CT HEAD/BRAIN W/O DYE: CPT

## 2023-01-07 PROCEDURE — 93005 ELECTROCARDIOGRAM TRACING: CPT | Performed by: STUDENT IN AN ORGANIZED HEALTH CARE EDUCATION/TRAINING PROGRAM

## 2023-01-07 PROCEDURE — 99285 EMERGENCY DEPT VISIT HI MDM: CPT

## 2023-01-07 PROCEDURE — 6370000000 HC RX 637 (ALT 250 FOR IP): Performed by: STUDENT IN AN ORGANIZED HEALTH CARE EDUCATION/TRAINING PROGRAM

## 2023-01-07 PROCEDURE — 85025 COMPLETE CBC W/AUTO DIFF WBC: CPT

## 2023-01-07 PROCEDURE — 6360000004 HC RX CONTRAST MEDICATION: Performed by: STUDENT IN AN ORGANIZED HEALTH CARE EDUCATION/TRAINING PROGRAM

## 2023-01-07 PROCEDURE — 71045 X-RAY EXAM CHEST 1 VIEW: CPT

## 2023-01-07 PROCEDURE — 80048 BASIC METABOLIC PNL TOTAL CA: CPT

## 2023-01-07 PROCEDURE — 84484 ASSAY OF TROPONIN QUANT: CPT

## 2023-01-07 PROCEDURE — 81001 URINALYSIS AUTO W/SCOPE: CPT

## 2023-01-07 RX ORDER — MECLIZINE HYDROCHLORIDE 25 MG/1
25 TABLET ORAL ONCE
Status: COMPLETED | OUTPATIENT
Start: 2023-01-07 | End: 2023-01-07

## 2023-01-07 RX ADMIN — MECLIZINE HYDROCHLORIDE 25 MG: 25 TABLET ORAL at 20:17

## 2023-01-07 RX ADMIN — IOPAMIDOL 75 ML: 755 INJECTION, SOLUTION INTRAVENOUS at 21:57

## 2023-01-07 ASSESSMENT — PAIN - FUNCTIONAL ASSESSMENT
PAIN_FUNCTIONAL_ASSESSMENT: NONE - DENIES PAIN

## 2023-01-08 LAB
EKG ATRIAL RATE: 80 BPM
EKG DIAGNOSIS: NORMAL
EKG P AXIS: 15 DEGREES
EKG P-R INTERVAL: 190 MS
EKG Q-T INTERVAL: 378 MS
EKG QRS DURATION: 82 MS
EKG QTC CALCULATION (BAZETT): 435 MS
EKG R AXIS: -23 DEGREES
EKG T AXIS: 3 DEGREES
EKG VENTRICULAR RATE: 80 BPM

## 2023-01-08 NOTE — DISCHARGE INSTRUCTIONS
You were seen in the emergency department for dizziness and nausea/vomiting. You did not have any signs of organ damage from your high blood pressure. You did have signs of a previous old, small stroke on your CT head. We discussed that we cannot fully rule out that this is not a stroke, however you do have improvement in her symptoms. Please continue to monitor for symptoms including ongoing dizziness and vomiting and return to the emergency department if these happen again.

## 2023-01-08 NOTE — ED NOTES
Discharge instructions given per provider order. Patient verbalized understanding.         Cassandra Welch RN  01/07/23 2534

## 2023-01-08 NOTE — ED PROVIDER NOTES
[address]          EM RESIDENT NOTE       Date of evaluation: 1/7/2023    Chief Complaint     Hypertension, Hyperglycemia, and Nausea      of Present Illness     Mignon Rojas is a 69 y.o. female with past medical history of malignant hypertension, HLD, and type 2 diabetes who presents emergency department for dizziness, an episode of vomiting, and hypertension. Patient states that she became dizzy earlier this evening, felt like she was unsteady, and vomited x2. Patient denies any blood or bilious appearance to her vomiting. Patient states that she noticed her blood pressure was 220s during this time, concerning her. Her blood pressure steadily decreased over time, and then subsequently increased, with the most recent blood pressure prior to arrival with a systolic of 072. Patient was noted to have a glucose of the low 300s prior to arrival as well. Patient states that she still feels dizzy, states it is present when moving, but resolves with rest.  Patient states that she is no longer nauseous and does not feel the urge to vomit. She did not have any shortness of breath or chest pain with this and otherwise denies additional symptoms specifically denies fever, headache, abdominal pain, cough, dysuria, hematuria, lower extremity edema and rash. She states she does have a history of this prior with her blood pressure. Review of Systems     Denies fever, headache, abdominal pain, cough, dysuria, hematuria, lower extremity edema and rash. All other systems were reviewed and were negative.     Past Medical, Surgical, Family, and Social History     She has a past medical history of Diabetes mellitus ([address] Headache(784.0), Herniated disc, cervical, Hypercholesteremia, Hypertension, Intestinal malabsorption, Iron deficiency anemia, unspecified, Memory loss, Osteopenia of neck of left femur, Pneumonia due to 2019 novel coronavirus, Psychiatric problem, and Type 2 diabetes mellitus without complication ([facility] She has a past surgical history that includes Tubal ligation; shoulder surgery (Left); Kidney stone surgery; Breast surgery; Colonoscopy (N/A, 02/17/2021); and Upper gastrointestinal endoscopy (N/A, 02/17/2021). Her family history includes Diabetes in her father and mother; High Blood Pressure in her father and mother; Stroke in her father and mother. She reports that she has never smoked. She has never used smokeless tobacco. She reports that she does not currently use alcohol. She reports that she does not use drugs. Medications     Discharge Medication List as of 1/7/2023 11:38 PM        CONTINUE these medications which have NOT CHANGED    Details   gabapentin (NEURONTIN) 300 MG capsule Take 1 capsule by mouth 3 times daily as needed (neuropathy) for up to 30 days. , Disp-90 capsule, R-1Normal      atorvastatin (LIPITOR) 40 MG tablet Take 1 tablet by mouth daily For cholesterol and heart, Disp-90 tablet, R-0Normal      metFORMIN (GLUCOPHAGE) 1000 MG tablet Take 1 tablet by mouth daily (with breakfast) For diabetes, Disp-90 tablet, R-0Normal      aspirin 325 MG tablet Take 1 tablet by mouth daily, Disp-30 tablet, R-3Normal      donepezil (ARICEPT) 10 MG tablet Take 1 tablet by mouth nightly, Disp-60 tablet, R-0Normal      losartan (COZAAR) 100 MG tablet TAKE ONE TABLET BY MOUTH DAILY, Disp-90 tablet, R-0Normal      SITagliptin (JANUVIA) 100 MG tablet Take 1 tablet by mouth daily, Disp-90 tablet, R-0Normal      amLODIPine (NORVASC) 5 MG tablet Take 1 tablet by mouth daily, Disp-90 tablet, R-0Normal      traZODone (DESYREL) 100 MG tablet TAKE ONE TABLET BY MOUTH DAILY AS NEEDED FOR SLEEP, Disp-90 tablet, R-0Normal      vitamin B-12 (CYANOCOBALAMIN) 1000 MCG tablet Take 1 tablet by mouth daily, Disp-90 tablet, R-0Normal      Continuous Blood Gluc  (FREESTYLE SIMRAN 2 READER) SHANNEN 1 each by Does not apply route 4 times daily, Disp-1 each, R-0Normal Continuous Blood Gluc Sensor (FREESTYLE SIMRAN 14 DAY SENSOR) MISC 1 box by Does not apply route 4 times daily, Disp-1 each, R-3Normal      insulin glargine (LANTUS SOLOSTAR) 100 UNIT/ML injection pen Inject 32 Units into the skin in the morning., Disp-54 mL, R-0Normal      Insulin Pen Needle 31G X 5 MM MISC DAILY Starting Mon 7/18/2022, Disp-300 each, R-3, Normal      FreeStyle Lancets MISC DAILY Starting Mon 4/18/2022, Disp-100 each, R-3, Normal      dicyclomine (BENTYL) 10 MG capsule Take 1 capsule by mouth every 6 hours as needed (cramps), Disp-20 capsule, R-0Normal      blood glucose test strips (FREESTYLE LITE) strip 1 each by In Vitro route 4 times daily (before meals and nightly), Disp-300 each, R-3Normal      ondansetron (ZOFRAN ODT) 4 MG disintegrating tablet Take 1 tablet by mouth every 8 hours as needed for Nausea, Disp-20 tablet, R-0Normal      Cyanocobalamin 1000 MCG SUBL Place 1,000 mcg under the tongue daily, Disp-90 tablet, R-0Normal      COVID-19 mRNA Vacc, PFIZER, 30 MCG/0.3ML SUSP injection Historical Med             Allergies     She has No Known Allergies. Physical Exam     INITIAL VITALS: BP: (!) 156/86, Temp: 98.3 °F (36.8 °C), Heart Rate: 94, Resp: 16, SpO2: 97 %     General: Well-appearing, age-appropriate female, sitting in the gurney no acute distress. Eyes:  PERRL. EOMI. No discharge from eyes   ENT:  No discharge from nose. OP clear  Neck:  Supple, trachea midline  Pulmonary:   Non-labored breathing. Breath sounds clear bilaterally  Cardiac:  Regular rate and rhythm. No murmurs/rubs/gallops  Abdomen:  Soft. Non-tender. Non-distended. No rebound tenderness, guarding or masses. Musculoskeletal:  No long bone deformity. Vascular:  Extremities warm and perfused. Normal pulses in all 4 extremities  Skin:  Dry, no rashes  Extremities:  No peripheral edema  Neuro:  Alert and oriented x 4. CN II-XII intact. 5/5 strength in all 4 extremities. Peripheral visual fields intact.   Normal finger-to-nose test.  Sensation grossly intact to light touch. Speech and mentation normal.  Gait narrow and stable      DiagnosticResults     EKG   Interpreted in conjunction with emergencydepartment physician No att. providers found  Rhythm: normal sinus   Rate: normal  Axis: normal  Ectopy: none  Conduction: normal  ST Segments: normal  T Waves:flattening in  III and aVf and inversion in  v2  Q Waves: none  Clinical Impression: Sinus rhythm, normal QRS duration of 82, normal QTc interval of 435, T wave flattening in inferior leads III and aVF, T wave inversion in lead V2. Unchanged from prior EKG in 2/3/2021. Comparison:  2/3/21    RADIOLOGY:  CTA HEAD NECK W CONTRAST   Final Result   1. Normal right internal carotid artery. .   2. Normal left internal carotid artery with no hemodynamic stenosis   3. Normal vertebral arteries. Left dominant with bilateral trigeminal origin of the posterior cerebral arteries   4. Normal intracranial circulation. CT Head W/O Contrast   Final Result   1. Atrophic changes   2. Periventricular basal ganglion remote lacunar ischemic changes. FINDINGS are stable   3. No evidence of acute intracranial hemorrhage      XR CHEST PORTABLE   Final Result   1. Evidence of pulmonary interstitial lung disease, nonspecific pulmonary findings unchanged from prior study of 2/3/2021 (CT of 5/7/2021 showing basilar interstitial lung disease)   2.  No acute changes          LABS:   Results for orders placed or performed during the hospital encounter of 01/07/23   BMP w/ Reflex to MG   Result Value Ref Range    Sodium 139 136 - 145 mmol/L    Potassium reflex Magnesium 4.0 3.5 - 5.1 mmol/L    Chloride 101 99 - 110 mmol/L    CO2 29 21 - 32 mmol/L    Anion Gap 9 3 - 16    Glucose 219 (H) 70 - 99 mg/dL    BUN 11 7 - 20 mg/dL    Creatinine 0.6 0.6 - 1.2 mg/dL    Est, Glom Filt Rate >60 >60    Calcium 9.8 8.3 - 10.6 mg/dL   CBC with Auto Differential   Result Value Ref Range    WBC 8.8 4.0 - 11.0 K/uL    RBC 4.80 4.00 - 5.20 M/uL    Hemoglobin 14.3 12.0 - 16.0 g/dL    Hematocrit 42.8 36.0 - 48.0 %    MCV 89.1 80.0 - 100.0 fL    MCH 29.9 26.0 - 34.0 pg    MCHC 33.5 31.0 - 36.0 g/dL    RDW 12.9 12.4 - 15.4 %    Platelets 397 572 - 966 K/uL    MPV 9.2 5.0 - 10.5 fL    Neutrophils % 62.2 %    Lymphocytes % 27.4 %    Monocytes % 7.3 %    Eosinophils % 2.4 %    Basophils % 0.7 %    Neutrophils Absolute 5.5 1.7 - 7.7 K/uL    Lymphocytes Absolute 2.4 1.0 - 5.1 K/uL    Monocytes Absolute 0.6 0.0 - 1.3 K/uL    Eosinophils Absolute 0.2 0.0 - 0.6 K/uL    Basophils Absolute 0.1 0.0 - 0.2 K/uL   Troponin   Result Value Ref Range    Troponin <0.01 <0.01 ng/mL   Urinalysis with Reflex to Culture    Specimen: Urine   Result Value Ref Range    Color, UA Yellow Straw/Yellow    Clarity, UA Clear Clear    Glucose, Ur 500 (A) Negative mg/dL    Bilirubin Urine Negative Negative    Ketones, Urine Negative Negative mg/dL    Specific Gravity, UA 1.010 1.005 - 1.030    Blood, Urine Negative Negative    pH, UA 6.5 5.0 - 8.0    Protein,  (A) Negative mg/dL    Urobilinogen, Urine 0.2 <2.0 E.U./dL    Nitrite, Urine Negative Negative    Leukocyte Esterase, Urine Negative Negative    Microscopic Examination YES     Urine Type NotGiven     Urine Reflex to Culture Not Indicated    Brain Natriuretic Peptide   Result Value Ref Range    Pro-BNP 87 0 - 124 pg/mL   Microscopic Urinalysis   Result Value Ref Range    WBC, UA 0-2 0 - 5 /HPF    RBC, UA 0-2 0 - 4 /HPF    Epithelial Cells, UA 0-1 0 - 5 /HPF    Amorphous, UA Rare /HPF   POCT Glucose   Result Value Ref Range    Glucose 264 mg/dL   POCT Glucose   Result Value Ref Range    POC Glucose 264 (H) 70 - 99 mg/dl    Performed on ACCU-CHEK          RECENT VITALS:  BP: (!) 142/81, Temp: 98.3 °F (36.8 °C), Heart Rate: 82,Resp: 17, SpO2: 99 %     ED Course     Nursing Notes, Past Medical Hx, Past Surgical Hx, Social Hx, Allergies, and Family Hx were reviewed.     The patient was given the followingmedications:  Orders Placed This Encounter   Medications    meclizine (ANTIVERT) tablet 25 mg    iopamidol (ISOVUE-370) 76 % injection 75 mL       CONSULTS:  None    MEDICAL DECISION MAKING / ASSESSMENT / PLAN     Whitney Telles is a 67 y.o. female with a past medical history of dementia and malignant hypertension who presents to the emergency department for dizziness and vomiting.  On arrival patient is chronically ill-appearing, no acute distress, with unremarkable vital signs.  Patient is a inconsistent historian, will intermittently report a headache versus ongoing dizziness, therefore difficult to discern whether dizziness is present at rest.  Patient did not have any chest pain, her EKG does not show any signs of ischemia, and her troponin is negative.  She does not have any signs of endorgan damage with a BMP showing a normal creatinine, a normal BNP and a urinalysis notable for mild proteinuria and glucosuria.  Patient did not have any signs of infection or CBC with a normal white blood cell count and did not have any signs of anemia.  Her neuro exam did not show any signs of ataxia, this is nonfocal.  She was given meclizine with improvement of symptoms, however given inconsistency of story there was some concern for posterior stroke and therefore a CT head was obtained as well as a CTA of the head and neck did not show any acute abnormality.  Discussed with patient the possibility that she may have a posterior stroke and offered admission however they declined.  Patient's daughter is at bedside who states patient appears at her baseline and she is nothing the patient has any ongoing dizziness.  Patient was given strict return precautions, instructed to follow-up with her next appointment and was discharged.    This patient was also evaluated by the attending physician. All care plans werediscussed and agreed upon.    At this time, the patient was deemed appropriate for discharge. My customary  discharge instructions, including strict return precautions for new or worsening symptoms concerning to the patient, were provided. All of patient's questions were answered satisfactorily, and she was subsequently sent home in stable condition. Clinical Impression     1. Dizziness    2. Nausea        Disposition     PATIENT REFERRED TO:  No follow-up provider specified.     DISCHARGE MEDICATIONS:  Discharge Medication List as of 1/7/2023 11:38 PM          DISPOSITION Decision To Discharge 01/07/2023 11:22:55 PM       Radha Granados MD  01/08/23 6727

## 2023-01-09 ENCOUNTER — CARE COORDINATION (OUTPATIENT)
Dept: CARE COORDINATION | Age: 68
End: 2023-01-09

## 2023-01-16 ENCOUNTER — CARE COORDINATION (OUTPATIENT)
Dept: CARE COORDINATION | Age: 68
End: 2023-01-16

## 2023-12-27 ENCOUNTER — APPOINTMENT (OUTPATIENT)
Dept: CT IMAGING | Age: 68
End: 2023-12-27
Payer: MEDICARE

## 2023-12-27 ENCOUNTER — HOSPITAL ENCOUNTER (EMERGENCY)
Age: 68
Discharge: HOME | End: 2023-12-27
Attending: EMERGENCY MEDICINE
Payer: MEDICARE

## 2023-12-27 ENCOUNTER — APPOINTMENT (OUTPATIENT)
Dept: GENERAL RADIOLOGY | Age: 68
End: 2023-12-27
Payer: MEDICARE

## 2023-12-27 VITALS
WEIGHT: 133 LBS | HEIGHT: 60 IN | TEMPERATURE: 98.2 F | BODY MASS INDEX: 26.11 KG/M2 | DIASTOLIC BLOOD PRESSURE: 75 MMHG | HEART RATE: 72 BPM | SYSTOLIC BLOOD PRESSURE: 159 MMHG | OXYGEN SATURATION: 95 % | RESPIRATION RATE: 16 BRPM

## 2023-12-27 DIAGNOSIS — S80.01XA CONTUSION OF RIGHT KNEE, INITIAL ENCOUNTER: Primary | ICD-10-CM

## 2023-12-27 PROCEDURE — 73562 X-RAY EXAM OF KNEE 3: CPT

## 2023-12-27 PROCEDURE — 70450 CT HEAD/BRAIN W/O DYE: CPT

## 2023-12-27 PROCEDURE — 6370000000 HC RX 637 (ALT 250 FOR IP): Performed by: EMERGENCY MEDICINE

## 2023-12-27 PROCEDURE — 72125 CT NECK SPINE W/O DYE: CPT

## 2023-12-27 PROCEDURE — 99284 EMERGENCY DEPT VISIT MOD MDM: CPT

## 2023-12-27 RX ORDER — LIDOCAINE 50 MG/G
1 PATCH TOPICAL DAILY
Qty: 30 PATCH | Refills: 0 | Status: SHIPPED | OUTPATIENT
Start: 2023-12-27 | End: 2024-01-26

## 2023-12-27 RX ORDER — ACETAMINOPHEN 325 MG/1
650 TABLET ORAL ONCE
Status: COMPLETED | OUTPATIENT
Start: 2023-12-27 | End: 2023-12-27

## 2023-12-27 RX ORDER — LIDOCAINE 4 G/G
1 PATCH TOPICAL DAILY
Status: DISCONTINUED | OUTPATIENT
Start: 2023-12-27 | End: 2023-12-27 | Stop reason: HOSPADM

## 2023-12-27 RX ADMIN — ACETAMINOPHEN 650 MG: 325 TABLET ORAL at 09:26

## 2023-12-27 NOTE — DISCHARGE INSTRUCTIONS
Your xrays and cat scans today are normal with no significant injuries. Take tylenol and ibuprofen and use prescribed lidocaine patches. Use eyedrops for eye discomfort.

## 2024-08-08 ENCOUNTER — APPOINTMENT (OUTPATIENT)
Dept: ULTRASOUND IMAGING | Age: 69
End: 2024-08-08
Payer: MEDICARE

## 2024-08-08 ENCOUNTER — APPOINTMENT (OUTPATIENT)
Dept: CT IMAGING | Age: 69
End: 2024-08-08
Payer: MEDICARE

## 2024-08-08 ENCOUNTER — HOSPITAL ENCOUNTER (INPATIENT)
Age: 69
LOS: 7 days | Discharge: HOME HEALTH | End: 2024-08-15
Attending: EMERGENCY MEDICINE | Admitting: STUDENT IN AN ORGANIZED HEALTH CARE EDUCATION/TRAINING PROGRAM
Payer: MEDICARE

## 2024-08-08 DIAGNOSIS — E11.65 HYPERGLYCEMIA DUE TO DIABETES MELLITUS (HCC): ICD-10-CM

## 2024-08-08 DIAGNOSIS — R07.9 CHEST PAIN, UNSPECIFIED TYPE: ICD-10-CM

## 2024-08-08 DIAGNOSIS — I10 HYPERTENSION, UNSPECIFIED TYPE: ICD-10-CM

## 2024-08-08 DIAGNOSIS — K80.51 CALCULUS OF BILE DUCT WITHOUT CHOLECYSTITIS WITH OBSTRUCTION: Primary | ICD-10-CM

## 2024-08-08 DIAGNOSIS — K80.20 SYMPTOMATIC CHOLELITHIASIS: ICD-10-CM

## 2024-08-08 DIAGNOSIS — K80.10 CCC (CHRONIC CALCULOUS CHOLECYSTITIS): ICD-10-CM

## 2024-08-08 PROBLEM — K80.50 CHOLEDOCHOLITHIASIS: Status: ACTIVE | Noted: 2024-08-08

## 2024-08-08 LAB
ALBUMIN SERPL-MCNC: 3.5 G/DL (ref 3.4–5)
ALBUMIN/GLOB SERPL: 0.9 {RATIO} (ref 1.1–2.2)
ALP SERPL-CCNC: 96 U/L (ref 40–129)
ALT SERPL-CCNC: 119 U/L (ref 10–40)
ANION GAP SERPL CALCULATED.3IONS-SCNC: 9 MMOL/L (ref 3–16)
AST SERPL-CCNC: 219 U/L (ref 15–37)
BACTERIA URNS QL MICRO: ABNORMAL /HPF
BASE EXCESS BLDV CALC-SCNC: 8 MMOL/L (ref -3–3)
BASOPHILS # BLD: 0.1 K/UL (ref 0–0.2)
BASOPHILS NFR BLD: 0.7 %
BILIRUB SERPL-MCNC: 0.9 MG/DL (ref 0–1)
BILIRUB UR QL STRIP.AUTO: NEGATIVE
BUN SERPL-MCNC: 14 MG/DL (ref 7–20)
CALCIUM SERPL-MCNC: 9.1 MG/DL (ref 8.3–10.6)
CHLORIDE SERPL-SCNC: 93 MMOL/L (ref 99–110)
CLARITY UR: CLEAR
CO2 BLDV-SCNC: 81 MMOL/L
CO2 SERPL-SCNC: 31 MMOL/L (ref 21–32)
COHGB MFR BLDV: 2.5 % (ref 0–1.5)
COLOR UR: YELLOW
CREAT SERPL-MCNC: 0.6 MG/DL (ref 0.6–1.2)
DEPRECATED RDW RBC AUTO: 12.8 % (ref 12.4–15.4)
DO-HGB MFR BLDV: 18 %
EOSINOPHIL # BLD: 0.1 K/UL (ref 0–0.6)
EOSINOPHIL NFR BLD: 1.2 %
EPI CELLS #/AREA URNS AUTO: 1 /HPF (ref 0–5)
GFR SERPLBLD CREATININE-BSD FMLA CKD-EPI: >90 ML/MIN/{1.73_M2}
GLUCOSE SERPL-MCNC: 353 MG/DL (ref 70–99)
GLUCOSE UR STRIP.AUTO-MCNC: >=1000 MG/DL
HCO3 BLDV-SCNC: 34.6 MMOL/L (ref 23–29)
HCT VFR BLD AUTO: 42.9 % (ref 36–48)
HGB BLD-MCNC: 14.8 G/DL (ref 12–16)
HGB UR QL STRIP.AUTO: NEGATIVE
HYALINE CASTS #/AREA URNS AUTO: 0 /LPF (ref 0–8)
KETONES UR STRIP.AUTO-MCNC: NEGATIVE MG/DL
LACTATE BLDV-SCNC: 2.2 MMOL/L (ref 0.4–1.9)
LACTATE BLDV-SCNC: 2.9 MMOL/L (ref 0.4–1.9)
LEUKOCYTE ESTERASE UR QL STRIP.AUTO: NEGATIVE
LIPASE SERPL-CCNC: 70 U/L (ref 13–60)
LYMPHOCYTES # BLD: 2.2 K/UL (ref 1–5.1)
LYMPHOCYTES NFR BLD: 23.2 %
MCH RBC QN AUTO: 30.3 PG (ref 26–34)
MCHC RBC AUTO-ENTMCNC: 34.6 G/DL (ref 31–36)
MCV RBC AUTO: 87.4 FL (ref 80–100)
METHGB MFR BLDV: 0.4 %
MONOCYTES # BLD: 0.7 K/UL (ref 0–1.3)
MONOCYTES NFR BLD: 7.3 %
NEUTROPHILS # BLD: 6.5 K/UL (ref 1.7–7.7)
NEUTROPHILS NFR BLD: 67.6 %
NITRITE UR QL STRIP.AUTO: NEGATIVE
NT-PROBNP SERPL-MCNC: 108 PG/ML (ref 0–124)
O2 CT VFR BLDV CALC: 17 VOL %
O2 THERAPY: ABNORMAL
PCO2 BLDV: 54.8 MMHG (ref 40–50)
PH BLDV: 7.41 [PH] (ref 7.35–7.45)
PH UR STRIP.AUTO: 8 [PH] (ref 5–8)
PLATELET # BLD AUTO: 226 K/UL (ref 135–450)
PMV BLD AUTO: 9 FL (ref 5–10.5)
PO2 BLDV: 45.2 MMHG (ref 25–40)
POTASSIUM SERPL-SCNC: 3.6 MMOL/L (ref 3.5–5.1)
PROT SERPL-MCNC: 7.5 G/DL (ref 6.4–8.2)
PROT UR STRIP.AUTO-MCNC: 300 MG/DL
RBC # BLD AUTO: 4.91 M/UL (ref 4–5.2)
RBC CLUMPS #/AREA URNS AUTO: 2 /HPF (ref 0–4)
SAO2 % BLDV: 82 %
SODIUM SERPL-SCNC: 133 MMOL/L (ref 136–145)
SP GR UR STRIP.AUTO: >=1.03 (ref 1–1.03)
TROPONIN, HIGH SENSITIVITY: 11 NG/L (ref 0–14)
TROPONIN, HIGH SENSITIVITY: 13 NG/L (ref 0–14)
UA COMPLETE W REFLEX CULTURE PNL UR: ABNORMAL
UA DIPSTICK W REFLEX MICRO PNL UR: YES
URN SPEC COLLECT METH UR: ABNORMAL
UROBILINOGEN UR STRIP-ACNC: 1 E.U./DL
WBC # BLD AUTO: 9.6 K/UL (ref 4–11)
WBC #/AREA URNS AUTO: 2 /HPF (ref 0–5)

## 2024-08-08 PROCEDURE — 96376 TX/PRO/DX INJ SAME DRUG ADON: CPT

## 2024-08-08 PROCEDURE — 2580000003 HC RX 258: Performed by: EMERGENCY MEDICINE

## 2024-08-08 PROCEDURE — 83690 ASSAY OF LIPASE: CPT

## 2024-08-08 PROCEDURE — 80053 COMPREHEN METABOLIC PANEL: CPT

## 2024-08-08 PROCEDURE — 83605 ASSAY OF LACTIC ACID: CPT

## 2024-08-08 PROCEDURE — 93005 ELECTROCARDIOGRAM TRACING: CPT | Performed by: EMERGENCY MEDICINE

## 2024-08-08 PROCEDURE — 81001 URINALYSIS AUTO W/SCOPE: CPT

## 2024-08-08 PROCEDURE — 87040 BLOOD CULTURE FOR BACTERIA: CPT

## 2024-08-08 PROCEDURE — 96365 THER/PROPH/DIAG IV INF INIT: CPT

## 2024-08-08 PROCEDURE — 87150 DNA/RNA AMPLIFIED PROBE: CPT

## 2024-08-08 PROCEDURE — 87186 SC STD MICRODIL/AGAR DIL: CPT

## 2024-08-08 PROCEDURE — 71250 CT THORAX DX C-: CPT

## 2024-08-08 PROCEDURE — 83880 ASSAY OF NATRIURETIC PEPTIDE: CPT

## 2024-08-08 PROCEDURE — 87077 CULTURE AEROBIC IDENTIFY: CPT

## 2024-08-08 PROCEDURE — 96375 TX/PRO/DX INJ NEW DRUG ADDON: CPT

## 2024-08-08 PROCEDURE — 74174 CTA ABD&PLVS W/CONTRAST: CPT

## 2024-08-08 PROCEDURE — 1200000000 HC SEMI PRIVATE

## 2024-08-08 PROCEDURE — 36415 COLL VENOUS BLD VENIPUNCTURE: CPT

## 2024-08-08 PROCEDURE — 99285 EMERGENCY DEPT VISIT HI MDM: CPT

## 2024-08-08 PROCEDURE — 6360000004 HC RX CONTRAST MEDICATION: Performed by: EMERGENCY MEDICINE

## 2024-08-08 PROCEDURE — 82803 BLOOD GASES ANY COMBINATION: CPT

## 2024-08-08 PROCEDURE — 84484 ASSAY OF TROPONIN QUANT: CPT

## 2024-08-08 PROCEDURE — 76705 ECHO EXAM OF ABDOMEN: CPT

## 2024-08-08 PROCEDURE — 6360000002 HC RX W HCPCS: Performed by: EMERGENCY MEDICINE

## 2024-08-08 PROCEDURE — 85025 COMPLETE CBC W/AUTO DIFF WBC: CPT

## 2024-08-08 RX ORDER — ONDANSETRON 2 MG/ML
4 INJECTION INTRAMUSCULAR; INTRAVENOUS ONCE
Status: COMPLETED | OUTPATIENT
Start: 2024-08-08 | End: 2024-08-08

## 2024-08-08 RX ORDER — LABETALOL HYDROCHLORIDE 5 MG/ML
10 INJECTION, SOLUTION INTRAVENOUS ONCE
Status: COMPLETED | OUTPATIENT
Start: 2024-08-08 | End: 2024-08-08

## 2024-08-08 RX ORDER — MORPHINE SULFATE 4 MG/ML
4 INJECTION, SOLUTION INTRAMUSCULAR; INTRAVENOUS ONCE
Status: COMPLETED | OUTPATIENT
Start: 2024-08-08 | End: 2024-08-08

## 2024-08-08 RX ORDER — 0.9 % SODIUM CHLORIDE 0.9 %
1000 INTRAVENOUS SOLUTION INTRAVENOUS ONCE
Status: COMPLETED | OUTPATIENT
Start: 2024-08-08 | End: 2024-08-08

## 2024-08-08 RX ADMIN — ONDANSETRON 4 MG: 2 INJECTION INTRAMUSCULAR; INTRAVENOUS at 19:39

## 2024-08-08 RX ADMIN — SODIUM CHLORIDE 1000 ML: 9 INJECTION, SOLUTION INTRAVENOUS at 21:00

## 2024-08-08 RX ADMIN — PIPERACILLIN AND TAZOBACTAM 3375 MG: 3; .375 INJECTION, POWDER, LYOPHILIZED, FOR SOLUTION INTRAVENOUS at 22:16

## 2024-08-08 RX ADMIN — ONDANSETRON 4 MG: 2 INJECTION INTRAMUSCULAR; INTRAVENOUS at 20:49

## 2024-08-08 RX ADMIN — LABETALOL HYDROCHLORIDE 10 MG: 5 INJECTION, SOLUTION INTRAVENOUS at 22:16

## 2024-08-08 RX ADMIN — MORPHINE SULFATE 4 MG: 4 INJECTION, SOLUTION INTRAMUSCULAR; INTRAVENOUS at 20:49

## 2024-08-08 RX ADMIN — IOPAMIDOL 75 ML: 755 INJECTION, SOLUTION INTRAVENOUS at 19:48

## 2024-08-08 ASSESSMENT — HEART SCORE: ECG: NON-SPECIFC REPOLARIZATION DISTURBANCE/LBTB/PM

## 2024-08-08 ASSESSMENT — LIFESTYLE VARIABLES
HOW OFTEN DO YOU HAVE A DRINK CONTAINING ALCOHOL: NEVER
HOW MANY STANDARD DRINKS CONTAINING ALCOHOL DO YOU HAVE ON A TYPICAL DAY: PATIENT DOES NOT DRINK

## 2024-08-09 ENCOUNTER — ANESTHESIA (OUTPATIENT)
Dept: OPERATING ROOM | Age: 69
End: 2024-08-09
Payer: MEDICARE

## 2024-08-09 ENCOUNTER — APPOINTMENT (OUTPATIENT)
Dept: GENERAL RADIOLOGY | Age: 69
End: 2024-08-09
Payer: MEDICARE

## 2024-08-09 ENCOUNTER — ANESTHESIA EVENT (OUTPATIENT)
Dept: OPERATING ROOM | Age: 69
End: 2024-08-09
Payer: MEDICARE

## 2024-08-09 ENCOUNTER — ANESTHESIA (OUTPATIENT)
Dept: ENDOSCOPY | Age: 69
End: 2024-08-09
Payer: MEDICARE

## 2024-08-09 ENCOUNTER — ANESTHESIA EVENT (OUTPATIENT)
Dept: ENDOSCOPY | Age: 69
End: 2024-08-09
Payer: MEDICARE

## 2024-08-09 PROBLEM — D72.829 LEUKOCYTOSIS: Status: ACTIVE | Noted: 2024-08-09

## 2024-08-09 PROBLEM — K80.51 CALCULUS OF BILE DUCT WITHOUT CHOLECYSTITIS WITH OBSTRUCTION: Status: ACTIVE | Noted: 2024-08-09

## 2024-08-09 PROBLEM — E83.42 HYPOMAGNESEMIA: Status: ACTIVE | Noted: 2024-08-09

## 2024-08-09 LAB
ALBUMIN SERPL-MCNC: 3.5 G/DL (ref 3.4–5)
ALP SERPL-CCNC: 142 U/L (ref 40–129)
ALT SERPL-CCNC: 844 U/L (ref 10–40)
ANION GAP SERPL CALCULATED.3IONS-SCNC: 14 MMOL/L (ref 3–16)
AST SERPL-CCNC: 1452 U/L (ref 15–37)
BASOPHILS # BLD: 0 K/UL (ref 0–0.2)
BASOPHILS NFR BLD: 0.1 %
BILIRUB DIRECT SERPL-MCNC: 2 MG/DL (ref 0–0.3)
BILIRUB INDIRECT SERPL-MCNC: 0.5 MG/DL (ref 0–1)
BILIRUB SERPL-MCNC: 2.5 MG/DL (ref 0–1)
BUN SERPL-MCNC: 15 MG/DL (ref 7–20)
CALCIUM SERPL-MCNC: 8.9 MG/DL (ref 8.3–10.6)
CHLORIDE SERPL-SCNC: 95 MMOL/L (ref 99–110)
CO2 SERPL-SCNC: 27 MMOL/L (ref 21–32)
CREAT SERPL-MCNC: 0.8 MG/DL (ref 0.6–1.2)
DEPRECATED RDW RBC AUTO: 12.9 % (ref 12.4–15.4)
EKG ATRIAL RATE: 68 BPM
EKG DIAGNOSIS: NORMAL
EKG P AXIS: 31 DEGREES
EKG P-R INTERVAL: 210 MS
EKG Q-T INTERVAL: 518 MS
EKG QRS DURATION: 88 MS
EKG QTC CALCULATION (BAZETT): 550 MS
EKG R AXIS: -32 DEGREES
EKG T AXIS: 104 DEGREES
EKG VENTRICULAR RATE: 68 BPM
EOSINOPHIL # BLD: 0 K/UL (ref 0–0.6)
EOSINOPHIL NFR BLD: 0 %
GFR SERPLBLD CREATININE-BSD FMLA CKD-EPI: 79 ML/MIN/{1.73_M2}
GLUCOSE BLD-MCNC: 223 MG/DL (ref 70–99)
GLUCOSE BLD-MCNC: 261 MG/DL (ref 70–99)
GLUCOSE BLD-MCNC: 301 MG/DL (ref 70–99)
GLUCOSE BLD-MCNC: 311 MG/DL (ref 70–99)
GLUCOSE BLD-MCNC: 319 MG/DL (ref 70–99)
GLUCOSE BLD-MCNC: 428 MG/DL (ref 70–99)
GLUCOSE SERPL-MCNC: 384 MG/DL (ref 70–99)
HCT VFR BLD AUTO: 41.6 % (ref 36–48)
HGB BLD-MCNC: 13.9 G/DL (ref 12–16)
LYMPHOCYTES # BLD: 0.3 K/UL (ref 1–5.1)
LYMPHOCYTES NFR BLD: 1.8 %
MAGNESIUM SERPL-MCNC: 1.6 MG/DL (ref 1.8–2.4)
MCH RBC QN AUTO: 29.7 PG (ref 26–34)
MCHC RBC AUTO-ENTMCNC: 33.6 G/DL (ref 31–36)
MCV RBC AUTO: 88.6 FL (ref 80–100)
MONOCYTES # BLD: 0.7 K/UL (ref 0–1.3)
MONOCYTES NFR BLD: 3.8 %
NEUTROPHILS # BLD: 17.8 K/UL (ref 1.7–7.7)
NEUTROPHILS NFR BLD: 94.3 %
PERFORMED ON: ABNORMAL
PLATELET # BLD AUTO: 217 K/UL (ref 135–450)
PMV BLD AUTO: 9 FL (ref 5–10.5)
POTASSIUM SERPL-SCNC: 3.3 MMOL/L (ref 3.5–5.1)
PROT SERPL-MCNC: 7.3 G/DL (ref 6.4–8.2)
RBC # BLD AUTO: 4.69 M/UL (ref 4–5.2)
REPORT: NORMAL
SODIUM SERPL-SCNC: 136 MMOL/L (ref 136–145)
WBC # BLD AUTO: 18.8 K/UL (ref 4–11)

## 2024-08-09 PROCEDURE — 2580000003 HC RX 258: Performed by: INTERNAL MEDICINE

## 2024-08-09 PROCEDURE — 3609015200 HC ERCP REMOVE CALCULI/DEBRIS BILIARY/PANCREAS DUCT: Performed by: INTERNAL MEDICINE

## 2024-08-09 PROCEDURE — 74330 X-RAY BILE/PANC ENDOSCOPY: CPT

## 2024-08-09 PROCEDURE — 6370000000 HC RX 637 (ALT 250 FOR IP): Performed by: INTERNAL MEDICINE

## 2024-08-09 PROCEDURE — 6370000000 HC RX 637 (ALT 250 FOR IP): Performed by: PHYSICIAN ASSISTANT

## 2024-08-09 PROCEDURE — 6370000000 HC RX 637 (ALT 250 FOR IP): Performed by: NURSE PRACTITIONER

## 2024-08-09 PROCEDURE — 6360000004 HC RX CONTRAST MEDICATION: Performed by: INTERNAL MEDICINE

## 2024-08-09 PROCEDURE — C1769 GUIDE WIRE: HCPCS | Performed by: INTERNAL MEDICINE

## 2024-08-09 PROCEDURE — 3700000000 HC ANESTHESIA ATTENDED CARE: Performed by: INTERNAL MEDICINE

## 2024-08-09 PROCEDURE — 85025 COMPLETE CBC W/AUTO DIFF WBC: CPT

## 2024-08-09 PROCEDURE — 80048 BASIC METABOLIC PNL TOTAL CA: CPT

## 2024-08-09 PROCEDURE — 80076 HEPATIC FUNCTION PANEL: CPT

## 2024-08-09 PROCEDURE — 2500000003 HC RX 250 WO HCPCS: Performed by: NURSE ANESTHETIST, CERTIFIED REGISTERED

## 2024-08-09 PROCEDURE — 36415 COLL VENOUS BLD VENIPUNCTURE: CPT

## 2024-08-09 PROCEDURE — 7100000001 HC PACU RECOVERY - ADDTL 15 MIN: Performed by: INTERNAL MEDICINE

## 2024-08-09 PROCEDURE — 83735 ASSAY OF MAGNESIUM: CPT

## 2024-08-09 PROCEDURE — 6360000002 HC RX W HCPCS: Performed by: PHYSICIAN ASSISTANT

## 2024-08-09 PROCEDURE — 6360000002 HC RX W HCPCS: Performed by: NURSE ANESTHETIST, CERTIFIED REGISTERED

## 2024-08-09 PROCEDURE — 3609014900 HC ERCP W/SPHINCTEROTOMY &/OR PAPILLOTOMY: Performed by: INTERNAL MEDICINE

## 2024-08-09 PROCEDURE — APPNB30 APP NON BILLABLE TIME 0-30 MINS: Performed by: NURSE PRACTITIONER

## 2024-08-09 PROCEDURE — 6360000002 HC RX W HCPCS: Performed by: INTERNAL MEDICINE

## 2024-08-09 PROCEDURE — 2709999900 HC NON-CHARGEABLE SUPPLY: Performed by: INTERNAL MEDICINE

## 2024-08-09 PROCEDURE — 99222 1ST HOSP IP/OBS MODERATE 55: CPT | Performed by: SURGERY

## 2024-08-09 PROCEDURE — APPSS60 APP SPLIT SHARED TIME 46-60 MINUTES: Performed by: NURSE PRACTITIONER

## 2024-08-09 PROCEDURE — 93010 ELECTROCARDIOGRAM REPORT: CPT | Performed by: INTERNAL MEDICINE

## 2024-08-09 PROCEDURE — 2720000010 HC SURG SUPPLY STERILE: Performed by: INTERNAL MEDICINE

## 2024-08-09 PROCEDURE — 7100000000 HC PACU RECOVERY - FIRST 15 MIN: Performed by: INTERNAL MEDICINE

## 2024-08-09 PROCEDURE — 1200000000 HC SEMI PRIVATE

## 2024-08-09 PROCEDURE — 3700000001 HC ADD 15 MINUTES (ANESTHESIA): Performed by: INTERNAL MEDICINE

## 2024-08-09 PROCEDURE — 2580000003 HC RX 258: Performed by: PHYSICIAN ASSISTANT

## 2024-08-09 RX ORDER — LACTOBACILLUS RHAMNOSUS GG 10B CELL
1 CAPSULE ORAL 2 TIMES DAILY WITH MEALS
Status: DISCONTINUED | OUTPATIENT
Start: 2024-08-09 | End: 2024-08-15 | Stop reason: HOSPADM

## 2024-08-09 RX ORDER — SODIUM CHLORIDE 0.9 % (FLUSH) 0.9 %
5-40 SYRINGE (ML) INJECTION EVERY 12 HOURS SCHEDULED
Status: DISCONTINUED | OUTPATIENT
Start: 2024-08-09 | End: 2024-08-15 | Stop reason: HOSPADM

## 2024-08-09 RX ORDER — ENOXAPARIN SODIUM 100 MG/ML
40 INJECTION SUBCUTANEOUS DAILY
Status: DISCONTINUED | OUTPATIENT
Start: 2024-08-09 | End: 2024-08-10

## 2024-08-09 RX ORDER — ACETAMINOPHEN 325 MG/1
650 TABLET ORAL EVERY 6 HOURS PRN
Status: DISCONTINUED | OUTPATIENT
Start: 2024-08-09 | End: 2024-08-15 | Stop reason: HOSPADM

## 2024-08-09 RX ORDER — ENOXAPARIN SODIUM 100 MG/ML
40 INJECTION SUBCUTANEOUS DAILY
Status: DISCONTINUED | OUTPATIENT
Start: 2024-08-09 | End: 2024-08-09

## 2024-08-09 RX ORDER — INSULIN LISPRO 100 [IU]/ML
0-8 INJECTION, SOLUTION INTRAVENOUS; SUBCUTANEOUS
Status: DISCONTINUED | OUTPATIENT
Start: 2024-08-09 | End: 2024-08-15 | Stop reason: HOSPADM

## 2024-08-09 RX ORDER — INSULIN GLARGINE 100 [IU]/ML
50 INJECTION, SOLUTION SUBCUTANEOUS EVERY MORNING
Status: DISCONTINUED | OUTPATIENT
Start: 2024-08-09 | End: 2024-08-15 | Stop reason: HOSPADM

## 2024-08-09 RX ORDER — DONEPEZIL HYDROCHLORIDE 5 MG/1
10 TABLET, FILM COATED ORAL NIGHTLY
Status: DISCONTINUED | OUTPATIENT
Start: 2024-08-09 | End: 2024-08-15 | Stop reason: HOSPADM

## 2024-08-09 RX ORDER — MORPHINE SULFATE 4 MG/ML
4 INJECTION, SOLUTION INTRAMUSCULAR; INTRAVENOUS
Status: DISCONTINUED | OUTPATIENT
Start: 2024-08-09 | End: 2024-08-12

## 2024-08-09 RX ORDER — LIDOCAINE HYDROCHLORIDE 20 MG/ML
INJECTION, SOLUTION INFILTRATION; PERINEURAL PRN
Status: DISCONTINUED | OUTPATIENT
Start: 2024-08-09 | End: 2024-08-09 | Stop reason: SDUPTHER

## 2024-08-09 RX ORDER — ONDANSETRON 2 MG/ML
4 INJECTION INTRAMUSCULAR; INTRAVENOUS EVERY 6 HOURS PRN
Status: DISCONTINUED | OUTPATIENT
Start: 2024-08-09 | End: 2024-08-15 | Stop reason: HOSPADM

## 2024-08-09 RX ORDER — ASPIRIN 325 MG
325 TABLET ORAL DAILY
Status: DISCONTINUED | OUTPATIENT
Start: 2024-08-09 | End: 2024-08-15 | Stop reason: HOSPADM

## 2024-08-09 RX ORDER — INSULIN LISPRO 100 [IU]/ML
0-4 INJECTION, SOLUTION INTRAVENOUS; SUBCUTANEOUS NIGHTLY
Status: DISCONTINUED | OUTPATIENT
Start: 2024-08-09 | End: 2024-08-09

## 2024-08-09 RX ORDER — LOSARTAN POTASSIUM 100 MG/1
100 TABLET ORAL DAILY
Status: DISCONTINUED | OUTPATIENT
Start: 2024-08-09 | End: 2024-08-09

## 2024-08-09 RX ORDER — SODIUM CHLORIDE, SODIUM LACTATE, POTASSIUM CHLORIDE, CALCIUM CHLORIDE 600; 310; 30; 20 MG/100ML; MG/100ML; MG/100ML; MG/100ML
INJECTION, SOLUTION INTRAVENOUS CONTINUOUS
Status: DISCONTINUED | OUTPATIENT
Start: 2024-08-09 | End: 2024-08-09

## 2024-08-09 RX ORDER — PROPOFOL 10 MG/ML
INJECTION, EMULSION INTRAVENOUS PRN
Status: DISCONTINUED | OUTPATIENT
Start: 2024-08-09 | End: 2024-08-09 | Stop reason: SDUPTHER

## 2024-08-09 RX ORDER — SODIUM CHLORIDE 0.9 % (FLUSH) 0.9 %
5-40 SYRINGE (ML) INJECTION PRN
Status: DISCONTINUED | OUTPATIENT
Start: 2024-08-09 | End: 2024-08-15 | Stop reason: HOSPADM

## 2024-08-09 RX ORDER — INSULIN GLARGINE 100 [IU]/ML
50 INJECTION, SOLUTION SUBCUTANEOUS NIGHTLY
Status: DISCONTINUED | OUTPATIENT
Start: 2024-08-09 | End: 2024-08-09

## 2024-08-09 RX ORDER — ONDANSETRON 2 MG/ML
INJECTION INTRAMUSCULAR; INTRAVENOUS PRN
Status: DISCONTINUED | OUTPATIENT
Start: 2024-08-09 | End: 2024-08-09 | Stop reason: SDUPTHER

## 2024-08-09 RX ORDER — PANTOPRAZOLE SODIUM 40 MG/10ML
40 INJECTION, POWDER, LYOPHILIZED, FOR SOLUTION INTRAVENOUS DAILY
Status: DISCONTINUED | OUTPATIENT
Start: 2024-08-09 | End: 2024-08-15 | Stop reason: HOSPADM

## 2024-08-09 RX ORDER — AMLODIPINE BESYLATE 5 MG/1
5 TABLET ORAL DAILY
Status: DISCONTINUED | OUTPATIENT
Start: 2024-08-09 | End: 2024-08-09

## 2024-08-09 RX ORDER — SODIUM CHLORIDE 9 MG/ML
INJECTION, SOLUTION INTRAVENOUS CONTINUOUS
Status: DISCONTINUED | OUTPATIENT
Start: 2024-08-09 | End: 2024-08-11

## 2024-08-09 RX ORDER — ACETAMINOPHEN 650 MG/1
650 SUPPOSITORY RECTAL EVERY 6 HOURS PRN
Status: DISCONTINUED | OUTPATIENT
Start: 2024-08-09 | End: 2024-08-15 | Stop reason: HOSPADM

## 2024-08-09 RX ORDER — HYDROCHLOROTHIAZIDE 25 MG/1
12.5 TABLET ORAL DAILY
Status: DISCONTINUED | OUTPATIENT
Start: 2024-08-09 | End: 2024-08-13

## 2024-08-09 RX ORDER — GLUCAGON 1 MG/ML
1 KIT INJECTION PRN
Status: DISCONTINUED | OUTPATIENT
Start: 2024-08-09 | End: 2024-08-15 | Stop reason: HOSPADM

## 2024-08-09 RX ORDER — SODIUM CHLORIDE 9 MG/ML
INJECTION, SOLUTION INTRAVENOUS PRN
Status: DISCONTINUED | OUTPATIENT
Start: 2024-08-09 | End: 2024-08-15 | Stop reason: HOSPADM

## 2024-08-09 RX ORDER — INSULIN LISPRO 100 [IU]/ML
2 INJECTION, SOLUTION INTRAVENOUS; SUBCUTANEOUS ONCE
Status: COMPLETED | OUTPATIENT
Start: 2024-08-09 | End: 2024-08-09

## 2024-08-09 RX ORDER — LANOLIN ALCOHOL/MO/W.PET/CERES
3 CREAM (GRAM) TOPICAL NIGHTLY
Status: DISCONTINUED | OUTPATIENT
Start: 2024-08-09 | End: 2024-08-15 | Stop reason: HOSPADM

## 2024-08-09 RX ORDER — TRAZODONE HYDROCHLORIDE 50 MG/1
50 TABLET ORAL NIGHTLY PRN
Status: DISCONTINUED | OUTPATIENT
Start: 2024-08-09 | End: 2024-08-15 | Stop reason: HOSPADM

## 2024-08-09 RX ORDER — LOSARTAN POTASSIUM 100 MG/1
100 TABLET ORAL DAILY
Status: DISCONTINUED | OUTPATIENT
Start: 2024-08-10 | End: 2024-08-15 | Stop reason: HOSPADM

## 2024-08-09 RX ORDER — INSULIN LISPRO 100 [IU]/ML
0-4 INJECTION, SOLUTION INTRAVENOUS; SUBCUTANEOUS NIGHTLY
Status: DISCONTINUED | OUTPATIENT
Start: 2024-08-09 | End: 2024-08-15 | Stop reason: HOSPADM

## 2024-08-09 RX ORDER — INDOMETHACIN 50 MG/1
SUPPOSITORY RECTAL PRN
Status: DISCONTINUED | OUTPATIENT
Start: 2024-08-09 | End: 2024-08-09 | Stop reason: ALTCHOICE

## 2024-08-09 RX ORDER — SENNOSIDES A AND B 8.6 MG/1
1 TABLET, FILM COATED ORAL DAILY PRN
Status: DISCONTINUED | OUTPATIENT
Start: 2024-08-09 | End: 2024-08-15 | Stop reason: HOSPADM

## 2024-08-09 RX ORDER — AMLODIPINE BESYLATE 5 MG/1
10 TABLET ORAL DAILY
Status: DISCONTINUED | OUTPATIENT
Start: 2024-08-10 | End: 2024-08-13

## 2024-08-09 RX ORDER — GLYCOPYRROLATE 0.2 MG/ML
INJECTION INTRAMUSCULAR; INTRAVENOUS PRN
Status: DISCONTINUED | OUTPATIENT
Start: 2024-08-09 | End: 2024-08-09 | Stop reason: SDUPTHER

## 2024-08-09 RX ORDER — DEXAMETHASONE SODIUM PHOSPHATE 4 MG/ML
INJECTION, SOLUTION INTRA-ARTICULAR; INTRALESIONAL; INTRAMUSCULAR; INTRAVENOUS; SOFT TISSUE PRN
Status: DISCONTINUED | OUTPATIENT
Start: 2024-08-09 | End: 2024-08-09 | Stop reason: SDUPTHER

## 2024-08-09 RX ORDER — INSULIN LISPRO 100 [IU]/ML
0-4 INJECTION, SOLUTION INTRAVENOUS; SUBCUTANEOUS
Status: DISCONTINUED | OUTPATIENT
Start: 2024-08-09 | End: 2024-08-09

## 2024-08-09 RX ORDER — HYDRALAZINE HYDROCHLORIDE 20 MG/ML
10 INJECTION INTRAMUSCULAR; INTRAVENOUS EVERY 4 HOURS PRN
Status: DISCONTINUED | OUTPATIENT
Start: 2024-08-09 | End: 2024-08-15 | Stop reason: HOSPADM

## 2024-08-09 RX ORDER — MORPHINE SULFATE 2 MG/ML
2 INJECTION, SOLUTION INTRAMUSCULAR; INTRAVENOUS
Status: DISCONTINUED | OUTPATIENT
Start: 2024-08-09 | End: 2024-08-12

## 2024-08-09 RX ORDER — ATORVASTATIN CALCIUM 40 MG/1
40 TABLET, FILM COATED ORAL NIGHTLY
Status: DISCONTINUED | OUTPATIENT
Start: 2024-08-09 | End: 2024-08-09

## 2024-08-09 RX ORDER — DEXTROSE MONOHYDRATE 100 MG/ML
INJECTION, SOLUTION INTRAVENOUS CONTINUOUS PRN
Status: DISCONTINUED | OUTPATIENT
Start: 2024-08-09 | End: 2024-08-15 | Stop reason: HOSPADM

## 2024-08-09 RX ORDER — FENTANYL CITRATE 50 UG/ML
INJECTION, SOLUTION INTRAMUSCULAR; INTRAVENOUS PRN
Status: DISCONTINUED | OUTPATIENT
Start: 2024-08-09 | End: 2024-08-09 | Stop reason: SDUPTHER

## 2024-08-09 RX ADMIN — PANTOPRAZOLE SODIUM 40 MG: 40 INJECTION, POWDER, FOR SOLUTION INTRAVENOUS at 10:15

## 2024-08-09 RX ADMIN — INSULIN GLARGINE 25 UNITS: 100 INJECTION, SOLUTION SUBCUTANEOUS at 10:25

## 2024-08-09 RX ADMIN — AMLODIPINE BESYLATE 5 MG: 5 TABLET ORAL at 02:05

## 2024-08-09 RX ADMIN — INSULIN LISPRO 4 UNITS: 100 INJECTION, SOLUTION INTRAVENOUS; SUBCUTANEOUS at 18:28

## 2024-08-09 RX ADMIN — HYDROCHLOROTHIAZIDE 12.5 MG: 25 TABLET ORAL at 10:23

## 2024-08-09 RX ADMIN — PROPOFOL 200 MG: 10 INJECTION, EMULSION INTRAVENOUS at 15:26

## 2024-08-09 RX ADMIN — ONDANSETRON 4 MG: 2 INJECTION INTRAMUSCULAR; INTRAVENOUS at 15:34

## 2024-08-09 RX ADMIN — TRAZODONE HYDROCHLORIDE 50 MG: 50 TABLET ORAL at 02:05

## 2024-08-09 RX ADMIN — SODIUM CHLORIDE: 9 INJECTION, SOLUTION INTRAVENOUS at 13:55

## 2024-08-09 RX ADMIN — HYDROCHLOROTHIAZIDE 12.5 MG: 25 TABLET ORAL at 02:04

## 2024-08-09 RX ADMIN — DONEPEZIL HYDROCHLORIDE 10 MG: 5 TABLET, FILM COATED ORAL at 02:37

## 2024-08-09 RX ADMIN — PIPERACILLIN AND TAZOBACTAM 3375 MG: 3; .375 INJECTION, POWDER, LYOPHILIZED, FOR SOLUTION INTRAVENOUS at 13:57

## 2024-08-09 RX ADMIN — INSULIN LISPRO 6 UNITS: 100 INJECTION, SOLUTION INTRAVENOUS; SUBCUTANEOUS at 11:26

## 2024-08-09 RX ADMIN — LOSARTAN POTASSIUM 100 MG: 100 TABLET, FILM COATED ORAL at 02:05

## 2024-08-09 RX ADMIN — PHENYLEPHRINE HYDROCHLORIDE 100 MCG: 10 INJECTION INTRAVENOUS at 15:43

## 2024-08-09 RX ADMIN — INSULIN LISPRO 2 UNITS: 100 INJECTION, SOLUTION INTRAVENOUS; SUBCUTANEOUS at 02:37

## 2024-08-09 RX ADMIN — FENTANYL CITRATE 50 MCG: 50 INJECTION, SOLUTION INTRAMUSCULAR; INTRAVENOUS at 15:26

## 2024-08-09 RX ADMIN — GLYCOPYRROLATE 0.2 MG: 0.2 INJECTION, SOLUTION INTRAMUSCULAR; INTRAVENOUS at 15:24

## 2024-08-09 RX ADMIN — SODIUM CHLORIDE, PRESERVATIVE FREE 10 ML: 5 INJECTION INTRAVENOUS at 20:09

## 2024-08-09 RX ADMIN — INSULIN LISPRO 4 UNITS: 100 INJECTION, SOLUTION INTRAVENOUS; SUBCUTANEOUS at 02:15

## 2024-08-09 RX ADMIN — FENTANYL CITRATE 25 MCG: 50 INJECTION, SOLUTION INTRAMUSCULAR; INTRAVENOUS at 15:34

## 2024-08-09 RX ADMIN — PHENYLEPHRINE HYDROCHLORIDE 100 MCG: 10 INJECTION INTRAVENOUS at 15:38

## 2024-08-09 RX ADMIN — INSULIN LISPRO 4 UNITS: 100 INJECTION, SOLUTION INTRAVENOUS; SUBCUTANEOUS at 20:06

## 2024-08-09 RX ADMIN — LIDOCAINE HYDROCHLORIDE 100 MG: 20 INJECTION, SOLUTION INFILTRATION; PERINEURAL at 15:26

## 2024-08-09 RX ADMIN — MORPHINE SULFATE 2 MG: 2 INJECTION, SOLUTION INTRAMUSCULAR; INTRAVENOUS at 02:05

## 2024-08-09 RX ADMIN — PIPERACILLIN AND TAZOBACTAM 3375 MG: 3; .375 INJECTION, POWDER, LYOPHILIZED, FOR SOLUTION INTRAVENOUS at 05:32

## 2024-08-09 RX ADMIN — MELATONIN TAB 3 MG 3 MG: 3 TAB at 20:09

## 2024-08-09 RX ADMIN — LOSARTAN POTASSIUM 100 MG: 100 TABLET, FILM COATED ORAL at 10:24

## 2024-08-09 RX ADMIN — HYDRALAZINE HYDROCHLORIDE 10 MG: 20 INJECTION INTRAMUSCULAR; INTRAVENOUS at 17:02

## 2024-08-09 RX ADMIN — AMLODIPINE BESYLATE 5 MG: 5 TABLET ORAL at 10:23

## 2024-08-09 RX ADMIN — MELATONIN TAB 3 MG 3 MG: 3 TAB at 02:04

## 2024-08-09 RX ADMIN — FENTANYL CITRATE 25 MCG: 50 INJECTION, SOLUTION INTRAMUSCULAR; INTRAVENOUS at 15:30

## 2024-08-09 RX ADMIN — DEXAMETHASONE SODIUM PHOSPHATE 4 MG: 4 INJECTION, SOLUTION INTRAMUSCULAR; INTRAVENOUS at 15:32

## 2024-08-09 ASSESSMENT — PAIN SCALES - WONG BAKER
WONGBAKER_NUMERICALRESPONSE: NO HURT

## 2024-08-09 ASSESSMENT — PAIN - FUNCTIONAL ASSESSMENT
PAIN_FUNCTIONAL_ASSESSMENT: 0-10
PAIN_FUNCTIONAL_ASSESSMENT: ACTIVITIES ARE NOT PREVENTED

## 2024-08-09 ASSESSMENT — PAIN DESCRIPTION - ORIENTATION: ORIENTATION: RIGHT

## 2024-08-09 ASSESSMENT — PAIN SCALES - GENERAL
PAINLEVEL_OUTOF10: 0
PAINLEVEL_OUTOF10: 5
PAINLEVEL_OUTOF10: 0

## 2024-08-09 ASSESSMENT — PAIN DESCRIPTION - DESCRIPTORS: DESCRIPTORS: SHARP

## 2024-08-09 ASSESSMENT — PAIN DESCRIPTION - LOCATION: LOCATION: ABDOMEN

## 2024-08-09 ASSESSMENT — ENCOUNTER SYMPTOMS: SHORTNESS OF BREATH: 0

## 2024-08-09 NOTE — CONSULTS
Whitney G Elfego     Chief complaint-epigastric abdominal pain    HPI: 68-year-old female who presented to the ED yesterday with complaints of severe, stabbing constant epigastric abdominal pain.  The pain started just prior to presentation to the ED.  It radiated to the chest and through to the back.  Nothing made the pain better or worse.  Associated symptoms include nausea and vomiting.  No history of fevers, chills, change in bowel habits or urinary symptoms.    Past Medical History:   Diagnosis Date    Diabetes mellitus (HCC)     NIDDM    Headache(784.0)     Herniated disc, cervical     Hypercholesteremia     Hypertension     Intestinal malabsorption 10/22/2020    Iron deficiency anemia, unspecified 10/22/2020    Memory loss     short term    Osteopenia of neck of left femur     dexa 12/2020, repeat 12/2022    Pneumonia due to 2019 novel coronavirus 07/2020    hospitalized in [facility]    Psychiatric problem     Type 2 diabetes mellitus without complication (HCC)        Past Surgical History:   Procedure Laterality Date    BREAST SURGERY      biopsy    COLONOSCOPY N/A 02/17/2021    repeat 2024 COLONOSCOPY DIAGNOSTIC performed by Juan Espinoza Jr., DO    KIDNEY STONE SURGERY      SHOULDER SURGERY Left     TUBAL LIGATION      UPPER GASTROINTESTINAL ENDOSCOPY N/A 02/17/2021    EGD BIOPSY performed by Juan Espinoza Jr., DO at [facility]       Social History     Socioeconomic History    Marital status:      Spouse name: Not on file    Number of children: 3    Years of education: Not on file    Highest education level: Not on file   Occupational History    Occupation: retired   Tobacco Use    Smoking status: Never    Smokeless tobacco: Never   Vaping Use    Vaping Use: Never used   Substance and Sexual Activity    Alcohol use: Not Currently    Drug use: No    Sexual activity: Yes     Partners: Male   Other Topics Concern    Not on file   Social History Narrative    Lives with   SUBL Place 1,000 mcg under the tongue daily  Patient not taking: Reported on 8/9/2024 2/21/22   Parminder Banda MD   COVID-19 mRNA Vacc, PFIZER, 30 MCG/0.3ML SUSP injection  5/1/21   ProviderAbel MD       Principal Problem:    Choledocholithiasis  Active Problems:    Hypercholesteremia    Essential hypertension    Type 2 diabetes mellitus with diabetic mononeuropathy, with long-term current use of insulin (HCC)    Alzheimer's disease, unspecified    Hypomagnesemia    Leukocytosis    Calculus of bile duct without cholecystitis with obstruction  Resolved Problems:    * No resolved hospital problems. *      Blood pressure 133/71, pulse 82, temperature 98.9 °F (37.2 °C), resp. rate 18, height 1.524 m (5'), weight 59.4 kg (131 lb), SpO2 96 %, not currently breastfeeding.    Review of Systems   Constitutional:  Positive for activity change and appetite change.   HENT:  Negative for congestion and dental problem.    Eyes:  Negative for discharge and itching.   Respiratory:  Negative for apnea and chest tightness.    Cardiovascular:  Negative for chest pain and leg swelling.   Gastrointestinal:  Positive for abdominal pain, nausea and vomiting.   Endocrine: Negative for cold intolerance and heat intolerance.   Genitourinary:  Negative for difficulty urinating and dyspareunia.   Musculoskeletal:  Negative for arthralgias and back pain.   Skin:  Negative for color change and pallor.   Allergic/Immunologic: Negative for environmental allergies and food allergies.   Neurological:  Negative for dizziness and facial asymmetry.   Hematological:  Negative for adenopathy. Does not bruise/bleed easily.   Psychiatric/Behavioral:  Negative for agitation and behavioral problems.        Physical Exam  Constitutional:       Appearance: She is well-developed.   HENT:      Head: Normocephalic and atraumatic.      Right Ear: External ear normal.      Left Ear: External ear normal.   Eyes:      Conjunctiva/sclera: Conjunctivae

## 2024-08-09 NOTE — ANESTHESIA PRE PROCEDURE
tablet Take 1 tablet by mouth daily  Patient not taking: Reported on 8/9/2024 8/14/23   Parminder Banda MD   SITagliptin (JANUVIA) 100 MG tablet Take 1 tablet by mouth every morning (before breakfast)  Patient not taking: Reported on 8/9/2024 8/14/23   Parminder Banda MD   blood glucose test strips (FREESTYLE LITE) strip 1 each by In Vitro route 4 times daily (before meals and nightly) 8/10/23   Parminder Banda MD   FreeStyle Lancets MISC 1 each by Does not apply route daily 8/10/23   Parminder Banda MD   Insulin Pen Needle 31G X 5 MM MISC 1 each by Does not apply route daily 8/10/23   Parminder Banda MD   Continuous Blood Gluc  (FREESTYLE SIMRAN 2 READER) SHANNEN 1 each by Does not apply route 4 times daily 10/24/22   Parminder Banda MD   Continuous Blood Gluc Sensor (FREESTYLE SIMRAN 14 DAY SENSOR) MISC 1 box by Does not apply route 4 times daily 10/24/22   Parminder Banda MD   dicyclomine (BENTYL) 10 MG capsule Take 1 capsule by mouth every 6 hours as needed (cramps)  Patient not taking: Reported on 8/9/2024 2/21/22   Parminder Banda MD   ondansetron (ZOFRAN ODT) 4 MG disintegrating tablet Take 1 tablet by mouth every 8 hours as needed for Nausea  Patient not taking: Reported on 8/9/2024 2/21/22   Parminder Banda MD   Cyanocobalamin 1000 MCG SUBL Place 1,000 mcg under the tongue daily  Patient not taking: Reported on 8/9/2024 2/21/22   Parminder Banda MD   COVID-19 mRNA Vacc, PFIZER, 30 MCG/0.3ML SUSP injection  5/1/21   Provider, MD Abel       Current medications:    Current Facility-Administered Medications   Medication Dose Route Frequency Provider Last Rate Last Admin    aspirin tablet 325 mg  325 mg Oral Daily Carina Paredes PA-C        hydroCHLOROthiazide (HYDRODIURIL) tablet 12.5 mg  12.5 mg Oral Daily Carina Paredes PA-C   12.5 mg at 08/09/24 1023    traZODone (DESYREL) tablet 50 mg  50 mg Oral Nightly PRN Carina Paredes PA-C   50 mg at 08/09/24 0205    sodium chloride

## 2024-08-09 NOTE — ED NOTES
How does patient ambulate?   [x]Low Fall Risk (ambulates by themselves without support)  []Stand by assist   []Contact Guard   []Front wheel walker  []Wheelchair   []Steady  []Bed bound  []History of Lower Extremity Amputation  []Unknown, did not assess in the emergency department   How does patient take pills?  [x]Whole with Water  []Crushed in applesauce  []Crushed in pudding  []Other  []Unknown no oral medications were given in the ED  Is patient alert?   [x]Alert  []Drowsy but responds to voice  []Doesn't respond to voice but responds to painful stimuli  []Unresponsive  Is patient oriented?   [x]To person  [x]To place  [x]To time  [x]To situation  []Confused  []Agitated  []Follows commands  If patient is disoriented or from a Skill Nursing Facility has family been notified of admission?   [x]Yes   []No  Patient belongings?   [x]Cell phone  []Wallet   []Dentures  [x]Clothing  Any specific patient or family belongings/needs/dynamics?     Miscellaneous comments/pending orders?       If there are any additional questions please reach out to the Emergency Department.

## 2024-08-09 NOTE — CARE COORDINATION
Discharge Planning Note:    Chart reviewed and it appears that patient has minimal needs for discharge at this time. Discussed with patient’s nurse and requested that case management be notified if discharge needs are identified.     - Current discharge plan is for the patient to return home.    - PCP: Parminder Banda    Case management will continue to follow progress and update discharge plan as needed.      Risk of Readmission Score: 10%    DESTINI Mello RN    [facility]  Phone: [phone]

## 2024-08-09 NOTE — H&P
[facility] Medicine History & Physical        Name:  Whitney Telles /Age/Sex: 1955  (68 y.o. female)   MRN & CSN:  [id number] & [id number] Encounter Date/Time: 2024 11:46 PM EDT   Location:  ED- PCP: Parminder Banda MD         CHIEF COMPLAINT:   Chief Complaint   Patient presents with    Chest Pain     Pt via EMS from home, c/o sudden onset chest pain that moves to the back, hurts to move, pt with episode of emesis on arrival          HISTORY OF PRESENT ILLNESS:      History from: patient  Limitations to history : None     Whitney Telles is a 68 y.o. female who presented to ED for evaluation of sharp, stabbing epigastric abdominal pain radiating to her chest and to her back which began just prior to arrival to ED.  Patient had one episode of vomiting immediately upon arrival to ED.  She denies any known aggravating or alleviating factors.  She denies any history of similar symptoms.  She denies fever, cough, shortness of breath, diarrhea, constipation, urinary symptoms. She denies any other complaints or concerns at this time.    BP elevated and .  Patient admits to medication noncompliance.  She reports she has not taken her BP meds today.  Patient is unsure of Lantus dosing.  Daughter at bedside reports patient is prescribed 50 units of lantus daily but she did not take it today.      CT negative for aortic abnormality, aneurysm or dissection; negative for evidence for central pulmonary embolism; negative for acute airspace disease. CT notable for distended stomach with questionable wall thickening involving the gastric  antrum, for which gastritis should be considered; also notable for diverticulosis and cholelithiasis.  RUQ US notable for cholelithiasis without evidence of acute cholecystitis; notable for moderate dilatation of the common bile duct measuring up to 10 mm in diameter. , , Lipase 70.      REVIEW OF SYSTEMS:   Pertinent positives as noted in the  HPI. All other systems reviewed and negative.      PHYSICAL EXAM PERFORMED:  BP (!) 208/85   Pulse 98   Temp 99 °F (37.2 °C) (Oral)   Resp 21   Ht 1.524 m (5')   Wt 58.5 kg (129 lb)   SpO2 95%   BMI 25.19 kg/m²     General appearance:  Awake, alert, no apparent distress  HEENT:  Normocephalic, atraumatic without obvious deformity. PERRL. EOM intact. Conjunctivae/corneas clear.  Neck:  Supple, with full range of motion. No JVD. Trachea midline.  Respiratory:  Clear to auscultation bilaterally without rales, wheezes, or rhonchi. Normal respiratory effort.   Cardiovascular:  Regular rate and rhythm without murmurs, rubs or gallops.   Abdomen:  +Tenderness to palpation of RUQ and epigastric area.  Abdomen soft, ND, without rebound or guarding. Normal bowel sounds.  Extremities:  No clubbing, cyanosis, or edema bilaterally.  Full range of motion without deformity. +2 palpable pulses, equal bilaterally. Capillary refill brisk,< 3 seconds   Skin:  No rashes or lesions. Warm/dry.  Neurologic:  Neurovascularly intact without any focal sensory/motor deficits. Cranial nerves: II-XII intact, grossly non-focal. Alert and oriented x 3. No slurred speech.  Psychiatric:  Behavior normal      ASSESSMENT/PLAN:    Choledocholithiasis. Transaminitis.   - Zosyn started in ED. Will continue  - Pain control with IV morphine  - NPO  - GI consulted    HTN  - /87.  Patient admits to med noncompliance.  Labetalol 10 mg IV administered in ED with good response  - Home norvasc, HCTZ, losartan ordered    DM2  - Last HbA1c was 11.1  - Continue home lantus   - Hold home PO meds   - Accuchecks, SSI  - Hypoglycemia protocol         Diet: Diet NPO Exceptions are: Sips of Water with Meds    Code Status: Full Code    DVT Prophylaxis: [x] Lovenox [] Heparin [] SCDs [] Eliquis [] Xarelto [] Coumadin    Probiotic if on abx: [x] Yes  [] N/A    Consults  IP CONSULT TO GI    Disposition: [x] Admit to Inpatient  [] Place in observation  ELOS: [x]

## 2024-08-09 NOTE — ED PROVIDER NOTES
[facility] EMERGENCY DEPARTMENT  EMERGENCY DEPARTMENT ENCOUNTER        Pt Name: Whitney Telles  MRN: [id number]  Birthdate 1955  Date of evaluation: 8/8/2024  Provider: Jax Grant MD  PCP: Parminder Banda MD  Note Started: 9:48 PM EDT 8/8/24    CHIEF COMPLAINT       Chief Complaint   Patient presents with    Chest Pain     Pt via EMS from home, c/o sudden onset chest pain that moves to the back, hurts to move, pt with episode of emesis on arrival        HISTORY OF PRESENT ILLNESS: 1 or more Elements     History from : Patient and EMS    Limitations to history : None    Whitney Telles is a 68 y.o. female who presents for chest pain that radiates directly to the back sharp and stabbing in nature started just prior to arrival.  Patient had an episode of vomiting immediately upon arrival to the ER.  Patient has history of diabetes, hypertension, hyperlipidemia.  She admits that she does not take her medication regularly.  She has not taken any of her blood pressure medication.  Patient states nothing seems to make her pain better or worse.  Somewhat improved at this time.  Pain radiates from her epigastrium to the center of her chest back to her back.    Nursing Notes were all reviewed and agreed with or any disagreements were addressed in the HPI.    REVIEW OF SYSTEMS :      Review of Systems   Constitutional: Negative.  Negative for chills and fever.   HENT:  Negative for congestion and sore throat.    Respiratory: Negative.  Negative for shortness of breath.    Cardiovascular:  Positive for chest pain.   Gastrointestinal:  Positive for abdominal pain.   Genitourinary: Negative.    Musculoskeletal: Negative.    Skin: Negative.    Neurological: Negative.  Negative for headaches.       Positives and Pertinent negatives as per HPI.     SURGICAL HISTORY     Past Surgical History:   Procedure Laterality Date    BREAST SURGERY      biopsy    COLONOSCOPY N/A 02/17/2021    repeat 2024

## 2024-08-09 NOTE — PROGRESS NOTES
Patient transferred to room by pca by stretcher. Report called to 4 t rn. Patient on ra, no distress noted.

## 2024-08-09 NOTE — PLAN OF CARE
[facility]        Assessment & Plan of Care    History of Present Illness: Ms. Telles is a 68 y.o. female who presented to the ED yesterday with severe, stabbing, constant epigastric region pain that started suddenly just prior to arrival.  The pain radiated into the chest and through to the back.  No alleviating or aggravating factors noted.  Associated nausea and vomiting.  Denies fevers, chills, diarrhea, constipation, hematochezia, melena, urinary symptoms, SOB, jaundice, or postprandial pain.  Last BM was yesterday.  She reports one similar episode in the past; unable to specify when or give much further detail but reports resolved with dose of Tylenol.  Medical history includes diabetes, hypertension, hyperlipidemia, and dementia.  Prior abdominal surgery includes tubal ligation.  Underwent colonoscopy and EGD in 2021, reports no significant findings.  No blood thinners.  Nonsmoker.    Patient is forgetful/not detail-oriented and daughter at bedside (who patient lives with) supplemented history.    Physical Exam:  CONSTITUTIONAL:  alert, no apparent distress and normal weight  NEUROLOGIC:  Mental Status Exam:  Level of Alertness:   awake  Orientation:   person, place, time  EYES:  sclera clear  ENT:  normocepalic, without obvious abnormality  NECK:  supple, symmetrical, trachea midline  LUNGS:  clear to auscultation  CARDIOVASCULAR:  regular rate and rhythm and no murmur noted  ABDOMEN: soft, non-distended, mild tenderness noted along the ribcage in the upper abdomen, voluntary guarding absent, no masses palpated, normal bowel sounds, and hernia absent  Extremities: no edema  SKIN:  no bruising or bleeding and normal skin color, texture, turgor    Assessment:  Upper abdominal pain  Cholelithiasis, suspect choledocholithiasis  Elevated LFT  Hypertension  Hyperlipidemia  Diabetes  Dementia     Plan:  1. ERCP today per GI, tentatively planning for laparoscopic

## 2024-08-09 NOTE — CONSULTS
Gastroenterology Consult Note        Patient: Whitney Telles  : 1955  Acct#:      Date:  2024      1. Calculus of bile duct without cholecystitis with obstruction    2. Chest pain, unspecified type    3. Hypertension, unspecified type    4. Hyperglycemia due to diabetes mellitus (HCC)        Subjective:       History of Present Illness  Patient is a 68 y.o.  female admitted with Choledocholithiasis [K80.50]  Calculus of bile duct without cholecystitis with obstruction [K80.51]  Chest pain, unspecified type [R07.9]  Hypertension, unspecified type [I10]  Hyperglycemia due to diabetes mellitus (HCC) [E11.65] who is seen in consult for possible CBD stone.  History of diabetes, hypertension, hyperlipidemia, memory loss.  Her daughter is at bedside and helps provide history.  Around 7 PM last night patient had sudden onset of upper abdominal pain localized more to the left upper abdomen.  Was sharp and constant.  She had eaten maybe an hour before onset of pain.  Came to the ER.  Had nausea and nonbloody emesis twice.  Needed pain medicine around 2 AM and then none since but now having recurrent nausea and upper abdominal pain.  Never had this pain before.  No black or bloody bowel movements.  Takes aspirin 325 mg daily.  No other blood thinners.        Past Medical History:   Diagnosis Date    Diabetes mellitus (HCC)     NIDDM    Headache(784.0)     Herniated disc, cervical     Hypercholesteremia     Hypertension     Intestinal malabsorption 10/22/2020    Iron deficiency anemia, unspecified 10/22/2020    Memory loss     short term    Osteopenia of neck of left femur     dexa 2020, repeat 2022    Pneumonia due to 2019 novel coronavirus 2020    hospitalized in [facility]    Psychiatric problem     Type 2 diabetes mellitus without complication (HCC)       Past Surgical History:   Procedure Laterality Date    BREAST SURGERY      biopsy    COLONOSCOPY N/A 2021    repeat   abnormality, aneurysm or dissection.     2. No evidence for central pulmonary embolism.     3.  Chronic interstitial findings in the lungs with fibrotic features in the  lung bases.  No acute airspace disease identified.     4.  Distended stomach with questionable wall thickening involving the gastric  antrum, for which gastritis should be considered.     5.  Diverticulosis.     6.  Cholelithiasis.                  Assessment/Plan:     Right upper quadrant pain, elevated liver enzymes -sudden onset yesterday after eating.  Transaminases elevated as above with normal bili.  Lipase was 70.  CT with gallstones.  Ultrasound showed CBD dilation up to 10 mm.  This is concerning for CBD stone.  WBC increased to 18 today.  Afebrile.  Having recurrent pain  - on zosyn  -N.p.o.  -Monitor liver enzymes - repeat pending this am  -Surgery consult for cholecystectomy with IOC    Abnormal CT abdomen pelvis -CT with wall thickening of the antrum with distended stomach.  Last EGD 2021 did show antral and prepyloric erosions.  Not on PPI.  -PPI  -Follow-up EGD at some point.      Discussed with Dr. Dr Anshul Burgess PA-C  [facility]      I have personally performed a face to face diagnostic evaluation on this patient.  I have interviewed and examined the patient and I agree with the findings and recommended plan of care.  In summary, my findings and plan are the following:  elevated liver enzymes and RUQ pain.  Dilated duct on US.  Bili and lft up more today so suspect cbd stone.   Pain resolved again before I saw her and she is now nontender.  ? If retained stone.    Will proceed with ERCP  then will Carmen.   Agree with empiric abx.     Cristiano Landers MD  [facility]

## 2024-08-09 NOTE — BRIEF OP NOTE
Brief Postoperative Note - Full Note in Chart Review/Procedures tab       Patient: Whitney Telles  YOB: 1955  MRN: [id number]    Date of Procedure: 8/9/2024    Pre-Op Diagnosis Codes:     * Choledocholithiasis [K80.50]    Post-Op Diagnosis: Same       Procedure(s):  ENDOSCOPIC RETROGRADE CHOLANGIOPANCREATOGRAPHY SPHINCTER/PAPILLOTOMY  ENDOSCOPIC RETROGRADE CHOLANGIOPANCREATOGRAPHY STONE REMOVAL  An EXALT Model D Single-Use Duodenoscope () was used     Surgeon(s):  Jax Ba MD    Assistant:  * No surgical staff found *    Anesthesia: General    Estimated Blood Loss (mL): Minimal    Complications: None    Specimens:   * No specimens in log *    Implants:  * No implants in log *      Drains: * No LDAs found *    Findings:  Infection Present At Time Of Surgery (PATOS) (choose all levels that have infection present):  No infection present  Other Findings:   Choledocholithiasis with obstruction  Biliary sphincterotomy and stone extraction performed    Rec:  Clear liquid diet today and advance to low fat diet in AM as tolerated.   Follow up as inpatient  Cholecystectomy plans per Surgery service    Electronically signed by Jax Ba MD on 8/9/2024 at 3:56 PM

## 2024-08-09 NOTE — PROGRESS NOTES
Clinical Pharmacy Note: Positive Blood Culture Documentation    Pharmacy was notified by lab that Whitney Telles has positive blood cultures.    Patient is positive for E. coli in two out of two sets.  Resistance markers present: none    Name of lab caller: Dulce  Name of receiving pharmacist: Shirley  Time: 1320    Patient's current antimicrobial regimen of Zosyn does provide appropriate empiric coverage.      Dr. Miner was notified of the positive result at 1324.    New antimicrobials initiated after physician discussion: none    Thank you,  Shirley Berrios, PharmD, BCSCP  [id number]

## 2024-08-09 NOTE — PROGRESS NOTES
..4 Eyes Skin Assessment     NAME:  Whitney Telles  YOB: 1955  MEDICAL RECORD NUMBER:  [id number]    The patient is being assessed for  Admission    I agree that at least one RN has performed a thorough Head to Toe Skin Assessment on the patient. ALL assessment sites listed below have been assessed.      Areas assessed by both nurses:    Head, Face, Ears, Shoulders, Back, Chest, Arms, Elbows, Hands, Sacrum. Buttock, Coccyx, Ischium, Legs. Feet and Heels, and Under Medical Devices         Does the Patient have a Wound? No noted wound(s)       James Prevention initiated by RN: Yes  Wound Care Orders initiated by RN: No    Pressure Injury (Stage 3,4, Unstageable, DTI, NWPT, and Complex wounds) if present, place Wound referral order by RN under : No    New Ostomies, if present place, Ostomy referral order under : No     Nurse 1 eSignature: Electronically signed by Estela Damico RN on 8/9/24 at 2:26 AM EDT    **SHARE this note so that the co-signing nurse can place an eSignature**    Nurse 2 eSignature: Electronically signed by Madison Morris RN on 8/9/24 at 4:30 AM EDT ey

## 2024-08-09 NOTE — ANESTHESIA POSTPROCEDURE EVALUATION
Department of Anesthesiology  Postprocedure Note    Patient: Whitney Telles  MRN: [id number]  YOB: 1955  Date of evaluation: 8/9/2024    Procedure Summary       Date: 08/09/24 Room / Location: [place] [address] / [facility]    Anesthesia Start: 1522 Anesthesia Stop: 1605    Procedures:       ENDOSCOPIC RETROGRADE CHOLANGIOPANCREATOGRAPHY SPHINCTER/PAPILLOTOMY      ENDOSCOPIC RETROGRADE CHOLANGIOPANCREATOGRAPHY STONE REMOVAL Diagnosis:       Choledocholithiasis      (Choledocholithiasis [K80.50])    Surgeons: Jax Ba MD Responsible Provider: Jaclyn Nascimento MD    Anesthesia Type: general ASA Status: 3            Anesthesia Type: No value filed.    Sridevi Phase I: Sridevi Score: 10    Sridevi Phase II:      Anesthesia Post Evaluation    Patient location during evaluation: bedside  Patient participation: complete - patient participated  Level of consciousness: awake and alert  Pain score: 2  Airway patency: patent  Nausea & Vomiting: no vomiting  Cardiovascular status: hemodynamically stable  Respiratory status: nonlabored ventilation  Hydration status: stable  Multimodal analgesia pain management approach  Pain management: adequate    No notable events documented.

## 2024-08-09 NOTE — PROGRESS NOTES
Hospitalist Progress Note      PCP: Parminder Banda MD    Date of Admission: 8/8/2024    LOS: 1    Chief Complaint:   Chief Complaint   Patient presents with    Chest Pain     Pt via EMS from home, c/o sudden onset chest pain that moves to the back, hurts to move, pt with episode of emesis on arrival        Case Summary:   68-year-old lady with history of hypertension, hyperlipidemia, type 2 diabetes, dementia who presented with epigastric abdominal pain radiating to the back and associated nausea and vomiting found to have CBD dilation with concern for choledocholithiasis and possible gastritis and associated transaminitis.    CT abdomen pelvis noted for distended stomach with antral gastritis  Ultrasound abdomen with cholelithiasis and dilated CBD    Active Hospital Problems    Diagnosis Date Noted    Choledocholithiasis [K80.50] 08/08/2024    Alzheimer's disease, unspecified [G30.9] 01/24/2022    Type 2 diabetes mellitus with diabetic mononeuropathy, with long-term current use of insulin (HCC) [E11.41, Z79.4] 08/25/2020    Essential hypertension [I10] 05/30/2016    Hypercholesteremia [E78.00] 02/01/2016         Principal Problem:    Choledocholithiasis: Probable with transaminitis and CBD dilation.  Repeat LFTs worsening with a bump in T. bili.  She denies pain this morning.  She received analgesics.  Initial plan was for MRCP, following discussion with GI, they will go for ERCP today  - GI following with recommendations  - Trend LFTs  - Plan for ERCP today        Active Problems:    Essential hypertension: Noted elevated BP this morning.  Will continue hydrochlorothiazide, amlodipine and add as needed hydralazine for BP control    Type 2 diabetes mellitus with diabetic mononeuropathy, with long-term current use of insulin (HCC): On sliding scale insulin    Alzheimer's disease, unspecified: On donepezil    Hypomagnesemia: Will replete and monitor    Leukocytosis: In the setting of possible

## 2024-08-10 ENCOUNTER — APPOINTMENT (OUTPATIENT)
Dept: GENERAL RADIOLOGY | Age: 69
End: 2024-08-10
Payer: MEDICARE

## 2024-08-10 LAB
ALBUMIN SERPL-MCNC: 3.1 G/DL (ref 3.4–5)
ALBUMIN/GLOB SERPL: 0.8 {RATIO} (ref 1.1–2.2)
ALP SERPL-CCNC: 136 U/L (ref 40–129)
ALT SERPL-CCNC: 498 U/L (ref 10–40)
ANION GAP SERPL CALCULATED.3IONS-SCNC: 13 MMOL/L (ref 3–16)
AST SERPL-CCNC: 216 U/L (ref 15–37)
BASOPHILS # BLD: 0 K/UL (ref 0–0.2)
BASOPHILS NFR BLD: 0.1 %
BILIRUB SERPL-MCNC: 2.4 MG/DL (ref 0–1)
BUN SERPL-MCNC: 19 MG/DL (ref 7–20)
CALCIUM SERPL-MCNC: 8.1 MG/DL (ref 8.3–10.6)
CHLORIDE SERPL-SCNC: 98 MMOL/L (ref 99–110)
CO2 SERPL-SCNC: 21 MMOL/L (ref 21–32)
CREAT SERPL-MCNC: 0.6 MG/DL (ref 0.6–1.2)
DEPRECATED RDW RBC AUTO: 13.4 % (ref 12.4–15.4)
EOSINOPHIL # BLD: 0 K/UL (ref 0–0.6)
EOSINOPHIL NFR BLD: 0 %
GFR SERPLBLD CREATININE-BSD FMLA CKD-EPI: >90 ML/MIN/{1.73_M2}
GLUCOSE BLD-MCNC: 247 MG/DL (ref 70–99)
GLUCOSE BLD-MCNC: 260 MG/DL (ref 70–99)
GLUCOSE BLD-MCNC: 286 MG/DL (ref 70–99)
GLUCOSE BLD-MCNC: 388 MG/DL (ref 70–99)
GLUCOSE BLD-MCNC: 401 MG/DL (ref 70–99)
GLUCOSE SERPL-MCNC: 311 MG/DL (ref 70–99)
HCT VFR BLD AUTO: 41.7 % (ref 36–48)
HGB BLD-MCNC: 14 G/DL (ref 12–16)
LYMPHOCYTES # BLD: 0.5 K/UL (ref 1–5.1)
LYMPHOCYTES NFR BLD: 3.1 %
MCH RBC QN AUTO: 30.3 PG (ref 26–34)
MCHC RBC AUTO-ENTMCNC: 33.5 G/DL (ref 31–36)
MCV RBC AUTO: 90.4 FL (ref 80–100)
MONOCYTES # BLD: 0.3 K/UL (ref 0–1.3)
MONOCYTES NFR BLD: 2.1 %
NEUTROPHILS # BLD: 14.9 K/UL (ref 1.7–7.7)
NEUTROPHILS NFR BLD: 94.7 %
PERFORMED ON: ABNORMAL
PLATELET # BLD AUTO: 230 K/UL (ref 135–450)
PMV BLD AUTO: 9.3 FL (ref 5–10.5)
POTASSIUM SERPL-SCNC: 4.3 MMOL/L (ref 3.5–5.1)
PROT SERPL-MCNC: 7.1 G/DL (ref 6.4–8.2)
RBC # BLD AUTO: 4.61 M/UL (ref 4–5.2)
SODIUM SERPL-SCNC: 132 MMOL/L (ref 136–145)
WBC # BLD AUTO: 15.7 K/UL (ref 4–11)

## 2024-08-10 PROCEDURE — 7100000000 HC PACU RECOVERY - FIRST 15 MIN: Performed by: SURGERY

## 2024-08-10 PROCEDURE — 85025 COMPLETE CBC W/AUTO DIFF WBC: CPT

## 2024-08-10 PROCEDURE — 6370000000 HC RX 637 (ALT 250 FOR IP): Performed by: SURGERY

## 2024-08-10 PROCEDURE — 2709999900 HC NON-CHARGEABLE SUPPLY: Performed by: SURGERY

## 2024-08-10 PROCEDURE — BF121ZZ FLUOROSCOPY OF GALLBLADDER USING LOW OSMOLAR CONTRAST: ICD-10-PCS | Performed by: SURGERY

## 2024-08-10 PROCEDURE — 6360000002 HC RX W HCPCS: Performed by: ANESTHESIOLOGY

## 2024-08-10 PROCEDURE — 2580000003 HC RX 258: Performed by: INTERNAL MEDICINE

## 2024-08-10 PROCEDURE — 6370000000 HC RX 637 (ALT 250 FOR IP): Performed by: INTERNAL MEDICINE

## 2024-08-10 PROCEDURE — 3700000000 HC ANESTHESIA ATTENDED CARE: Performed by: SURGERY

## 2024-08-10 PROCEDURE — 6360000002 HC RX W HCPCS: Performed by: SURGERY

## 2024-08-10 PROCEDURE — 0FT44ZZ RESECTION OF GALLBLADDER, PERCUTANEOUS ENDOSCOPIC APPROACH: ICD-10-PCS | Performed by: SURGERY

## 2024-08-10 PROCEDURE — 2580000003 HC RX 258: Performed by: SURGERY

## 2024-08-10 PROCEDURE — 3700000001 HC ADD 15 MINUTES (ANESTHESIA): Performed by: SURGERY

## 2024-08-10 PROCEDURE — 6360000002 HC RX W HCPCS: Performed by: INTERNAL MEDICINE

## 2024-08-10 PROCEDURE — A4217 STERILE WATER/SALINE, 500 ML: HCPCS | Performed by: SURGERY

## 2024-08-10 PROCEDURE — 74301 X-RAYS AT SURGERY ADD-ON: CPT

## 2024-08-10 PROCEDURE — 80053 COMPREHEN METABOLIC PANEL: CPT

## 2024-08-10 PROCEDURE — C1894 INTRO/SHEATH, NON-LASER: HCPCS | Performed by: SURGERY

## 2024-08-10 PROCEDURE — 0FC48ZZ EXTIRPATION OF MATTER FROM GALLBLADDER, VIA NATURAL OR ARTIFICIAL OPENING ENDOSCOPIC: ICD-10-PCS | Performed by: SURGERY

## 2024-08-10 PROCEDURE — 7100000001 HC PACU RECOVERY - ADDTL 15 MIN: Performed by: SURGERY

## 2024-08-10 PROCEDURE — 3600000004 HC SURGERY LEVEL 4 BASE: Performed by: SURGERY

## 2024-08-10 PROCEDURE — 3600000014 HC SURGERY LEVEL 4 ADDTL 15MIN: Performed by: SURGERY

## 2024-08-10 PROCEDURE — 2720000010 HC SURG SUPPLY STERILE: Performed by: SURGERY

## 2024-08-10 PROCEDURE — 2580000003 HC RX 258: Performed by: ANESTHESIOLOGY

## 2024-08-10 PROCEDURE — 36415 COLL VENOUS BLD VENIPUNCTURE: CPT

## 2024-08-10 PROCEDURE — 2500000003 HC RX 250 WO HCPCS: Performed by: ANESTHESIOLOGY

## 2024-08-10 PROCEDURE — 88304 TISSUE EXAM BY PATHOLOGIST: CPT

## 2024-08-10 PROCEDURE — 47563 LAPARO CHOLECYSTECTOMY/GRAPH: CPT | Performed by: SURGERY

## 2024-08-10 PROCEDURE — 6360000004 HC RX CONTRAST MEDICATION: Performed by: SURGERY

## 2024-08-10 PROCEDURE — 1200000000 HC SEMI PRIVATE

## 2024-08-10 RX ORDER — SODIUM CHLORIDE, SODIUM LACTATE, POTASSIUM CHLORIDE, CALCIUM CHLORIDE 600; 310; 30; 20 MG/100ML; MG/100ML; MG/100ML; MG/100ML
INJECTION, SOLUTION INTRAVENOUS CONTINUOUS PRN
Status: COMPLETED | OUTPATIENT
Start: 2024-08-10 | End: 2024-08-10

## 2024-08-10 RX ORDER — ROCURONIUM BROMIDE 10 MG/ML
INJECTION, SOLUTION INTRAVENOUS PRN
Status: DISCONTINUED | OUTPATIENT
Start: 2024-08-10 | End: 2024-08-10 | Stop reason: SDUPTHER

## 2024-08-10 RX ORDER — PROPOFOL 10 MG/ML
INJECTION, EMULSION INTRAVENOUS PRN
Status: DISCONTINUED | OUTPATIENT
Start: 2024-08-10 | End: 2024-08-10 | Stop reason: SDUPTHER

## 2024-08-10 RX ORDER — SODIUM CHLORIDE, SODIUM LACTATE, POTASSIUM CHLORIDE, CALCIUM CHLORIDE 600; 310; 30; 20 MG/100ML; MG/100ML; MG/100ML; MG/100ML
INJECTION, SOLUTION INTRAVENOUS CONTINUOUS PRN
Status: DISCONTINUED | OUTPATIENT
Start: 2024-08-10 | End: 2024-08-10 | Stop reason: SDUPTHER

## 2024-08-10 RX ORDER — ENOXAPARIN SODIUM 100 MG/ML
40 INJECTION SUBCUTANEOUS DAILY
Status: DISCONTINUED | OUTPATIENT
Start: 2024-08-11 | End: 2024-08-15 | Stop reason: HOSPADM

## 2024-08-10 RX ORDER — HYDROCODONE BITARTRATE AND ACETAMINOPHEN 5; 325 MG/1; MG/1
2 TABLET ORAL EVERY 4 HOURS PRN
Status: DISCONTINUED | OUTPATIENT
Start: 2024-08-10 | End: 2024-08-15 | Stop reason: HOSPADM

## 2024-08-10 RX ORDER — LABETALOL HYDROCHLORIDE 5 MG/ML
10 INJECTION, SOLUTION INTRAVENOUS
Status: DISCONTINUED | OUTPATIENT
Start: 2024-08-10 | End: 2024-08-10 | Stop reason: HOSPADM

## 2024-08-10 RX ORDER — FENTANYL CITRATE 50 UG/ML
25 INJECTION, SOLUTION INTRAMUSCULAR; INTRAVENOUS EVERY 5 MIN PRN
Status: DISCONTINUED | OUTPATIENT
Start: 2024-08-10 | End: 2024-08-10 | Stop reason: HOSPADM

## 2024-08-10 RX ORDER — ONDANSETRON 2 MG/ML
4 INJECTION INTRAMUSCULAR; INTRAVENOUS
Status: DISCONTINUED | OUTPATIENT
Start: 2024-08-10 | End: 2024-08-10 | Stop reason: HOSPADM

## 2024-08-10 RX ORDER — HYDROCODONE BITARTRATE AND ACETAMINOPHEN 5; 325 MG/1; MG/1
1 TABLET ORAL EVERY 4 HOURS PRN
Status: DISCONTINUED | OUTPATIENT
Start: 2024-08-10 | End: 2024-08-15 | Stop reason: HOSPADM

## 2024-08-10 RX ORDER — FENTANYL CITRATE 50 UG/ML
INJECTION, SOLUTION INTRAMUSCULAR; INTRAVENOUS PRN
Status: DISCONTINUED | OUTPATIENT
Start: 2024-08-10 | End: 2024-08-10 | Stop reason: SDUPTHER

## 2024-08-10 RX ORDER — BUPIVACAINE HYDROCHLORIDE 5 MG/ML
INJECTION, SOLUTION EPIDURAL; INTRACAUDAL
Status: COMPLETED | OUTPATIENT
Start: 2024-08-10 | End: 2024-08-10

## 2024-08-10 RX ORDER — SODIUM CHLORIDE 0.9 % (FLUSH) 0.9 %
5-40 SYRINGE (ML) INJECTION PRN
Status: DISCONTINUED | OUTPATIENT
Start: 2024-08-10 | End: 2024-08-10 | Stop reason: HOSPADM

## 2024-08-10 RX ORDER — ONDANSETRON 2 MG/ML
INJECTION INTRAMUSCULAR; INTRAVENOUS PRN
Status: DISCONTINUED | OUTPATIENT
Start: 2024-08-10 | End: 2024-08-10 | Stop reason: SDUPTHER

## 2024-08-10 RX ORDER — SUCCINYLCHOLINE/SOD CL,ISO/PF 200MG/10ML
SYRINGE (ML) INTRAVENOUS PRN
Status: DISCONTINUED | OUTPATIENT
Start: 2024-08-10 | End: 2024-08-10 | Stop reason: SDUPTHER

## 2024-08-10 RX ORDER — DEXAMETHASONE SODIUM PHOSPHATE 4 MG/ML
INJECTION, SOLUTION INTRA-ARTICULAR; INTRALESIONAL; INTRAMUSCULAR; INTRAVENOUS; SOFT TISSUE PRN
Status: DISCONTINUED | OUTPATIENT
Start: 2024-08-10 | End: 2024-08-10 | Stop reason: SDUPTHER

## 2024-08-10 RX ORDER — DIPHENHYDRAMINE HYDROCHLORIDE 50 MG/ML
12.5 INJECTION INTRAMUSCULAR; INTRAVENOUS
Status: DISCONTINUED | OUTPATIENT
Start: 2024-08-10 | End: 2024-08-10 | Stop reason: HOSPADM

## 2024-08-10 RX ORDER — NALOXONE HYDROCHLORIDE 0.4 MG/ML
INJECTION, SOLUTION INTRAMUSCULAR; INTRAVENOUS; SUBCUTANEOUS PRN
Status: DISCONTINUED | OUTPATIENT
Start: 2024-08-10 | End: 2024-08-10 | Stop reason: HOSPADM

## 2024-08-10 RX ORDER — PROCHLORPERAZINE EDISYLATE 5 MG/ML
5 INJECTION INTRAMUSCULAR; INTRAVENOUS
Status: DISCONTINUED | OUTPATIENT
Start: 2024-08-10 | End: 2024-08-10 | Stop reason: HOSPADM

## 2024-08-10 RX ORDER — IPRATROPIUM BROMIDE AND ALBUTEROL SULFATE 2.5; .5 MG/3ML; MG/3ML
1 SOLUTION RESPIRATORY (INHALATION)
Status: DISCONTINUED | OUTPATIENT
Start: 2024-08-10 | End: 2024-08-10 | Stop reason: HOSPADM

## 2024-08-10 RX ORDER — MAGNESIUM HYDROXIDE 1200 MG/15ML
LIQUID ORAL CONTINUOUS PRN
Status: COMPLETED | OUTPATIENT
Start: 2024-08-10 | End: 2024-08-10

## 2024-08-10 RX ORDER — HYDROMORPHONE HYDROCHLORIDE 2 MG/ML
0.5 INJECTION, SOLUTION INTRAMUSCULAR; INTRAVENOUS; SUBCUTANEOUS EVERY 5 MIN PRN
Status: DISCONTINUED | OUTPATIENT
Start: 2024-08-10 | End: 2024-08-10 | Stop reason: HOSPADM

## 2024-08-10 RX ORDER — SODIUM CHLORIDE 9 MG/ML
INJECTION, SOLUTION INTRAVENOUS PRN
Status: DISCONTINUED | OUTPATIENT
Start: 2024-08-10 | End: 2024-08-10 | Stop reason: HOSPADM

## 2024-08-10 RX ORDER — LIDOCAINE HYDROCHLORIDE 20 MG/ML
INJECTION, SOLUTION INFILTRATION; PERINEURAL PRN
Status: DISCONTINUED | OUTPATIENT
Start: 2024-08-10 | End: 2024-08-10 | Stop reason: SDUPTHER

## 2024-08-10 RX ORDER — SODIUM CHLORIDE 0.9 % (FLUSH) 0.9 %
5-40 SYRINGE (ML) INJECTION EVERY 12 HOURS SCHEDULED
Status: DISCONTINUED | OUTPATIENT
Start: 2024-08-10 | End: 2024-08-10 | Stop reason: HOSPADM

## 2024-08-10 RX ORDER — LORAZEPAM 2 MG/ML
0.5 INJECTION INTRAMUSCULAR
Status: DISCONTINUED | OUTPATIENT
Start: 2024-08-10 | End: 2024-08-10 | Stop reason: HOSPADM

## 2024-08-10 RX ADMIN — MELATONIN TAB 3 MG 3 MG: 3 TAB at 21:26

## 2024-08-10 RX ADMIN — DONEPEZIL HYDROCHLORIDE 10 MG: 5 TABLET, FILM COATED ORAL at 21:26

## 2024-08-10 RX ADMIN — HYDROCODONE BITARTRATE AND ACETAMINOPHEN 2 TABLET: 5; 325 TABLET ORAL at 17:08

## 2024-08-10 RX ADMIN — ROCURONIUM BROMIDE 50 MG: 10 INJECTION, SOLUTION INTRAVENOUS at 09:20

## 2024-08-10 RX ADMIN — Medication 1 CAPSULE: at 17:08

## 2024-08-10 RX ADMIN — LOSARTAN POTASSIUM 100 MG: 100 TABLET, FILM COATED ORAL at 11:23

## 2024-08-10 RX ADMIN — FENTANYL CITRATE 50 MCG: 50 INJECTION, SOLUTION INTRAMUSCULAR; INTRAVENOUS at 09:08

## 2024-08-10 RX ADMIN — PIPERACILLIN AND TAZOBACTAM 3375 MG: 3; .375 INJECTION, POWDER, LYOPHILIZED, FOR SOLUTION INTRAVENOUS at 17:10

## 2024-08-10 RX ADMIN — HYDROCHLOROTHIAZIDE 12.5 MG: 25 TABLET ORAL at 09:51

## 2024-08-10 RX ADMIN — AMLODIPINE BESYLATE 10 MG: 5 TABLET ORAL at 11:23

## 2024-08-10 RX ADMIN — INSULIN LISPRO 4 UNITS: 100 INJECTION, SOLUTION INTRAVENOUS; SUBCUTANEOUS at 11:35

## 2024-08-10 RX ADMIN — PROPOFOL 120 MG: 10 INJECTION, EMULSION INTRAVENOUS at 09:14

## 2024-08-10 RX ADMIN — PIPERACILLIN AND TAZOBACTAM 3375 MG: 3; .375 INJECTION, POWDER, LYOPHILIZED, FOR SOLUTION INTRAVENOUS at 11:22

## 2024-08-10 RX ADMIN — SODIUM CHLORIDE: 9 INJECTION, SOLUTION INTRAVENOUS at 17:03

## 2024-08-10 RX ADMIN — PANTOPRAZOLE SODIUM 40 MG: 40 INJECTION, POWDER, FOR SOLUTION INTRAVENOUS at 11:22

## 2024-08-10 RX ADMIN — MORPHINE SULFATE 4 MG: 4 INJECTION, SOLUTION INTRAMUSCULAR; INTRAVENOUS at 11:04

## 2024-08-10 RX ADMIN — Medication 100 MG: at 09:15

## 2024-08-10 RX ADMIN — FENTANYL CITRATE 50 MCG: 50 INJECTION, SOLUTION INTRAMUSCULAR; INTRAVENOUS at 09:20

## 2024-08-10 RX ADMIN — SODIUM CHLORIDE: 9 INJECTION, SOLUTION INTRAVENOUS at 11:21

## 2024-08-10 RX ADMIN — PIPERACILLIN AND TAZOBACTAM 3375 MG: 3; .375 INJECTION, POWDER, LYOPHILIZED, FOR SOLUTION INTRAVENOUS at 00:43

## 2024-08-10 RX ADMIN — ONDANSETRON 4 MG: 2 INJECTION INTRAMUSCULAR; INTRAVENOUS at 09:20

## 2024-08-10 RX ADMIN — INSULIN LISPRO 8 UNITS: 100 INJECTION, SOLUTION INTRAVENOUS; SUBCUTANEOUS at 17:12

## 2024-08-10 RX ADMIN — INSULIN LISPRO 4 UNITS: 100 INJECTION, SOLUTION INTRAVENOUS; SUBCUTANEOUS at 21:23

## 2024-08-10 RX ADMIN — SODIUM CHLORIDE, PRESERVATIVE FREE 10 ML: 5 INJECTION INTRAVENOUS at 11:22

## 2024-08-10 RX ADMIN — DEXAMETHASONE SODIUM PHOSPHATE 4 MG: 4 INJECTION, SOLUTION INTRAMUSCULAR; INTRAVENOUS at 09:20

## 2024-08-10 RX ADMIN — MORPHINE SULFATE 4 MG: 4 INJECTION, SOLUTION INTRAMUSCULAR; INTRAVENOUS at 14:43

## 2024-08-10 RX ADMIN — SUGAMMADEX 200 MG: 100 INJECTION, SOLUTION INTRAVENOUS at 10:05

## 2024-08-10 RX ADMIN — SODIUM CHLORIDE, POTASSIUM CHLORIDE, SODIUM LACTATE AND CALCIUM CHLORIDE: 600; 310; 30; 20 INJECTION, SOLUTION INTRAVENOUS at 09:03

## 2024-08-10 RX ADMIN — TRAZODONE HYDROCHLORIDE 50 MG: 50 TABLET ORAL at 22:56

## 2024-08-10 RX ADMIN — LIDOCAINE HYDROCHLORIDE 100 MG: 20 INJECTION, SOLUTION INFILTRATION; PERINEURAL at 09:08

## 2024-08-10 ASSESSMENT — PAIN SCALES - GENERAL
PAINLEVEL_OUTOF10: 0
PAINLEVEL_OUTOF10: 8
PAINLEVEL_OUTOF10: 10
PAINLEVEL_OUTOF10: 10
PAINLEVEL_OUTOF10: 5
PAINLEVEL_OUTOF10: 6
PAINLEVEL_OUTOF10: 10

## 2024-08-10 ASSESSMENT — PAIN SCALES - WONG BAKER
WONGBAKER_NUMERICALRESPONSE: NO HURT
WONGBAKER_NUMERICALRESPONSE: HURTS WHOLE LOT

## 2024-08-10 NOTE — PROGRESS NOTES
Patient alert to self and place. Pleasantly confused. All evening medications given. Assessment complete. VSSA with no c/o pain or discomfort. Explained to patient she will be NPO for scheduled Lap Carmen tomorrow. Bed locked, in lowest position with alarm on. Bedside table and call light within reach. All patient needs addressed. Plan of care ongoing.

## 2024-08-10 NOTE — PROGRESS NOTES
Lap sited CDI. Patient voiding after surgery and tolerating a regular diet. Blood sugar elevated today due to missing morning Lantus - Insulin given and physician notified. PRN norco and morphine for pain as needed. Patient stable and denied needs when write left room.

## 2024-08-10 NOTE — ANESTHESIA POSTPROCEDURE EVALUATION
Department of Anesthesiology  Postprocedure Note    Patient: Whitney Telles  MRN: [id number]  YOB: 1955  Date of evaluation: 8/10/2024    Procedure Summary       Date: 08/10/24 Room / Location: [address]    Anesthesia Start: 0903 Anesthesia Stop: 1018    Procedure: LAPAROSCOPIC CHOLECYSTECTOMY WITH CHOLANGIOGRAM (Abdomen) Diagnosis:       Symptomatic cholelithiasis      (Symptomatic cholelithiasis [K80.20])    Surgeons: Geovani Cano MD Responsible Provider: Gregorio Atwood MD    Anesthesia Type: general ASA Status: 2            Anesthesia Type: No value filed.    Sridevi Phase I: Sridevi Score: 10    Sridevi Phase II:      Anesthesia Post Evaluation    Patient location during evaluation: PACU  Patient participation: complete - patient participated  Level of consciousness: awake  Airway patency: patent  Nausea & Vomiting: no nausea and no vomiting  Cardiovascular status: blood pressure returned to baseline and hemodynamically stable  Respiratory status: acceptable  Hydration status: euvolemic  Multimodal analgesia pain management approach  Pain management: adequate    No notable events documented.

## 2024-08-10 NOTE — PLAN OF CARE
Problem: Discharge Planning  Goal: Discharge to home or other facility with appropriate resources  8/10/2024 0956 by Magui Hassan, RN  Outcome: Progressing     Problem: Safety - Adult  Goal: Free from fall injury  8/10/2024 0956 by Magui Hassan RN  Outcome: Progressing     Problem: Chronic Conditions and Co-morbidities  Goal: Patient's chronic conditions and co-morbidity symptoms are monitored and maintained or improved  8/10/2024 0956 by Magui Hassan, RN  Outcome: Progressing     Problem: Pain  Goal: Verbalizes/displays adequate comfort level or baseline comfort level  8/10/2024 0956 by Magui Hassan, RN  Outcome: Progressing

## 2024-08-10 NOTE — PROGRESS NOTES
Patient awake and alert, denies pain and nausea, BP elevated, patient still needs to take AM BP meds, abdomen soft, phase I discharge criteria met, will transfer back to .

## 2024-08-10 NOTE — ANESTHESIA PRE PROCEDURE
from Last 3 Encounters:   08/10/24 63.5 kg (140 lb)   05/24/24 57.2 kg (126 lb)   02/09/24 58.3 kg (128 lb 9.6 oz)     Body mass index is 27.34 kg/m².    CBC:   Lab Results   Component Value Date/Time    WBC 18.8 08/09/2024 05:43 AM    RBC 4.69 08/09/2024 05:43 AM    HGB 13.9 08/09/2024 05:43 AM    HCT 41.6 08/09/2024 05:43 AM    MCV 88.6 08/09/2024 05:43 AM    RDW 12.9 08/09/2024 05:43 AM     08/09/2024 05:43 AM       CMP:   Lab Results   Component Value Date/Time     08/09/2024 05:43 AM    K 3.3 08/09/2024 05:43 AM    CL 95 08/09/2024 05:43 AM    CO2 27 08/09/2024 05:43 AM    BUN 15 08/09/2024 05:43 AM    CREATININE 0.8 08/09/2024 05:43 AM    GFRAA >60 09/12/2022 10:53 AM    GFRAA >60 04/27/2013 03:30 PM    AGRATIO 0.9 08/08/2024 07:50 PM    LABGLOM 79 08/09/2024 05:43 AM    LABGLOM >60 01/07/2023 08:19 PM    GLUCOSE 384 08/09/2024 05:43 AM    CALCIUM 8.9 08/09/2024 05:43 AM    BILITOT 2.5 08/09/2024 05:43 AM    ALKPHOS 142 08/09/2024 05:43 AM    AST 1,452 08/09/2024 05:43 AM     08/09/2024 05:43 AM       POC Tests:   Recent Labs     08/10/24  0726   POCGLU 286*       Coags:   Lab Results   Component Value Date/Time    PROTIME 9.4 10/28/2021 05:07 PM    INR 0.84 10/28/2021 05:07 PM    APTT 32.2 01/22/2014 03:35 AM       HCG (If Applicable):   Lab Results   Component Value Date    PREGTESTUR Negative 02/12/2015        ABGs:   Lab Results   Component Value Date/Time    PHART 7.348 07/23/2020 02:15 PM    PO2ART 64.3 07/23/2020 02:15 PM    IVV9PVS 53.2 07/23/2020 02:15 PM    BHH4PFC 29.2 07/23/2020 02:15 PM    BEART 2.4 07/23/2020 02:15 PM    H3VUOPSL 90.7 07/23/2020 02:15 PM        Type & Screen (If Applicable):  No results found for: \"LABABO\"    Drug/Infectious Status (If Applicable):  Lab Results   Component Value Date/Time    HIV Non-Reactive 02/14/2019 11:51 AM       COVID-19 Screening (If Applicable):   Lab Results   Component Value Date/Time    COVID19 Not Detected 03/28/2023 10:39 AM

## 2024-08-10 NOTE — PROGRESS NOTES
Patient transferred from OR to PACU, responds to voice, VSS, abdominal incisions x4 well approximated with surgical glue, denies pain and nausea.

## 2024-08-10 NOTE — BRIEF OP NOTE
Brief Postoperative Note      Patient: Whitney Telles  YOB: 1955  MRN: [id number]    Date of Procedure: 8/10/2024    Pre-Op Diagnosis Codes:      * Symptomatic cholelithiasis [K80.20]    Post-Op Diagnosis: Same       Procedure(s):  LAPAROSCOPIC CHOLECYSTECTOMY WITH CHOLANGIOGRAM    Surgeon(s):  Geovani Cano MD    Assistant:  Surgical Assistant: Barb Liang    Anesthesia: General    Estimated Blood Loss (mL): Minimal    Complications: None    Specimens:   ID Type Source Tests Collected by Time Destination   A : GALL BLADDER Tissue Gallbladder SURGICAL PATHOLOGY Geovani Cano MD 8/10/2024 0956        Implants:  * No implants in log *      Drains: * No LDAs found *    Findings:  Infection Present At Time Of Surgery (PATOS) (choose all levels that have infection present):  No infection present  Other Findings: Gallbladder removed    Electronically signed by Geovani Cano MD on 8/10/2024 at 9:59 AM

## 2024-08-10 NOTE — PLAN OF CARE
Problem: Discharge Planning  Goal: Discharge to home or other facility with appropriate resources  8/9/2024 2224 by Therese Martinez RN  Outcome: Progressing       Problem: Safety - Adult  Goal: Free from fall injury  8/9/2024 2224 by Therese Martinez RN  Outcome: Progressing       Problem: Chronic Conditions and Co-morbidities  Goal: Patient's chronic conditions and co-morbidity symptoms are monitored and maintained or improved  8/9/2024 2224 by Therese Martinez RN  Outcome: Progressing    Problem: Pain  Goal: Verbalizes/displays adequate comfort level or baseline comfort level  8/9/2024 2224 by Therese Martinez RN  Outcome: Progressing

## 2024-08-11 PROBLEM — R78.81 E COLI BACTEREMIA: Status: ACTIVE | Noted: 2024-08-11

## 2024-08-11 PROBLEM — B95.2 ENTEROCOCCAL BACTEREMIA: Status: ACTIVE | Noted: 2024-08-11

## 2024-08-11 PROBLEM — R78.81 ENTEROCOCCAL BACTEREMIA: Status: ACTIVE | Noted: 2024-08-11

## 2024-08-11 PROBLEM — B96.20 E COLI BACTEREMIA: Status: ACTIVE | Noted: 2024-08-11

## 2024-08-11 LAB
ALBUMIN SERPL-MCNC: 2.8 G/DL (ref 3.4–5)
ALBUMIN/GLOB SERPL: 0.8 {RATIO} (ref 1.1–2.2)
ALP SERPL-CCNC: 107 U/L (ref 40–129)
ALT SERPL-CCNC: 289 U/L (ref 10–40)
ANION GAP SERPL CALCULATED.3IONS-SCNC: 8 MMOL/L (ref 3–16)
AST SERPL-CCNC: 58 U/L (ref 15–37)
BACTERIA BLD CULT ORG #2: ABNORMAL
BACTERIA BLD CULT ORG #2: ABNORMAL
BACTERIA BLD CULT: ABNORMAL
BACTERIA BLD CULT: ABNORMAL
BASOPHILS # BLD: 0 K/UL (ref 0–0.2)
BASOPHILS NFR BLD: 0.3 %
BILIRUB SERPL-MCNC: 0.9 MG/DL (ref 0–1)
BUN SERPL-MCNC: 14 MG/DL (ref 7–20)
CALCIUM SERPL-MCNC: 7.7 MG/DL (ref 8.3–10.6)
CHLORIDE SERPL-SCNC: 103 MMOL/L (ref 99–110)
CO2 SERPL-SCNC: 25 MMOL/L (ref 21–32)
CREAT SERPL-MCNC: 0.6 MG/DL (ref 0.6–1.2)
DEPRECATED RDW RBC AUTO: 13.4 % (ref 12.4–15.4)
EOSINOPHIL # BLD: 0 K/UL (ref 0–0.6)
EOSINOPHIL NFR BLD: 0.3 %
GFR SERPLBLD CREATININE-BSD FMLA CKD-EPI: >90 ML/MIN/{1.73_M2}
GLUCOSE BLD-MCNC: 107 MG/DL (ref 70–99)
GLUCOSE BLD-MCNC: 231 MG/DL (ref 70–99)
GLUCOSE BLD-MCNC: 241 MG/DL (ref 70–99)
GLUCOSE BLD-MCNC: 257 MG/DL (ref 70–99)
GLUCOSE BLD-MCNC: 67 MG/DL (ref 70–99)
GLUCOSE BLD-MCNC: 85 MG/DL (ref 70–99)
GLUCOSE SERPL-MCNC: 249 MG/DL (ref 70–99)
HCT VFR BLD AUTO: 37.8 % (ref 36–48)
HGB BLD-MCNC: 12.2 G/DL (ref 12–16)
LYMPHOCYTES # BLD: 1.6 K/UL (ref 1–5.1)
LYMPHOCYTES NFR BLD: 10.7 %
MCH RBC QN AUTO: 28.9 PG (ref 26–34)
MCHC RBC AUTO-ENTMCNC: 32.3 G/DL (ref 31–36)
MCV RBC AUTO: 89.4 FL (ref 80–100)
MONOCYTES # BLD: 0.8 K/UL (ref 0–1.3)
MONOCYTES NFR BLD: 5.2 %
NEUTROPHILS # BLD: 12.8 K/UL (ref 1.7–7.7)
NEUTROPHILS NFR BLD: 83.5 %
ORGANISM: ABNORMAL
PERFORMED ON: ABNORMAL
PERFORMED ON: NORMAL
PLATELET # BLD AUTO: 216 K/UL (ref 135–450)
PMV BLD AUTO: 9.6 FL (ref 5–10.5)
POTASSIUM SERPL-SCNC: 3.2 MMOL/L (ref 3.5–5.1)
PROT SERPL-MCNC: 6.2 G/DL (ref 6.4–8.2)
RBC # BLD AUTO: 4.23 M/UL (ref 4–5.2)
SODIUM SERPL-SCNC: 136 MMOL/L (ref 136–145)
WBC # BLD AUTO: 15.3 K/UL (ref 4–11)

## 2024-08-11 PROCEDURE — 6370000000 HC RX 637 (ALT 250 FOR IP): Performed by: SURGERY

## 2024-08-11 PROCEDURE — 2580000003 HC RX 258: Performed by: INTERNAL MEDICINE

## 2024-08-11 PROCEDURE — 6360000002 HC RX W HCPCS: Performed by: INTERNAL MEDICINE

## 2024-08-11 PROCEDURE — 6370000000 HC RX 637 (ALT 250 FOR IP): Performed by: INTERNAL MEDICINE

## 2024-08-11 PROCEDURE — 87040 BLOOD CULTURE FOR BACTERIA: CPT

## 2024-08-11 PROCEDURE — 85025 COMPLETE CBC W/AUTO DIFF WBC: CPT

## 2024-08-11 PROCEDURE — 6360000002 HC RX W HCPCS: Performed by: SURGERY

## 2024-08-11 PROCEDURE — 1200000000 HC SEMI PRIVATE

## 2024-08-11 PROCEDURE — 99024 POSTOP FOLLOW-UP VISIT: CPT | Performed by: SURGERY

## 2024-08-11 PROCEDURE — 6360000002 HC RX W HCPCS: Performed by: STUDENT IN AN ORGANIZED HEALTH CARE EDUCATION/TRAINING PROGRAM

## 2024-08-11 PROCEDURE — 80053 COMPREHEN METABOLIC PANEL: CPT

## 2024-08-11 PROCEDURE — 36415 COLL VENOUS BLD VENIPUNCTURE: CPT

## 2024-08-11 RX ORDER — DIPHENHYDRAMINE HCL 25 MG
25 TABLET ORAL EVERY 6 HOURS PRN
Status: DISCONTINUED | OUTPATIENT
Start: 2024-08-11 | End: 2024-08-15 | Stop reason: HOSPADM

## 2024-08-11 RX ORDER — HYDROCODONE BITARTRATE AND ACETAMINOPHEN 5; 325 MG/1; MG/1
1-2 TABLET ORAL EVERY 6 HOURS PRN
Qty: 10 TABLET | Refills: 0 | Status: SHIPPED | OUTPATIENT
Start: 2024-08-11 | End: 2024-08-14

## 2024-08-11 RX ORDER — DIPHENHYDRAMINE HYDROCHLORIDE 50 MG/ML
25 INJECTION INTRAMUSCULAR; INTRAVENOUS ONCE
Status: COMPLETED | OUTPATIENT
Start: 2024-08-11 | End: 2024-08-11

## 2024-08-11 RX ORDER — DIPHENHYDRAMINE HYDROCHLORIDE 50 MG/ML
25 INJECTION INTRAMUSCULAR; INTRAVENOUS
Status: ACTIVE | OUTPATIENT
Start: 2024-08-11 | End: 2024-08-12

## 2024-08-11 RX ORDER — POTASSIUM CHLORIDE 20 MEQ/1
40 TABLET, EXTENDED RELEASE ORAL ONCE
Status: COMPLETED | OUTPATIENT
Start: 2024-08-11 | End: 2024-08-11

## 2024-08-11 RX ADMIN — PANTOPRAZOLE SODIUM 40 MG: 40 INJECTION, POWDER, FOR SOLUTION INTRAVENOUS at 09:07

## 2024-08-11 RX ADMIN — MORPHINE SULFATE 4 MG: 4 INJECTION, SOLUTION INTRAMUSCULAR; INTRAVENOUS at 14:21

## 2024-08-11 RX ADMIN — HYDROCODONE BITARTRATE AND ACETAMINOPHEN 2 TABLET: 5; 325 TABLET ORAL at 19:03

## 2024-08-11 RX ADMIN — PIPERACILLIN AND TAZOBACTAM 3375 MG: 3; .375 INJECTION, POWDER, LYOPHILIZED, FOR SOLUTION INTRAVENOUS at 09:01

## 2024-08-11 RX ADMIN — WATER 1000 MG: 1 INJECTION INTRAMUSCULAR; INTRAVENOUS; SUBCUTANEOUS at 12:01

## 2024-08-11 RX ADMIN — PIPERACILLIN AND TAZOBACTAM 3375 MG: 3; .375 INJECTION, POWDER, LYOPHILIZED, FOR SOLUTION INTRAVENOUS at 01:38

## 2024-08-11 RX ADMIN — MELATONIN TAB 3 MG 3 MG: 3 TAB at 22:05

## 2024-08-11 RX ADMIN — TRAZODONE HYDROCHLORIDE 50 MG: 50 TABLET ORAL at 22:05

## 2024-08-11 RX ADMIN — ASPIRIN 325 MG: 325 TABLET ORAL at 09:04

## 2024-08-11 RX ADMIN — INSULIN LISPRO 2 UNITS: 100 INJECTION, SOLUTION INTRAVENOUS; SUBCUTANEOUS at 09:02

## 2024-08-11 RX ADMIN — LOSARTAN POTASSIUM 100 MG: 100 TABLET, FILM COATED ORAL at 09:06

## 2024-08-11 RX ADMIN — INSULIN LISPRO 2 UNITS: 100 INJECTION, SOLUTION INTRAVENOUS; SUBCUTANEOUS at 11:59

## 2024-08-11 RX ADMIN — POTASSIUM CHLORIDE 40 MEQ: 1500 TABLET, EXTENDED RELEASE ORAL at 12:08

## 2024-08-11 RX ADMIN — DIPHENHYDRAMINE HYDROCHLORIDE 25 MG: 25 TABLET ORAL at 17:23

## 2024-08-11 RX ADMIN — Medication 1 CAPSULE: at 09:07

## 2024-08-11 RX ADMIN — SODIUM CHLORIDE 1250 MG: 9 INJECTION, SOLUTION INTRAVENOUS at 12:07

## 2024-08-11 RX ADMIN — HYDROCODONE BITARTRATE AND ACETAMINOPHEN 2 TABLET: 5; 325 TABLET ORAL at 09:06

## 2024-08-11 RX ADMIN — HYDROCODONE BITARTRATE AND ACETAMINOPHEN 1 TABLET: 5; 325 TABLET ORAL at 22:05

## 2024-08-11 RX ADMIN — INSULIN GLARGINE 50 UNITS: 100 INJECTION, SOLUTION SUBCUTANEOUS at 09:02

## 2024-08-11 RX ADMIN — Medication 1 CAPSULE: at 16:43

## 2024-08-11 RX ADMIN — HYDROCHLOROTHIAZIDE 12.5 MG: 25 TABLET ORAL at 09:05

## 2024-08-11 RX ADMIN — DIPHENHYDRAMINE HYDROCHLORIDE 25 MG: 50 INJECTION, SOLUTION INTRAMUSCULAR; INTRAVENOUS at 05:13

## 2024-08-11 RX ADMIN — AMLODIPINE BESYLATE 10 MG: 5 TABLET ORAL at 09:06

## 2024-08-11 RX ADMIN — DONEPEZIL HYDROCHLORIDE 10 MG: 5 TABLET, FILM COATED ORAL at 22:05

## 2024-08-11 RX ADMIN — ENOXAPARIN SODIUM 40 MG: 100 INJECTION SUBCUTANEOUS at 09:01

## 2024-08-11 RX ADMIN — VANCOMYCIN HYDROCHLORIDE 1000 MG: 1 INJECTION, POWDER, LYOPHILIZED, FOR SOLUTION INTRAVENOUS at 16:46

## 2024-08-11 RX ADMIN — SODIUM CHLORIDE, PRESERVATIVE FREE 10 ML: 5 INJECTION INTRAVENOUS at 09:08

## 2024-08-11 ASSESSMENT — PAIN SCALES - GENERAL
PAINLEVEL_OUTOF10: 8
PAINLEVEL_OUTOF10: 9
PAINLEVEL_OUTOF10: 0
PAINLEVEL_OUTOF10: 6
PAINLEVEL_OUTOF10: 9
PAINLEVEL_OUTOF10: 0
PAINLEVEL_OUTOF10: 0

## 2024-08-11 NOTE — PROGRESS NOTES
Whitney Telles is a 68 y.o. female patient.    Current Facility-Administered Medications   Medication Dose Route Frequency Provider Last Rate Last Admin    diphenhydrAMINE (BENADRYL) injection 25 mg  25 mg IntraVENous Once PRN Jennifer Ross APRN - IVETTE        cefTRIAXone (ROCEPHIN) 1,000 mg in sterile water 10 mL IV syringe  1,000 mg IntraVENous Q24H Dwain Miner MD   1,000 mg at 08/11/24 1201    vancomycin (VANCOCIN) 1,000 mg in sodium chloride 0.9 % 250 mL IVPB ([id number])  1,000 mg IntraVENous Q12H Dwain Miner MD        vancomycin (VANCOCIN) 1,250 mg in sodium chloride 0.9 % 250 mL IVPB ([id number])  1,250 mg IntraVENous Once Dwain Miner .7 mL/hr at 08/11/24 1207 1,250 mg at 08/11/24 1207    enoxaparin (LOVENOX) injection 40 mg  40 mg SubCUTAneous Daily Geovani Cano MD   40 mg at 08/11/24 0901    HYDROcodone-acetaminophen (NORCO) 5-325 MG per tablet 1 tablet  1 tablet Oral Q4H PRN Geovani Cano MD        Or    HYDROcodone-acetaminophen (NORCO) 5-325 MG per tablet 2 tablet  2 tablet Oral Q4H PRN Geovani Cano MD   2 tablet at 08/11/24 0906    aspirin tablet 325 mg  325 mg Oral Daily Jax Ba MD   325 mg at 08/11/24 0904    hydroCHLOROthiazide (HYDRODIURIL) tablet 12.5 mg  12.5 mg Oral Daily Jax Ba MD   12.5 mg at 08/11/24 0905    traZODone (DESYREL) tablet 50 mg  50 mg Oral Nightly PRN Jax Ba MD   50 mg at 08/10/24 2256    sodium chloride flush 0.9 % injection 5-40 mL  5-40 mL IntraVENous 2 times per day Jax Ba MD   10 mL at 08/11/24 0908    sodium chloride flush 0.9 % injection 5-40 mL  5-40 mL IntraVENous PRN Jax Ba MD        0.9 % sodium chloride infusion   IntraVENous PRN Jax Ba MD 15 mL/hr at 08/10/24 1121 New Bag at 08/10/24 1121    ondansetron (ZOFRAN) injection 4 mg  4 mg IntraVENous Q6H PRN Jax Ba MD        senna (SENOKOT) tablet 8.6 mg  1 tablet Oral Daily PRN

## 2024-08-11 NOTE — PROGRESS NOTES
Gastroenterology Progress Note            Whitney Telles is a 68 y.o. female patient.  1. Calculus of bile duct without cholecystitis with obstruction    2. Chest pain, unspecified type    3. Hypertension, unspecified type    4. Hyperglycemia due to diabetes mellitus (HCC)    5. Symptomatic cholelithiasis        SUBJECTIVE:  abd sore from surgery but overall feels well.     Physical    VITALS:  BP (!) 168/77   Pulse 73   Temp 97.6 °F (36.4 °C) (Oral)   Resp 16   Ht 1.524 m (5')   Wt 63.5 kg (140 lb)   SpO2 96%   BMI 27.34 kg/m²   TEMPERATURE:  Current - Temp: 97.6 °F (36.4 °C); Max - Temp  Av.9 °F (36.6 °C)  Min: 97.6 °F (36.4 °C)  Max: 98.2 °F (36.8 °C)    Abdomen soft, ND, appropriate Ttp abd. , no HSM, Bowel sounds normal     Data      Recent Labs     24  1950 24  0543 08/10/24  1143   WBC 9.6 18.8* 15.7*   HGB 14.8 13.9 14.0   HCT 42.9 41.6 41.7   MCV 87.4 88.6 90.4    217 230     Recent Labs     24  0543 08/10/24  1143 24  0719    132* 136   K 3.3* 4.3 3.2*   CL 95* 98* 103   CO2 27 21 25   BUN 15 19 14   CREATININE 0.8 0.6 0.6     Recent Labs     24  0543 08/10/24  1143 24  0719   AST 1,452* 216* 58*   * 498* 289*   BILIDIR 2.0*  --   --    BILITOT 2.5* 2.4* 0.9   ALKPHOS 142* 136* 107     Recent Labs     24   LIPASE 70.0*             ASSESSMENT   Choledocholithiasis- s /p ercp with stone extraction and now s/p cholecystectomy.   LFT trending down as expected.         PLAN    Cont post op care.    Will sign off.     Cristiano Landers MD  [facility]

## 2024-08-11 NOTE — PROGRESS NOTES
Anticipate discharge in a couple of days once repeat cultures resulted    ____________________________________________________________________________    Subjective:   Overnight Events:   Uneventful overnight  No new complaints this morning.  Denies abdominal pain  Seen with daughter at bedside and another on the phone.  Plan of care explained and questions addressed.  Next    Physical Exam:  BP (!) 148/73   Pulse 61   Temp 97.8 °F (36.6 °C) (Oral)   Resp 16   Ht 1.524 m (5')   Wt 64.3 kg (141 lb 11.2 oz)   SpO2 94%   BMI 27.67 kg/m²   General appearance: No apparent distress, appears stated age and cooperative.  HEENT: Normocephalic, atraumatic, MMM, No sclera icterus/conjuctival palor  Neck: Supple, no thyromegally. No jugular venous distention.   Respiratory:  Clear to auscultation, no Rales/Wheezes/Rhonchi.  Cardiovascular: S1/S2 without murmurs, rubs or gallops. RRR  Abdomen: Soft, non-tender, non-distended, bowel sounds present.  Musculoskeletal: No clubbing, cyanosis or edema bilaterally.    Skin: Skin color, texture, turgor normal.  No rashes or lesions.  Neurologic:  Cranial nerves: II-XII intact, JASMYNE, No focal sensory/motor deficits        Intake/Output Summary (Last 24 hours) at 8/11/2024 1304  Last data filed at 8/11/2024 0845  Gross per 24 hour   Intake 2330 ml   Output 1600 ml   Net 730 ml       Labs:   Recent Labs     08/09/24  0543 08/10/24  1143 08/11/24  0719   WBC 18.8* 15.7* 15.3*   HGB 13.9 14.0 12.2   HCT 41.6 41.7 37.8    230 216      Recent Labs     08/09/24  0543 08/10/24  1143 08/11/24  0719    132* 136   K 3.3* 4.3 3.2*   CL 95* 98* 103   CO2 27 21 25   BUN 15 19 14   CREATININE 0.8 0.6 0.6   CALCIUM 8.9 8.1* 7.7*   AST 1,452* 216* 58*   * 498* 289*   BILIDIR 2.0*  --   --    BILITOT 2.5* 2.4* 0.9   ALKPHOS 142* 136* 107     No results for input(s): \"CKTOTAL\", \"TROPONINI\" in the last 72 hours.    Urinalysis:    Lab Results   Component Value Date/Time    NITRU  Negative 08/08/2024 08:31 PM    WBCUA 2 08/08/2024 08:31 PM    BACTERIA Rare 08/08/2024 08:31 PM    RBCUA 2 08/08/2024 08:31 PM    BLOODU Negative 08/08/2024 08:31 PM    GLUCOSEU >=1000 08/08/2024 08:31 PM    GLUCOSEU NEGATIVE 07/24/2011 07:30 PM       Radiology:  XR CHOLANGIOGRAM INTRAOPERATIVE ADDL SET   Final Result   Intraoperative cholangiogram.  Please see operative report for more details.         FL ERCP BILIARY AND PANCREATIC S&I   Final Result   As above.  Please refer to the procedure report for further details.         US GALLBLADDER RUQ   Final Result   1. Cholelithiasis without evidence of acute cholecystitis.   2. Moderate dilatation of the common bile duct measuring up to 10 mm in   diameter. Correlation with liver function tests is recommended.         CTA CHEST ABDOMEN PELVIS W CONTRAST   Final Result   1. No acute aortic abnormality, aneurysm or dissection.      2. No evidence for central pulmonary embolism.      3.  Chronic interstitial findings in the lungs with fibrotic features in the   lung bases.  No acute airspace disease identified.      4.  Distended stomach with questionable wall thickening involving the gastric   antrum, for which gastritis should be considered.      5.  Diverticulosis.      6.  Cholelithiasis.         CT CHEST WO CONTRAST   Final Result   1. No acute aortic abnormality, aneurysm or dissection.      2. No evidence for central pulmonary embolism.      3.  Chronic interstitial findings in the lungs with fibrotic features in the   lung bases.  No acute airspace disease identified.      4.  Distended stomach with questionable wall thickening involving the gastric   antrum, for which gastritis should be considered.      5.  Diverticulosis.      6.  Cholelithiasis.                 Dwain Miner MD      Please excuse brevity and/or typos. This report was transcribed using voice recognition software. Every effort was made to ensure accuracy, however, inadvertent

## 2024-08-11 NOTE — PROGRESS NOTES
Medical team notified that blood cultures were positive by this RN - ID consult placed and antibiotics adjusted per medical team. Potassium replacement orders requested at this time.

## 2024-08-12 PROBLEM — Z90.49 S/P LAPAROSCOPIC CHOLECYSTECTOMY: Status: ACTIVE | Noted: 2024-08-12

## 2024-08-12 PROBLEM — K80.20 SYMPTOMATIC CHOLELITHIASIS: Status: ACTIVE | Noted: 2024-08-12

## 2024-08-12 PROBLEM — K80.10 CCC (CHRONIC CALCULOUS CHOLECYSTITIS): Status: ACTIVE | Noted: 2024-08-12

## 2024-08-12 PROBLEM — E11.65 HYPERGLYCEMIA DUE TO DIABETES MELLITUS (HCC): Status: ACTIVE | Noted: 2024-08-12

## 2024-08-12 PROBLEM — A41.51 SEPSIS DUE TO ESCHERICHIA COLI (HCC): Status: ACTIVE | Noted: 2024-08-12

## 2024-08-12 PROBLEM — Z98.890 STATUS POST ENDOSCOPIC RETROGRADE CHOLANGIOPANCREATOGRAPHY: Status: ACTIVE | Noted: 2024-08-12

## 2024-08-12 PROBLEM — R74.01 TRANSAMINITIS: Status: ACTIVE | Noted: 2024-08-12

## 2024-08-12 LAB
BASOPHILS # BLD: 0.1 K/UL (ref 0–0.2)
BASOPHILS NFR BLD: 0.5 %
DEPRECATED RDW RBC AUTO: 13.5 % (ref 12.4–15.4)
EOSINOPHIL # BLD: 0.3 K/UL (ref 0–0.6)
EOSINOPHIL NFR BLD: 2.8 %
GLUCOSE BLD-MCNC: 145 MG/DL (ref 70–99)
GLUCOSE BLD-MCNC: 178 MG/DL (ref 70–99)
GLUCOSE BLD-MCNC: 232 MG/DL (ref 70–99)
GLUCOSE BLD-MCNC: 70 MG/DL (ref 70–99)
GLUCOSE BLD-MCNC: 89 MG/DL (ref 70–99)
HCT VFR BLD AUTO: 38 % (ref 36–48)
HGB BLD-MCNC: 12.4 G/DL (ref 12–16)
LYMPHOCYTES # BLD: 2.4 K/UL (ref 1–5.1)
LYMPHOCYTES NFR BLD: 22.1 %
MCH RBC QN AUTO: 29.1 PG (ref 26–34)
MCHC RBC AUTO-ENTMCNC: 32.7 G/DL (ref 31–36)
MCV RBC AUTO: 89 FL (ref 80–100)
MONOCYTES # BLD: 0.9 K/UL (ref 0–1.3)
MONOCYTES NFR BLD: 8.5 %
NEUTROPHILS # BLD: 7.3 K/UL (ref 1.7–7.7)
NEUTROPHILS NFR BLD: 66.1 %
PERFORMED ON: ABNORMAL
PERFORMED ON: NORMAL
PERFORMED ON: NORMAL
PLATELET # BLD AUTO: 234 K/UL (ref 135–450)
PMV BLD AUTO: 9.1 FL (ref 5–10.5)
RBC # BLD AUTO: 4.27 M/UL (ref 4–5.2)
VANCOMYCIN SERPL-MCNC: 11 UG/ML
WBC # BLD AUTO: 11 K/UL (ref 4–11)

## 2024-08-12 PROCEDURE — 99223 1ST HOSP IP/OBS HIGH 75: CPT | Performed by: INTERNAL MEDICINE

## 2024-08-12 PROCEDURE — 6360000002 HC RX W HCPCS: Performed by: SURGERY

## 2024-08-12 PROCEDURE — 6370000000 HC RX 637 (ALT 250 FOR IP): Performed by: INTERNAL MEDICINE

## 2024-08-12 PROCEDURE — 80202 ASSAY OF VANCOMYCIN: CPT

## 2024-08-12 PROCEDURE — 85025 COMPLETE CBC W/AUTO DIFF WBC: CPT

## 2024-08-12 PROCEDURE — 1200000000 HC SEMI PRIVATE

## 2024-08-12 PROCEDURE — 6360000002 HC RX W HCPCS: Performed by: INTERNAL MEDICINE

## 2024-08-12 PROCEDURE — APPNB30 APP NON BILLABLE TIME 0-30 MINS: Performed by: NURSE PRACTITIONER

## 2024-08-12 PROCEDURE — 6370000000 HC RX 637 (ALT 250 FOR IP): Performed by: SURGERY

## 2024-08-12 PROCEDURE — APPSS15 APP SPLIT SHARED TIME 0-15 MINUTES: Performed by: NURSE PRACTITIONER

## 2024-08-12 PROCEDURE — 2580000003 HC RX 258: Performed by: INTERNAL MEDICINE

## 2024-08-12 PROCEDURE — 99024 POSTOP FOLLOW-UP VISIT: CPT | Performed by: SURGERY

## 2024-08-12 RX ORDER — LINEZOLID 2 MG/ML
600 INJECTION, SOLUTION INTRAVENOUS EVERY 12 HOURS
Status: DISCONTINUED | OUTPATIENT
Start: 2024-08-12 | End: 2024-08-14

## 2024-08-12 RX ADMIN — LOSARTAN POTASSIUM 100 MG: 100 TABLET, FILM COATED ORAL at 09:09

## 2024-08-12 RX ADMIN — Medication 1 CAPSULE: at 17:35

## 2024-08-12 RX ADMIN — HYDROCODONE BITARTRATE AND ACETAMINOPHEN 2 TABLET: 5; 325 TABLET ORAL at 04:37

## 2024-08-12 RX ADMIN — VANCOMYCIN HYDROCHLORIDE 1000 MG: 1 INJECTION, POWDER, LYOPHILIZED, FOR SOLUTION INTRAVENOUS at 05:15

## 2024-08-12 RX ADMIN — MELATONIN TAB 3 MG 3 MG: 3 TAB at 23:14

## 2024-08-12 RX ADMIN — PANTOPRAZOLE SODIUM 40 MG: 40 INJECTION, POWDER, FOR SOLUTION INTRAVENOUS at 09:09

## 2024-08-12 RX ADMIN — LINEZOLID 600 MG: 600 INJECTION, SOLUTION INTRAVENOUS at 23:17

## 2024-08-12 RX ADMIN — DONEPEZIL HYDROCHLORIDE 10 MG: 5 TABLET, FILM COATED ORAL at 23:14

## 2024-08-12 RX ADMIN — ENOXAPARIN SODIUM 40 MG: 100 INJECTION SUBCUTANEOUS at 09:09

## 2024-08-12 RX ADMIN — HYDROCODONE BITARTRATE AND ACETAMINOPHEN 2 TABLET: 5; 325 TABLET ORAL at 09:13

## 2024-08-12 RX ADMIN — ACETAMINOPHEN 650 MG: 325 TABLET ORAL at 06:35

## 2024-08-12 RX ADMIN — HYDROCODONE BITARTRATE AND ACETAMINOPHEN 2 TABLET: 5; 325 TABLET ORAL at 21:37

## 2024-08-12 RX ADMIN — HYDROCHLOROTHIAZIDE 12.5 MG: 25 TABLET ORAL at 09:09

## 2024-08-12 RX ADMIN — WATER 1000 MG: 1 INJECTION INTRAMUSCULAR; INTRAVENOUS; SUBCUTANEOUS at 12:00

## 2024-08-12 RX ADMIN — Medication 1 CAPSULE: at 09:09

## 2024-08-12 RX ADMIN — INSULIN GLARGINE 50 UNITS: 100 INJECTION, SOLUTION SUBCUTANEOUS at 09:09

## 2024-08-12 RX ADMIN — TRAZODONE HYDROCHLORIDE 50 MG: 50 TABLET ORAL at 23:14

## 2024-08-12 RX ADMIN — AMLODIPINE BESYLATE 10 MG: 5 TABLET ORAL at 09:09

## 2024-08-12 RX ADMIN — LINEZOLID 600 MG: 600 INJECTION, SOLUTION INTRAVENOUS at 12:01

## 2024-08-12 RX ADMIN — ASPIRIN 325 MG: 325 TABLET ORAL at 09:09

## 2024-08-12 ASSESSMENT — ENCOUNTER SYMPTOMS
WHEEZING: 0
RHINORRHEA: 0
SINUS PRESSURE: 0
SINUS PAIN: 0
CONSTIPATION: 0
COUGH: 0
SHORTNESS OF BREATH: 0
NAUSEA: 0
BACK PAIN: 0
ABDOMINAL PAIN: 0
DIARRHEA: 0
SORE THROAT: 0
EYE REDNESS: 0
EYE DISCHARGE: 0

## 2024-08-12 ASSESSMENT — PAIN SCALES - GENERAL
PAINLEVEL_OUTOF10: 7
PAINLEVEL_OUTOF10: 10
PAINLEVEL_OUTOF10: 0
PAINLEVEL_OUTOF10: 10
PAINLEVEL_OUTOF10: 10
PAINLEVEL_OUTOF10: 5
PAINLEVEL_OUTOF10: 10

## 2024-08-12 ASSESSMENT — PAIN DESCRIPTION - LOCATION
LOCATION: HEAD;NECK
LOCATION: ABDOMEN

## 2024-08-12 ASSESSMENT — PAIN DESCRIPTION - FREQUENCY: FREQUENCY: INTERMITTENT

## 2024-08-12 ASSESSMENT — PAIN DESCRIPTION - DESCRIPTORS: DESCRIPTORS: SORE;TENDER

## 2024-08-12 ASSESSMENT — PAIN DESCRIPTION - ONSET
ONSET: ON-GOING
ONSET: ON-GOING

## 2024-08-12 ASSESSMENT — PAIN DESCRIPTION - ORIENTATION: ORIENTATION: RIGHT

## 2024-08-12 NOTE — PROGRESS NOTES
[facility]        Progress Note    Patient Name: Whitney Telles  MRN: [id number]  YOB: 1955  Date of Evaluation: 2024    Subjective:  No acute events overnight  Pain controlled  No nausea or vomiting, tolerating regular diet  Reports dizziness  Resting in bed at this time    Post-Operative Day #2      Vital Signs:  Patient Vitals for the past 24 hrs:   BP Temp Temp src Pulse Resp SpO2   24 0900 (!) 189/96 97.8 °F (36.6 °C) Oral 64 16 94 %   24 0430 (!) 191/79 97.7 °F (36.5 °C) Oral 67 16 93 %   24 (!) 164/72 98.8 °F (37.1 °C) Oral 69 16 94 %   24 1903 -- -- -- -- 16 --   24 1605 (!) 150/75 98 °F (36.7 °C) Oral 70 17 96 %   24 1451 -- -- -- -- 16 --   24 1421 -- -- -- -- 16 --      TEMPERATURE HISTORY 24H: Temp (24hrs), Av.1 °F (36.7 °C), Min:97.7 °F (36.5 °C), Max:98.8 °F (37.1 °C)    BLOOD PRESSURE HISTORY: Systolic (36hrs), Av , Min:148 , Max:191    Diastolic (36hrs), Av, Min:72, Max:96      Intake/Output:  I/O last 3 completed shifts:  In: 3120 [P.O.:1860; I.V.:1260]  Out: 1600 [Urine:1600]  No intake/output data recorded.  Drain/tube Output:       Physical Exam:  General: awake, alert, oriented to person, place, time  Lungs: unlabored respirations  Abdomen: soft, non-distended, incisional tenderness only, bowel sounds present   Skin/Wound: healing well, no drainage, no erythema, well approximated    Labs:  CBC:    Recent Labs     08/10/24  1143 24  0719 24  0442   WBC 15.7* 15.3* 11.0   HGB 14.0 12.2 12.4   HCT 41.7 37.8 38.0    216 234     BMP:    Recent Labs     08/10/24  1143 24  0719   * 136   K 4.3 3.2*   CL 98* 103   CO2 21 25   BUN 19 14   CREATININE 0.6 0.6   GLUCOSE 311* 249*     Hepatic:    Recent Labs     08/10/24  1143 24  0719   * 58*   * 289*   BILITOT 2.4* 0.9   ALKPHOS 136* 107     Amylase:    Lab Results   Component Value  labs within last 30 days.     Urine culture  Lab Results   Component Value Date/Time    LABURIN No growth at 18 to 36 hours 10/20/2020 02:24 PM       Pathology:  OR 8/10/2024--Pathology results pending     Imaging:  I have personally reviewed the following films:    No results found.    Scheduled Meds:   linezolid  600 mg IntraVENous Q12H    cefTRIAXone (ROCEPHIN) IV  1,000 mg IntraVENous Q24H    enoxaparin  40 mg SubCUTAneous Daily    aspirin  325 mg Oral Daily    hydroCHLOROthiazide  12.5 mg Oral Daily    sodium chloride flush  5-40 mL IntraVENous 2 times per day    lactobacillus  1 capsule Oral BID WC    melatonin  3 mg Oral Nightly    donepezil  10 mg Oral Nightly    insulin lispro  0-8 Units SubCUTAneous TID WC    insulin lispro  0-4 Units SubCUTAneous Nightly    insulin glargine  50 Units SubCUTAneous QAM    pantoprazole  40 mg IntraVENous Daily    amLODIPine  10 mg Oral Daily    losartan  100 mg Oral Daily     Continuous Infusions:   sodium chloride 15 mL/hr at 08/10/24 1121    dextrose       PRN Meds:.diphenhydrAMINE, HYDROcodone 5 mg - acetaminophen **OR** HYDROcodone 5 mg - acetaminophen, traZODone, sodium chloride flush, sodium chloride, ondansetron, senna, acetaminophen **OR** acetaminophen, dextrose bolus **OR** dextrose bolus, glucagon (rDNA), dextrose, hydrALAZINE      Assessment:  68 y.o. female admitted with   1. Calculus of bile duct without cholecystitis with obstruction    2. Chest pain, unspecified type    3. Hypertension, unspecified type    4. Hyperglycemia due to diabetes mellitus (HCC)    5. Symptomatic cholelithiasis    6. CCC (chronic calculous cholecystitis)        OR Date 8/10/2024, laparoscopic cholecystectomy with intraoperative cholangiogram for chronic cholecystitis with cholelithiasis  Procedure Date 8/9/2024, ERCP with biliary sphincterotomy and stone extraction for choledocholithiasis  Elevated LFT  Hypertension  Hyperlipidemia  Diabetes  Dementia       Plan:  1. Pain controlled,

## 2024-08-12 NOTE — PROGRESS NOTES
Shift assessment complete. Vital signs stable. Afebrile. Abdomen soft, non-distended. Active bowel sounds. Passing flatus, no BMs yet. Encouraged ambulation. Pain well controlled. Scheduled night medication administered. The care plan and education has been reviewed and mutually agreed upon with the patient. Patient's daughter remains at bedside and will be staying overnight. Bed alarm on and call light within reach.

## 2024-08-12 NOTE — PROGRESS NOTES
Hospitalist Progress Note      PCP: Parminder Banda MD    Date of Admission: 8/8/2024    LOS: 4    Chief Complaint:   Chief Complaint   Patient presents with    Chest Pain     Pt via EMS from home, c/o sudden onset chest pain that moves to the back, hurts to move, pt with episode of emesis on arrival        Case Summary:   68-year-old lady with history of hypertension, hyperlipidemia, type 2 diabetes, dementia who presented with epigastric abdominal pain radiating to the back and associated nausea and vomiting found to have CBD dilation with concern for choledocholithiasis and possible gastritis and associated transaminitis.    CT abdomen pelvis noted for distended stomach with antral gastritis  Ultrasound abdomen with cholelithiasis and dilated CBD  Transaminitis trended.  Decision was made for ERCP 8/9/2024 with GI and was found to have choledocholithiasis with obstruction status post biliary sphincterotomy and stone extraction performed.  She was consulted by general surgery and underwent laparoscopic cholecystectomy with intraoperative cholangiogram 8/10/2024      Active Hospital Problems    Diagnosis Date Noted    S/P laparoscopic cholecystectomy [Z90.49] 08/12/2024    Status post endoscopic retrograde cholangiopancreatography [Z98.890] 08/12/2024    Transaminitis [R74.01] 08/12/2024    Sepsis due to Escherichia coli (HCC) [A41.51] 08/12/2024    CCC (chronic calculous cholecystitis) [K80.10] 08/12/2024    Hyperglycemia due to diabetes mellitus (HCC) [E11.65] 08/12/2024    Symptomatic cholelithiasis [K80.20] 08/12/2024    E coli bacteremia [R78.81, B96.20] 08/11/2024    Enterococcal bacteremia [R78.81, B95.2] 08/11/2024    Hypomagnesemia [E83.42] 08/09/2024    Leukocytosis [D72.829] 08/09/2024    Calculus of bile duct without cholecystitis with obstruction [K80.51] 08/09/2024    Alzheimer's disease, unspecified [G30.9] 01/24/2022    Type 2 diabetes mellitus with diabetic mononeuropathy, with long-term        Radiology:  XR CHOLANGIOGRAM INTRAOPERATIVE ADDL SET   Final Result   Intraoperative cholangiogram.  Please see operative report for more details.         FL ERCP BILIARY AND PANCREATIC S&I   Final Result   As above.  Please refer to the procedure report for further details.         US GALLBLADDER RUQ   Final Result   1. Cholelithiasis without evidence of acute cholecystitis.   2. Moderate dilatation of the common bile duct measuring up to 10 mm in   diameter. Correlation with liver function tests is recommended.         CTA CHEST ABDOMEN PELVIS W CONTRAST   Final Result   1. No acute aortic abnormality, aneurysm or dissection.      2. No evidence for central pulmonary embolism.      3.  Chronic interstitial findings in the lungs with fibrotic features in the   lung bases.  No acute airspace disease identified.      4.  Distended stomach with questionable wall thickening involving the gastric   antrum, for which gastritis should be considered.      5.  Diverticulosis.      6.  Cholelithiasis.         CT CHEST WO CONTRAST   Final Result   1. No acute aortic abnormality, aneurysm or dissection.      2. No evidence for central pulmonary embolism.      3.  Chronic interstitial findings in the lungs with fibrotic features in the   lung bases.  No acute airspace disease identified.      4.  Distended stomach with questionable wall thickening involving the gastric   antrum, for which gastritis should be considered.      5.  Diverticulosis.      6.  Cholelithiasis.                 Kenna Casiano MD      Please excuse brevity and/or typos. This report was transcribed using voice recognition software. Every effort was made to ensure accuracy, however, inadvertent computerized transcription errors may be present.

## 2024-08-12 NOTE — PROGRESS NOTES
[facility]        Progress Note    Patient Name: Whitney Telles  MRN: [id number]  YOB: 1955  Date of Evaluation: 2024    Subjective:  No acute events overnight  Pain controlled  No nausea or vomiting, tolerating regular diet  Reports dizziness  Resting in bed at this time    Post-Operative Day #2      Vital Signs:  Patient Vitals for the past 24 hrs:   BP Temp Temp src Pulse Resp SpO2   24 0900 (!) 189/96 97.8 °F (36.6 °C) Oral 64 16 94 %   24 0430 (!) 191/79 97.7 °F (36.5 °C) Oral 67 16 93 %   24 (!) 164/72 98.8 °F (37.1 °C) Oral 69 16 94 %   24 1903 -- -- -- -- 16 --      TEMPERATURE HISTORY 24H: Temp (24hrs), Av.1 °F (36.7 °C), Min:97.7 °F (36.5 °C), Max:98.8 °F (37.1 °C)    BLOOD PRESSURE HISTORY: Systolic (36hrs), Av , Min:148 , Max:191    Diastolic (36hrs), Av, Min:72, Max:96      Intake/Output:  I/O last 3 completed shifts:  In: 3120 [P.O.:1860; I.V.:1260]  Out: 1600 [Urine:1600]  No intake/output data recorded.  Drain/tube Output:       Physical Exam:  General: awake, alert, oriented to person, place, time  Lungs: unlabored respirations  Abdomen: soft, non-distended, incisional tenderness only, bowel sounds present   Skin/Wound: healing well, no drainage, no erythema, well approximated    Labs:  CBC:    Recent Labs     08/10/24  1143 24  0719 24  0442   WBC 15.7* 15.3* 11.0   HGB 14.0 12.2 12.4   HCT 41.7 37.8 38.0    216 234     BMP:    Recent Labs     08/10/24  1143 24  0719   * 136   K 4.3 3.2*   CL 98* 103   CO2 21 25   BUN 19 14   CREATININE 0.6 0.6   GLUCOSE 311* 249*     Hepatic:    Recent Labs     08/10/24  1143 24  0719   * 58*   * 289*   BILITOT 2.4* 0.9   ALKPHOS 136* 107     Amylase:    Lab Results   Component Value Date/Time    AMYLASE 119 2021 12:50 PM    AMYLASE 74 2015 09:00 AM     Lipase:    Lab Results   Component Value  10/20/2020 02:24 PM       Pathology:  OR 8/10/2024--Pathology results pending     Imaging:  I have personally reviewed the following films:    No results found.    Scheduled Meds:   linezolid  600 mg IntraVENous Q12H    cefTRIAXone (ROCEPHIN) IV  1,000 mg IntraVENous Q24H    enoxaparin  40 mg SubCUTAneous Daily    aspirin  325 mg Oral Daily    hydroCHLOROthiazide  12.5 mg Oral Daily    sodium chloride flush  5-40 mL IntraVENous 2 times per day    lactobacillus  1 capsule Oral BID WC    melatonin  3 mg Oral Nightly    donepezil  10 mg Oral Nightly    insulin lispro  0-8 Units SubCUTAneous TID WC    insulin lispro  0-4 Units SubCUTAneous Nightly    insulin glargine  50 Units SubCUTAneous QAM    pantoprazole  40 mg IntraVENous Daily    amLODIPine  10 mg Oral Daily    losartan  100 mg Oral Daily     Continuous Infusions:   sodium chloride 15 mL/hr at 08/10/24 1121    dextrose       PRN Meds:.diphenhydrAMINE, HYDROcodone 5 mg - acetaminophen **OR** HYDROcodone 5 mg - acetaminophen, traZODone, sodium chloride flush, sodium chloride, ondansetron, senna, acetaminophen **OR** acetaminophen, dextrose bolus **OR** dextrose bolus, glucagon (rDNA), dextrose, hydrALAZINE      Assessment:  68 y.o. female admitted with   1. Calculus of bile duct without cholecystitis with obstruction    2. Chest pain, unspecified type    3. Hypertension, unspecified type    4. Hyperglycemia due to diabetes mellitus (HCC)    5. Symptomatic cholelithiasis    6. CCC (chronic calculous cholecystitis)        OR Date 8/10/2024, laparoscopic cholecystectomy with intraoperative cholangiogram for chronic cholecystitis with cholelithiasis  Procedure Date 8/9/2024, ERCP with biliary sphincterotomy and stone extraction for choledocholithiasis  Elevated LFT  Hypertension  Hyperlipidemia  Diabetes  Dementia       Plan:  1. Pain controlled, incisional tenderness only on exam, incisions healing well, no nausea or vomiting, tolerating regular diet, reports

## 2024-08-12 NOTE — CONSULTS
streptomycin-syn Sensitive SYN-S mcg/mL BACTERIAL SUSCEPTIBILITY PANEL BY MICKY     vancomycin Resistant   BACTERIAL SUSCEPTIBILITY PANEL BY MICKY                 Antibiotics and immunizations:       Current antibiotics: All antibiotics and their doses were reviewed by me    Recent Abx Admin                     vancomycin (VANCOCIN) 1,000 mg in sodium chloride 0.9 % 250 mL IVPB ([id number]) (mg) 1,000 mg New Bag 08/12/24 0515     1,000 mg New Bag 08/11/24 1646    vancomycin (VANCOCIN) 1,250 mg in sodium chloride 0.9 % 250 mL IVPB ([id number]) (mg) 1,250 mg New Bag 08/11/24 1207    cefTRIAXone (ROCEPHIN) 1,000 mg in sterile water 10 mL IV syringe (mg) 1,000 mg Given 08/11/24 1201                      Immunization History: All immunization history was reviewed by me today.    Immunization History   Administered Date(s) Administered    COVID-19, PFIZER Bivalent, DO NOT Dilute, (age 12y+), IM, 30 mcg/0.3 mL 09/28/2022    COVID-19, PFIZER PURPLE top, DILUTE for use, (age 12 y+), 30mcg/0.3mL 05/01/2021, 05/22/2021    Influenza Vaccine, unspecified formulation 09/03/2012, 09/01/2017    Influenza Virus Vaccine 09/01/2017    Influenza, FLUAD, (age 65 y+), Adjuvanted, 0.5mL 10/15/2020, 09/12/2022, 11/10/2023    Influenza, High Dose (Fluzone 65 yrs and older) 09/27/2018    Pneumococcal, PCV-13, PREVNAR 13, (age 6w+), IM, 0.5mL 07/26/2019    Pneumococcal, PPSV23, PNEUMOVAX 23, (age 2y+), SC/IM, 0.5mL 06/06/2018    TDaP, ADACEL (age 10y-64y), BOOSTRIX (age 10y+), IM, 0.5mL 07/26/2019    Zoster Recombinant (Shingrix) 12/01/2020, 02/14/2021       Known drug allergies:     All allergies were reviewed and updated    No Known Allergies    Social history:     Social History:  All social andepidemiologic history was reviewed and updated by me today as needed.     Tobacco use:   reports that she has never smoked. She has never used smokeless tobacco.  Alcohol use:   reports that she does not currently use alcohol.  Currently lives in:  report for further details.         US GALLBLADDER RUQ   Final Result   1. Cholelithiasis without evidence of acute cholecystitis.   2. Moderate dilatation of the common bile duct measuring up to 10 mm in   diameter. Correlation with liver function tests is recommended.         CTA CHEST ABDOMEN PELVIS W CONTRAST   Final Result   1. No acute aortic abnormality, aneurysm or dissection.      2. No evidence for central pulmonary embolism.      3.  Chronic interstitial findings in the lungs with fibrotic features in the   lung bases.  No acute airspace disease identified.      4.  Distended stomach with questionable wall thickening involving the gastric   antrum, for which gastritis should be considered.      5.  Diverticulosis.      6.  Cholelithiasis.         CT CHEST WO CONTRAST   Final Result   1. No acute aortic abnormality, aneurysm or dissection.      2. No evidence for central pulmonary embolism.      3.  Chronic interstitial findings in the lungs with fibrotic features in the   lung bases.  No acute airspace disease identified.      4.  Distended stomach with questionable wall thickening involving the gastric   antrum, for which gastritis should be considered.      5.  Diverticulosis.      6.  Cholelithiasis.             Outside records:    Labs, Microbiology, Radiology and pertinent results from Care everywhere, if available, were reviewed as a part ofthe consultation.      Problem list:       Patient Active Problem List   Diagnosis Code    Cervical spinal stenosis M48.02    Acute encephalopathy G93.40    Malignant hypertension I10    Left-sided low back pain with left-sided sciatica M54.42    Hypercholesteremia E78.00    Primary insomnia F51.01    Hypotension I95.9    Dizziness R42    Essential hypertension I10    Elevated lactic acid level R79.89    Lactic acidosis E87.20    Atrial ectopy I49.1    Vasovagal syncope R55    Dysrhythmia I49.9    Chronic left-sided low back pain with left-sided sciatica M54.42,

## 2024-08-13 PROBLEM — Z71.89 DIABETES EDUCATION, ENCOUNTER FOR: Status: ACTIVE | Noted: 2024-08-13

## 2024-08-13 LAB
ANION GAP SERPL CALCULATED.3IONS-SCNC: 9 MMOL/L (ref 3–16)
BASOPHILS # BLD: 0 K/UL (ref 0–0.2)
BASOPHILS NFR BLD: 0.4 %
BUN SERPL-MCNC: 7 MG/DL (ref 7–20)
CALCIUM SERPL-MCNC: 8.3 MG/DL (ref 8.3–10.6)
CHLORIDE SERPL-SCNC: 96 MMOL/L (ref 99–110)
CO2 SERPL-SCNC: 28 MMOL/L (ref 21–32)
CREAT SERPL-MCNC: <0.5 MG/DL (ref 0.6–1.2)
DEPRECATED RDW RBC AUTO: 13.3 % (ref 12.4–15.4)
EOSINOPHIL # BLD: 0.2 K/UL (ref 0–0.6)
EOSINOPHIL NFR BLD: 2.7 %
GFR SERPLBLD CREATININE-BSD FMLA CKD-EPI: >90 ML/MIN/{1.73_M2}
GLUCOSE BLD-MCNC: 186 MG/DL (ref 70–99)
GLUCOSE BLD-MCNC: 187 MG/DL (ref 70–99)
GLUCOSE BLD-MCNC: 223 MG/DL (ref 70–99)
GLUCOSE BLD-MCNC: 231 MG/DL (ref 70–99)
GLUCOSE SERPL-MCNC: 191 MG/DL (ref 70–99)
HCT VFR BLD AUTO: 41.2 % (ref 36–48)
HGB BLD-MCNC: 14.1 G/DL (ref 12–16)
LYMPHOCYTES # BLD: 2 K/UL (ref 1–5.1)
LYMPHOCYTES NFR BLD: 21.9 %
MAGNESIUM SERPL-MCNC: 1.8 MG/DL (ref 1.8–2.4)
MCH RBC QN AUTO: 30.1 PG (ref 26–34)
MCHC RBC AUTO-ENTMCNC: 34.2 G/DL (ref 31–36)
MCV RBC AUTO: 88 FL (ref 80–100)
MONOCYTES # BLD: 0.7 K/UL (ref 0–1.3)
MONOCYTES NFR BLD: 7.7 %
NEUTROPHILS # BLD: 6.2 K/UL (ref 1.7–7.7)
NEUTROPHILS NFR BLD: 67.3 %
PERFORMED ON: ABNORMAL
PLATELET # BLD AUTO: 254 K/UL (ref 135–450)
PMV BLD AUTO: 8.4 FL (ref 5–10.5)
POTASSIUM SERPL-SCNC: 3 MMOL/L (ref 3.5–5.1)
RBC # BLD AUTO: 4.68 M/UL (ref 4–5.2)
SODIUM SERPL-SCNC: 133 MMOL/L (ref 136–145)
WBC # BLD AUTO: 9.2 K/UL (ref 4–11)

## 2024-08-13 PROCEDURE — 99233 SBSQ HOSP IP/OBS HIGH 50: CPT | Performed by: INTERNAL MEDICINE

## 2024-08-13 PROCEDURE — 6360000002 HC RX W HCPCS: Performed by: INTERNAL MEDICINE

## 2024-08-13 PROCEDURE — 97161 PT EVAL LOW COMPLEX 20 MIN: CPT

## 2024-08-13 PROCEDURE — 6370000000 HC RX 637 (ALT 250 FOR IP): Performed by: NURSE PRACTITIONER

## 2024-08-13 PROCEDURE — 6370000000 HC RX 637 (ALT 250 FOR IP): Performed by: INTERNAL MEDICINE

## 2024-08-13 PROCEDURE — APPNB30 APP NON BILLABLE TIME 0-30 MINS: Performed by: NURSE PRACTITIONER

## 2024-08-13 PROCEDURE — 2580000003 HC RX 258: Performed by: INTERNAL MEDICINE

## 2024-08-13 PROCEDURE — APPSS15 APP SPLIT SHARED TIME 0-15 MINUTES: Performed by: NURSE PRACTITIONER

## 2024-08-13 PROCEDURE — 97530 THERAPEUTIC ACTIVITIES: CPT

## 2024-08-13 PROCEDURE — 6370000000 HC RX 637 (ALT 250 FOR IP): Performed by: SURGERY

## 2024-08-13 PROCEDURE — 83735 ASSAY OF MAGNESIUM: CPT

## 2024-08-13 PROCEDURE — 85025 COMPLETE CBC W/AUTO DIFF WBC: CPT

## 2024-08-13 PROCEDURE — 36415 COLL VENOUS BLD VENIPUNCTURE: CPT

## 2024-08-13 PROCEDURE — 80048 BASIC METABOLIC PNL TOTAL CA: CPT

## 2024-08-13 PROCEDURE — 99024 POSTOP FOLLOW-UP VISIT: CPT | Performed by: SURGERY

## 2024-08-13 PROCEDURE — 97165 OT EVAL LOW COMPLEX 30 MIN: CPT

## 2024-08-13 PROCEDURE — 1200000000 HC SEMI PRIVATE

## 2024-08-13 PROCEDURE — 6360000002 HC RX W HCPCS: Performed by: SURGERY

## 2024-08-13 PROCEDURE — 97535 SELF CARE MNGMENT TRAINING: CPT

## 2024-08-13 RX ORDER — NIFEDIPINE 30 MG/1
60 TABLET, EXTENDED RELEASE ORAL DAILY
Status: DISCONTINUED | OUTPATIENT
Start: 2024-08-14 | End: 2024-08-15 | Stop reason: HOSPADM

## 2024-08-13 RX ORDER — POTASSIUM CHLORIDE 20 MEQ/1
40 TABLET, EXTENDED RELEASE ORAL ONCE
Status: COMPLETED | OUTPATIENT
Start: 2024-08-13 | End: 2024-08-13

## 2024-08-13 RX ADMIN — INSULIN GLARGINE 50 UNITS: 100 INJECTION, SOLUTION SUBCUTANEOUS at 08:47

## 2024-08-13 RX ADMIN — MELATONIN TAB 3 MG 3 MG: 3 TAB at 23:07

## 2024-08-13 RX ADMIN — LOSARTAN POTASSIUM 100 MG: 100 TABLET, FILM COATED ORAL at 08:47

## 2024-08-13 RX ADMIN — Medication 1 CAPSULE: at 08:47

## 2024-08-13 RX ADMIN — DONEPEZIL HYDROCHLORIDE 10 MG: 5 TABLET, FILM COATED ORAL at 23:07

## 2024-08-13 RX ADMIN — HYDRALAZINE HYDROCHLORIDE 10 MG: 20 INJECTION INTRAMUSCULAR; INTRAVENOUS at 18:51

## 2024-08-13 RX ADMIN — ENOXAPARIN SODIUM 40 MG: 100 INJECTION SUBCUTANEOUS at 08:48

## 2024-08-13 RX ADMIN — HYDROCODONE BITARTRATE AND ACETAMINOPHEN 1 TABLET: 5; 325 TABLET ORAL at 18:49

## 2024-08-13 RX ADMIN — PANTOPRAZOLE SODIUM 40 MG: 40 INJECTION, POWDER, FOR SOLUTION INTRAVENOUS at 08:47

## 2024-08-13 RX ADMIN — AMLODIPINE BESYLATE 10 MG: 5 TABLET ORAL at 08:47

## 2024-08-13 RX ADMIN — LINEZOLID 600 MG: 600 INJECTION, SOLUTION INTRAVENOUS at 11:17

## 2024-08-13 RX ADMIN — ONDANSETRON 4 MG: 2 INJECTION INTRAMUSCULAR; INTRAVENOUS at 06:33

## 2024-08-13 RX ADMIN — ASPIRIN 325 MG: 325 TABLET ORAL at 08:47

## 2024-08-13 RX ADMIN — INSULIN LISPRO 2 UNITS: 100 INJECTION, SOLUTION INTRAVENOUS; SUBCUTANEOUS at 13:03

## 2024-08-13 RX ADMIN — Medication 1 CAPSULE: at 15:46

## 2024-08-13 RX ADMIN — TRAZODONE HYDROCHLORIDE 50 MG: 50 TABLET ORAL at 23:07

## 2024-08-13 RX ADMIN — WATER 1000 MG: 1 INJECTION INTRAMUSCULAR; INTRAVENOUS; SUBCUTANEOUS at 11:15

## 2024-08-13 RX ADMIN — LINEZOLID 600 MG: 600 INJECTION, SOLUTION INTRAVENOUS at 21:33

## 2024-08-13 RX ADMIN — HYDRALAZINE HYDROCHLORIDE 10 MG: 20 INJECTION INTRAMUSCULAR; INTRAVENOUS at 04:40

## 2024-08-13 RX ADMIN — HYDROCHLOROTHIAZIDE 12.5 MG: 25 TABLET ORAL at 08:47

## 2024-08-13 RX ADMIN — INSULIN LISPRO 2 UNITS: 100 INJECTION, SOLUTION INTRAVENOUS; SUBCUTANEOUS at 17:22

## 2024-08-13 RX ADMIN — POTASSIUM CHLORIDE 40 MEQ: 1500 TABLET, EXTENDED RELEASE ORAL at 11:14

## 2024-08-13 RX ADMIN — POTASSIUM CHLORIDE 40 MEQ: 1500 TABLET, EXTENDED RELEASE ORAL at 15:46

## 2024-08-13 RX ADMIN — HYDROCODONE BITARTRATE AND ACETAMINOPHEN 2 TABLET: 5; 325 TABLET ORAL at 06:22

## 2024-08-13 ASSESSMENT — ENCOUNTER SYMPTOMS
DIARRHEA: 0
SORE THROAT: 0
ABDOMINAL PAIN: 0
SHORTNESS OF BREATH: 0
WHEEZING: 0
COUGH: 0
NAUSEA: 0
RHINORRHEA: 0
EYE DISCHARGE: 0
SINUS PAIN: 0
SINUS PRESSURE: 0
CONSTIPATION: 0
EYE REDNESS: 0
BACK PAIN: 0

## 2024-08-13 ASSESSMENT — PAIN SCALES - GENERAL
PAINLEVEL_OUTOF10: 6
PAINLEVEL_OUTOF10: 0
PAINLEVEL_OUTOF10: 9

## 2024-08-13 NOTE — PROGRESS NOTES
[facility]        Progress Note    Patient Name: Whitney Telles  MRN: [id number]  YOB: 1955  Date of Evaluation: 2024    Subjective:  No acute events overnight  Pain controlled  No nausea or vomiting, tolerating regular diet  Dizziness better  Up to chair at this time    Post-Operative Day #3      Vital Signs:  Patient Vitals for the past 24 hrs:   BP Temp Temp src Pulse Resp SpO2 Weight   24 0520 -- -- -- -- -- -- 64.5 kg (142 lb 1.6 oz)   24 0421 (!) 187/90 98.2 °F (36.8 °C) Oral 74 18 94 % --   24 2351 (!) 160/88 98 °F (36.7 °C) Oral 69 18 96 % --   24 2308 (!) 171/85 -- -- -- -- -- --   24 2207 -- -- -- -- 16 -- --   24 2134 (!) 208/88 97.9 °F (36.6 °C) Oral 78 16 96 % --      TEMPERATURE HISTORY 24H: Temp (24hrs), Av °F (36.7 °C), Min:97.9 °F (36.6 °C), Max:98.2 °F (36.8 °C)    BLOOD PRESSURE HISTORY: Systolic (36hrs), Av , Min:160 , Max:208    Diastolic (36hrs), Av, Min:79, Max:96      Intake/Output:  I/O last 3 completed shifts:  In: 550 [P.O.:550]  Out: -   No intake/output data recorded.  Drain/tube Output:       Physical Exam:  General: awake, alert, oriented to person, place, time  Lungs: unlabored respirations  Abdomen: soft, non-distended, incisional tenderness only, bowel sounds present   Skin/Wound: healing well, no drainage, no erythema, well approximated    Labs:  CBC:    Recent Labs     24  0719 24  0442 24  0535   WBC 15.3* 11.0 9.2   HGB 12.2 12.4 14.1   HCT 37.8 38.0 41.2    234 254     BMP:    Recent Labs     24  0719 24  0535    133*   K 3.2* 3.0*    96*   CO2 25 28   BUN 14 7   CREATININE 0.6 <0.5*   GLUCOSE 249* 191*     Hepatic:    Recent Labs     24  0719   AST 58*   *   BILITOT 0.9   ALKPHOS 107     Amylase:    Lab Results   Component Value Date/Time    AMYLASE 119 2021 12:50 PM    AMYLASE 74 2015 09:00 AM

## 2024-08-13 NOTE — PROGRESS NOTES
Assessment completed. Patient awake, alert, and in bed. Medications given per MAR. BP elevated at this time. Patient was in severe 10/10 pain. Pain medications given per MAR. Will recheck BP prior to giving any Hydralazine. Safety precautions in place. Daughter at bedside. Call light within reach. Will continue to monitor.

## 2024-08-13 NOTE — PLAN OF CARE
Problem: Discharge Planning  Goal: Discharge to home or other facility with appropriate resources  Outcome: Progressing  Flowsheets (Taken 8/12/2024 0900 by Lizzette Coughlin RN)  Discharge to home or other facility with appropriate resources: Identify barriers to discharge with patient and caregiver     Problem: Safety - Adult  Goal: Free from fall injury  Outcome: Progressing     Problem: Chronic Conditions and Co-morbidities  Goal: Patient's chronic conditions and co-morbidity symptoms are monitored and maintained or improved  Outcome: Progressing     Problem: Pain  Goal: Verbalizes/displays adequate comfort level or baseline comfort level  Outcome: Progressing

## 2024-08-13 NOTE — PROGRESS NOTES
No acute airspace disease identified.      4.  Distended stomach with questionable wall thickening involving the gastric   antrum, for which gastritis should be considered.      5.  Diverticulosis.      6.  Cholelithiasis.             Medications: All current and past medications were reviewed.     [START ON 8/14/2024] NIFEdipine  60 mg Oral Daily    linezolid  600 mg IntraVENous Q12H    cefTRIAXone (ROCEPHIN) IV  1,000 mg IntraVENous Q24H    enoxaparin  40 mg SubCUTAneous Daily    aspirin  325 mg Oral Daily    sodium chloride flush  5-40 mL IntraVENous 2 times per day    lactobacillus  1 capsule Oral BID WC    melatonin  3 mg Oral Nightly    donepezil  10 mg Oral Nightly    insulin lispro  0-8 Units SubCUTAneous TID WC    insulin lispro  0-4 Units SubCUTAneous Nightly    insulin glargine  50 Units SubCUTAneous QAM    pantoprazole  40 mg IntraVENous Daily    losartan  100 mg Oral Daily        sodium chloride 15 mL/hr at 08/10/24 1121    dextrose         diphenhydrAMINE, HYDROcodone 5 mg - acetaminophen **OR** HYDROcodone 5 mg - acetaminophen, traZODone, sodium chloride flush, sodium chloride, ondansetron, senna, acetaminophen **OR** acetaminophen, dextrose bolus **OR** dextrose bolus, glucagon (rDNA), dextrose, hydrALAZINE      Problem list:       Patient Active Problem List   Diagnosis Code    Cervical spinal stenosis M48.02    Acute encephalopathy G93.40    Malignant hypertension I10    Left-sided low back pain with left-sided sciatica M54.42    Hypercholesteremia E78.00    Primary insomnia F51.01    Hypotension I95.9    Dizziness R42    Essential hypertension I10    Elevated lactic acid level R79.89    Lactic acidosis E87.20    Atrial ectopy I49.1    Vasovagal syncope R55    Dysrhythmia I49.9    Chronic left-sided low back pain with left-sided sciatica M54.42, G89.29    MCI (mild cognitive impairment) G31.84    Chest pain R07.9    COVID-19 virus infection U07.1    COVID-19 virus detected U07.1    Type 2 diabetes  mellitus with diabetic mononeuropathy, with long-term current use of insulin (HCC) E11.41, Z79.4    Iron deficiency anemia, unspecified D50.9    Intestinal malabsorption K90.9    Osteopenia M85.80    Anemia D64.9    Alzheimer's disease, unspecified G30.9    Choledocholithiasis K80.50    Hypomagnesemia E83.42    Leukocytosis D72.829    Calculus of bile duct without cholecystitis with obstruction K80.51    E coli bacteremia R78.81, B96.20    Enterococcal bacteremia R78.81, B95.2    S/P laparoscopic cholecystectomy Z90.49    Status post endoscopic retrograde cholangiopancreatography Z98.890    Transaminitis R74.01    Sepsis due to Escherichia coli (HCC) A41.51    CCC (chronic calculous cholecystitis) K80.10    Hyperglycemia due to diabetes mellitus (HCC) E11.65    Symptomatic cholelithiasis K80.20       Please note that this chart was generated using Dragon dictation software. Although every effort was made to ensure the accuracy of this automated transcription, some errors in transcription may have occurred inadvertently. If you may need any clarification, please do not hesitate to contact me through EPIC or at the phone number provided below with my electronic signature.  Any pictures or media included in this note were obtained after taking informed verbal consent from the patient and with their approval to include those in the patient's medical record.        Brett Art MD, MPH, FACP, [facility]  8/13/2024, 5:06 PM  Central Office Phone: [phone]  Central Office Fax: [phone]    [facility]   [address], [address] ([address])  [place] [address]  [facility] days:  Tuesday & Thursday AM    [facility]  [address] , [address] (Medical office Building)  [place], [place], [address]  [facility] days: Wednesday AM

## 2024-08-13 NOTE — DISCHARGE INSTRUCTIONS
[facility]    Discharge Instructions      Activity  - activity as tolerated, no driving while on analgesics, and no heavy lifting for 6 weeks; it is OK to be up walking around--walking up and down stairs is also OK. Do what is comfortable: stop and rest if you feel tired.    Diet  - regular diet  - Drink plenty of fluids     Pain  - You may use narcotic pain medication as prescribed for breakthrough pain  - You can use OTC Tylenol or Ibuprofen for 1-2 weeks (may need to adjust the dose as directed on the bottle if enteric coated ibuprofen chosen)  - Avoid taking narcotic pain medication on an empty stomach which may result in nausea, if pain medication is causing nausea try decreasing the dose  - Narcotic pain medication is not necessary, please contact the office if pain is not controlled  - Narcotic pain medication will not be refilled on weekends and after hours  - Please contact the office Monday-Friday 8a-4p if a refill is requested    Nausea  - Avoid taking narcotic pain medication on an empty stomach which may result in nausea  - If pain medication is causing nausea you may try decreasing the dose  - Nausea can be common for 24 hours after surgery--if nausea uncontrolled or worsening, contact the office or go to the ED    Constipation  - Pain medication can be constipating  - Often, it helps to take a stool softener with the goal of at least one soft bowel movement daily with minimal straining  - You may also need to increase water and fiber intake--may supplement with Benefiber and increasing your activity will also be helpful  - If a stool softener is needed, the following OTC medications are recommend: Colace 100 mg by mouth twice daily, Miralax 17 gm by mouth 1-3 times daily with glass of water, dulcolax suppositories, and Fleets enemas    Wound Care  - keep wound clean and dry  - If incisional bleeding is noted, hold firm pressure for 15-20 minutes. If remains  uncontrolled, either contact the office or present to nearest ED  - It is common to have bruising or mild redness around the wounds within the first few days after surgery. If it worsens, or starts draining, do not hesitate to contact the office  - May shower but no tub baths or swimming pools--do not submerge incisions under water    Cautions  - Watch for signs of infection, such as: fever over 100.5, excessive warmth or redness around incisions, bloody or cloudy drainage from incisions  - Watch for signs of complication: uncontrolled pain, nausea, bloating, sudden lightheadedness  - Serious problems can arise after surgery that need urgent or immediate care  - Do not hesitate to contact the office with any questions    Follow-up with your surgeon in  2 weeks.  Call [phone] to schedule follow-up visit.

## 2024-08-13 NOTE — PROGRESS NOTES
[facility] - Inpatient Rehabilitation Department   Phone: [phone]    Occupational Therapy    [x] Initial Evaluation            [] Daily Treatment Note         [] Discharge Summary      Patient: Whitney Telles   : 1955   MRN: [id number]   Date of Service:  2024    Admitting Diagnosis:  Calculus of bile duct without cholecystitis with obstruction  Current Admission Summary:   Chest Pain        Pt via EMS from home, c/o sudden onset chest pain that moves to the back, hurts to move, pt with episode of emesis on arrival          HISTORY OF PRESENT ILLNESS: 1 or more Elements      History from : Patient and EMS     Limitations to history : None     Whitney Telles is a 68 y.o. female who presents for chest pain that radiates directly to the back sharp and stabbing in nature started just prior to arrival.  Patient had an episode of vomiting immediately upon arrival to the ER.  Patient has history of diabetes, hypertension, hyperlipidemia.  She admits that she does not take her medication regularly.  She has not taken any of her blood pressure medication.  Patient states nothing seems to make her pain better or worse.  Somewhat improved at this time.  Pain radiates from her epigastrium to the center of her chest back to her back.    Date of Procedure: 2024   Pre-Op Diagnosis Codes:     * Choledocholithiasis [K80.50]   Post-Op Diagnosis: Same     Procedure(s):  ENDOSCOPIC RETROGRADE CHOLANGIOPANCREATOGRAPHY SPHINCTER/PAPILLOTOMY  ENDOSCOPIC RETROGRADE CHOLANGIOPANCREATOGRAPHY STONE REMOVAL  An [employer] Model D Single-Use Duodenoscope () was used      Past Medical History:  has a past medical history of Diabetes mellitus (HCC), Headache(784.0), Herniated disc, cervical, Hypercholesteremia, Hypertension, Intestinal malabsorption, Iron deficiency anemia, unspecified, Memory loss, Osteopenia of neck of left femur, Pneumonia due to 2019 novel coronavirus, Psychiatric problem, and Type 2  Tolerance: Fair, c/o dizziness, did not perform ambulation  Impairments Requiring Therapeutic Intervention: decreased functional mobility, decreased ADL status, decreased cognition, decreased endurance, increased pain  Prognosis: good  Clinical Assessment: Patient is not at baseline level following admission to hospital due to choledocholithiases. Patient c/p librado kate on 8/9/24. Limited this date due to feeling dizzy and headache. Patient would benefit from further skilled OT treatment with goal to return home to apartment with daughter  Safety Interventions: patient left in chair, chair alarm in place, and nurse notified    Plan  Frequency: 3-5 x/per week  Current Treatment Recommendations: functional mobility training, transfer training, endurance training, patient/caregiver education, and ADL/self-care training    Goals  Patient Goals: feel better   Short Term Goals:  Time Frame: until discharge  Patient will complete lower body ADL at modified independent   Patient will complete toileting at Independent   Patient will complete functional transfers at modified independent   Patient will complete functional mobility at modified independent   Patient will increase [facility] ADL score = to or > than 23/24    Above goals reviewed on 8/13/2024.  All goals are ongoing at this time unless indicated above.       Therapy Session Time     Individual Group Co-treatment   Time In    0818   Time Out    0903   Minutes    45        Timed Code Treatment Minutes: 30    Total Treatment Minutes:  45       Electronically Signed By: Shirley Davis, OTR/L [id number]

## 2024-08-13 NOTE — PROGRESS NOTES
[facility] - Inpatient Rehabilitation Department   Phone: [phone]    Physical Therapy    [x] Initial Evaluation            [] Daily Treatment Note         [] Discharge Summary      Patient: Whitney Telles   : 1955   MRN: [id number]   Date of Service:  2024  Admitting Diagnosis: Calculus of bile duct without cholecystitis with obstruction    Current Admission Summary:   CHIEF COMPLAINT             Chief Complaint   Patient presents with    Chest Pain       Pt via EMS from home, c/o sudden onset chest pain that moves to the back, hurts to move, pt with episode of emesis on arrival          HISTORY OF PRESENT ILLNESS: 1 or more Elements      History from : Patient and EMS     Limitations to history : None     Whitney Telles is a 68 y.o. female who presents for chest pain that radiates directly to the back sharp and stabbing in nature started just prior to arrival.  Patient had an episode of vomiting immediately upon arrival to the ER.  Patient has history of diabetes, hypertension, hyperlipidemia.  She admits that she does not take her medication regularly.  She has not taken any of her blood pressure medication.  Patient states nothing seems to make her pain better or worse.  Somewhat improved at this time.  Pain radiates from her epigastrium to the center of her chest back to her back.    Patient s/p ERCP , lap cholecystectomy 8/10.    Past Medical History:  has a past medical history of Diabetes mellitus (HCC), Headache(784.0), Herniated disc, cervical, Hypercholesteremia, Hypertension, Intestinal malabsorption, Iron deficiency anemia, unspecified, Memory loss, Osteopenia of neck of left femur, Pneumonia due to 2019 novel coronavirus, Psychiatric problem, and Type 2 diabetes mellitus without complication (HCC).  Past Surgical History:  has a past surgical history that includes Tubal ligation; shoulder surgery (Left); Kidney stone surgery; Breast surgery; Colonoscopy (N/A,  assistance  Stand to sit transfer: contact guard assistance  Bed to chair transfer: contact guard assistance  Comments:  Gait belt donned around chest to avoid surgical incisions.  Patient stood and again c/o dizziness, transferred to chair w/ RW and CGA.  After sitting, felt better, stood again, /80 although patient sat in the middle of taking BP.  RN aware.  Ambulation:  Ambulation not tested on this date secondary to patient reported dizziness, unable to continue, possible OH.  Distance: N/A  Gait Mechanics: N/A  Comments:    Stair Mobility:  Stair mobility not completed on this date.  Comments:  Wheelchair Mobility:  No w/c mobility completed on this date.  Comments:  Balance:  Static Sitting Balance: good: independent with functional balance in unsupported position  Dynamic Sitting Balance: fair (+): maintains balance at SBA/supervision without use of UE support  Static Standing Balance: fair (-): maintains balance at CGA with use of UE support  Dynamic Standing Balance: fair (-): maintains balance at CGA with use of UE support  Comments:    Other Therapeutic Interventions    Functional Outcomes  AM-PAC Inpatient Mobility Raw Score : 18              Cognition  Overall Cognitive Status: Impaired  Orientation:    oriented to person, disoriented to place, disoriented to time , and disoriented to situation  Command Following:   WFL    Education  Barriers To Learning: cognition  Patient Education: patient educated on goals, PT role and benefits, plan of care, general safety, functional mobility training, proper use of assistive device/equipment, transfer training, discharge recommendations  Learning Assessment:  patient verbalizes understanding, would benefit from continued reinforcement, patient will require reinforcement due to cognitive deficits    Assessment  Activity Tolerance: Limited by subjective reports of headache and dizziness, systolic BP drop from 157 to 127 in standing.  RN aware.  Impairments

## 2024-08-13 NOTE — PROGRESS NOTES
Hospitalist Progress Note      PCP: Parminder Banda MD    Date of Admission: 8/8/2024    LOS: 5    Chief Complaint:   Chief Complaint   Patient presents with    Chest Pain     Pt via EMS from home, c/o sudden onset chest pain that moves to the back, hurts to move, pt with episode of emesis on arrival        Case Summary:   68-year-old lady with history of hypertension, hyperlipidemia, type 2 diabetes, dementia who presented with epigastric abdominal pain radiating to the back and associated nausea and vomiting found to have CBD dilation with concern for choledocholithiasis and possible gastritis and associated transaminitis.    CT abdomen pelvis noted for distended stomach with antral gastritis  Ultrasound abdomen with cholelithiasis and dilated CBD  Transaminitis trended.  Decision was made for ERCP 8/9/2024 with GI and was found to have choledocholithiasis with obstruction status post biliary sphincterotomy and stone extraction performed.  She was consulted by general surgery and underwent laparoscopic cholecystectomy with intraoperative cholangiogram 8/10/2024      Active Hospital Problems    Diagnosis Date Noted    S/P laparoscopic cholecystectomy [Z90.49] 08/12/2024    Status post endoscopic retrograde cholangiopancreatography [Z98.890] 08/12/2024    Transaminitis [R74.01] 08/12/2024    Sepsis due to Escherichia coli (HCC) [A41.51] 08/12/2024    CCC (chronic calculous cholecystitis) [K80.10] 08/12/2024    Hyperglycemia due to diabetes mellitus (HCC) [E11.65] 08/12/2024    Symptomatic cholelithiasis [K80.20] 08/12/2024    E coli bacteremia [R78.81, B96.20] 08/11/2024    Enterococcal bacteremia [R78.81, B95.2] 08/11/2024    Hypomagnesemia [E83.42] 08/09/2024    Leukocytosis [D72.829] 08/09/2024    Calculus of bile duct without cholecystitis with obstruction [K80.51] 08/09/2024    Alzheimer's disease, unspecified [G30.9] 01/24/2022    Type 2 diabetes mellitus with diabetic mononeuropathy, with long-term  >=1000 08/08/2024 08:31 PM    GLUCOSEU NEGATIVE 07/24/2011 07:30 PM       Radiology:  XR CHOLANGIOGRAM INTRAOPERATIVE ADDL SET   Final Result   Intraoperative cholangiogram.  Please see operative report for more details.         FL ERCP BILIARY AND PANCREATIC S&I   Final Result   As above.  Please refer to the procedure report for further details.         US GALLBLADDER RUQ   Final Result   1. Cholelithiasis without evidence of acute cholecystitis.   2. Moderate dilatation of the common bile duct measuring up to 10 mm in   diameter. Correlation with liver function tests is recommended.         CTA CHEST ABDOMEN PELVIS W CONTRAST   Final Result   1. No acute aortic abnormality, aneurysm or dissection.      2. No evidence for central pulmonary embolism.      3.  Chronic interstitial findings in the lungs with fibrotic features in the   lung bases.  No acute airspace disease identified.      4.  Distended stomach with questionable wall thickening involving the gastric   antrum, for which gastritis should be considered.      5.  Diverticulosis.      6.  Cholelithiasis.         CT CHEST WO CONTRAST   Final Result   1. No acute aortic abnormality, aneurysm or dissection.      2. No evidence for central pulmonary embolism.      3.  Chronic interstitial findings in the lungs with fibrotic features in the   lung bases.  No acute airspace disease identified.      4.  Distended stomach with questionable wall thickening involving the gastric   antrum, for which gastritis should be considered.      5.  Diverticulosis.      6.  Cholelithiasis.                 Kenna Casiano MD      Please excuse brevity and/or typos. This report was transcribed using voice recognition software. Every effort was made to ensure accuracy, however, inadvertent computerized transcription errors may be present.

## 2024-08-14 LAB
ANION GAP SERPL CALCULATED.3IONS-SCNC: 10 MMOL/L (ref 3–16)
BASOPHILS # BLD: 0 K/UL (ref 0–0.2)
BASOPHILS NFR BLD: 0.5 %
BUN SERPL-MCNC: 12 MG/DL (ref 7–20)
CALCIUM SERPL-MCNC: 8.6 MG/DL (ref 8.3–10.6)
CHLORIDE SERPL-SCNC: 100 MMOL/L (ref 99–110)
CO2 SERPL-SCNC: 25 MMOL/L (ref 21–32)
CREAT SERPL-MCNC: 0.6 MG/DL (ref 0.6–1.2)
DEPRECATED RDW RBC AUTO: 13.5 % (ref 12.4–15.4)
EOSINOPHIL # BLD: 0.3 K/UL (ref 0–0.6)
EOSINOPHIL NFR BLD: 3.6 %
GFR SERPLBLD CREATININE-BSD FMLA CKD-EPI: >90 ML/MIN/{1.73_M2}
GLUCOSE BLD-MCNC: 173 MG/DL (ref 70–99)
GLUCOSE BLD-MCNC: 185 MG/DL (ref 70–99)
GLUCOSE BLD-MCNC: 227 MG/DL (ref 70–99)
GLUCOSE BLD-MCNC: 246 MG/DL (ref 70–99)
GLUCOSE SERPL-MCNC: 189 MG/DL (ref 70–99)
HCT VFR BLD AUTO: 41 % (ref 36–48)
HGB BLD-MCNC: 13.7 G/DL (ref 12–16)
LYMPHOCYTES # BLD: 2.2 K/UL (ref 1–5.1)
LYMPHOCYTES NFR BLD: 23 %
MCH RBC QN AUTO: 29.7 PG (ref 26–34)
MCHC RBC AUTO-ENTMCNC: 33.4 G/DL (ref 31–36)
MCV RBC AUTO: 89 FL (ref 80–100)
MONOCYTES # BLD: 0.9 K/UL (ref 0–1.3)
MONOCYTES NFR BLD: 9.1 %
NEUTROPHILS # BLD: 6.2 K/UL (ref 1.7–7.7)
NEUTROPHILS NFR BLD: 63.8 %
PERFORMED ON: ABNORMAL
PLATELET # BLD AUTO: 307 K/UL (ref 135–450)
PMV BLD AUTO: 8.3 FL (ref 5–10.5)
POTASSIUM SERPL-SCNC: 3.8 MMOL/L (ref 3.5–5.1)
RBC # BLD AUTO: 4.61 M/UL (ref 4–5.2)
SODIUM SERPL-SCNC: 135 MMOL/L (ref 136–145)
WBC # BLD AUTO: 9.8 K/UL (ref 4–11)

## 2024-08-14 PROCEDURE — 99232 SBSQ HOSP IP/OBS MODERATE 35: CPT | Performed by: INTERNAL MEDICINE

## 2024-08-14 PROCEDURE — 6360000002 HC RX W HCPCS: Performed by: INTERNAL MEDICINE

## 2024-08-14 PROCEDURE — 6370000000 HC RX 637 (ALT 250 FOR IP): Performed by: INTERNAL MEDICINE

## 2024-08-14 PROCEDURE — 2580000003 HC RX 258: Performed by: INTERNAL MEDICINE

## 2024-08-14 PROCEDURE — 6360000002 HC RX W HCPCS: Performed by: SURGERY

## 2024-08-14 PROCEDURE — 1200000000 HC SEMI PRIVATE

## 2024-08-14 PROCEDURE — 85025 COMPLETE CBC W/AUTO DIFF WBC: CPT

## 2024-08-14 PROCEDURE — 80048 BASIC METABOLIC PNL TOTAL CA: CPT

## 2024-08-14 PROCEDURE — 6370000000 HC RX 637 (ALT 250 FOR IP): Performed by: NURSE PRACTITIONER

## 2024-08-14 PROCEDURE — 36415 COLL VENOUS BLD VENIPUNCTURE: CPT

## 2024-08-14 RX ORDER — LINEZOLID 600 MG/1
600 TABLET, FILM COATED ORAL EVERY 12 HOURS SCHEDULED
Status: DISCONTINUED | OUTPATIENT
Start: 2024-08-14 | End: 2024-08-15 | Stop reason: HOSPADM

## 2024-08-14 RX ORDER — SPIRONOLACTONE 25 MG/1
25 TABLET ORAL DAILY
Status: DISCONTINUED | OUTPATIENT
Start: 2024-08-14 | End: 2024-08-15 | Stop reason: HOSPADM

## 2024-08-14 RX ORDER — CEFUROXIME AXETIL 500 MG/1
500 TABLET ORAL 2 TIMES DAILY
Qty: 20 TABLET | Refills: 0 | Status: SHIPPED | OUTPATIENT
Start: 2024-08-14 | End: 2024-08-24

## 2024-08-14 RX ORDER — LINEZOLID 600 MG/1
600 TABLET, FILM COATED ORAL 2 TIMES DAILY
Qty: 20 TABLET | Refills: 0 | Status: SHIPPED | OUTPATIENT
Start: 2024-08-14 | End: 2024-08-24

## 2024-08-14 RX ORDER — CEFUROXIME AXETIL 250 MG/1
500 TABLET ORAL EVERY 12 HOURS SCHEDULED
Status: DISCONTINUED | OUTPATIENT
Start: 2024-08-15 | End: 2024-08-15 | Stop reason: HOSPADM

## 2024-08-14 RX ADMIN — INSULIN LISPRO 2 UNITS: 100 INJECTION, SOLUTION INTRAVENOUS; SUBCUTANEOUS at 17:18

## 2024-08-14 RX ADMIN — TRAZODONE HYDROCHLORIDE 50 MG: 50 TABLET ORAL at 21:25

## 2024-08-14 RX ADMIN — LINEZOLID 600 MG: 600 INJECTION, SOLUTION INTRAVENOUS at 08:52

## 2024-08-14 RX ADMIN — INSULIN GLARGINE 50 UNITS: 100 INJECTION, SOLUTION SUBCUTANEOUS at 08:53

## 2024-08-14 RX ADMIN — PANTOPRAZOLE SODIUM 40 MG: 40 INJECTION, POWDER, FOR SOLUTION INTRAVENOUS at 08:53

## 2024-08-14 RX ADMIN — Medication 1 CAPSULE: at 17:18

## 2024-08-14 RX ADMIN — HYDRALAZINE HYDROCHLORIDE 10 MG: 20 INJECTION INTRAMUSCULAR; INTRAVENOUS at 21:29

## 2024-08-14 RX ADMIN — LOSARTAN POTASSIUM 100 MG: 100 TABLET, FILM COATED ORAL at 08:53

## 2024-08-14 RX ADMIN — LINEZOLID 600 MG: 600 TABLET, FILM COATED ORAL at 23:13

## 2024-08-14 RX ADMIN — ENOXAPARIN SODIUM 40 MG: 100 INJECTION SUBCUTANEOUS at 08:53

## 2024-08-14 RX ADMIN — SPIRONOLACTONE 25 MG: 25 TABLET ORAL at 11:56

## 2024-08-14 RX ADMIN — ASPIRIN 325 MG: 325 TABLET ORAL at 08:53

## 2024-08-14 RX ADMIN — NIFEDIPINE 60 MG: 30 TABLET, FILM COATED, EXTENDED RELEASE ORAL at 08:53

## 2024-08-14 RX ADMIN — HYDRALAZINE HYDROCHLORIDE 10 MG: 20 INJECTION INTRAMUSCULAR; INTRAVENOUS at 15:25

## 2024-08-14 RX ADMIN — INSULIN LISPRO 2 UNITS: 100 INJECTION, SOLUTION INTRAVENOUS; SUBCUTANEOUS at 11:55

## 2024-08-14 RX ADMIN — WATER 1000 MG: 1 INJECTION INTRAMUSCULAR; INTRAVENOUS; SUBCUTANEOUS at 11:56

## 2024-08-14 RX ADMIN — SODIUM CHLORIDE, PRESERVATIVE FREE 10 ML: 5 INJECTION INTRAVENOUS at 21:25

## 2024-08-14 RX ADMIN — DONEPEZIL HYDROCHLORIDE 10 MG: 5 TABLET, FILM COATED ORAL at 21:25

## 2024-08-14 RX ADMIN — HYDRALAZINE HYDROCHLORIDE 10 MG: 20 INJECTION INTRAMUSCULAR; INTRAVENOUS at 05:47

## 2024-08-14 RX ADMIN — LINEZOLID 600 MG: 600 INJECTION, SOLUTION INTRAVENOUS at 21:00

## 2024-08-14 RX ADMIN — Medication 1 CAPSULE: at 08:53

## 2024-08-14 RX ADMIN — MELATONIN TAB 3 MG 3 MG: 3 TAB at 21:25

## 2024-08-14 ASSESSMENT — ENCOUNTER SYMPTOMS
COUGH: 0
RHINORRHEA: 0
CONSTIPATION: 0
EYE DISCHARGE: 0
NAUSEA: 0
SHORTNESS OF BREATH: 0
DIARRHEA: 0
SINUS PAIN: 0
SINUS PRESSURE: 0
ABDOMINAL PAIN: 0
WHEEZING: 0
EYE REDNESS: 0
BACK PAIN: 0
SORE THROAT: 0

## 2024-08-14 ASSESSMENT — PAIN SCALES - WONG BAKER: WONGBAKER_NUMERICALRESPONSE: HURTS WHOLE LOT

## 2024-08-14 ASSESSMENT — PAIN SCALES - GENERAL: PAINLEVEL_OUTOF10: 9

## 2024-08-14 ASSESSMENT — PAIN DESCRIPTION - LOCATION: LOCATION: GENERALIZED

## 2024-08-14 NOTE — PROGRESS NOTES
Assessment completed. VSS. Patient awake, alert, and in bed. Medications given per MAR.  No complaints of pain or discomfort at this time. Safety and isolation precautions in place. Call light within reach. Will continue to monitor.

## 2024-08-14 NOTE — PROGRESS NOTES
Infectious Diseases   Progress Note      Admission Date: 8/8/2024  Hospital Day: Hospital Day: 7   Attending: Kenna Casiano MD  Date of service: 8/14/2024     Chief complaint/ Reason for consult:     Sepsis on admission with leukocytosis, tachycardia, tachypnea and lactic acidosis with elevated lactate of 2.9  Bacteremia with E. coli and vancomycin-resistant Enterococcus gallinarium  Choledocholithiasis, s/p ERCP on 8/9/2024  Cholelithiasis; status post laparoscopic cholecystectomy with cholangiogram on 8/10/2024  Transaminitis with elevated AST and ALT    Microbiology:        I have reviewed allavailable micro lab data and cultures    Blood culture (2/2) - collected on 8/8/2024: E. coli, Enterococcus    Susceptibility    Escherichia coli (2)      Antibiotic Interpretation Microscan  Method Status    ampicillin Resistant >=32 mcg/mL BACTERIAL SUSCEPTIBILITY PANEL BY MICKY     ampicillin-sulbactam Resistant >=32 mcg/mL BACTERIAL SUSCEPTIBILITY PANEL BY MICKY     ceFAZolin Sensitive <=4 mcg/mL BACTERIAL SUSCEPTIBILITY PANEL BY MICKY     cefepime Sensitive <=0.12 mcg/mL BACTERIAL SUSCEPTIBILITY PANEL BY MICKY     cefTRIAXone Sensitive <=0.25 mcg/mL BACTERIAL SUSCEPTIBILITY PANEL BY MICKY     ciprofloxacin Sensitive <=0.25 mcg/mL BACTERIAL SUSCEPTIBILITY PANEL BY MICKY     ertapenem Sensitive <=0.12 mcg/mL BACTERIAL SUSCEPTIBILITY PANEL BY MICKY     gentamicin Sensitive <=1 mcg/mL BACTERIAL SUSCEPTIBILITY PANEL BY MICKY     levofloxacin Sensitive <=0.12 mcg/mL BACTERIAL SUSCEPTIBILITY PANEL BY MICKY     piperacillin-tazobactam Sensitive <=4 mcg/mL BACTERIAL SUSCEPTIBILITY PANEL BY MICKY     trimethoprim-sulfamethoxazole Sensitive <=20 mcg/mL BACTERIAL SUSCEPTIBILITY PANEL BY MICKY             Susceptibility    Enterococcus gallinarum (2)    Antibiotic Interpretation Microscan  Method Status    ampicillin Sensitive <=2 mcg/mL BACTERIAL SUSCEPTIBILITY PANEL BY MICKY     gentamicin-syn Sensitive SYN-S mcg/mL BACTERIAL SUSCEPTIBILITY  LAPAROSCOPIC CHOLECYSTECTOMY WITH CHOLANGIOGRAM performed by Geovani Cano MD at [facility] OR    COLONOSCOPY N/A 02/17/2021    repeat 2024 COLONOSCOPY DIAGNOSTIC performed by Juan Espinoza Jr., DO    ERCP N/A 8/9/2024    ENDOSCOPIC RETROGRADE CHOLANGIOPANCREATOGRAPHY SPHINCTER/PAPILLOTOMY performed by Jax Ba MD at [facility] ENDOSCOPY    ERCP N/A 8/9/2024    ENDOSCOPIC RETROGRADE CHOLANGIOPANCREATOGRAPHY STONE REMOVAL performed by Jax Ba MD at [facility] ENDOSCOPY    KIDNEY STONE SURGERY      SHOULDER SURGERY Left     TUBAL LIGATION      UPPER GASTROINTESTINAL ENDOSCOPY N/A 02/17/2021    EGD BIOPSY performed by Juan Espinoza Jr., DO at [facility] ENDOSCOPY       Family History: All family history was reviewed today.        Problem Relation Age of Onset    Diabetes Mother     High Blood Pressure Mother     Stroke Mother     Diabetes Father     High Blood Pressure Father     Stroke Father        Objective:       PHYSICAL EXAM:      Vitals:   Vitals:    08/14/24 1121 08/14/24 1525 08/14/24 1545 08/14/24 1643   BP: (!) 184/84 (!) 184/88 (!) 163/78 (!) 167/82   Pulse: 80  72 94   Resp: 16   16   Temp: 98.2 °F (36.8 °C)   98.2 °F (36.8 °C)   TempSrc: Oral   Oral   SpO2: 98%   96%   Weight:       Height:           Physical Exam  Vitals and nursing note reviewed.   Constitutional:       Appearance: She is well-developed. She is not diaphoretic.      Comments: The patient was seen earlier today.   HENT:      Head: Normocephalic and atraumatic.      Right Ear: External ear normal. There is no impacted cerumen.      Left Ear: External ear normal. There is no impacted cerumen.      Nose: Nose normal.      Mouth/Throat:      Mouth: Mucous membranes are moist.      Pharynx: Oropharynx is clear. No oropharyngeal exudate.   Eyes:      General: No scleral icterus.        Right eye: No discharge.         Left eye: No discharge.      Conjunctiva/sclera: Conjunctivae normal.      Pupils: Pupils are equal,

## 2024-08-14 NOTE — CARE COORDINATION
08/14/24 1224   IMM Letter   IMM Letter given to Patient/Family/Significant other/Guardian/POA/by: CASE MANAGEMENT   IMM Letter date given: 08/14/24   IMM Letter time given: 1120

## 2024-08-14 NOTE — PROGRESS NOTES
Hospitalist Progress Note      PCP: Parminder Banda MD    Date of Admission: 8/8/2024    LOS: 6    Chief Complaint:   Chief Complaint   Patient presents with    Chest Pain     Pt via EMS from home, c/o sudden onset chest pain that moves to the back, hurts to move, pt with episode of emesis on arrival        Case Summary:   68-year-old lady with history of hypertension, hyperlipidemia, type 2 diabetes, dementia who presented with epigastric abdominal pain radiating to the back and associated nausea and vomiting found to have CBD dilation with concern for choledocholithiasis and possible gastritis and associated transaminitis.    CT abdomen pelvis noted for distended stomach with antral gastritis  Ultrasound abdomen with cholelithiasis and dilated CBD  Transaminitis trended.  Decision was made for ERCP 8/9/2024 with GI and was found to have choledocholithiasis with obstruction status post biliary sphincterotomy and stone extraction performed.  She was consulted by general surgery and underwent laparoscopic cholecystectomy with intraoperative cholangiogram 8/10/2024      Active Hospital Problems    Diagnosis Date Noted    Diabetes education, encounter for [Z71.89] 08/13/2024    S/P laparoscopic cholecystectomy [Z90.49] 08/12/2024    Status post endoscopic retrograde cholangiopancreatography [Z98.890] 08/12/2024    Transaminitis [R74.01] 08/12/2024    Sepsis due to Escherichia coli (HCC) [A41.51] 08/12/2024    CCC (chronic calculous cholecystitis) [K80.10] 08/12/2024    Hyperglycemia due to diabetes mellitus (HCC) [E11.65] 08/12/2024    Symptomatic cholelithiasis [K80.20] 08/12/2024    E coli bacteremia [R78.81, B96.20] 08/11/2024    Enterococcal bacteremia [R78.81, B95.2] 08/11/2024    Hypomagnesemia [E83.42] 08/09/2024    Leukocytosis [D72.829] 08/09/2024    Calculus of bile duct without cholecystitis with obstruction [K80.51] 08/09/2024    Alzheimer's disease, unspecified [G30.9] 01/24/2022    Type 2 diabetes  08/08/2024 08:31 PM    GLUCOSEU NEGATIVE 07/24/2011 07:30 PM       Radiology:  XR CHOLANGIOGRAM INTRAOPERATIVE ADDL SET   Final Result   Intraoperative cholangiogram.  Please see operative report for more details.         FL ERCP BILIARY AND PANCREATIC S&I   Final Result   As above.  Please refer to the procedure report for further details.         US GALLBLADDER RUQ   Final Result   1. Cholelithiasis without evidence of acute cholecystitis.   2. Moderate dilatation of the common bile duct measuring up to 10 mm in   diameter. Correlation with liver function tests is recommended.         CTA CHEST ABDOMEN PELVIS W CONTRAST   Final Result   1. No acute aortic abnormality, aneurysm or dissection.      2. No evidence for central pulmonary embolism.      3.  Chronic interstitial findings in the lungs with fibrotic features in the   lung bases.  No acute airspace disease identified.      4.  Distended stomach with questionable wall thickening involving the gastric   antrum, for which gastritis should be considered.      5.  Diverticulosis.      6.  Cholelithiasis.         CT CHEST WO CONTRAST   Final Result   1. No acute aortic abnormality, aneurysm or dissection.      2. No evidence for central pulmonary embolism.      3.  Chronic interstitial findings in the lungs with fibrotic features in the   lung bases.  No acute airspace disease identified.      4.  Distended stomach with questionable wall thickening involving the gastric   antrum, for which gastritis should be considered.      5.  Diverticulosis.      6.  Cholelithiasis.                 Kenna Casiano MD      Please excuse brevity and/or typos. This report was transcribed using voice recognition software. Every effort was made to ensure accuracy, however, inadvertent computerized transcription errors may be present.

## 2024-08-14 NOTE — DISCHARGE INSTR - COC
Continuity of Care Form    Patient Name: Whitney Telles   :  1955  MRN:  [id number]    Admit date:  2024  Discharge date:  ***    Code Status Order: Full Code   Advance Directives:   Advance Care Flowsheet Documentation        Date/Time Healthcare Directive Type of Healthcare Directive Copy in Chart Healthcare Agent Appointed Healthcare Agent's Name Healthcare Agent's Phone Number    24 1430 Yes, patient has an advance directive for healthcare treatment  Durable power of  for health care  Yes, copy in chart  [facility]  [place]  [phone]                     Admitting Physician:  Jacky Mathew MD  PCP: Parminder Banda MD    Discharging Nurse: ***  Discharging Hospital Unit/Room#: 4TN-4478/4478-01  Discharging Unit Phone Number: ***    Emergency Contact:   Extended Emergency Contact Information  Primary Emergency Contact: Reigne Guerra  Address: [place] [address]   [facility]  Home Phone: [phone]  Relation: Child  Secondary Emergency Contact: Gaviota Tatum  Address: [facility]  Home Phone: [phone]  Relation: Child    Past Surgical History:  Past Surgical History:   Procedure Laterality Date    BREAST SURGERY      biopsy    CHOLECYSTECTOMY, LAPAROSCOPIC N/A 8/10/2024    LAPAROSCOPIC CHOLECYSTECTOMY WITH CHOLANGIOGRAM performed by Geovani Cano MD at [facility] OR    COLONOSCOPY N/A 2021    repeat  COLONOSCOPY DIAGNOSTIC performed by Juan Espinoza Jr., DO    ERCP N/A 2024    ENDOSCOPIC RETROGRADE CHOLANGIOPANCREATOGRAPHY SPHINCTER/PAPILLOTOMY performed by Jax Ba MD at [facility] ENDOSCOPY    ERCP N/A 2024    ENDOSCOPIC RETROGRADE CHOLANGIOPANCREATOGRAPHY STONE REMOVAL performed by Jax Ba MD at [facility] ENDOSCOPY    KIDNEY STONE SURGERY      SHOULDER SURGERY Left     TUBAL LIGATION      UPPER GASTROINTESTINAL ENDOSCOPY N/A 2021    EGD BIOPSY performed

## 2024-08-15 VITALS
SYSTOLIC BLOOD PRESSURE: 163 MMHG | TEMPERATURE: 98 F | OXYGEN SATURATION: 97 % | HEART RATE: 89 BPM | DIASTOLIC BLOOD PRESSURE: 84 MMHG | BODY MASS INDEX: 25.05 KG/M2 | HEIGHT: 60 IN | WEIGHT: 127.6 LBS | RESPIRATION RATE: 16 BRPM

## 2024-08-15 LAB
ANION GAP SERPL CALCULATED.3IONS-SCNC: 10 MMOL/L (ref 3–16)
BACTERIA BLD CULT ORG #2: NORMAL
BACTERIA BLD CULT: NORMAL
BASOPHILS # BLD: 0.1 K/UL (ref 0–0.2)
BASOPHILS NFR BLD: 0.6 %
BUN SERPL-MCNC: 8 MG/DL (ref 7–20)
CALCIUM SERPL-MCNC: 8.5 MG/DL (ref 8.3–10.6)
CHLORIDE SERPL-SCNC: 102 MMOL/L (ref 99–110)
CO2 SERPL-SCNC: 24 MMOL/L (ref 21–32)
CREAT SERPL-MCNC: <0.5 MG/DL (ref 0.6–1.2)
DEPRECATED RDW RBC AUTO: 13.7 % (ref 12.4–15.4)
EOSINOPHIL # BLD: 0.3 K/UL (ref 0–0.6)
EOSINOPHIL NFR BLD: 3.2 %
GFR SERPLBLD CREATININE-BSD FMLA CKD-EPI: >90 ML/MIN/{1.73_M2}
GLUCOSE BLD-MCNC: 139 MG/DL (ref 70–99)
GLUCOSE BLD-MCNC: 246 MG/DL (ref 70–99)
GLUCOSE SERPL-MCNC: 160 MG/DL (ref 70–99)
HCT VFR BLD AUTO: 41.8 % (ref 36–48)
HGB BLD-MCNC: 13.9 G/DL (ref 12–16)
LYMPHOCYTES # BLD: 2.2 K/UL (ref 1–5.1)
LYMPHOCYTES NFR BLD: 21 %
MCH RBC QN AUTO: 29.4 PG (ref 26–34)
MCHC RBC AUTO-ENTMCNC: 33.2 G/DL (ref 31–36)
MCV RBC AUTO: 88.8 FL (ref 80–100)
MONOCYTES # BLD: 0.9 K/UL (ref 0–1.3)
MONOCYTES NFR BLD: 8.7 %
NEUTROPHILS # BLD: 6.8 K/UL (ref 1.7–7.7)
NEUTROPHILS NFR BLD: 66.5 %
PERFORMED ON: ABNORMAL
PERFORMED ON: ABNORMAL
PLATELET # BLD AUTO: 334 K/UL (ref 135–450)
PMV BLD AUTO: 8.2 FL (ref 5–10.5)
POTASSIUM SERPL-SCNC: 3.7 MMOL/L (ref 3.5–5.1)
RBC # BLD AUTO: 4.71 M/UL (ref 4–5.2)
SODIUM SERPL-SCNC: 136 MMOL/L (ref 136–145)
WBC # BLD AUTO: 10.2 K/UL (ref 4–11)

## 2024-08-15 PROCEDURE — 6370000000 HC RX 637 (ALT 250 FOR IP): Performed by: INTERNAL MEDICINE

## 2024-08-15 PROCEDURE — 97110 THERAPEUTIC EXERCISES: CPT

## 2024-08-15 PROCEDURE — 85025 COMPLETE CBC W/AUTO DIFF WBC: CPT

## 2024-08-15 PROCEDURE — 36415 COLL VENOUS BLD VENIPUNCTURE: CPT

## 2024-08-15 PROCEDURE — 6360000002 HC RX W HCPCS: Performed by: SURGERY

## 2024-08-15 PROCEDURE — 2580000003 HC RX 258: Performed by: INTERNAL MEDICINE

## 2024-08-15 PROCEDURE — 80048 BASIC METABOLIC PNL TOTAL CA: CPT

## 2024-08-15 PROCEDURE — 6360000002 HC RX W HCPCS: Performed by: INTERNAL MEDICINE

## 2024-08-15 PROCEDURE — 6370000000 HC RX 637 (ALT 250 FOR IP): Performed by: NURSE PRACTITIONER

## 2024-08-15 PROCEDURE — 99232 SBSQ HOSP IP/OBS MODERATE 35: CPT | Performed by: INTERNAL MEDICINE

## 2024-08-15 PROCEDURE — 97530 THERAPEUTIC ACTIVITIES: CPT

## 2024-08-15 RX ORDER — NIFEDIPINE 30 MG/1
90 TABLET, EXTENDED RELEASE ORAL DAILY
Qty: 30 TABLET | Refills: 3 | Status: SHIPPED | OUTPATIENT
Start: 2024-08-16 | End: 2024-08-15

## 2024-08-15 RX ORDER — NIFEDIPINE 30 MG/1
90 TABLET, EXTENDED RELEASE ORAL DAILY
Qty: 30 TABLET | Refills: 3 | Status: SHIPPED | OUTPATIENT
Start: 2024-08-16

## 2024-08-15 RX ORDER — LACTOBACILLUS RHAMNOSUS GG 10B CELL
1 CAPSULE ORAL 2 TIMES DAILY WITH MEALS
Qty: 60 CAPSULE | Refills: 0 | Status: SHIPPED | OUTPATIENT
Start: 2024-08-15 | End: 2024-09-14

## 2024-08-15 RX ORDER — SPIRONOLACTONE 25 MG/1
25 TABLET ORAL DAILY
Qty: 30 TABLET | Refills: 3 | Status: SHIPPED | OUTPATIENT
Start: 2024-08-16 | End: 2024-08-15

## 2024-08-15 RX ORDER — LACTOBACILLUS RHAMNOSUS GG 10B CELL
1 CAPSULE ORAL 2 TIMES DAILY WITH MEALS
Qty: 60 CAPSULE | Refills: 0 | Status: SHIPPED | OUTPATIENT
Start: 2024-08-15 | End: 2024-08-15

## 2024-08-15 RX ORDER — SPIRONOLACTONE 25 MG/1
25 TABLET ORAL DAILY
Qty: 30 TABLET | Refills: 3 | Status: SHIPPED | OUTPATIENT
Start: 2024-08-16

## 2024-08-15 RX ADMIN — LOSARTAN POTASSIUM 100 MG: 100 TABLET, FILM COATED ORAL at 10:00

## 2024-08-15 RX ADMIN — CEFUROXIME AXETIL 500 MG: 250 TABLET ORAL at 09:57

## 2024-08-15 RX ADMIN — LINEZOLID 600 MG: 600 TABLET, FILM COATED ORAL at 10:08

## 2024-08-15 RX ADMIN — ENOXAPARIN SODIUM 40 MG: 100 INJECTION SUBCUTANEOUS at 09:58

## 2024-08-15 RX ADMIN — Medication 1 CAPSULE: at 09:57

## 2024-08-15 RX ADMIN — INSULIN LISPRO 2 UNITS: 100 INJECTION, SOLUTION INTRAVENOUS; SUBCUTANEOUS at 12:02

## 2024-08-15 RX ADMIN — ASPIRIN 325 MG: 325 TABLET ORAL at 09:57

## 2024-08-15 RX ADMIN — NIFEDIPINE 60 MG: 30 TABLET, FILM COATED, EXTENDED RELEASE ORAL at 09:57

## 2024-08-15 RX ADMIN — SPIRONOLACTONE 25 MG: 25 TABLET ORAL at 09:57

## 2024-08-15 RX ADMIN — PANTOPRAZOLE SODIUM 40 MG: 40 INJECTION, POWDER, FOR SOLUTION INTRAVENOUS at 09:58

## 2024-08-15 RX ADMIN — SODIUM CHLORIDE, PRESERVATIVE FREE 10 ML: 5 INJECTION INTRAVENOUS at 09:58

## 2024-08-15 RX ADMIN — INSULIN GLARGINE 50 UNITS: 100 INJECTION, SOLUTION SUBCUTANEOUS at 09:59

## 2024-08-15 ASSESSMENT — PAIN SCALES - WONG BAKER
WONGBAKER_NUMERICALRESPONSE: NO HURT

## 2024-08-15 ASSESSMENT — ENCOUNTER SYMPTOMS
SINUS PRESSURE: 0
CONSTIPATION: 0
COUGH: 0
ABDOMINAL PAIN: 0
EYE DISCHARGE: 0
BACK PAIN: 0
WHEEZING: 0
EYE REDNESS: 0
SINUS PAIN: 0
NAUSEA: 0
DIARRHEA: 0
RHINORRHEA: 0
SHORTNESS OF BREATH: 0

## 2024-08-15 ASSESSMENT — PAIN SCALES - GENERAL
PAINLEVEL_OUTOF10: 0
PAINLEVEL_OUTOF10: 0

## 2024-08-15 ASSESSMENT — PAIN DESCRIPTION - ONSET: ONSET: ON-GOING

## 2024-08-15 ASSESSMENT — PAIN DESCRIPTION - LOCATION: LOCATION: GENERALIZED

## 2024-08-15 ASSESSMENT — PAIN DESCRIPTION - DESCRIPTORS: DESCRIPTORS: ACHING

## 2024-08-15 ASSESSMENT — PAIN - FUNCTIONAL ASSESSMENT: PAIN_FUNCTIONAL_ASSESSMENT: ACTIVITIES ARE NOT PREVENTED

## 2024-08-15 ASSESSMENT — PAIN DESCRIPTION - FREQUENCY: FREQUENCY: INTERMITTENT

## 2024-08-15 NOTE — DISCHARGE SUMMARY
Hospital Medicine Discharge Summary    Patient: Whitney Telles     Gender: female  : 1955   Age: 68 y.o.  MRN: [id number]    Admitting Physician: Jacky Mathew MD  Discharge Physician: Kenna Casiano MD     Code Status: Full Code     Admit Date: 2024   Discharge Date:   8/15/2024    Disposition:  Home  Time spent arranging discharge: 31 minutes    Discharge Diagnoses:    Active Hospital Problems    Diagnosis Date Noted    Diabetes education, encounter for [Z71.89] 2024    S/P laparoscopic cholecystectomy [Z90.49] 2024    Status post endoscopic retrograde cholangiopancreatography [Z98.890] 2024    Transaminitis [R74.01] 2024    Sepsis due to Escherichia coli (HCC) [A41.51] 2024    CCC (chronic calculous cholecystitis) [K80.10] 2024    Hyperglycemia due to diabetes mellitus (HCC) [E11.65] 2024    Symptomatic cholelithiasis [K80.20] 2024    E coli bacteremia [R78.81, B96.20] 2024    Enterococcal bacteremia [R78.81, B95.2] 2024    Hypomagnesemia [E83.42] 2024    Leukocytosis [D72.829] 2024    Calculus of bile duct without cholecystitis with obstruction [K80.51] 2024    Alzheimer's disease, unspecified [G30.9] 2022    Type 2 diabetes mellitus with diabetic mononeuropathy, with long-term current use of insulin (HCC) [E11.41, Z79.4] 2020    Hypertension [I10] 2016    Hypercholesteremia [E78.00] 2016         Condition at Discharge:  Stable    Hospital Course:   Patient admitted to hospital choledocholithiasis with CBD obstruction underwent ERCP with sphincterectomy and stone extraction and then lap cholecystectomy.  Was bacteremic with E. coli Enterococcus infection treated with IV antibiotics discharged on course of oral antibiotics per ID.  Patient had hypertensive urgency HCTZ was discontinued due to electrolyte abnormalities patient started on nifedipine spironolactone and losartan and blood

## 2024-08-15 NOTE — CARE COORDINATION
Discharge Planning Note:    - PT/OT recommends HHC    Met with the patient. HHC options were reviewed and the following referral was placed a the request of the patient:    - [facility] - ACCEPTED    Will continue to follow.    DESTINI Mello RN    [facility]  Phone: [phone]

## 2024-08-15 NOTE — SIGNIFICANT EVENT
APRN notified by RN of patient IV site being lost.  Patient refuses reinsertion of IV.  -Per review of notes, discharge plan per ID is to switch IV linezolid to p.o. linezolid 600 mg every 4 hours for VRE coverage at discharge  -IV ceftriaxone to switch to oral Ceftin 500 mg every 12 hours at discharge for E. coli coverage  -Repeat blood cultures are no growth to date.  It appears discharge is eminent pending final analysis of repeat blood cultures  -Given the loss of IV site, we will stop IV antibiotics and start oral linezolid and Ceftin tonight  -Further pending reeval by attending in a..    ZACHARY Holley - NP

## 2024-08-15 NOTE — PLAN OF CARE
Problem: Discharge Planning  Goal: Discharge to home or other facility with appropriate resources  8/15/2024 1111 by Henrik Jung RN  Outcome: Progressing  8/15/2024 0240 by Therese Martinez RN  Outcome: Progressing     Problem: Safety - Adult  Goal: Free from fall injury  8/15/2024 1111 by Henrik Jung RN  Outcome: Progressing  8/15/2024 0240 by Therese Martinez RN  Outcome: Progressing     Problem: Chronic Conditions and Co-morbidities  Goal: Patient's chronic conditions and co-morbidity symptoms are monitored and maintained or improved  8/15/2024 1111 by Henrik Jung RN  Outcome: Progressing  8/15/2024 0240 by Therese Martinez RN  Outcome: Progressing     Problem: Pain  Goal: Verbalizes/displays adequate comfort level or baseline comfort level  8/15/2024 1111 by Henrik Jung RN  Outcome: Progressing  8/15/2024 0240 by Therese Martinez RN  Outcome: Progressing

## 2024-08-15 NOTE — PROGRESS NOTES
[facility] - Inpatient Rehabilitation Department   Phone: [phone]    Occupational Therapy    [] Initial Evaluation            [x] Daily Treatment Note         [] Discharge Summary      Patient: Whitney Telles   : 1955   MRN: [id number]   Date of Service:  8/15/2024    Admitting Diagnosis:  Calculus of bile duct without cholecystitis with obstruction  Current Admission Summary:   Chest Pain        Pt via EMS from home, c/o sudden onset chest pain that moves to the back, hurts to move, pt with episode of emesis on arrival          HISTORY OF PRESENT ILLNESS: 1 or more Elements      History from : Patient and EMS     Limitations to history : None     Whitney Telles is a 68 y.o. female who presents for chest pain that radiates directly to the back sharp and stabbing in nature started just prior to arrival.  Patient had an episode of vomiting immediately upon arrival to the ER.  Patient has history of diabetes, hypertension, hyperlipidemia.  She admits that she does not take her medication regularly.  She has not taken any of her blood pressure medication.  Patient states nothing seems to make her pain better or worse.  Somewhat improved at this time.  Pain radiates from her epigastrium to the center of her chest back to her back.    Date of Procedure: 2024   Pre-Op Diagnosis Codes:     * Choledocholithiasis [K80.50]   Post-Op Diagnosis: Same     Procedure(s):  ENDOSCOPIC RETROGRADE CHOLANGIOPANCREATOGRAPHY SPHINCTER/PAPILLOTOMY  ENDOSCOPIC RETROGRADE CHOLANGIOPANCREATOGRAPHY STONE REMOVAL  An RuffWireALT Model D Single-Use Duodenoscope () was used      Past Medical History:  has a past medical history of Diabetes mellitus (HCC), Headache(784.0), Herniated disc, cervical, Hypercholesteremia, Hypertension, Intestinal malabsorption, Iron deficiency anemia, unspecified, Memory loss, Osteopenia of neck of left femur, Pneumonia due to 2019 novel coronavirus, Psychiatric problem, and Type 2

## 2024-08-15 NOTE — PROGRESS NOTES
Physical Therapy             Whitney Telles    PT treatment session attempted.  The pt refused at this time as she recently worked with OT and then was getting positioned in the chair by PCA.  She stated she walked enough this morning and does not want to do PT at this time.  PT will follow-up with this pt as the schedule allows.  Thanks.  Nga Travis, PT DPT [id number]

## 2024-08-15 NOTE — PLAN OF CARE
Problem: Discharge Planning  Goal: Discharge to home or other facility with appropriate resources  8/15/2024 0240 by Therese Martinez RN  Outcome: Progressing       Problem: Safety - Adult  Goal: Free from fall injury  8/15/2024 0240 by Therese Martinez RN  Outcome: Progressing       Problem: Chronic Conditions and Co-morbidities  Goal: Patient's chronic conditions and co-morbidity symptoms are monitored and maintained or improved  8/15/2024 0240 by Therese Martinez RN  Outcome: Progressing       Problem: Pain  Goal: Verbalizes/displays adequate comfort level or baseline comfort level  8/15/2024 0240 by Therese Martinez RN  Outcome: Progressing

## 2024-08-15 NOTE — CARE COORDINATION
Case Management -  Discharge Note      Patient Name: Whitney Telles                   YOB: 1955            Readmission Risk (Low < 19, Mod (19-27), High > 27): Readmission Risk Score: 8    Current PCP: Parminder Banda MD      Date: 08/15/2024    PT AM-PAC: 18 /24  OT AM-PAC: 23 /24    Patient/patient representative has been educated on the benefits of HHC as well as the possible risks of declining recommended services. Patient/patient representative has acknowledged the information provided and decided on the following discharge plan. Patient/ patient representative has been provided freedom of choice regarding service provider, supported by basic dialogue that supports the patient's individualized plan of care/goals.    Home Care Information:       Home Care Agency:     FACILITY:     [facility]  ADDRESS:   [address] [place] [address]   PHONE:        [phone]  FAX:              [phone]              Services: RN / PT / OT    Home Health Order Obtained: yes      Home health agency notified of discharge: yes      Financial    Payor: MEDICARE / Plan: MEDICARE PART A AND B / Product Type: *No Product type* /     Pharmacy:  Potential assistance Purchasing Medications:    Meds-to-Beds request: No      [facility] [place] [facility], [place] - [address] - P [phone] -  [phone]  [address] [facility] [address]  Phone: [phone] Fax: [phone]    [facility] [place], [place] - [address] - P [phone] - F [phone]  [address] [facility] [address]  Phone: [phone] Fax: [phone]    [facility] [id number] [place], [place] - [address] - P [phone] - F [phone]  [address] [facility] [address]  Phone: [phone] Fax: [phone]    [id number] [address] [phone] - F

## 2024-08-15 NOTE — PROGRESS NOTES
Patient alert to self and place. Some confusion and forgetfulness. Daughter Gaviota bedside, very helpful and supportive. Assessment complete. VSSA with no c/o pain or discomfort. All evening medications given. All questions and needs addressed. Bed locked, in lowest position with alarm on. Bedside table and call light within reach. Plan of care ongoing.

## 2024-08-15 NOTE — PROGRESS NOTES
Infectious Diseases   Progress Note      Admission Date: 8/8/2024  Hospital Day: Hospital Day: 8   Attending: No att. providers found  Date of service: 8/15/2024     Chief complaint/ Reason for consult:     Sepsis on admission with leukocytosis, tachycardia, tachypnea and lactic acidosis with elevated lactate of 2.9  Bacteremia with E. coli and vancomycin-resistant Enterococcus gallinarium  Choledocholithiasis, s/p ERCP on 8/9/2024  Cholelithiasis; status post laparoscopic cholecystectomy with cholangiogram on 8/10/2024  Transaminitis with elevated AST and ALT    Microbiology:        I have reviewed allavailable micro lab data and cultures    Blood culture (2/2) - collected on 8/8/2024: E. coli, Enterococcus    Susceptibility    Escherichia coli (2)      Antibiotic Interpretation Microscan  Method Status    ampicillin Resistant >=32 mcg/mL BACTERIAL SUSCEPTIBILITY PANEL BY MICKY     ampicillin-sulbactam Resistant >=32 mcg/mL BACTERIAL SUSCEPTIBILITY PANEL BY MICKY     ceFAZolin Sensitive <=4 mcg/mL BACTERIAL SUSCEPTIBILITY PANEL BY MICKY     cefepime Sensitive <=0.12 mcg/mL BACTERIAL SUSCEPTIBILITY PANEL BY MICKY     cefTRIAXone Sensitive <=0.25 mcg/mL BACTERIAL SUSCEPTIBILITY PANEL BY MICKY     ciprofloxacin Sensitive <=0.25 mcg/mL BACTERIAL SUSCEPTIBILITY PANEL BY MICKY     ertapenem Sensitive <=0.12 mcg/mL BACTERIAL SUSCEPTIBILITY PANEL BY MICKY     gentamicin Sensitive <=1 mcg/mL BACTERIAL SUSCEPTIBILITY PANEL BY MICKY     levofloxacin Sensitive <=0.12 mcg/mL BACTERIAL SUSCEPTIBILITY PANEL BY MICKY     piperacillin-tazobactam Sensitive <=4 mcg/mL BACTERIAL SUSCEPTIBILITY PANEL BY MICKY     trimethoprim-sulfamethoxazole Sensitive <=20 mcg/mL BACTERIAL SUSCEPTIBILITY PANEL BY MICKY             Susceptibility    Enterococcus gallinarum (2)    Antibiotic Interpretation Microscan  Method Status    ampicillin Sensitive <=2 mcg/mL BACTERIAL SUSCEPTIBILITY PANEL BY MICKY     gentamicin-syn Sensitive SYN-S mcg/mL BACTERIAL SUSCEPTIBILITY  VACCINATION AND LAB RESULT RECORDS:     Internal Administration   First Dose COVID-19, PFIZER PURPLE top, DILUTE for use, (age 12 y+), 30mcg/0.3mL  05/01/2021   Second Dose COVID-19, PFIZER PURPLE top, DILUTE for use, (age 12 y+), 30mcg/0.3mL   05/22/2021       Last COVID Lab SARS-CoV-2 (no units)   Date Value   12/04/2020 Not Detected     SARS-CoV-2, PCR (no units)   Date Value   03/28/2023 Not Detected            Assessment:     The patient is a 68 y.o. old female who  has a past medical history of Diabetes mellitus ([facility]), Headache(784.0), Herniated disc, cervical, Hypercholesteremia, Hypertension, Intestinal malabsorption (10/22/2020), Iron deficiency anemia, unspecified (10/22/2020), Memory loss, Osteopenia of neck of left femur, Pneumonia due to 2019 novel coronavirus (07/2020), Psychiatric problem, and Type 2 diabetes mellitus without complication ([facility]). with following problems:    Sepsis on admission with leukocytosis, tachycardia, tachypnea and lactic acidosis with elevated lactate of 2.9-this is resolved  Bacteremia with E. coli and vancomycin-resistant Enterococcus gallinarium-covered with Ceftin and linezolid, respectively  Choledocholithiasis, s/p ERCP on 8/9/2024  Cholelithiasis; status post laparoscopic cholecystectomy with cholangiogram on 8/10/2024  Transaminitis with elevated AST and ALT-has shown improvement  Essential hypertension-BP controlled  Mixed hyperlipidemia  Type 2 diabetes mellitus-maintain good glucose control  Overweight due to excess calorie intake : Body mass index is 27.67 kg/m².      Discussion:      The patient is afebrile.  The patient is on linezolid and cefepime.    She is tolerating the antibiotics okay.    No diarrhea.    Serum creatinine 0.5 today.    White cell count is 10,200 with hemoglobin of 13.9 and platelet count of 334,000 today.        Plan:     Diagnostic Workup:      Continue to follow  fever curve, WBC count and blood cultures.  Continue to monitor blood counts,

## 2024-08-15 NOTE — PROGRESS NOTES
Nutrition Note    RECOMMENDATIONS  PO Diet: Added 4 carb choice modifier  ONS: Ordered Glucerna once daily    ASSESSMENT   Pt triggered for LOS assessment. On regular diet with documented meal intakes of >50% throughout admission. Upon visiting, pt reported decreased appetite while here but no issues with appetite/po intake or unintentional wt loss prior to current admission. Wt hx in EMR indicates no significant wt changes. Would like to receive ONS to offer additional nutrition. RD will monitor for adequate po intake.     Malnutrition Status: No malnutrition  Acute Illness    NUTRITION DIAGNOSIS   Inadequate oral intake related to acute injury/trauma as evidenced by  (pt report of decreased appetite)    Goals: PO intake 75% or greater, prior to discharge     NUTRITION RELATED FINDINGS  Objective: Labs reviewed. POCG 139-246. LBM 8/14. No edema noted.  Wounds: None    CURRENT NUTRITION THERAPIES  ADULT DIET; Regular     PO Intake: 51-75%, %   PO Supplement Intake:None Ordered    ANTHROPOMETRICS  Current Height: 152.4 cm (5')  Current Weight - Scale: 57.9 kg (127 lb 9.6 oz)    Ideal Body Weight (IBW): 100 lbs  (45 kg)      BMI: 24.9    COMPARATIVE STANDARDS  Total Energy Requirements (kcals/day): 2706-8603     Protein (g):  58-70       Fluid (mL/day):  1500    EDUCATION  Education not indicated     The patient will be monitored per nutrition standards of care. Consult dietitian if additional nutrition interventions are needed prior to RD reassessment.     Kylie Garcia MS, RD, LD    Contact: [phone]

## 2024-08-15 NOTE — PROGRESS NOTES
CLINICAL PHARMACY NOTE: MEDS TO BEDS    Total # of Prescriptions Filled: 2   The following medications were delivered to the patient:  CEFUROXIME AXETIL 500MG  LINEZOLID 600MG    Additional Documentation:  Patient picked up in outpatient pharmacy = signed  Karin Middleton - Pharmacy Tech.

## 2024-08-16 ENCOUNTER — CARE COORDINATION (OUTPATIENT)
Dept: CASE MANAGEMENT | Age: 69
End: 2024-08-16

## 2024-08-16 DIAGNOSIS — K80.20 SYMPTOMATIC CHOLELITHIASIS: Primary | ICD-10-CM

## 2024-08-16 PROCEDURE — 1111F DSCHRG MED/CURRENT MED MERGE: CPT | Performed by: STUDENT IN AN ORGANIZED HEALTH CARE EDUCATION/TRAINING PROGRAM

## 2024-08-16 NOTE — CARE COORDINATION
your home?: No  Care Transitions Interventions          Follow Up Appointment:   Discussed follow up appointments. Patient has hospital follow up appointment scheduled within 14 days of discharge.   Future Appointments         Provider Specialty Dept Phone    8/26/2024 1:15 PM Parminder Banda MD Internal Medicine [phone]    8/30/2024 3:30 PM Parminder Banda MD Internal Medicine [phone]            Care Transition Nurse provided contact information.  Plan for follow-up call in 2-5 days based on severity of symptoms and risk factors.  Plan for next call: symptom management-.  self management-.  follow-up appointment-.      ALESHIA QUICK RN

## 2024-08-16 NOTE — CARE COORDINATION
CTN contacted Tawanda with [facility] and confirmed that orders for HHC were received.

## 2024-08-16 NOTE — CARE COORDINATION
Care Transitions Note    Initial Call - Call within 2 business days of discharge: Yes    Attempted to reach patient for transitions of care follow up. Unable to reach patient.    Outreach Attempts:   HIPAA compliant voicemail left for patient.     Patient: Whitney Telles    Patient : 1955   MRN: [id number]    Reason for Admission: choledocholithiasis with CBD obstruction underwent ERCP with sphincterectomy and stone extraction and then lap cholecystectomy   Discharge Date: 8/15/24  RURS: Readmission Risk Score: 7.5    Last Discharge Facility       Date Complaint Diagnosis Description Type Department Provider    24 Chest Pain Calculus of bile duct without cholecystitis with obstruction ... ED to Hosp-Admission (Discharged) (ADMITTED) [facility]T Kenna Casiano MD; [facility]            Was this an external facility discharge? No    Follow Up Appointment:     Future Appointments         Provider Specialty Dept Phone    2024 3:30 PM Parminder Banda MD Internal Medicine [phone]            Plan for follow-up on next business day.      ALESHIA QUICK RN

## 2024-08-20 ENCOUNTER — CARE COORDINATION (OUTPATIENT)
Dept: CASE MANAGEMENT | Age: 69
End: 2024-08-20

## 2024-08-20 NOTE — CARE COORDINATION
Care Transitions Note    Follow Up Call     Attempted to reach patient for transitions of care follow up.  Unable to reach patient.      Outreach Attempts:   HIPAA compliant voicemail left for patient.     Care Summary Note: Follow up outreach call attempt, no answer.  CTN left VM with contact information and request for return call.  CTN will continue with outreach call attempts.      Follow Up Appointment:   Future Appointments         Provider Specialty Dept Phone    8/26/2024 1:15 PM Parminder Banda MD Internal Medicine [phone]    8/28/2024 9:30 AM Geovani Cano MD General Surgery [phone]    8/30/2024 3:30 PM Parminder Banda MD Internal Medicine [phone]            Plan for follow-up call in 2-5 days based on severity of symptoms and risk factors. Plan for next call: symptom management-.  self management-.  follow-up appointment-.    ALESHIA QUICK RN

## 2024-08-27 ENCOUNTER — CARE COORDINATION (OUTPATIENT)
Dept: CASE MANAGEMENT | Age: 69
End: 2024-08-27

## 2024-08-27 NOTE — CARE COORDINATION
Care Transitions Note    Follow Up Call     Attempted to reach patient for transitions of care follow up.  Unable to reach patient.      Outreach Attempts:   HIPAA compliant voicemail left for patient.     Care Summary Note: Second & final follow up outreach call attempt, no answer.  Unable to leave VM.  CTN will close program & remain available.      Follow Up Appointment:   Future Appointments         Provider Specialty Dept Phone    8/28/2024 9:30 AM Geovani Cano MD General Surgery [phone]    11/29/2024 2:00 PM Parminder Banda MD Internal Medicine [phone]                ALESHIA QUICK RN

## 2024-08-28 ENCOUNTER — OFFICE VISIT (OUTPATIENT)
Dept: SURGERY | Age: 69
End: 2024-08-28

## 2024-08-28 VITALS — DIASTOLIC BLOOD PRESSURE: 74 MMHG | WEIGHT: 129.2 LBS | BODY MASS INDEX: 25.23 KG/M2 | SYSTOLIC BLOOD PRESSURE: 132 MMHG

## 2024-08-28 DIAGNOSIS — K80.10 CCC (CHRONIC CALCULOUS CHOLECYSTITIS): Primary | ICD-10-CM

## 2024-08-28 PROCEDURE — 99024 POSTOP FOLLOW-UP VISIT: CPT | Performed by: SURGERY

## 2024-08-28 NOTE — PROGRESS NOTES
Subjective   Patient ID: Whitney Telles is a 68 y.o. female.    HPI    Review of Systems       Objective   Physical Exam   Abdomen soft  Incisions healing well    Assessment   68-year-old female status post laparoscopic cholecystectomy.  Pathology shows chronic cholecystitis with cholelithiasis.  Intraoperative cholangiogram was unremarkable.  She is doing well postoperatively.      Plan   Follow-up as needed.        Geovani Cano MD

## 2025-01-13 ENCOUNTER — HOSPITAL ENCOUNTER (OUTPATIENT)
Dept: GENERAL RADIOLOGY | Age: 70
Discharge: HOME | End: 2025-01-13
Payer: MEDICARE

## 2025-01-13 ENCOUNTER — HOSPITAL ENCOUNTER (OUTPATIENT)
Age: 70
Discharge: HOME | End: 2025-01-13
Payer: MEDICARE

## 2025-01-13 DIAGNOSIS — M25.562 ACUTE PAIN OF LEFT KNEE: ICD-10-CM

## 2025-01-13 PROCEDURE — 73560 X-RAY EXAM OF KNEE 1 OR 2: CPT

## 2025-03-17 ENCOUNTER — APPOINTMENT (OUTPATIENT)
Dept: CT IMAGING | Age: 70
End: 2025-03-17
Payer: MEDICARE

## 2025-03-17 ENCOUNTER — OFFICE VISIT (OUTPATIENT)
Dept: ORTHOPEDIC SURGERY | Age: 70
End: 2025-03-17
Payer: MEDICARE

## 2025-03-17 ENCOUNTER — HOSPITAL ENCOUNTER (EMERGENCY)
Age: 70
Discharge: HOME | End: 2025-03-17
Attending: EMERGENCY MEDICINE
Payer: MEDICARE

## 2025-03-17 VITALS
SYSTOLIC BLOOD PRESSURE: 128 MMHG | RESPIRATION RATE: 18 BRPM | TEMPERATURE: 97.8 F | HEART RATE: 79 BPM | OXYGEN SATURATION: 94 % | DIASTOLIC BLOOD PRESSURE: 85 MMHG | WEIGHT: 128.7 LBS | BODY MASS INDEX: 25.27 KG/M2 | HEIGHT: 60 IN

## 2025-03-17 VITALS — HEIGHT: 61 IN | WEIGHT: 130 LBS | BODY MASS INDEX: 24.55 KG/M2

## 2025-03-17 DIAGNOSIS — M17.12 PRIMARY OSTEOARTHRITIS OF LEFT KNEE: Primary | ICD-10-CM

## 2025-03-17 DIAGNOSIS — K57.90 DIVERTICULOSIS: ICD-10-CM

## 2025-03-17 DIAGNOSIS — K62.5 RECTAL BLEEDING: Primary | ICD-10-CM

## 2025-03-17 LAB
ALBUMIN SERPL-MCNC: 4 G/DL (ref 3.4–5)
ALBUMIN/GLOB SERPL: 1.1 {RATIO} (ref 1.1–2.2)
ALP SERPL-CCNC: 51 U/L (ref 40–129)
ALT SERPL-CCNC: 12 U/L (ref 10–40)
ANION GAP SERPL CALCULATED.3IONS-SCNC: 10 MMOL/L (ref 3–16)
APTT BLD: 28.8 SEC (ref 22.1–36.4)
AST SERPL-CCNC: 15 U/L (ref 15–37)
BASOPHILS # BLD: 0 K/UL (ref 0–0.2)
BASOPHILS NFR BLD: 0.6 %
BILIRUB SERPL-MCNC: 0.3 MG/DL (ref 0–1)
BUN SERPL-MCNC: 16 MG/DL (ref 7–20)
CALCIUM SERPL-MCNC: 9.3 MG/DL (ref 8.3–10.6)
CHLORIDE SERPL-SCNC: 102 MMOL/L (ref 99–110)
CO2 SERPL-SCNC: 25 MMOL/L (ref 21–32)
CREAT SERPL-MCNC: 0.8 MG/DL (ref 0.6–1.2)
DEPRECATED RDW RBC AUTO: 13.1 % (ref 12.4–15.4)
EOSINOPHIL # BLD: 0.2 K/UL (ref 0–0.6)
EOSINOPHIL NFR BLD: 3.1 %
GFR SERPLBLD CREATININE-BSD FMLA CKD-EPI: 78 ML/MIN/{1.73_M2}
GLUCOSE SERPL-MCNC: 290 MG/DL (ref 70–99)
HCT VFR BLD AUTO: 36.8 % (ref 36–48)
HEMOCCULT STL QL: ABNORMAL
HGB BLD-MCNC: 12.2 G/DL (ref 12–16)
INR PPP: 0.91 (ref 0.85–1.15)
LYMPHOCYTES # BLD: 1.7 K/UL (ref 1–5.1)
LYMPHOCYTES NFR BLD: 23.8 %
MCH RBC QN AUTO: 30.1 PG (ref 26–34)
MCHC RBC AUTO-ENTMCNC: 33.3 G/DL (ref 31–36)
MCV RBC AUTO: 90.4 FL (ref 80–100)
MONOCYTES # BLD: 0.6 K/UL (ref 0–1.3)
MONOCYTES NFR BLD: 8.1 %
NEUTROPHILS # BLD: 4.7 K/UL (ref 1.7–7.7)
NEUTROPHILS NFR BLD: 64.4 %
PLATELET # BLD AUTO: 233 K/UL (ref 135–450)
PMV BLD AUTO: 8.8 FL (ref 5–10.5)
POTASSIUM SERPL-SCNC: 4.6 MMOL/L (ref 3.5–5.1)
PROT SERPL-MCNC: 7.7 G/DL (ref 6.4–8.2)
PROTHROMBIN TIME: 12.5 SEC (ref 11.9–14.9)
RBC # BLD AUTO: 4.07 M/UL (ref 4–5.2)
SODIUM SERPL-SCNC: 137 MMOL/L (ref 136–145)
WBC # BLD AUTO: 7.3 K/UL (ref 4–11)

## 2025-03-17 PROCEDURE — 99285 EMERGENCY DEPT VISIT HI MDM: CPT

## 2025-03-17 PROCEDURE — 85025 COMPLETE CBC W/AUTO DIFF WBC: CPT

## 2025-03-17 PROCEDURE — 74174 CTA ABD&PLVS W/CONTRAST: CPT

## 2025-03-17 PROCEDURE — 1036F TOBACCO NON-USER: CPT | Performed by: ORTHOPAEDIC SURGERY

## 2025-03-17 PROCEDURE — 99203 OFFICE O/P NEW LOW 30 MIN: CPT | Performed by: ORTHOPAEDIC SURGERY

## 2025-03-17 PROCEDURE — 80053 COMPREHEN METABOLIC PANEL: CPT

## 2025-03-17 PROCEDURE — 85730 THROMBOPLASTIN TIME PARTIAL: CPT

## 2025-03-17 PROCEDURE — 85610 PROTHROMBIN TIME: CPT

## 2025-03-17 PROCEDURE — G8428 CUR MEDS NOT DOCUMENT: HCPCS | Performed by: ORTHOPAEDIC SURGERY

## 2025-03-17 PROCEDURE — 3017F COLORECTAL CA SCREEN DOC REV: CPT | Performed by: ORTHOPAEDIC SURGERY

## 2025-03-17 PROCEDURE — 1090F PRES/ABSN URINE INCON ASSESS: CPT | Performed by: ORTHOPAEDIC SURGERY

## 2025-03-17 PROCEDURE — 82270 OCCULT BLOOD FECES: CPT

## 2025-03-17 PROCEDURE — G8399 PT W/DXA RESULTS DOCUMENT: HCPCS | Performed by: ORTHOPAEDIC SURGERY

## 2025-03-17 PROCEDURE — 1123F ACP DISCUSS/DSCN MKR DOCD: CPT | Performed by: ORTHOPAEDIC SURGERY

## 2025-03-17 PROCEDURE — G8419 CALC BMI OUT NRM PARAM NOF/U: HCPCS | Performed by: ORTHOPAEDIC SURGERY

## 2025-03-17 PROCEDURE — 1125F AMNT PAIN NOTED PAIN PRSNT: CPT | Performed by: ORTHOPAEDIC SURGERY

## 2025-03-17 PROCEDURE — 1159F MED LIST DOCD IN RCRD: CPT | Performed by: ORTHOPAEDIC SURGERY

## 2025-03-17 PROCEDURE — 6360000004 HC RX CONTRAST MEDICATION: Performed by: EMERGENCY MEDICINE

## 2025-03-17 RX ORDER — IOPAMIDOL 755 MG/ML
75 INJECTION, SOLUTION INTRAVASCULAR
Status: COMPLETED | OUTPATIENT
Start: 2025-03-17 | End: 2025-03-17

## 2025-03-17 RX ADMIN — IOPAMIDOL 75 ML: 755 INJECTION, SOLUTION INTRAVENOUS at 16:27

## 2025-03-17 ASSESSMENT — PAIN SCALES - GENERAL: PAINLEVEL_OUTOF10: 10

## 2025-03-17 ASSESSMENT — PAIN - FUNCTIONAL ASSESSMENT: PAIN_FUNCTIONAL_ASSESSMENT: 0-10

## 2025-03-17 NOTE — ED PROVIDER NOTES
DIAGNOSTIC performed by Juan Espinoza Jr., DO    ERCP N/A 8/9/2024    ENDOSCOPIC RETROGRADE CHOLANGIOPANCREATOGRAPHY SPHINCTER/PAPILLOTOMY performed by Jax Ba MD at [facility] ENDOSCOPY    ERCP N/A 8/9/2024    ENDOSCOPIC RETROGRADE CHOLANGIOPANCREATOGRAPHY STONE REMOVAL performed by Jax Ba MD at [facility] ENDOSCOPY    KIDNEY STONE SURGERY      SHOULDER SURGERY Left     TUBAL LIGATION      UPPER GASTROINTESTINAL ENDOSCOPY N/A 02/17/2021    EGD BIOPSY performed by Juan Espinoza Jr., DO at [facility] ENDOSCOPY     MEDICATIONS:  No current facility-administered medications on file prior to encounter.     Current Outpatient Medications on File Prior to Encounter   Medication Sig Dispense Refill    atorvastatin (LIPITOR) 40 MG tablet TAKE 1 TABLET BY MOUTH DAILY FOR HEART AND CHOLESTEROL 90 tablet 2    NIFEdipine (PROCARDIA XL) 90 MG extended release tablet Take 1 tablet by mouth daily 90 tablet 2    spironolactone (ALDACTONE) 25 MG tablet Take 1 tablet by mouth daily 30 tablet 3    aspirin 325 MG tablet Take 1 tablet by mouth daily 30 tablet 3    Insulin Pen Needle 31G X 5 MM MISC 1 each by Does not apply route daily 300 each 3    diclofenac sodium (VOLTAREN) 1 % GEL Apply 4 g topically 4 times daily 150 g 1    insulin glargine (LANTUS SOLOSTAR) 100 UNIT/ML injection pen Inject 54 Units into the skin daily 54 mL 1    donepezil (ARICEPT) 10 MG tablet Take 1 tablet by mouth nightly 90 tablet 1    gabapentin (NEURONTIN) 300 MG capsule TAKE 1 CAPSULE BY MOUTH THREE TIMES DAILY AS NEEDED FOR NERVE PAIN 90 capsule 1    losartan (COZAAR) 100 MG tablet Take 1 tablet by mouth daily 90 tablet 1    metFORMIN (GLUCOPHAGE) 1000 MG tablet Take 1 tablet by mouth daily (with breakfast) 90 tablet 1    traZODone (DESYREL) 100 MG tablet Take 1 tablet by mouth nightly as needed for Sleep 90 tablet 1    vitamin B-12 (CYANOCOBALAMIN) 1000 MCG tablet Take 1 tablet by mouth daily 90 tablet 0    FreeStyle Lancets

## 2025-03-17 NOTE — PROGRESS NOTES
Dr Kaplan, who personally examined the patient and reviewed the plan.      This dictation was performed with a verbal recognition program (DRAGON) and it was checked for errors.  It is possible that there are still dictated errors within this office note.  If so, please bring any errors to my attention for an addendum.  All efforts were made to ensure that this office note is accurate.

## 2025-04-21 ENCOUNTER — HOSPITAL ENCOUNTER (OUTPATIENT)
Dept: MRI IMAGING | Age: 70
Discharge: HOME | End: 2025-04-21
Attending: ORTHOPAEDIC SURGERY
Payer: MEDICARE

## 2025-04-21 DIAGNOSIS — M17.12 PRIMARY OSTEOARTHRITIS OF LEFT KNEE: ICD-10-CM

## 2025-04-21 PROCEDURE — 73721 MRI JNT OF LWR EXTRE W/O DYE: CPT

## (undated) DEVICE — SYRINGE MED 50ML LUERLOCK TIP

## (undated) DEVICE — SYRINGE MED 30ML STD CLR PLAS LUERLOCK TIP N CTRL DISP

## (undated) DEVICE — SPONGE,LAP,4"X18",XR,ST,5/PK,40PK/CS: Brand: MEDLINE INDUSTRIES, INC.

## (undated) DEVICE — SET EXTN PRIMING 4.9ML L30IN INCL SLDE CLMP SPIN M LUERLOCK

## (undated) DEVICE — COVER LT HNDL BLU PLAS

## (undated) DEVICE — POSITIONER HD W8XH4XL8.5IN RASPBERRY FOAM SLT

## (undated) DEVICE — RETRIEVAL BALLOON CATHETER: Brand: EXTRACTOR™ PRO XL

## (undated) DEVICE — INSUFFLATION NEEDLE TO ESTABLISH PNEUMOPERITONEUM.: Brand: INSUFFLATION NEEDLE

## (undated) DEVICE — SINGLE USE DISTAL COVER MAJ-2315: Brand: SINGLE USE DISTAL COVER

## (undated) DEVICE — APPLICATOR MEDICATED 26 CC SOLUTION HI LT ORNG CHLORAPREP

## (undated) DEVICE — DUODENOSCOPE FLX MODEL D AUTOCAP RX EXALT

## (undated) DEVICE — LIQUIBAND RAPID ADHESIVE 36/CS 0.8ML: Brand: MEDLINE

## (undated) DEVICE — CATHETER CHOLANGIOGRAM 4.5 FRX3 IN W/ 20018M55 TAUT INTRO

## (undated) DEVICE — SUTURE VICRYL + SZ 0 L27IN CT 3 ABSRB VCP329H

## (undated) DEVICE — SYRINGE, LUER LOCK, 10ML: Brand: MEDLINE

## (undated) DEVICE — AIR/WATER CLEANING ADAPTER FOR OLYMPUS® GI ENDOSCOPE: Brand: BULLDOG®

## (undated) DEVICE — CATHETER CHOLGM 4.5FR L18IN W/ MTL SUPP TB

## (undated) DEVICE — TISSUE RETRIEVAL SYSTEM: Brand: INZII RETRIEVAL SYSTEM

## (undated) DEVICE — STERILE POLYISOPRENE POWDER-FREE SURGICAL GLOVES: Brand: PROTEXIS

## (undated) DEVICE — LAP CHOLE: Brand: MEDLINE INDUSTRIES, INC.

## (undated) DEVICE — CONTAINER DENT 8OZ AQUA W/ LID

## (undated) DEVICE — FORCEPS BX 240CM 2.4MM L NDL RAD JAW 4 M00513334

## (undated) DEVICE — BW-412T DISP COMBO CLEANING BRUSH: Brand: SINGLE USE COMBINATION CLEANING BRUSH

## (undated) DEVICE — MEMORY EIGHT WIRE BASKET: Brand: MEMORY BASKET

## (undated) DEVICE — ELECTRODE PT RET AD L9FT HI MOIST COND ADH HYDRGEL CORDED

## (undated) DEVICE — CANNULATING SPHINCTEROTOME: Brand: TRUETOME DREAMWIRE 44

## (undated) DEVICE — SOLUTION IRRIG 1000ML 0.9% SOD CHL USP POUR PLAS BTL

## (undated) DEVICE — LAPAROSCOPIC SCISSORS: Brand: EPIX LAPAROSCOPIC SCISSORS

## (undated) DEVICE — TROCAR: Brand: KII® SLEEVE

## (undated) DEVICE — ENDOSCOPIC KIT 6X3/16 FT COLON W/ 1.1 OZ 2 GWN W/O BRSH

## (undated) DEVICE — BAG U-BAG PLASTIC 10OZ MICROPORE ADHES

## (undated) DEVICE — APPLIER CLP M/L SHFT DIA5MM 15 LIG LIGAMAX 5

## (undated) DEVICE — SUTURE ABSORBABLE L 18 IN SZ 4-0 NDL L 19 MM POLYGLACTIN 910 36/BX

## (undated) DEVICE — Device: Brand: SINGLE USE SOFT BRUSH

## (undated) DEVICE — SNARE ENDOSCP 11 MM BRAIDED WIRE CAPTFLX DISP

## (undated) DEVICE — SINGLE USE AIR/WATER, SUCTION AND BIOPSY VALVES SET: Brand: ORCAPOD™

## (undated) DEVICE — SOLUTION IRRIG 500ML STRL H2O NONPYROGENIC

## (undated) DEVICE — TROCAR: Brand: KII FIOS FIRST ENTRY

## (undated) DEVICE — BLOCK BITE 48FR DENT RIM CAREGUARD